# Patient Record
Sex: MALE | Race: WHITE | Employment: FULL TIME | URBAN - NONMETROPOLITAN AREA
[De-identification: names, ages, dates, MRNs, and addresses within clinical notes are randomized per-mention and may not be internally consistent; named-entity substitution may affect disease eponyms.]

---

## 2017-01-25 DIAGNOSIS — E11.65 TYPE 2 DIABETES MELLITUS WITH HYPERGLYCEMIA, WITHOUT LONG-TERM CURRENT USE OF INSULIN (HCC): ICD-10-CM

## 2017-03-04 PROBLEM — R05.9 COUGH: Status: ACTIVE | Noted: 2017-03-04

## 2017-03-08 RX ORDER — SPIRONOLACTONE 25 MG/1
25 TABLET ORAL DAILY
Qty: 90 TABLET | Refills: 3 | Status: SHIPPED | OUTPATIENT
Start: 2017-03-08 | End: 2018-03-02 | Stop reason: SDUPTHER

## 2017-03-08 RX ORDER — VALSARTAN 160 MG/1
160 TABLET ORAL DAILY
Qty: 90 TABLET | Refills: 3 | Status: SHIPPED | OUTPATIENT
Start: 2017-03-08 | End: 2017-12-21

## 2017-03-13 RX ORDER — CARVEDILOL 25 MG/1
TABLET ORAL
Qty: 60 TABLET | Refills: 2 | Status: ON HOLD | OUTPATIENT
Start: 2017-03-13 | End: 2017-03-16 | Stop reason: HOSPADM

## 2017-03-15 PROBLEM — R63.5 WEIGHT GAIN: Status: ACTIVE | Noted: 2017-03-15

## 2017-03-15 PROBLEM — I50.23 ACUTE ON CHRONIC SYSTOLIC (CONGESTIVE) HEART FAILURE (HCC): Status: ACTIVE | Noted: 2017-03-15

## 2017-04-03 RX ORDER — FUROSEMIDE 20 MG/1
20 TABLET ORAL DAILY
Qty: 30 TABLET | Refills: 3 | Status: SHIPPED | OUTPATIENT
Start: 2017-04-03 | End: 2017-11-03 | Stop reason: SDUPTHER

## 2017-04-03 RX ORDER — FUROSEMIDE 40 MG/1
40 TABLET ORAL DAILY
Qty: 30 TABLET | Refills: 3 | Status: SHIPPED | OUTPATIENT
Start: 2017-04-03 | End: 2017-09-04 | Stop reason: SDUPTHER

## 2017-04-21 RX ORDER — OMEPRAZOLE 40 MG/1
CAPSULE, DELAYED RELEASE ORAL
Qty: 90 CAPSULE | Refills: 1 | Status: SHIPPED | OUTPATIENT
Start: 2017-04-21 | End: 2017-10-20 | Stop reason: SDUPTHER

## 2017-05-01 ENCOUNTER — PROCEDURE VISIT (OUTPATIENT)
Dept: CARDIOLOGY | Age: 42
End: 2017-05-01

## 2017-05-01 DIAGNOSIS — Z95.810 S/P ICD (INTERNAL CARDIAC DEFIBRILLATOR) PROCEDURE: Primary | ICD-10-CM

## 2017-05-01 PROCEDURE — 93296 REM INTERROG EVL PM/IDS: CPT | Performed by: INTERNAL MEDICINE

## 2017-05-01 PROCEDURE — 93295 DEV INTERROG REMOTE 1/2/MLT: CPT | Performed by: INTERNAL MEDICINE

## 2017-05-03 ENCOUNTER — OFFICE VISIT (OUTPATIENT)
Dept: ENDOCRINOLOGY | Age: 42
End: 2017-05-03

## 2017-05-03 VITALS
DIASTOLIC BLOOD PRESSURE: 77 MMHG | WEIGHT: 315 LBS | SYSTOLIC BLOOD PRESSURE: 139 MMHG | BODY MASS INDEX: 40.43 KG/M2 | HEIGHT: 74 IN | HEART RATE: 105 BPM | RESPIRATION RATE: 22 BRPM

## 2017-05-03 DIAGNOSIS — Z79.4 TYPE 2 DIABETES MELLITUS WITH COMPLICATION, WITH LONG-TERM CURRENT USE OF INSULIN (HCC): Primary | ICD-10-CM

## 2017-05-03 DIAGNOSIS — E11.8 TYPE 2 DIABETES MELLITUS WITH COMPLICATION, WITH LONG-TERM CURRENT USE OF INSULIN (HCC): Primary | ICD-10-CM

## 2017-05-03 DIAGNOSIS — E78.2 MIXED HYPERLIPIDEMIA: ICD-10-CM

## 2017-05-03 LAB — GLUCOSE BLD-MCNC: 212 MG/DL

## 2017-05-03 PROCEDURE — 99214 OFFICE O/P EST MOD 30 MIN: CPT | Performed by: CLINICAL NURSE SPECIALIST

## 2017-05-03 PROCEDURE — 82962 GLUCOSE BLOOD TEST: CPT | Performed by: CLINICAL NURSE SPECIALIST

## 2017-05-03 RX ORDER — ATORVASTATIN CALCIUM 10 MG/1
10 TABLET, FILM COATED ORAL DAILY
Qty: 30 TABLET | Refills: 3 | Status: SHIPPED | OUTPATIENT
Start: 2017-05-03 | End: 2019-04-18

## 2017-05-03 ASSESSMENT — ENCOUNTER SYMPTOMS
VOICE CHANGE: 0
DIARRHEA: 0
CHEST TIGHTNESS: 0
WHEEZING: 0
EYE REDNESS: 0
SHORTNESS OF BREATH: 1
CONSTIPATION: 0
NAUSEA: 0
ABDOMINAL PAIN: 0
COUGH: 0

## 2017-05-11 ENCOUNTER — INITIAL CONSULT (OUTPATIENT)
Dept: PULMONOLOGY | Age: 42
End: 2017-05-11

## 2017-05-11 VITALS
DIASTOLIC BLOOD PRESSURE: 82 MMHG | HEART RATE: 105 BPM | OXYGEN SATURATION: 92 % | BODY MASS INDEX: 40.43 KG/M2 | HEIGHT: 74 IN | WEIGHT: 315 LBS | RESPIRATION RATE: 20 BRPM | SYSTOLIC BLOOD PRESSURE: 122 MMHG

## 2017-05-11 DIAGNOSIS — R06.83 SNORING: ICD-10-CM

## 2017-05-11 DIAGNOSIS — R68.2 DRY MOUTH: ICD-10-CM

## 2017-05-11 DIAGNOSIS — R94.30 CARDIAC LV EJECTION FRACTION 30-35%: ICD-10-CM

## 2017-05-11 DIAGNOSIS — R06.81 WITNESSED APNEIC SPELLS: ICD-10-CM

## 2017-05-11 DIAGNOSIS — I50.23 ACUTE ON CHRONIC SYSTOLIC CONGESTIVE HEART FAILURE (HCC): Primary | ICD-10-CM

## 2017-05-11 PROCEDURE — 99214 OFFICE O/P EST MOD 30 MIN: CPT | Performed by: INTERNAL MEDICINE

## 2017-05-11 RX ORDER — NEBULIZER ACCESSORIES
EACH MISCELLANEOUS
Qty: 1 EACH | Refills: 0 | Status: SHIPPED | OUTPATIENT
Start: 2017-05-11

## 2017-05-17 PROBLEM — I50.22 CHRONIC SYSTOLIC CONGESTIVE HEART FAILURE (HCC): Status: ACTIVE | Noted: 2017-03-15

## 2017-05-17 RX ORDER — FUROSEMIDE 40 MG/1
40 TABLET ORAL 2 TIMES DAILY
Qty: 60 TABLET | Refills: 5 | Status: SHIPPED | OUTPATIENT
Start: 2017-05-17 | End: 2017-06-15

## 2017-06-15 ENCOUNTER — OFFICE VISIT (OUTPATIENT)
Dept: CARDIOLOGY | Age: 42
End: 2017-06-15

## 2017-06-15 VITALS
HEIGHT: 74 IN | WEIGHT: 315 LBS | BODY MASS INDEX: 40.43 KG/M2 | DIASTOLIC BLOOD PRESSURE: 84 MMHG | HEART RATE: 98 BPM | SYSTOLIC BLOOD PRESSURE: 152 MMHG

## 2017-06-15 DIAGNOSIS — I50.22 CHRONIC SYSTOLIC CONGESTIVE HEART FAILURE (HCC): ICD-10-CM

## 2017-06-15 DIAGNOSIS — Z95.810 S/P ICD (INTERNAL CARDIAC DEFIBRILLATOR) PROCEDURE: ICD-10-CM

## 2017-06-15 DIAGNOSIS — I42.9 CARDIOMYOPATHY (HCC): Primary | ICD-10-CM

## 2017-06-15 DIAGNOSIS — I10 ESSENTIAL HYPERTENSION: ICD-10-CM

## 2017-06-15 DIAGNOSIS — Z98.890 S/P CARDIAC CATH: ICD-10-CM

## 2017-06-15 PROCEDURE — 99214 OFFICE O/P EST MOD 30 MIN: CPT | Performed by: INTERNAL MEDICINE

## 2017-06-15 RX ORDER — CARVEDILOL 25 MG/1
25 TABLET ORAL 2 TIMES DAILY WITH MEALS
Qty: 60 TABLET | Refills: 5
Start: 2017-06-15 | End: 2017-09-05 | Stop reason: SDUPTHER

## 2017-06-17 DIAGNOSIS — G47.33 OSA (OBSTRUCTIVE SLEEP APNEA): Primary | ICD-10-CM

## 2017-09-05 RX ORDER — FUROSEMIDE 40 MG/1
TABLET ORAL
Qty: 30 TABLET | Refills: 3 | Status: SHIPPED | OUTPATIENT
Start: 2017-09-05 | End: 2020-08-03 | Stop reason: SDUPTHER

## 2017-09-05 RX ORDER — CARVEDILOL 25 MG/1
25 TABLET ORAL 2 TIMES DAILY WITH MEALS
Qty: 60 TABLET | Refills: 11 | Status: SHIPPED | OUTPATIENT
Start: 2017-09-05 | End: 2018-09-23 | Stop reason: SDUPTHER

## 2017-10-11 ENCOUNTER — TELEPHONE (OUTPATIENT)
Dept: ENDOCRINOLOGY | Age: 42
End: 2017-10-11

## 2017-10-16 ENCOUNTER — OFFICE VISIT (OUTPATIENT)
Dept: PULMONOLOGY | Age: 42
End: 2017-10-16
Payer: COMMERCIAL

## 2017-10-16 VITALS
HEIGHT: 75 IN | OXYGEN SATURATION: 94 % | DIASTOLIC BLOOD PRESSURE: 78 MMHG | RESPIRATION RATE: 22 BRPM | SYSTOLIC BLOOD PRESSURE: 124 MMHG | BODY MASS INDEX: 39.17 KG/M2 | HEART RATE: 110 BPM | WEIGHT: 315 LBS

## 2017-10-16 DIAGNOSIS — Z99.89 OSA ON CPAP: Primary | ICD-10-CM

## 2017-10-16 DIAGNOSIS — G47.33 OSA ON CPAP: Primary | ICD-10-CM

## 2017-10-16 DIAGNOSIS — E66.01 MORBID OBESITY WITH BMI OF 50.0-59.9, ADULT (HCC): ICD-10-CM

## 2017-10-16 DIAGNOSIS — I42.0 DILATED CARDIOMYOPATHY (HCC): ICD-10-CM

## 2017-10-16 PROBLEM — R68.2 DRY MOUTH: Status: RESOLVED | Noted: 2017-05-11 | Resolved: 2017-10-16

## 2017-10-16 PROBLEM — R05.9 COUGH: Status: RESOLVED | Noted: 2017-03-04 | Resolved: 2017-10-16

## 2017-10-16 PROBLEM — R06.83 SNORING: Status: RESOLVED | Noted: 2017-05-11 | Resolved: 2017-10-16

## 2017-10-16 PROBLEM — R06.81 WITNESSED APNEIC SPELLS: Status: RESOLVED | Noted: 2017-05-11 | Resolved: 2017-10-16

## 2017-10-16 PROCEDURE — 99213 OFFICE O/P EST LOW 20 MIN: CPT | Performed by: PHYSICIAN ASSISTANT

## 2017-10-16 RX ORDER — FLUTICASONE PROPIONATE 50 MCG
2 SPRAY, SUSPENSION (ML) NASAL DAILY
COMMUNITY
End: 2020-02-24 | Stop reason: SDUPTHER

## 2017-10-16 RX ORDER — LORATADINE 10 MG/1
10 TABLET ORAL DAILY
COMMUNITY

## 2017-10-16 NOTE — PROGRESS NOTES
[unfilled]      Visit Date: [de-identified]  [unfilled] is a @AGE@ @SEX@ who presents today for:  [unfilled]     Subjective:   @HPI@    @PMH@  [unfilled]  [unfilled]   [unfilled]   [unfilled]  [unfilled]    ROS:   @ROS@    [unfilled]  @[facility]  All other systems reviewed and are negative    Test Results:   Lung Nodule Screening      [] Qualifies    [] Does not qualify   [] Declined    [] Completed    ***   LABS - none ***     Objective:   @VS@ at rest on Room Air  @LASTWT(3)@  Neck Circumference - *** in; Mallampati Score - ***    [unfilled]    Assessment:   [unfilled]       Plan:      ***      Electronically signed by [unfilled] on @TD@ at @NOW@              The truth is, there is nothing much we can do to make @HIS@ breathng better unless [unfilled] stops smoking.  All care efforts should be directed toward smoking cessation

## 2017-10-16 NOTE — PROGRESS NOTES
[place] [facility]      Alisson Rush                                         [id number]  10/16/2017   Chief Complaint   Patient presents with    Sleep Apnea     MIKE on CPAP - 5 mth f/u with D/L. Pt of Dr. Nick Lane    PAP Download:   Original or initial  AHI: 121.7     Date of initial study: 6/9/17    [x] Compliant  100%   []  Noncompliant 0%     PAP Type CPAP   Level  15.0 cmH2O   Avg Hrs/Day 7:23  AHI: 0.0   Recorded compliance dates , 9/12/17 to 10/15/17  Machine/Mfg: 3B Interface: Nasal    Provider:  [][facility]  [][facility]  [x][facility]  [][facility]         [][facility] []Other:   Neck Size: 21  Mallampati 2  ESS:  4    Here is a scan of the most recent download:        Presentation:   Kevin Gonzales presents for sleep medicine follow up for obstructive sleep apnea  Since the last visit, Kevin Gonzales is doing reasonably well with their sleep machine. Other comments: He is doing well with PAP. Feels much better    Equipment issues: The pressure is  acceptable, the mask is acceptable and he  is  using the humidity. Progress History:   Since last visit any new medical issues? No  New ER or hospitlal visits? No  Any new or changes in medicines? No  Any new sleep medicines? No    Sleep issues:  Do you feel better? Yes  More rested? Yes   Better concentration? yes      Past Medical History:   Diagnosis Date    [facility]est[facility] [facility]     Diabetes mellitus ([facility] [address]     S/P ICD (internal cardiac defibrillator) procedure: 1/15/2015: Medtronic Single Chamber 1/15/2015    1/15/2015: Medtronic Single Chamber.  Dr. Cynthia Riddle Sleep apnea 06/09/2017    Dr.Rovner Kendell Duron lifevest applied 8/22/14 8/29/2014    returned prior to ICD insertion 1/15       Past Surgical History:   Procedure Laterality Date    CARDIAC CATHETERIZATION  9/2014    [address]  2014    EYE SURGERY      Lasix        Social History   Substance Use Topics    Smoking status: Never person, place and time, well-developed and well-nourished, in no acute distress  Nose: Nares normal. Septum midline. Mucosa normal. No drainage or sinus tenderness. Oropharynx:  negative  Lungs: clear to auscultation bilaterally  Heart: regular rate and rhythm, S1, S2 normal, no murmur, click, rub or gallop  Extremities: extremities normal, atraumatic, no cyanosis or edema  Neurologic: Mental status: Alert, oriented, thought content appropriate      ASSESSMENT/DIAGNOSIS    1. MIKE on CPAP     2. Dilated cardiomyopathy ([address]     3. Morbid obesity with BMI of 50.0-59.9, adult [facility])              Plan   Do you need any equipment today? No    - He  was advised to continue current positive airway pressure therapy with above described pressure. - He  advised to keep good compliance with current recommended pressure to get optimal results and clinical improvement  - Recommend 7-9 hours of sleep with PAP  - He was advised to call [employer] regarding supplies if needed. - He call my office for earlier appointment if needed for worsening of sleep symptoms.   - He was instructed on weight loss  - Cristiana Schwartz was educated about my impression and plan. Patient verbalizes understanding.   We will see Leelee Oneil back in: 6 months with download    Nick Mercer PA-C, MPAS  10/16/2017

## 2017-10-18 ENCOUNTER — TELEPHONE (OUTPATIENT)
Dept: CARDIOLOGY CLINIC | Age: 42
End: 2017-10-18

## 2017-10-20 RX ORDER — OMEPRAZOLE 40 MG/1
CAPSULE, DELAYED RELEASE ORAL
Qty: 90 CAPSULE | Refills: 0 | Status: SHIPPED | OUTPATIENT
Start: 2017-10-20 | End: 2018-02-05 | Stop reason: SDUPTHER

## 2017-11-06 RX ORDER — FUROSEMIDE 20 MG/1
20 TABLET ORAL DAILY
Qty: 30 TABLET | Refills: 3 | Status: SHIPPED | OUTPATIENT
Start: 2017-11-06 | End: 2018-01-02 | Stop reason: SDUPTHER

## 2017-11-12 ENCOUNTER — HOSPITAL ENCOUNTER (EMERGENCY)
Age: 42
Discharge: HOME | End: 2017-11-12
Payer: COMMERCIAL

## 2017-11-12 VITALS
HEART RATE: 108 BPM | BODY MASS INDEX: 39.17 KG/M2 | OXYGEN SATURATION: 94 % | SYSTOLIC BLOOD PRESSURE: 127 MMHG | WEIGHT: 315 LBS | HEIGHT: 75 IN | RESPIRATION RATE: 20 BRPM | DIASTOLIC BLOOD PRESSURE: 77 MMHG | TEMPERATURE: 97.8 F

## 2017-11-12 DIAGNOSIS — J20.9 ACUTE BRONCHITIS, UNSPECIFIED ORGANISM: Primary | ICD-10-CM

## 2017-11-12 PROCEDURE — 99214 OFFICE O/P EST MOD 30 MIN: CPT | Performed by: NURSE PRACTITIONER

## 2017-11-12 PROCEDURE — 99212 OFFICE O/P EST SF 10 MIN: CPT

## 2017-11-12 RX ORDER — BENZONATATE 200 MG/1
200 CAPSULE ORAL 3 TIMES DAILY PRN
Qty: 21 CAPSULE | Refills: 0 | Status: SHIPPED | OUTPATIENT
Start: 2017-11-12 | End: 2017-11-19

## 2017-11-12 RX ORDER — PREDNISONE 20 MG/1
20 TABLET ORAL 2 TIMES DAILY
Qty: 10 TABLET | Refills: 0 | Status: SHIPPED | OUTPATIENT
Start: 2017-11-12 | End: 2017-11-17

## 2017-11-12 RX ORDER — AZITHROMYCIN 250 MG/1
TABLET, FILM COATED ORAL
Qty: 6 TABLET | Refills: 0 | Status: SHIPPED | OUTPATIENT
Start: 2017-11-12 | End: 2019-04-18

## 2017-11-12 RX ORDER — CEFDINIR 300 MG/1
300 CAPSULE ORAL 2 TIMES DAILY
Qty: 14 CAPSULE | Refills: 0 | Status: SHIPPED | OUTPATIENT
Start: 2017-11-12 | End: 2017-11-19

## 2017-11-12 ASSESSMENT — PAIN DESCRIPTION - DESCRIPTORS: DESCRIPTORS: BURNING;ITCHING

## 2017-11-12 ASSESSMENT — PAIN DESCRIPTION - PAIN TYPE: TYPE: ACUTE PAIN

## 2017-11-12 ASSESSMENT — PAIN SCALES - GENERAL: PAINLEVEL_OUTOF10: 6

## 2017-11-12 ASSESSMENT — PAIN DESCRIPTION - LOCATION: LOCATION: THROAT

## 2017-11-12 ASSESSMENT — PAIN DESCRIPTION - PROGRESSION: CLINICAL_PROGRESSION: GRADUALLY WORSENING

## 2017-11-12 ASSESSMENT — PAIN DESCRIPTION - ONSET: ONSET: GRADUAL

## 2017-11-12 NOTE — ED NOTES
Pt discharged. Discharge assessments completed. No changes. All discharge education and information given. Pt instructed to go to ED for any shortness of breath, chest pain or abd pain. Verbalized understanding. Left stable.      Angelica Leblanc LPN  11/67/98 [id number]

## 2017-11-15 ASSESSMENT — ENCOUNTER SYMPTOMS
SORE THROAT: 1
ABDOMINAL PAIN: 0
NAUSEA: 0
SINUS PRESSURE: 0
CHEST TIGHTNESS: 0
VOMITING: 0
SINUS PAIN: 0
COUGH: 1
DIARRHEA: 0
FACIAL SWELLING: 0
WHEEZING: 0
STRIDOR: 0
SINUS CONGESTION: 1
RHINORRHEA: 1
SHORTNESS OF BREATH: 0
TROUBLE SWALLOWING: 0
VOICE CHANGE: 1

## 2017-11-15 NOTE — ED PROVIDER NOTES
[id number]  Urgent Care Encounter      CHIEF COMPLAINT       Chief Complaint   Patient presents with    Sinusitis    Pharyngitis       Nurses Notes reviewed and I agree except as noted in the HPI. HISTORY OF PRESENT ILLNESS   Luis Solo is a 43 y.o. male who presents: The history is provided by the patient. Pharyngitis   Location:  Generalized  Quality:  Burning  Severity:  Moderate  Onset quality:  Sudden  Duration:  4 days  Timing:  Constant  Progression:  Unchanged  Chronicity:  New  Relieved by:  Nothing  Worsened by:  Swallowing  Associated symptoms: cough, postnasal drip, rhinorrhea, sinus congestion and voice change    Associated symptoms: no abdominal pain, no adenopathy, no chest pain, no chills, no drooling, no ear discharge, no ear pain, no fever, no headaches, no night sweats, no plugged ear sensation, no rash, no shortness of breath, no stridor and no trouble swallowing    Cough:     Cough characteristics:  Non-productive and productive    Sputum characteristics:  Clear    Severity:  Mild    Onset quality:  Gradual    Duration:  4 days    Timing:  Intermittent    Progression:  Unchanged    Chronicity:  New  Rhinorrhea:     Quality:  Clear    Severity:  Mild    Duration:  4 days    Timing:  Intermittent  Risk factors: no exposure to strep, no exposure to mono and no sick contacts        REVIEW OF SYSTEMS     Review of Systems   Constitutional: Negative for activity change, appetite change, chills, diaphoresis, fatigue, fever and night sweats. HENT: Positive for congestion, postnasal drip, rhinorrhea, sore throat and voice change. Negative for drooling, ear discharge, ear pain, facial swelling, sinus pain, sinus pressure, sneezing and trouble swallowing. Respiratory: Positive for cough. Negative for chest tightness, shortness of breath, wheezing and stridor. Cardiovascular: Negative for chest pain, palpitations and leg swelling.    Gastrointestinal: Negative for desensitization and/or provide mask of patient's choice. acetaminophen (TYLENOL) 325 MG tablet Take 650 mg by mouth every 8 hours as needed for PainHistorical Med      Dextromethorphan-Guaifenesin (MUCINEX DM)  MG TB12 Take 1 tablet by mouth 2 times daily as needed (cough/congestion)Historical Med      spironolactone (ALDACTONE) 25 MG tablet Take 1 tablet by mouth daily, Disp-90 tablet, R-3Normal      valsartan (DIOVAN) 160 MG tablet Take 1 tablet by mouth daily, Disp-90 tablet, R-3Normal      Blood Glucose Monitoring Suppl (ACCU-CHEK CLIFFORD) MAUREEN Disp-1 Device, R-0, NormalUse to test blood sugar 4 times a day DX;E11.65      insulin glargine (LANTUS SOLOSTAR) 100 UNIT/ML injection pen Inject 25 Units into the skin nightly, Disp-3 Pen, R-5Normal      insulin aspart (NOVOLOG FLEXPEN) 100 UNIT/ML injection pen Inject 4 units @ breakfast, 4 units @lunch and 4 units @ supper plus sliding scale as follows:   no insulin, 151-200 2 units, 201-250 4 units, 251-300 6 units, 301-350 8 units, 351-400 10 units, Above 400 12 units and call MD, Disp-5 Pen, R-5      Blood Glucose Monitoring Suppl (TRUE METRIX METER) W/DEVICE KIT 1 strip by Does not apply route 4 times daily (before meals and nightly), Disp-1 kit, R-0      Lancets MISC Disp-400 each, R-1, NormalFreestyle: Test BS 4 times a day      Insulin Pen Needle (MEIJER PEN NEEDLES) 31G X 8 MM MISC Disp-150 each, R-5, Djuoyu1g/day      aspirin EC 81 MG EC tablet Take 1 tablet by mouth daily. , Disp-30 tablet, R-3      atorvastatin (LIPITOR) 10 MG tablet Take 1 tablet by mouth daily, Disp-30 tablet, R-3Print      albuterol-ipratropium (COMBIVENT)  MCG/ACT inhaler Inhale 2 puffs into the lungs 2 times daily as needed for WheezingHistorical Med      nitroGLYCERIN (NITROSTAT) 0.4 MG SL tablet Place 1 tablet under the tongue every 5 minutes as needed for Chest pain up to max of 3 total doses.  If no relief after 1 dose, call 911., Disp-25 tablet, R-1Normal !! - Potential duplicate medications found. Please discuss with provider. ALLERGIES     Patient is is allergic to levaquin [levofloxacin in d5w] and statins. FAMILY HISTORY     Patient's family history includes Heart Disease in his maternal grandfather, maternal grandmother, maternal uncle, and paternal uncle. SOCIAL HISTORY     Patient  reports that he has never smoked. He has never used smokeless tobacco. He reports that he drinks about 10.8 oz of alcohol per week . He reports that he does not use drugs. PHYSICAL EXAM     ED TRIAGE VITALS  BP: 127/77, Temp: 97.8 °F (36.6 °C), Pulse: 108, Resp: 20, SpO2: 94 %  Physical Exam   Constitutional: He is oriented to person, place, and time. He appears well-developed and well-nourished. Non-toxic appearance. He does not have a sickly appearance. He does not appear ill. No distress. HENT:   Head: Normocephalic and atraumatic. Head is without right periorbital erythema and without left periorbital erythema. Right Ear: Hearing, tympanic membrane, external ear and ear canal normal.   Left Ear: Hearing, tympanic membrane, external ear and ear canal normal.   Nose: Nose normal. Right sinus exhibits no maxillary sinus tenderness and no frontal sinus tenderness. Left sinus exhibits no maxillary sinus tenderness and no frontal sinus tenderness. Mouth/Throat: Uvula is midline, oropharynx is clear and moist and mucous membranes are normal. No trismus in the jaw. No uvula swelling. No oropharyngeal exudate, posterior oropharyngeal edema or posterior oropharyngeal erythema. Neck: Trachea normal, normal range of motion and phonation normal. Neck supple. Cardiovascular: Regular rhythm, S1 normal, S2 normal and normal heart sounds. Tachycardia present. No murmur heard. Pulmonary/Chest: Effort normal and breath sounds normal. No accessory muscle usage or stridor. No respiratory distress. He has no decreased breath sounds. He has no wheezes.  He has no rhonchi. He has no rales. He exhibits no tenderness. Lymphadenopathy:        Head (right side): No submental, no submandibular, no tonsillar, no preauricular and no posterior auricular adenopathy present. Head (left side): No submental, no submandibular, no tonsillar, no preauricular and no posterior auricular adenopathy present. He has no cervical adenopathy. Neurological: He is alert and oriented to person, place, and time. Skin: Skin is warm, dry and intact. No rash noted. He is not diaphoretic. No cyanosis. No pallor. Psychiatric: He has a normal mood and affect. Nursing note and vitals reviewed. DIAGNOSTIC RESULTS   Labs:No results found for this visit on 11/12/17. IMAGING:    URGENT CARE COURSE:     Vitals:    11/12/17 1151   BP: 127/77   Pulse: 108   Resp: 20   Temp: 97.8 °F (36.6 °C)   TempSrc: Temporal   SpO2: 94%   Weight: (!) 400 lb (181.4 kg)   Height: 6' 3\" (1.905 m)       Medications - No data to display  PROCEDURES:  None  FINAL IMPRESSION      1. Acute bronchitis, unspecified organism        DISPOSITION/PLAN   DISPOSITION Decision to Discharge   History of CHF, Diabetes, sleep apnea, obesity. Patient denies any weight gain, no extremity edema or tightness. Pulse ox 94 % today looking at past visits patient runs at 94% at past visits. No respiratory distress during encounter. Afebrile, nontoxic in appearance. Presents with sore throat, burning sensation. Sinus congestion. Onset 4 days. Allergic to Levaquin  Start on Omnicef and Kaleigh Fluke + due to co morbidities  Continue Flonase for congestion, rhinorrhea, avoid over counter decongestants  Prednisone as prescribed  Tessalon Perles as prescribed for cough as needed  Push fluids  Avoid caffeine, chocolate  Warm Salt water rinses as needed   For any edema to extremities, 2lb weight gain in 24 hours 5lbs in 1 week  Worsening cough, chest pain, Shortness of breath go to ER.     Claritin daily  On Mucinex DM per

## 2017-11-28 ENCOUNTER — TELEPHONE (OUTPATIENT)
Dept: CARDIOLOGY CLINIC | Age: 42
End: 2017-11-28

## 2017-11-28 NOTE — TELEPHONE ENCOUNTER
Spoke with patient on 11.27.2017 and 11.28.17- we did not receive the carelink download on 10.4.17, 11.8. 17. Patient states that he sent the download, however we did not receive it. I explained that if the transmission goes thru successfully,  The bullseye should light up.

## 2017-11-29 ENCOUNTER — PROCEDURE VISIT (OUTPATIENT)
Dept: CARDIOLOGY CLINIC | Age: 42
End: 2017-11-29

## 2017-11-29 DIAGNOSIS — Z95.810 S/P ICD (INTERNAL CARDIAC DEFIBRILLATOR) PROCEDURE: Primary | ICD-10-CM

## 2017-12-14 DIAGNOSIS — E11.65 TYPE 2 DIABETES MELLITUS WITH HYPERGLYCEMIA, WITHOUT LONG-TERM CURRENT USE OF INSULIN (HCC): ICD-10-CM

## 2017-12-14 RX ORDER — INSULIN GLARGINE 100 [IU]/ML
20 INJECTION, SOLUTION SUBCUTANEOUS NIGHTLY
Qty: 15 PEN | Refills: 3 | Status: SHIPPED | OUTPATIENT
Start: 2017-12-14 | End: 2018-01-06 | Stop reason: SDUPTHER

## 2017-12-17 LAB
ANION GAP SERPL CALCULATED.3IONS-SCNC: 15 MMOL/L
BUN BLDV-MCNC: 11 MG/DL (ref 10–20)
CALCIUM SERPL-MCNC: 8.9 MG/DL (ref 8.7–10.8)
CHLORIDE BLD-SCNC: 100 MMOL/L (ref 95–111)
CO2: 27 MMOL/L (ref 21–32)
CREAT SERPL-MCNC: 0.7 MG/DL (ref 0.5–1.3)
EGFR AFRICAN AMERICAN: 150
EGFR IF NONAFRICAN AMERICAN: 124
GLUCOSE: 258 MG/DL (ref 70–100)
MAGNESIUM: 2 MG/DL (ref 1.7–2.4)
POTASSIUM SERPL-SCNC: 4.3 MMOL/L (ref 3.5–5.4)
SODIUM BLD-SCNC: 138 MMOL/L (ref 134–147)

## 2017-12-21 ENCOUNTER — OFFICE VISIT (OUTPATIENT)
Dept: CARDIOLOGY CLINIC | Age: 42
End: 2017-12-21
Payer: COMMERCIAL

## 2017-12-21 VITALS
SYSTOLIC BLOOD PRESSURE: 137 MMHG | HEIGHT: 75 IN | HEART RATE: 104 BPM | BODY MASS INDEX: 39.17 KG/M2 | WEIGHT: 315 LBS | DIASTOLIC BLOOD PRESSURE: 85 MMHG

## 2017-12-21 DIAGNOSIS — Z98.890 S/P CARDIAC CATH: ICD-10-CM

## 2017-12-21 DIAGNOSIS — I42.0 DILATED CARDIOMYOPATHY (HCC): ICD-10-CM

## 2017-12-21 DIAGNOSIS — E66.01 MORBID OBESITY WITH BMI OF 50.0-59.9, ADULT (HCC): ICD-10-CM

## 2017-12-21 DIAGNOSIS — Z99.89 OSA ON CPAP: ICD-10-CM

## 2017-12-21 DIAGNOSIS — I10 ESSENTIAL HYPERTENSION: ICD-10-CM

## 2017-12-21 DIAGNOSIS — R60.0 BILATERAL LEG EDEMA: ICD-10-CM

## 2017-12-21 DIAGNOSIS — G47.33 OSA ON CPAP: ICD-10-CM

## 2017-12-21 DIAGNOSIS — I50.22 CHRONIC SYSTOLIC CONGESTIVE HEART FAILURE (HCC): Primary | ICD-10-CM

## 2017-12-21 PROCEDURE — 99214 OFFICE O/P EST MOD 30 MIN: CPT | Performed by: INTERNAL MEDICINE

## 2017-12-21 NOTE — PROGRESS NOTES
Chief Complaint   Patient presents with    6 Month Follow-Up     cardiomyopathy   Seen and evaluated in the hospital for acute systolic CHF new and dexed to have low EF new  diuresed  And discharged. And under F/U    Has been admitted to hospital twice for CHF  03/2017 and treated and d/betsy by hospitalist and cardiology was not consulted  Reviewed the two admission notes    Pt here for a 6 mo fu  Feel good     No leg edema    MIKE on CPAP in June 2017 and feel better and has more energy    Denies chest pain, sob, dizziness, edema, and palpitations    He had ICD implantation on 1-15-15. Taking lasix 20 mg daily and aldactone 25 mg daily. Doing good    Currently   NO CP  NO SOB  NO palpitation  NO Dizziness  NO N/V/D  Doing well    Patient Seen, Chart, Consults notes, Labs, Radiology studies reviewed.       Patient Active Problem List   Diagnosis    CHF (congestive heart failure) (HCC) systolic chronic     Bronchitis, acute    Cardiomyopathy (HCC)-Nonischemic dilated low EF 25%, newly Dxed 08/2014- med RX- cath  mild CAD- repeat echo Nov 24, 2014- EF 25 to 30%- NEED the ICD    S/P cardiac cath- Angiographically patent coronaries-EF 20, EDP 28 mmhg- med rx    1/15/2015: Medtronic Single Chamber  [place]    Morbid obesity with BMI of 50.0-59.9, adult ([address]    HTN (hypertension)    Bilateral leg edema- +1 B/L-stable    Cough due to ACE inhibitor    Hyponatremia    Hyperglycemia    Acute on chronic congestive heart failure (HCC)    Weight gain, claimed 18lbs in 10 days since discharge    Chronic systolic congestive heart failure (HCC)    MIKE on CPAP       Past Surgical History:   Procedure Laterality Date    [facility]    CARDIAC DEFIBRILLATOR PLACEMENT  2014    CARDIAC DEFIBRILLATOR PLACEMENT      CARDIAC DEFIBRILLATOR PLACEMENT      EYE SURGERY      Lasix        Allergies   Allergen Reactions    Levaquin [Levofloxacin In D5w] Itching    Statins Other (See Comments) Muscle aches and cramps        Family History   Problem Relation Age of Onset    Heart Disease Maternal Uncle     Heart Disease Maternal Grandfather     Heart Disease Paternal Uncle     Heart Disease Maternal Grandmother         Social History     Social History    Marital status:      Spouse name: N/A    Number of children: N/A    Years of education: N/A     Occupational History     [employer]     Social History Main Topics    Smoking status: Never Smoker    Smokeless tobacco: Never Used    Alcohol use 10.8 oz/week     18 Cans of beer per week      Comment: Socially    Drug use: No    Sexual activity: Not on file     Other Topics Concern    Not on file     Social History Narrative    No narrative on file       Current Outpatient Prescriptions   Medication Sig Dispense Refill    sacubitril-valsartan (ENTRESTO) 24-26 MG per tablet Take 1 tablet by mouth 2 times daily 60 tablet 5    LANTUS SOLOSTAR 100 UNIT/ML injection pen INJECT 20 UNITS INTO THE SKIN NIGHTLY 15 pen 3    insulin aspart (NOVOLOG FLEXPEN) 100 UNIT/ML injection pen Inject 4 Units into the skin 3 times daily (before meals) Plus sliding scale. Patient uses a max of 48 units per day. 15 pen 0    UNABLE TO FIND calotren weight loss supplement      azithromycin (ZITHROMAX Z-LORIE) 250 MG tablet 2 tablets day 1 then1 tablet days 2 - 5. 6 tablet 0    furosemide (LASIX) 20 MG tablet TAKE 1 TABLET BY MOUTH DAILY 30 tablet 3    omeprazole (PRILOSEC) 40 MG delayed release capsule TAKE 1 CAPSULE BY MOUTH DAILY. 90 capsule 0    glucose blood VI test strips (ACCU-CHEK CLIFFORD PLUS) strip Use to test blood sugar 4 times a day.  Diagnosis: E11.8 150 strip 5    loratadine (CLARITIN) 10 MG tablet Take 10 mg by mouth daily      fluticasone (FLONASE) 50 MCG/ACT nasal spray 2 sprays by Nasal route daily      furosemide (LASIX) 40 MG tablet TAKE 1 TABLET BY MOUTH DAILY 30 tablet 3    carvedilol (COREG) 25 MG tablet Take 1 tablet by 137/85, pulse 104, height 6' 3\" (1.905 m), weight (!) 407 lb (184.6 kg). Physical Examination:    General appearance - alert, well appearing, and in no distress  Mental status - alert, oriented to person, place, and time  Neck - supple, no significant adenopathy, no JVD, or carotid bruits  Chest - clear to auscultation, no wheezes, rales or rhonchi, symmetric air entry  Heart - normal rate, regular rhythm, normal S1, S2, no murmurs, rubs, clicks or gallops  Abdomen - soft, nontender, nondistended, no masses or organomegaly  Neurological - alert, oriented, normal speech, no focal findings or movement disorder noted  Musculoskeletal - no joint tenderness, deformity or swelling  Extremities - peripheral pulses normal, no pedal edema, no clubbing or cyanosis  Skin - normal coloration and turgor, no rashes, no suspicious skin lesions noted    Lab  No results for input(s): CKTOTAL, CKMB, CKMBINDEX, TROPONINI in the last 72 hours.   CBC:   Lab Results   Component Value Date    WBC 10.7 03/15/2017    RBC 4.66 03/15/2017    HGB 12.4 03/15/2017    HCT 38.9 03/15/2017    MCV 83.5 03/15/2017    MCH 26.7 03/15/2017    MCHC 32.0 03/15/2017    RDW 15.7 03/15/2017     03/15/2017    MPV 7.8 03/15/2017     BMP:    Lab Results   Component Value Date     12/16/2017    K 4.3 12/16/2017     12/16/2017    CO2 27 12/16/2017    BUN 11 12/16/2017    LABALBU 3.9 04/30/2017    CREATININE 0.7 12/16/2017    CALCIUM 8.9 12/16/2017    LABGLOM >90 04/30/2017    GLUCOSE 258 12/16/2017     Hepatic Function Panel:    Lab Results   Component Value Date    ALKPHOS 92 04/30/2017     Magnesium:    Lab Results   Component Value Date    MG 2.0 12/16/2017     Warfarin PT/INR:    No components found for: PTPATWAR,  PTINRWAR  HgBA1c:    Lab Results   Component Value Date    LABA1C 11.1 04/30/2017     FLP:    Lab Results   Component Value Date    TRIG 145 04/30/2017    HDL 34 04/30/2017    LDLCALC 140 04/30/2017     TSH:    Lab Results reviewed and it look good       RTC in 6 months      Mallorie Maxwell [facility]

## 2017-12-29 ENCOUNTER — TELEPHONE (OUTPATIENT)
Dept: ENDOCRINOLOGY | Age: 42
End: 2017-12-29

## 2017-12-29 NOTE — TELEPHONE ENCOUNTER
Spoke with bertha evans and she said his bs have been elevated, she didn't not have concrete numbers for me. I told her to call back with bs readings for a week so that his insulin can be properly adjusted.

## 2018-01-02 RX ORDER — FUROSEMIDE 20 MG/1
20 TABLET ORAL DAILY
Qty: 90 TABLET | Refills: 2 | Status: SHIPPED | OUTPATIENT
Start: 2018-01-02 | End: 2018-08-07 | Stop reason: SDUPTHER

## 2018-01-04 ENCOUNTER — TELEPHONE (OUTPATIENT)
Dept: ENDOCRINOLOGY | Age: 43
End: 2018-01-04

## 2018-01-06 DIAGNOSIS — E11.65 TYPE 2 DIABETES MELLITUS WITH HYPERGLYCEMIA, WITHOUT LONG-TERM CURRENT USE OF INSULIN (HCC): ICD-10-CM

## 2018-01-07 NOTE — TELEPHONE ENCOUNTER
Take 24 units of lantus in the evening    Take meal time insulin as follows:  Breakfast: 6 uniys of humalog  Lunch:       6 units of humalog  Dinner:      6 units of humalog    Plus sliding scale humalog as follows:       Glucose: Dose:               No Insulin  151-200           1 Units  201-250 2 Units  251-300           3 units  301-350           4 units  351-400           5 units  Above 400       6 units and call MD

## 2018-01-08 NOTE — TELEPHONE ENCOUNTER
Mayito Diaz of Dr. Lopez Diss recommendations to take 34 units of Lantus in the evening and to send blood sugar readings in one week. Josue Bro stated understanding.

## 2018-01-08 NOTE — TELEPHONE ENCOUNTER
Informed Sara Arias of Dr. Onofre Bias recommendation of Humalog 6/6/6 plus sliding scale. I made sure she wrote down the sliding scale and had her repeat it back to me. However, Sara Arias stated that she made an error when reporting how much Lantus Aleda Chamber is taking in the evening. Currently, Aleda Chamber is taking 30 Units of Lantus in the evening. Stay at the recommended 24 units nightly, or 30, or something different? Please advise.

## 2018-01-09 ENCOUNTER — TELEPHONE (OUTPATIENT)
Dept: ENDOCRINOLOGY | Age: 43
End: 2018-01-09

## 2018-01-09 DIAGNOSIS — E11.65 TYPE 2 DIABETES MELLITUS WITH HYPERGLYCEMIA, WITHOUT LONG-TERM CURRENT USE OF INSULIN (HCC): ICD-10-CM

## 2018-01-16 DIAGNOSIS — Z79.4 TYPE 2 DIABETES MELLITUS WITH HYPERGLYCEMIA, WITH LONG-TERM CURRENT USE OF INSULIN (HCC): Primary | ICD-10-CM

## 2018-01-16 DIAGNOSIS — E11.65 TYPE 2 DIABETES MELLITUS WITH HYPERGLYCEMIA, WITH LONG-TERM CURRENT USE OF INSULIN (HCC): Primary | ICD-10-CM

## 2018-01-16 RX ORDER — IBUPROFEN 200 MG
1 TABLET ORAL 3 TIMES DAILY
Qty: 100 EACH | Refills: 3 | Status: SHIPPED | OUTPATIENT
Start: 2018-01-16 | End: 2020-04-03

## 2018-01-16 NOTE — TELEPHONE ENCOUNTER
Alfredo Matos called and said they still are unable to afford the Humalog/Novolog. The insurance company said they prefer Novolog but it's still $500+. I asked Alfredo Matos if Roxana Trinidad had ever tried the vials, she said no but that they would be willing to use vials if possible. Would vials of Humalog (or Novolog) be ok? We also have some samples of Novolog pens available. Eboni Stack currently ok with affording the Lantus. Let me know, thanks.

## 2018-01-16 NOTE — TELEPHONE ENCOUNTER
Prescription for Novolog vial and syringes pending. Pt takes 6/6/6 units daily plus sliding scale. Called to let Yadira Balderrama know samples of Novolog pens is Ok. Yadria Balderrama stated understanding.     Sliding scale per Alele:         Glucose:          Dose:               No Insulin  151-200           1 Units  201-250           2 Units  251-300           3 units  301-350           4 units  351-400           5 units  Above 400       6 units and call MD

## 2018-02-05 RX ORDER — OMEPRAZOLE 40 MG/1
CAPSULE, DELAYED RELEASE ORAL
Qty: 90 CAPSULE | Refills: 1 | Status: SHIPPED | OUTPATIENT
Start: 2018-02-05 | End: 2020-03-16 | Stop reason: SDUPTHER

## 2018-02-08 ENCOUNTER — NURSE ONLY (OUTPATIENT)
Dept: CARDIOLOGY CLINIC | Age: 43
End: 2018-02-08
Payer: COMMERCIAL

## 2018-02-08 ENCOUNTER — TELEPHONE (OUTPATIENT)
Dept: CARDIOLOGY CLINIC | Age: 43
End: 2018-02-08

## 2018-02-08 DIAGNOSIS — Z95.810 S/P ICD (INTERNAL CARDIAC DEFIBRILLATOR) PROCEDURE: Primary | ICD-10-CM

## 2018-02-08 PROCEDURE — 93282 PRGRMG EVAL IMPLANTABLE DFB: CPT | Performed by: INTERNAL MEDICINE

## 2018-02-08 NOTE — PROGRESS NOTES
DR CRAWFORD PT  MEDTRONIC SINGLE ICD  BATTERY 9.2 YRS REMAINING  VVI 40  V PACED <0.1%  RV WAVES 5.0  RV IMPEDENCE 399  RV 52  SVC 62  PT INFORMS THAT HE DOES HAVE SOME EDEMA TO HIS LOWER LEGS AND FEET AT TIMES .  OPTIVOL IS NOW BACK TO NORMAL   DENIES ANY INCREASED SOB OR EDEMA AT PRESENT

## 2018-02-13 ENCOUNTER — TELEPHONE (OUTPATIENT)
Dept: ENDOCRINOLOGY | Age: 43
End: 2018-02-13

## 2018-02-13 NOTE — TELEPHONE ENCOUNTER
Patient contacted our office requesting samples of humalog and Lantus. We currently don't have any humalog. [phone] Irena Manuel for samples of lantus?

## 2018-03-02 RX ORDER — VALSARTAN 160 MG/1
160 TABLET ORAL DAILY
Qty: 90 TABLET | Refills: 3 | OUTPATIENT
Start: 2018-03-02

## 2018-03-02 RX ORDER — SPIRONOLACTONE 25 MG/1
25 TABLET ORAL DAILY
Qty: 90 TABLET | Refills: 1 | Status: SHIPPED | OUTPATIENT
Start: 2018-03-02 | End: 2018-09-29 | Stop reason: SDUPTHER

## 2018-03-08 ENCOUNTER — TELEPHONE (OUTPATIENT)
Dept: CARDIOLOGY CLINIC | Age: 43
End: 2018-03-08

## 2018-03-15 ENCOUNTER — TELEPHONE (OUTPATIENT)
Dept: CARDIOLOGY CLINIC | Age: 43
End: 2018-03-15

## 2018-03-16 RX ORDER — VALSARTAN 80 MG/1
80 TABLET ORAL DAILY
COMMUNITY
End: 2019-04-18

## 2018-03-16 NOTE — TELEPHONE ENCOUNTER
Spoke with li, he will go on the valsartan (diovan) he has some at home so he will call when he needs a new script.

## 2018-05-18 ENCOUNTER — TELEPHONE (OUTPATIENT)
Dept: CARDIOLOGY CLINIC | Age: 43
End: 2018-05-18

## 2018-05-29 ENCOUNTER — PROCEDURE VISIT (OUTPATIENT)
Dept: CARDIOLOGY CLINIC | Age: 43
End: 2018-05-29
Payer: COMMERCIAL

## 2018-05-29 DIAGNOSIS — Z95.810 S/P ICD (INTERNAL CARDIAC DEFIBRILLATOR) PROCEDURE: Primary | ICD-10-CM

## 2018-05-29 PROCEDURE — 93295 DEV INTERROG REMOTE 1/2/MLT: CPT | Performed by: INTERNAL MEDICINE

## 2018-05-29 PROCEDURE — 93296 REM INTERROG EVL PM/IDS: CPT | Performed by: INTERNAL MEDICINE

## 2018-06-28 ENCOUNTER — OFFICE VISIT (OUTPATIENT)
Dept: CARDIOLOGY CLINIC | Age: 43
End: 2018-06-28
Payer: COMMERCIAL

## 2018-06-28 VITALS
DIASTOLIC BLOOD PRESSURE: 88 MMHG | SYSTOLIC BLOOD PRESSURE: 130 MMHG | HEIGHT: 75 IN | WEIGHT: 315 LBS | HEART RATE: 98 BPM | BODY MASS INDEX: 39.17 KG/M2

## 2018-06-28 DIAGNOSIS — Z98.890 S/P CARDIAC CATH: ICD-10-CM

## 2018-06-28 DIAGNOSIS — I10 ESSENTIAL HYPERTENSION: ICD-10-CM

## 2018-06-28 DIAGNOSIS — I50.22 CHRONIC SYSTOLIC CONGESTIVE HEART FAILURE (HCC): Primary | ICD-10-CM

## 2018-06-28 DIAGNOSIS — Z99.89 OSA ON CPAP: ICD-10-CM

## 2018-06-28 DIAGNOSIS — E66.01 MORBID OBESITY WITH BMI OF 50.0-59.9, ADULT (HCC): ICD-10-CM

## 2018-06-28 DIAGNOSIS — I42.0 DILATED CARDIOMYOPATHY (HCC): ICD-10-CM

## 2018-06-28 DIAGNOSIS — Z95.810 S/P ICD (INTERNAL CARDIAC DEFIBRILLATOR) PROCEDURE: ICD-10-CM

## 2018-06-28 DIAGNOSIS — G47.33 OSA ON CPAP: ICD-10-CM

## 2018-06-28 PROCEDURE — 99214 OFFICE O/P EST MOD 30 MIN: CPT | Performed by: INTERNAL MEDICINE

## 2018-06-28 PROCEDURE — 93000 ELECTROCARDIOGRAM COMPLETE: CPT | Performed by: INTERNAL MEDICINE

## 2018-06-28 RX ORDER — GLIMEPIRIDE 1 MG/1
2 TABLET ORAL 2 TIMES DAILY
COMMUNITY
End: 2020-03-25 | Stop reason: SDUPTHER

## 2018-07-17 NOTE — TELEPHONE ENCOUNTER
Mariposa Morataya called in and stated valsartan has been recalled due to it causing cancer. Patient gets cough with ace inhibitors. Advice? ?

## 2018-07-19 RX ORDER — IRBESARTAN 75 MG/1
75 TABLET ORAL NIGHTLY
COMMUNITY
End: 2018-07-19 | Stop reason: SDUPTHER

## 2018-07-20 RX ORDER — IRBESARTAN 75 MG/1
75 TABLET ORAL NIGHTLY
Qty: 30 TABLET | Refills: 3 | Status: SHIPPED | OUTPATIENT
Start: 2018-07-20 | End: 2018-11-29 | Stop reason: SDUPTHER

## 2018-08-07 RX ORDER — FUROSEMIDE 20 MG/1
20 TABLET ORAL DAILY
Qty: 90 TABLET | Refills: 1 | Status: SHIPPED | OUTPATIENT
Start: 2018-08-07 | End: 2018-11-26 | Stop reason: SDUPTHER

## 2018-08-17 ENCOUNTER — TELEPHONE (OUTPATIENT)
Dept: CARDIOLOGY CLINIC | Age: 43
End: 2018-08-17

## 2018-08-20 ENCOUNTER — PROCEDURE VISIT (OUTPATIENT)
Dept: CARDIOLOGY CLINIC | Age: 43
End: 2018-08-20

## 2018-08-20 DIAGNOSIS — Z95.810 S/P ICD (INTERNAL CARDIAC DEFIBRILLATOR) PROCEDURE: Primary | ICD-10-CM

## 2018-08-20 NOTE — PROGRESS NOTES
NC  Medtronic single lead ICD  Battery 8.5 years  V paced <0.1%  r wave 5.1mV  r wave 399 ohms  Shock 51ohms  svc 60 ohms

## 2018-09-24 RX ORDER — CARVEDILOL 25 MG/1
25 TABLET ORAL 2 TIMES DAILY WITH MEALS
Qty: 60 TABLET | Refills: 5 | Status: SHIPPED | OUTPATIENT
Start: 2018-09-24 | End: 2019-03-21 | Stop reason: SDUPTHER

## 2018-10-01 RX ORDER — SPIRONOLACTONE 25 MG/1
25 TABLET ORAL DAILY
Qty: 90 TABLET | Refills: 0 | Status: SHIPPED | OUTPATIENT
Start: 2018-10-01 | End: 2018-12-23 | Stop reason: SDUPTHER

## 2018-11-26 RX ORDER — FUROSEMIDE 20 MG/1
20 TABLET ORAL DAILY
Qty: 90 TABLET | Refills: 0 | Status: SHIPPED | OUTPATIENT
Start: 2018-11-26 | End: 2019-09-10 | Stop reason: SDUPTHER

## 2018-11-30 RX ORDER — IRBESARTAN 75 MG/1
TABLET ORAL
Qty: 30 TABLET | Refills: 0 | Status: SHIPPED | OUTPATIENT
Start: 2018-11-30 | End: 2020-05-14

## 2018-12-05 ENCOUNTER — TELEPHONE (OUTPATIENT)
Dept: CARDIOLOGY CLINIC | Age: 43
End: 2018-12-05

## 2018-12-17 ENCOUNTER — PROCEDURE VISIT (OUTPATIENT)
Dept: CARDIOLOGY CLINIC | Age: 43
End: 2018-12-17
Payer: COMMERCIAL

## 2018-12-17 DIAGNOSIS — Z95.810 S/P ICD (INTERNAL CARDIAC DEFIBRILLATOR) PROCEDURE: Primary | ICD-10-CM

## 2018-12-17 PROCEDURE — 93295 DEV INTERROG REMOTE 1/2/MLT: CPT | Performed by: INTERNAL MEDICINE

## 2018-12-17 PROCEDURE — 93296 REM INTERROG EVL PM/IDS: CPT | Performed by: INTERNAL MEDICINE

## 2018-12-24 RX ORDER — SPIRONOLACTONE 25 MG/1
TABLET ORAL
Qty: 90 TABLET | Refills: 0 | Status: SHIPPED | OUTPATIENT
Start: 2018-12-24 | End: 2019-04-15 | Stop reason: SDUPTHER

## 2019-02-27 ENCOUNTER — TELEPHONE (OUTPATIENT)
Dept: CARDIOLOGY CLINIC | Age: 44
End: 2019-02-27

## 2019-03-21 RX ORDER — CARVEDILOL 25 MG/1
25 TABLET ORAL 2 TIMES DAILY WITH MEALS
Qty: 60 TABLET | Refills: 0 | Status: SHIPPED | OUTPATIENT
Start: 2019-03-21 | End: 2019-03-27 | Stop reason: SDUPTHER

## 2019-03-27 RX ORDER — CARVEDILOL 25 MG/1
25 TABLET ORAL 2 TIMES DAILY WITH MEALS
Qty: 180 TABLET | Refills: 0 | Status: SHIPPED | OUTPATIENT
Start: 2019-03-27 | End: 2019-07-15 | Stop reason: SDUPTHER

## 2019-04-15 RX ORDER — SPIRONOLACTONE 25 MG/1
TABLET ORAL
Qty: 90 TABLET | Refills: 0 | Status: SHIPPED | OUTPATIENT
Start: 2019-04-15 | End: 2019-08-05 | Stop reason: SDUPTHER

## 2019-04-18 ENCOUNTER — OFFICE VISIT (OUTPATIENT)
Dept: CARDIOLOGY CLINIC | Age: 44
End: 2019-04-18
Payer: COMMERCIAL

## 2019-04-18 VITALS
SYSTOLIC BLOOD PRESSURE: 148 MMHG | BODY MASS INDEX: 39.17 KG/M2 | HEIGHT: 75 IN | HEART RATE: 112 BPM | DIASTOLIC BLOOD PRESSURE: 93 MMHG | WEIGHT: 315 LBS

## 2019-04-18 DIAGNOSIS — Z99.89 OSA ON CPAP: ICD-10-CM

## 2019-04-18 DIAGNOSIS — R60.0 BILATERAL LEG EDEMA: ICD-10-CM

## 2019-04-18 DIAGNOSIS — I50.42 CHRONIC COMBINED SYSTOLIC AND DIASTOLIC CONGESTIVE HEART FAILURE (HCC): Primary | ICD-10-CM

## 2019-04-18 DIAGNOSIS — G47.33 OSA ON CPAP: ICD-10-CM

## 2019-04-18 DIAGNOSIS — Z98.890 S/P CARDIAC CATH: ICD-10-CM

## 2019-04-18 DIAGNOSIS — I10 ESSENTIAL HYPERTENSION: ICD-10-CM

## 2019-04-18 DIAGNOSIS — Z95.810 S/P ICD (INTERNAL CARDIAC DEFIBRILLATOR) PROCEDURE: ICD-10-CM

## 2019-04-18 DIAGNOSIS — E66.01 MORBID OBESITY WITH BMI OF 50.0-59.9, ADULT (HCC): ICD-10-CM

## 2019-04-18 DIAGNOSIS — I42.0 DILATED CARDIOMYOPATHY (HCC): ICD-10-CM

## 2019-04-18 PROCEDURE — 99214 OFFICE O/P EST MOD 30 MIN: CPT | Performed by: INTERNAL MEDICINE

## 2019-04-18 NOTE — PROGRESS NOTES
Chief Complaint   Patient presents with    Check-Up    Congestive Heart Failure   origin Seen and evaluated in the hospital for acute systolic CHF new and dexed to have low EF new  diuresed  And discharged. And under F/U    Has been admitted to hospital twice for CHF  03/2017 and treated and d/betsy by hospitalist and cardiology was not consulted  Reviewed the two admission notes        Pt here for 10 mo check up     Sob on exertion chronic    No leg edema    MIKE on CPAP in June 2017 and feel better and has more energy    Denies chest pain, dizziness, edema, and palpitations    He had ICD implantation on 1-15-15. Taking lasix 20 mg daily and aldactone 25 mg daily. Doing good    Currently   NO CP  NO SOB  NO palpitation  NO Dizziness  NO N/V/D  Doing well    Patient Seen, Chart, Consults notes, Labs, Radiology studies reviewed.       Patient Active Problem List   Diagnosis    CHF (congestive heart failure) (HCC) systolic chronic     Bronchitis, acute    Cardiomyopathy (HCC)-Nonischemic dilated low EF 25%, newly Dxed 08/2014- med RX- cath  mild CAD- repeat echo Nov 24, 2014- EF 25 to 30%- NEED the ICD    S/P cardiac cath- Angiographically patent coronaries-EF 20, EDP 28 mmhg- med rx    1/15/2015: Medtronic Single Chamber  [place]    Morbid obesity with BMI of 50.0-59.9, adult ([address]    HTN (hypertension)    Bilateral leg edema- +1 B/L-stable    Cough due to ACE inhibitor    Hyponatremia    Hyperglycemia    Weight gain, claimed 18lbs in 10 days since discharge    Chronic systolic congestive heart failure (HCC)    MIKE on CPAP       Past Surgical History:   Procedure Laterality Date    [facility]    CARDIAC DEFIBRILLATOR PLACEMENT  2014    CARDIAC DEFIBRILLATOR PLACEMENT      CARDIAC DEFIBRILLATOR PLACEMENT      EYE SURGERY      Lasix        Allergies   Allergen Reactions    Levaquin [Levofloxacin In D5w] Itching    Statins Other (See Comments)     Muscle aches and cramps        Family History   Problem Relation Age of Onset    Heart Disease Maternal Uncle     Heart Disease Maternal Grandfather     Heart Disease Paternal Uncle     Heart Disease Maternal Grandmother         Social History     Socioeconomic History    Marital status:      Spouse name: Not on file    Number of children: Not on file    Years of education: Not on file    Highest education level: Not on file   Occupational History     Employer: [employer]   Social Needs    Financial resource strain: Not on file    Food insecurity:     Worry: Not on file     Inability: Not on file    Transportation needs:     Medical: Not on file     Non-medical: Not on file   Tobacco Use    Smoking status: Never Smoker    Smokeless tobacco: Never Used   Substance and Sexual Activity    Alcohol use:  Yes     Alcohol/week: 10.8 oz     Types: 18 Cans of beer per week     Comment: Socially    Drug use: No    Sexual activity: Not on file   Lifestyle    Physical activity:     Days per week: Not on file     Minutes per session: Not on file    Stress: Not on file   Relationships    Social connections:     Talks on phone: Not on file     Gets together: Not on file     Attends Sikhism service: Not on file     Active member of club or organization: Not on file     Attends meetings of clubs or organizations: Not on file     Relationship status: Not on file    Intimate partner violence:     Fear of current or ex partner: Not on file     Emotionally abused: Not on file     Physically abused: Not on file     Forced sexual activity: Not on file   Other Topics Concern    Not on file   Social History Narrative    Not on file       Current Outpatient Medications   Medication Sig Dispense Refill    spironolactone (ALDACTONE) 25 MG tablet TAKE 1 TABLET BY MOUTH EVERY DAY 90 tablet 0    carvedilol (COREG) 25 MG tablet Take 1 tablet by mouth 2 times daily (with meals) 180 tablet 0    irbesartan (AVAPRO) 75 MG tablet TAKE 1 TABLET BY MOUTH EVERY DAY AT NIGHT 30 tablet 0    furosemide (LASIX) 20 MG tablet Take 1 tablet by mouth daily 90 tablet 0    metFORMIN (GLUCOPHAGE) 500 MG tablet Take 500 mg by mouth 2 times daily (with meals)      glimepiride (AMARYL) 1 MG tablet Take 2 mg by mouth 2 times daily       omeprazole (PRILOSEC) 40 MG delayed release capsule TAKE 1 CAPSULE BY MOUTH DAILY. 90 capsule 1    INSULIN SYRINGE .5CC/29G ([employer] INSULIN SYRINGE .5CC/29G) 29G X 1/2\" 0.5 ML MISC 1 each by Does not apply route 3 times daily 100 each 3    insulin lispro (HUMALOG KWIKPEN) 100 UNIT/ML pen Inject 6 Units into the skin 3 times daily (before meals) Plus scale 5 pen 3    insulin glargine (LANTUS SOLOSTAR) 100 UNIT/ML injection pen Inject 24 Units into the skin nightly 15 pen 3    UNABLE TO FIND calotren weight loss supplement      glucose blood VI test strips (ACCU-CHEK CLIFFORD PLUS) strip Use to test blood sugar 4 times a day. Diagnosis: E11.8 150 strip 5    loratadine (CLARITIN) 10 MG tablet Take 10 mg by mouth daily      fluticasone (FLONASE) 50 MCG/ACT nasal spray 2 sprays by Nasal route daily      furosemide (LASIX) 40 MG tablet TAKE 1 TABLET BY MOUTH DAILY 30 tablet 3    Respiratory Therapy Supplies (ADULT MASK) MISC Please do mask desensitization and/or provide mask of patient's choice. 1 each 0    acetaminophen (TYLENOL) 325 MG tablet Take 650 mg by mouth every 8 hours as needed for Pain      Dextromethorphan-Guaifenesin (MUCINEX DM)  MG TB12 Take 1 tablet by mouth 2 times daily as needed (cough/congestion)      nitroGLYCERIN (NITROSTAT) 0.4 MG SL tablet Place 1 tablet under the tongue every 5 minutes as needed for Chest pain up to max of 3 total doses.  If no relief after 1 dose, call 911. 25 tablet 1    Blood Glucose Monitoring Suppl (ACCU-CHEK CLIFFORD) MAUREEN Use to test blood sugar 4 times a day DX;E11.65 1 Device 0    Blood Glucose Monitoring Suppl (TRUE METRIX METER) W/DEVICE KIT 1 strip by Does not apply route 4 times daily (before meals and nightly) 1 kit 0    Lancets MISC Freestyle: Test BS 4 times a day 400 each 1    Insulin Pen Needle (MEIJER PEN NEEDLES) 31G X 8 MM MISC 5x/day 150 each 5    aspirin EC 81 MG EC tablet Take 1 tablet by mouth daily. 30 tablet 3     No current facility-administered medications for this visit. Review of Systems -     General ROS: negative  Psychological ROS: negative  Hematological and Lymphatic ROS: No history of blood clots or bleeding disorder. Respiratory ROS: no cough, shortness of breath, or wheezing  Cardiovascular ROS: no chest pain or dyspnea on exertion  Gastrointestinal ROS: negative  Genito-Urinary ROS: no dysuria, trouble voiding, or hematuria  Musculoskeletal ROS: negative  Neurological ROS: no TIA or stroke symptoms  Dermatological ROS: negative      Blood pressure (!) 149/88, pulse 112, height 6' 3\" (1.905 m), weight (!) 412 lb (186.9 kg). Physical Examination:    General appearance - alert, well appearing, and in no distress  Mental status - alert, oriented to person, place, and time  Neck - supple, no significant adenopathy, no JVD, or carotid bruits  Chest - clear to auscultation, no wheezes, rales or rhonchi, symmetric air entry  Heart - normal rate, regular rhythm, normal S1, S2, no murmurs, rubs, clicks or gallops  Abdomen - soft, nontender, nondistended, no masses or organomegaly  Neurological - alert, oriented, normal speech, no focal findings or movement disorder noted  Musculoskeletal - no joint tenderness, deformity or swelling  Extremities - peripheral pulses normal, no pedal edema, no clubbing or cyanosis  Skin - normal coloration and turgor, no rashes, no suspicious skin lesions noted    Lab  No results for input(s): CKTOTAL, CKMB, CKMBINDEX, TROPONINI in the last 72 hours.   CBC:   Lab Results   Component Value Date    WBC 10.7 03/15/2017    RBC 4.66 03/15/2017    HGB 12.4 03/15/2017    HCT 38.9 03/15/2017    MCV 83.5 Medtronic Single [employer]  Basic Metabolic Panel    Magnesium    Basic Metabolic Panel    Magnesium   8. Bilateral leg edema- +1 B/L-stable  Basic Metabolic Panel    Magnesium    Basic Metabolic Panel    Magnesium       Plan   The  current meds and lab reviewed    MIKE  s/p CPAP    Continue the current treatment and with constant vigilance to changes in symptoms and also any potential side effects. Return for care or seek medical attention immediately if symptoms got worse and/or develop new symptoms. Obesity advised to lose WT  Nimo Amaya saw dietician with that regards    ICD interrogation look good  Dec 2019  Function normal    Cardiomyopathy: improving, no CHF symptoms, no change in clinical condition. Will need periodic echocardiograms depending on symptoms. On life vest for low EF  Cont   lisinopril to 20 mg   Cont. coreg to 25 po bid  Repeat echo  EF  Remain low    Hypertension, on medical treatment. Seems to be under good control. Patient is compliant with medical treatment. Cont  lisinopril to 20  And coreg      Congestive heart failure: no evidence of fluid overload today, no recent hospitalization for CHF  Cont   Lasix  40 mg at am 20  Pm qd  and aldactone 25 qd  Need BMP and mg today and prior to next visit    Wt gain > 5 lb may use added lasix  Pat did not get lab yet done  Advised to ge labs done asap  Stop  Diovan 80  Start avapro 75 mg     INS does not cover entresto 1 tab po bid    Cont F/U with CHF clinic    Need Lab prior to next visit needed    Recent lab reviewed and d.w the pat    Repeat echo  EF 25 to 30  % no much improvement - had ICD    ICD interrogation reviewed and NSVT noted    D/w the pat the plan of care    Discussed use, benefit, and side effects of prescribed medications. All patient questions answered. Pt voiced understanding. Instructed to continue current medications, diet and exercise. Continue risk factor modification and medical management.  Patient agreed with treatment plan. Follow up as directed.     Advised about need of lab done twice a yr BMP and MG         RTC in 6 months      Abdi [facility]

## 2019-05-22 ENCOUNTER — TELEPHONE (OUTPATIENT)
Dept: CARDIOLOGY CLINIC | Age: 44
End: 2019-05-22

## 2019-05-22 NOTE — LETTER
.. [address] [place] [address]  Phone: [phone]  Fax: [phone]      July 18, 2019    [address] [place] [address] Road [facility]      Dear Kimmy Wallace: Lord Brandon Taylor Final Notice       Our records indicate you are overdue to download your pacemaker/ICD/loop recorder from home or overdue for your yearly in office interrogation. It essential to have your device interrogated to stay in good standing with our office. The office has scheduled an in-office interrogation for you on ________________________________. If you choose not to keep this appointment, our office will no longer be responsible for the follow up of your pacemaker/ICD/loop recorder. If this appointment time is inconvenient for you, you may reschedule by calling [phone]. THE RESCHEDULED APPOINTMENT MUST BE WITHIN 2 WEEKS OF THE ASSIGNED SCHEDULED APPOINTMENT. If you chose to have your device followed at a new clinic, I have enclosed a release of information form. Please return the form to our clinic and we will be happy to forward your records. If you have any questions regarding the contents of this letter, you may reach us at [phone] during normal business hours. Please feel free to leave us a message.       [employer]

## 2019-05-22 NOTE — LETTER
[facility] [address] [place] [address] [address]  [place] [address].[facility] [address]  Phone: [phone]  Fax: [phone]      June 18, 2019    [address] [address]      Dear Kulwant Lanes:    2nd notice     . Robe Wheeler Our office did not receive your Carelink transmission which was scheduled for 5/20/19. Your last device check was 12/17/2018. Please check the connections of your carelink monitor and send a manual download ASAP. If the monitor appears to be connected and did not send, unplug it for three seconds, plug it back in and try to resend. If you need help sending a download, please call [employer] at [phone]. Our office is not responsible for missed transmissions. Thank You!   [facility] Pacemaker/ICD clinic

## 2019-07-15 RX ORDER — CARVEDILOL 25 MG/1
TABLET ORAL
Qty: 180 TABLET | Refills: 1 | Status: SHIPPED | OUTPATIENT
Start: 2019-07-15 | End: 2020-03-16

## 2019-07-19 ENCOUNTER — PROCEDURE VISIT (OUTPATIENT)
Dept: CARDIOLOGY CLINIC | Age: 44
End: 2019-07-19
Payer: COMMERCIAL

## 2019-07-19 DIAGNOSIS — Z95.810 S/P ICD (INTERNAL CARDIAC DEFIBRILLATOR) PROCEDURE: Primary | ICD-10-CM

## 2019-07-19 PROCEDURE — 93296 REM INTERROG EVL PM/IDS: CPT | Performed by: INTERNAL MEDICINE

## 2019-07-19 PROCEDURE — 93295 DEV INTERROG REMOTE 1/2/MLT: CPT | Performed by: INTERNAL MEDICINE

## 2019-08-06 RX ORDER — SPIRONOLACTONE 25 MG/1
TABLET ORAL
Qty: 90 TABLET | Refills: 0 | Status: SHIPPED | OUTPATIENT
Start: 2019-08-06 | End: 2019-11-02 | Stop reason: SDUPTHER

## 2019-08-14 ENCOUNTER — APPOINTMENT (OUTPATIENT)
Dept: CT IMAGING | Age: 44
End: 2019-08-14
Payer: COMMERCIAL

## 2019-08-14 ENCOUNTER — HOSPITAL ENCOUNTER (EMERGENCY)
Age: 44
Discharge: HOME | End: 2019-08-14
Payer: COMMERCIAL

## 2019-08-14 ENCOUNTER — APPOINTMENT (OUTPATIENT)
Dept: ULTRASOUND IMAGING | Age: 44
End: 2019-08-14
Payer: COMMERCIAL

## 2019-08-14 VITALS
RESPIRATION RATE: 23 BRPM | OXYGEN SATURATION: 93 % | WEIGHT: 315 LBS | BODY MASS INDEX: 39.17 KG/M2 | TEMPERATURE: 98.3 F | HEIGHT: 75 IN | SYSTOLIC BLOOD PRESSURE: 144 MMHG | DIASTOLIC BLOOD PRESSURE: 73 MMHG | HEART RATE: 96 BPM

## 2019-08-14 DIAGNOSIS — K40.90 RIGHT INGUINAL HERNIA: ICD-10-CM

## 2019-08-14 DIAGNOSIS — N45.1 EPIDIDYMITIS, LEFT: Primary | ICD-10-CM

## 2019-08-14 LAB
ALBUMIN SERPL-MCNC: 4.1 G/DL (ref 3.5–5.1)
ALP BLD-CCNC: 88 U/L (ref 38–126)
ALT SERPL-CCNC: 33 U/L (ref 11–66)
ANION GAP SERPL CALCULATED.3IONS-SCNC: 12 MEQ/L (ref 8–16)
AST SERPL-CCNC: 37 U/L (ref 5–40)
BASOPHILS # BLD: 0.4 %
BASOPHILS ABSOLUTE: 0 THOU/MM3 (ref 0–0.1)
BILIRUB SERPL-MCNC: 0.5 MG/DL (ref 0.3–1.2)
BILIRUBIN URINE: NEGATIVE
BLOOD, URINE: NEGATIVE
BUN BLDV-MCNC: 8 MG/DL (ref 7–22)
C-REACTIVE PROTEIN: 4.36 MG/DL (ref 0–1)
CALCIUM SERPL-MCNC: 8.9 MG/DL (ref 8.5–10.5)
CHARACTER, URINE: CLEAR
CHLORIDE BLD-SCNC: 101 MEQ/L (ref 98–111)
CO2: 26 MEQ/L (ref 23–33)
COLOR: YELLOW
CREAT SERPL-MCNC: 0.8 MG/DL (ref 0.4–1.2)
EKG ATRIAL RATE: 101 BPM
EKG P AXIS: 49 DEGREES
EKG P-R INTERVAL: 176 MS
EKG Q-T INTERVAL: 374 MS
EKG QRS DURATION: 98 MS
EKG QTC CALCULATION (BAZETT): 484 MS
EKG R AXIS: 40 DEGREES
EKG T AXIS: 52 DEGREES
EKG VENTRICULAR RATE: 101 BPM
EOSINOPHIL # BLD: 2.8 %
EOSINOPHILS ABSOLUTE: 0.2 THOU/MM3 (ref 0–0.4)
ERYTHROCYTE [DISTWIDTH] IN BLOOD BY AUTOMATED COUNT: 15.6 % (ref 11.5–14.5)
ERYTHROCYTE [DISTWIDTH] IN BLOOD BY AUTOMATED COUNT: 52 FL (ref 35–45)
GFR SERPL CREATININE-BSD FRML MDRD: > 90 ML/MIN/1.73M2
GLUCOSE BLD-MCNC: 191 MG/DL (ref 70–108)
GLUCOSE URINE: NEGATIVE MG/DL
HCT VFR BLD CALC: 41.1 % (ref 42–52)
HEMOGLOBIN: 12.8 GM/DL (ref 14–18)
IMMATURE GRANS (ABS): 0.08 THOU/MM3 (ref 0–0.07)
IMMATURE GRANULOCYTES: 1 %
KETONES, URINE: NEGATIVE
LEUKOCYTE ESTERASE, URINE: NEGATIVE
LYMPHOCYTES # BLD: 23.7 %
LYMPHOCYTES ABSOLUTE: 1.9 THOU/MM3 (ref 1–4.8)
MCH RBC QN AUTO: 28.7 PG (ref 26–33)
MCHC RBC AUTO-ENTMCNC: 31.1 GM/DL (ref 32.2–35.5)
MCV RBC AUTO: 92.2 FL (ref 80–94)
MONOCYTES # BLD: 5.8 %
MONOCYTES ABSOLUTE: 0.5 THOU/MM3 (ref 0.4–1.3)
NITRITE, URINE: NEGATIVE
NUCLEATED RED BLOOD CELLS: 0 /100 WBC
OSMOLALITY CALCULATION: 281 MOSMOL/KG (ref 275–300)
PH UA: 6 (ref 5–9)
PLATELET # BLD: 168 THOU/MM3 (ref 130–400)
PMV BLD AUTO: 9.9 FL (ref 9.4–12.4)
POTASSIUM SERPL-SCNC: 3.6 MEQ/L (ref 3.5–5.2)
PROTEIN UA: NEGATIVE
RBC # BLD: 4.46 MILL/MM3 (ref 4.7–6.1)
SEG NEUTROPHILS: 66.3 %
SEGMENTED NEUTROPHILS ABSOLUTE COUNT: 5.2 THOU/MM3 (ref 1.8–7.7)
SODIUM BLD-SCNC: 139 MEQ/L (ref 135–145)
SPECIFIC GRAVITY, URINE: > 1.03 (ref 1–1.03)
TOTAL PROTEIN: 7 G/DL (ref 6.1–8)
UROBILINOGEN, URINE: 0.2 EU/DL (ref 0–1)
WBC # BLD: 7.9 THOU/MM3 (ref 4.8–10.8)

## 2019-08-14 PROCEDURE — 74177 CT ABD & PELVIS W/CONTRAST: CPT

## 2019-08-14 PROCEDURE — 93005 ELECTROCARDIOGRAM TRACING: CPT | Performed by: STUDENT IN AN ORGANIZED HEALTH CARE EDUCATION/TRAINING PROGRAM

## 2019-08-14 PROCEDURE — 80053 COMPREHEN METABOLIC PANEL: CPT

## 2019-08-14 PROCEDURE — 36415 COLL VENOUS BLD VENIPUNCTURE: CPT

## 2019-08-14 PROCEDURE — 99284 EMERGENCY DEPT VISIT MOD MDM: CPT

## 2019-08-14 PROCEDURE — 96374 THER/PROPH/DIAG INJ IV PUSH: CPT

## 2019-08-14 PROCEDURE — 76870 US EXAM SCROTUM: CPT

## 2019-08-14 PROCEDURE — 6360000004 HC RX CONTRAST MEDICATION: Performed by: STUDENT IN AN ORGANIZED HEALTH CARE EDUCATION/TRAINING PROGRAM

## 2019-08-14 PROCEDURE — 81003 URINALYSIS AUTO W/O SCOPE: CPT

## 2019-08-14 PROCEDURE — 86140 C-REACTIVE PROTEIN: CPT

## 2019-08-14 PROCEDURE — 6360000002 HC RX W HCPCS: Performed by: STUDENT IN AN ORGANIZED HEALTH CARE EDUCATION/TRAINING PROGRAM

## 2019-08-14 PROCEDURE — 93010 ELECTROCARDIOGRAM REPORT: CPT | Performed by: INTERNAL MEDICINE

## 2019-08-14 PROCEDURE — 85025 COMPLETE CBC W/AUTO DIFF WBC: CPT

## 2019-08-14 RX ORDER — TRAMADOL HYDROCHLORIDE 50 MG/1
50 TABLET ORAL EVERY 6 HOURS PRN
Qty: 12 TABLET | Refills: 0 | Status: SHIPPED | OUTPATIENT
Start: 2019-08-14 | End: 2019-08-17

## 2019-08-14 RX ORDER — MORPHINE SULFATE 2 MG/ML
2 INJECTION, SOLUTION INTRAMUSCULAR; INTRAVENOUS ONCE
Status: COMPLETED | OUTPATIENT
Start: 2019-08-14 | End: 2019-08-14

## 2019-08-14 RX ORDER — SULFAMETHOXAZOLE AND TRIMETHOPRIM 800; 160 MG/1; MG/1
1 TABLET ORAL 2 TIMES DAILY
Qty: 14 TABLET | Refills: 0 | Status: SHIPPED | OUTPATIENT
Start: 2019-08-14 | End: 2019-08-21

## 2019-08-14 RX ADMIN — IOPAMIDOL 80 ML: 755 INJECTION, SOLUTION INTRAVENOUS at 14:27

## 2019-08-14 RX ADMIN — MORPHINE SULFATE 2 MG: 2 INJECTION, SOLUTION INTRAMUSCULAR; INTRAVENOUS at 13:37

## 2019-08-14 ASSESSMENT — PAIN DESCRIPTION - PAIN TYPE
TYPE: ACUTE PAIN
TYPE: ACUTE PAIN

## 2019-08-14 ASSESSMENT — PAIN DESCRIPTION - LOCATION
LOCATION: ABDOMEN;SCROTUM;GROIN
LOCATION: ABDOMEN

## 2019-08-14 ASSESSMENT — ENCOUNTER SYMPTOMS
EYE REDNESS: 0
ABDOMINAL DISTENTION: 0
RHINORRHEA: 0
ABDOMINAL PAIN: 1
SORE THROAT: 0
WHEEZING: 0
EYE DISCHARGE: 0
NAUSEA: 1
VOMITING: 1
SHORTNESS OF BREATH: 1
COUGH: 0
BACK PAIN: 0
DIARRHEA: 0

## 2019-08-14 ASSESSMENT — PAIN DESCRIPTION - DESCRIPTORS
DESCRIPTORS: TIGHTNESS;STABBING
DESCRIPTORS: CRAMPING

## 2019-08-14 ASSESSMENT — PAIN DESCRIPTION - FREQUENCY: FREQUENCY: CONTINUOUS

## 2019-08-14 ASSESSMENT — PAIN DESCRIPTION - ORIENTATION
ORIENTATION: LOWER
ORIENTATION: LOWER;LEFT;RIGHT

## 2019-08-14 ASSESSMENT — PAIN DESCRIPTION - ONSET: ONSET: ON-GOING

## 2019-08-14 ASSESSMENT — PAIN DESCRIPTION - PROGRESSION: CLINICAL_PROGRESSION: GRADUALLY WORSENING

## 2019-08-14 ASSESSMENT — PAIN SCALES - GENERAL
PAINLEVEL_OUTOF10: 6
PAINLEVEL_OUTOF10: 8

## 2019-08-14 NOTE — ED NOTES
Pt resting on cot with wife at bedside. Pt denies any needs at this time. Respirations regular, no distress noted. Call light within reach, will monitor.       Vivi Sinha RN  08/14/19 [id number]

## 2019-08-14 NOTE — ED TRIAGE NOTES
Pt presents to ED with c/o lower abdominal/groin pain that started abvout 3 days ago and states he has on going testicular pain for the last month, pt states the testicular pain moves sides. Pt states he has been straining to have bowel movements. Pt states he also has a hx of CHF and states he feels short of breath at times but believes this is due to the pain \"taking his breath away. \" Pt states he has had chills and a low grade fever of 99 degrees orally. Pt rates pain 8/10 at this time. Pt states he takes tylenol and gets relief and also states certain positions also relieve the pain. Pt denies any chest pain. Respirations are unlabored but slightly tachypnic. Sandralee Favre PAC at bedside for assessment. Call light within reach. EKG completed.

## 2019-08-14 NOTE — DISCHARGE INSTR - COC
No Isolation            Nurse Assessment:  Last Vital Signs: BP (!) 144/73   Pulse 96   Temp 98.3 °F (36.8 °C) (Oral)   Resp 23   Ht 6' 3\" (1.905 m)   Wt (!) 412 lb (186.9 kg)   SpO2 93%   BMI 51.50 kg/m²     Last documented pain score (0-10 scale): Pain Level: 6  Last Weight:   Wt Readings from Last 1 Encounters:   19 (!) 412 lb (186.9 kg)     Mental Status:  {IP PT MENTAL STATUS:}    IV Access:  { TARSHA IV ACCESS:658367128}    Nursing Mobility/ADLs:  Walking   {[facility] DME WGOR:157961178}  Transfer  {[facility] DME RBYJ:024429486}  Bathing  {[facility] DME BMGM:099922647}  Dressing  {[facility] DME HUDR:234866659}  Toileting  {[facility] DME KOA}  Feeding  {[facility] DME SZRO:871093667}  Med Admin  {[facility] DME CJIW:916491391}  Med Delivery   {[facility] MED Delivery:187418822}    Wound Care Documentation and Therapy:        Elimination:  Continence:   · Bowel: {YES / LP:08108}  · Bladder: {YES / PQ:16825}  Urinary Catheter: {Urinary Catheter:664649801}   Colostomy/Ileostomy/Ileal Conduit: {YES / OJ:55710}       Date of Last BM: ***  No intake or output data in the 24 hours ending 19 191  No intake/output data recorded.     Safety Concerns:     [employer] Safety Concerns:712758142}    Impairments/Disabilities:      [employer] Impairments/Disabilities:117190633}    Nutrition Therapy:  Current Nutrition Therapy:   [employer] Diet List:401427631}    Routes of Feeding: {[facility] DME Other Feedings:154763664}  Liquids: {Slp liquid thickness:35093}  Daily Fluid Restriction: {CHP DME Yes amt example:359191275}  Last Modified Barium Swallow with Video (Video Swallowing Test): {Done Not Done GXVE:256200093}    Treatments at the Time of Hospital Discharge:   Respiratory Treatments: ***  Oxygen Therapy:  {Therapy; copd oxygen:16420}  Ventilator:    { CC Vent USMB:511030284}    Rehab Therapies: {THERAPEUTIC INTERVENTION:6332760264}  Weight Bearing Status/Restrictions: [address] Weight Bearin}  Other Medical Equipment (for information

## 2019-08-14 NOTE — ED PROVIDER NOTES
Conjunctivae and EOM are normal.   Neck: Normal range of motion. Neck supple. No JVD present. Cardiovascular: Normal rate, regular rhythm, normal heart sounds, intact distal pulses and normal pulses. Exam reveals no gallop and no friction rub. No murmur heard. Pulmonary/Chest: Effort normal and breath sounds normal. No respiratory distress. He has no decreased breath sounds. He has no wheezes. He has no rhonchi. He has no rales. Abdominal: Soft. Bowel sounds are normal. He exhibits no distension. There is tenderness in the right lower quadrant. There is tenderness at McBurney's point. There is no rebound, no guarding, no CVA tenderness and negative Magallon's sign. Hernia confirmed negative in the right inguinal area and confirmed negative in the left inguinal area. Genitourinary: Cremasteric reflex is present. Right testis shows swelling and tenderness. Right testis shows no mass. Right testis is descended. Cremasteric reflex is not absent on the right side. Left testis shows swelling and tenderness. Left testis shows no mass. Left testis is descended. Cremasteric reflex is not absent on the left side. Genitourinary Comments: Patient has erythema and vascularization to the bilateral testicles. There is no peritoneal tenderness. Musculoskeletal: Normal range of motion. He exhibits no edema. Lymphadenopathy: No inguinal adenopathy noted on the right or left side. Neurological: He is alert and oriented to person, place, and time. He exhibits normal muscle tone. Coordination normal.   Skin: Skin is warm and dry. No rash noted. He is not diaphoretic. Nursing note and vitals reviewed.     DIFFERENTIAL DIAGNOSIS:   Rule out appendicitis, constipation, epididymitis, testicular cancer, hernia    DIAGNOSTIC RESULTS     EKG: All EKG's are interpreted by the Emergency Department Physician who either signs or Co-signs this chart in the absence of a cardiologist.       EKG 12 Lead (Preliminary result) finding. No bladder wall thickening. Small fat-containing right inguinal hernia. Scattered stool throughout the colon without wall thickening or obstruction. Normal appendix. No acute osseous findings. Normal caliber abdominal aorta. Mild umbilical infiltration which is nonspecific.                      US SCROTUM AND TESTICLES (Final result)   Result time 08/14/19 15:09:49   Final result by Julio Cesar Anthony MD (08/14/19 15:09:49)                Impression:     IMPRESSION:     1. Left epididymis appears to be malrotated. Mild increased vascularity of the epididymal tail. Inguinal canal vessels are diminutive in size with increased vascularity which may be developmental, related to epididymitis of the tail or possibly varicocele. 2. No sonographic evidence for testicular torsion. **This report has been created using voice recognition software. It may contain minor errors which are inherent in voice recognition technology. **    Final report electronically signed by Dr. Treva Moritz on 8/14/2019 3:09 PM            Narrative:    PROCEDURE: US SCROTUM AND TESTICLES    CLINICAL INFORMATION: bilateral scrotal pain and swelling, 2 months coming and going, increased vascularity on exam .    COMPARISON: No prior study. TECHNIQUE: Grayscale and color Doppler sonographic imaging of the scrotum was performed in the longitudinal and transverse planes. FINDINGS:     Right Testicle- 4.5 x 3.6 x 2.7 cm homogeneous echogenicity. No discrete mass. Arterial and venous spectral Doppler flow is documented. No torsion. Right Epididymis- 0.7 x 1.4 x 0.3 cm  Left Testicle - 4.8 x 3.6 x 3.2 cm   Left Epididymis - 1.8 x 1.2 x 0.9 cm left epididymis appears to be malrotated. Inguinal canal vessels are diminutive in size with increased vascularity which may be developmental, related to epididymitis of the tail or possibly varicocele.                   LABS:   Labs Reviewed   CBC WITH AUTO DIFFERENTIAL - Abnormal; Notable for actions.     Rony Singh PA-C 8/14/19 4:13 PM    [address] [facility]  08/14/19 3482

## 2019-08-23 ENCOUNTER — OFFICE VISIT (OUTPATIENT)
Dept: UROLOGY | Age: 44
End: 2019-08-23
Payer: COMMERCIAL

## 2019-08-23 VITALS
DIASTOLIC BLOOD PRESSURE: 88 MMHG | SYSTOLIC BLOOD PRESSURE: 138 MMHG | WEIGHT: 315 LBS | HEIGHT: 75 IN | BODY MASS INDEX: 39.17 KG/M2

## 2019-08-23 DIAGNOSIS — N52.9 ERECTILE DYSFUNCTION, UNSPECIFIED ERECTILE DYSFUNCTION TYPE: ICD-10-CM

## 2019-08-23 DIAGNOSIS — N45.1 EPIDIDYMITIS: Primary | ICD-10-CM

## 2019-08-23 LAB
BILIRUBIN URINE: NEGATIVE
BLOOD URINE, POC: NORMAL
CHARACTER, URINE: CLEAR
COLOR, URINE: YELLOW
GLUCOSE URINE: NEGATIVE MG/DL
KETONES, URINE: NEGATIVE
LEUKOCYTE CLUMPS, URINE: NEGATIVE
NITRITE, URINE: NEGATIVE
PH, URINE: 5.5 (ref 5–9)
PROTEIN, URINE: NEGATIVE MG/DL
SPECIFIC GRAVITY, URINE: 1.01 (ref 1–1.03)
UROBILINOGEN, URINE: 0.2 EU/DL (ref 0–1)

## 2019-08-23 PROCEDURE — 99204 OFFICE O/P NEW MOD 45 MIN: CPT | Performed by: NURSE PRACTITIONER

## 2019-08-23 RX ORDER — SILDENAFIL CITRATE 20 MG/1
20-100 TABLET ORAL PRN
Qty: 30 TABLET | Refills: 5 | Status: SHIPPED | OUTPATIENT
Start: 2019-08-23 | End: 2021-08-16

## 2019-09-11 RX ORDER — FUROSEMIDE 20 MG/1
TABLET ORAL
Qty: 90 TABLET | Refills: 3 | Status: SHIPPED | OUTPATIENT
Start: 2019-09-11 | End: 2020-03-16 | Stop reason: SDUPTHER

## 2019-10-16 ENCOUNTER — TELEPHONE (OUTPATIENT)
Dept: CARDIOLOGY CLINIC | Age: 44
End: 2019-10-16

## 2019-10-16 DIAGNOSIS — E66.01 MORBID OBESITY WITH BMI OF 50.0-59.9, ADULT (HCC): ICD-10-CM

## 2019-10-16 DIAGNOSIS — I10 ESSENTIAL HYPERTENSION: Primary | ICD-10-CM

## 2019-10-16 DIAGNOSIS — I50.42 CHRONIC COMBINED SYSTOLIC AND DIASTOLIC CONGESTIVE HEART FAILURE (HCC): ICD-10-CM

## 2019-10-16 DIAGNOSIS — I42.0 DILATED CARDIOMYOPATHY (HCC): ICD-10-CM

## 2019-11-03 RX ORDER — SPIRONOLACTONE 25 MG/1
TABLET ORAL
Qty: 90 TABLET | Refills: 0 | Status: SHIPPED | OUTPATIENT
Start: 2019-11-03 | End: 2020-02-24 | Stop reason: SDUPTHER

## 2019-11-11 ENCOUNTER — HOSPITAL ENCOUNTER (OUTPATIENT)
Age: 44
Discharge: HOME | End: 2019-11-11
Payer: COMMERCIAL

## 2019-11-11 DIAGNOSIS — I50.42 CHRONIC COMBINED SYSTOLIC AND DIASTOLIC CONGESTIVE HEART FAILURE (HCC): ICD-10-CM

## 2019-11-11 DIAGNOSIS — R73.9 HYPERGLYCEMIA: ICD-10-CM

## 2019-11-11 LAB
ANION GAP SERPL CALCULATED.3IONS-SCNC: 13 MEQ/L (ref 8–16)
AVERAGE GLUCOSE: 198 MG/DL (ref 70–126)
BUN BLDV-MCNC: 9 MG/DL (ref 7–22)
CALCIUM SERPL-MCNC: 9.2 MG/DL (ref 8.5–10.5)
CHLORIDE BLD-SCNC: 98 MEQ/L (ref 98–111)
CO2: 25 MEQ/L (ref 23–33)
CREAT SERPL-MCNC: 0.8 MG/DL (ref 0.4–1.2)
GFR SERPL CREATININE-BSD FRML MDRD: > 90 ML/MIN/1.73M2
GLUCOSE BLD-MCNC: 256 MG/DL (ref 70–108)
HBA1C MFR BLD: 8.6 % (ref 4.4–6.4)
MAGNESIUM: 1.9 MG/DL (ref 1.6–2.4)
POTASSIUM SERPL-SCNC: 4 MEQ/L (ref 3.5–5.2)
SODIUM BLD-SCNC: 136 MEQ/L (ref 135–145)

## 2019-11-11 PROCEDURE — 83735 ASSAY OF MAGNESIUM: CPT

## 2019-11-11 PROCEDURE — 80048 BASIC METABOLIC PNL TOTAL CA: CPT

## 2019-11-11 PROCEDURE — 36415 COLL VENOUS BLD VENIPUNCTURE: CPT

## 2019-11-11 PROCEDURE — 83036 HEMOGLOBIN GLYCOSYLATED A1C: CPT

## 2019-11-15 ENCOUNTER — NURSE ONLY (OUTPATIENT)
Dept: CARDIOLOGY CLINIC | Age: 44
End: 2019-11-15
Payer: COMMERCIAL

## 2019-11-15 ENCOUNTER — OFFICE VISIT (OUTPATIENT)
Dept: CARDIOLOGY CLINIC | Age: 44
End: 2019-11-15
Payer: COMMERCIAL

## 2019-11-15 VITALS
SYSTOLIC BLOOD PRESSURE: 157 MMHG | DIASTOLIC BLOOD PRESSURE: 97 MMHG | HEART RATE: 97 BPM | HEIGHT: 75 IN | BODY MASS INDEX: 39.17 KG/M2 | WEIGHT: 315 LBS

## 2019-11-15 DIAGNOSIS — Z98.890 S/P CARDIAC CATH: ICD-10-CM

## 2019-11-15 DIAGNOSIS — I42.0 DILATED CARDIOMYOPATHY (HCC): ICD-10-CM

## 2019-11-15 DIAGNOSIS — Z95.810 S/P ICD (INTERNAL CARDIAC DEFIBRILLATOR) PROCEDURE: Primary | ICD-10-CM

## 2019-11-15 DIAGNOSIS — E66.01 MORBID OBESITY WITH BMI OF 50.0-59.9, ADULT (HCC): ICD-10-CM

## 2019-11-15 DIAGNOSIS — I50.22 CHRONIC SYSTOLIC CONGESTIVE HEART FAILURE (HCC): Primary | ICD-10-CM

## 2019-11-15 DIAGNOSIS — I10 ESSENTIAL HYPERTENSION: ICD-10-CM

## 2019-11-15 DIAGNOSIS — Z95.810 S/P ICD (INTERNAL CARDIAC DEFIBRILLATOR) PROCEDURE: ICD-10-CM

## 2019-11-15 PROCEDURE — 99214 OFFICE O/P EST MOD 30 MIN: CPT | Performed by: INTERNAL MEDICINE

## 2019-11-15 PROCEDURE — 93282 PRGRMG EVAL IMPLANTABLE DFB: CPT | Performed by: INTERNAL MEDICINE

## 2020-01-28 ENCOUNTER — TELEPHONE (OUTPATIENT)
Dept: CARDIOLOGY CLINIC | Age: 45
End: 2020-01-28

## 2020-02-21 ENCOUNTER — TELEPHONE (OUTPATIENT)
Dept: CARDIOLOGY CLINIC | Age: 45
End: 2020-02-21

## 2020-02-21 NOTE — LETTER
.. [address] [place] [address]  Phone: [phone]  Fax: [phone]        February 21, 2020    [address] [place] [address] Road [facility]      Dear Светлана Gooden: We have been unable to contact you by phone. Alicia Maravilla .Our office did not receive your Carelink transmission which was scheduled for December 26 and January 30. Our website indicates your monitor is disconnected. Please check the connections and send a manual download ASAP. If the monitor appears to be connected and did not send, unplug it for three seconds, plug it back in and try to resend. If you need help sending a download, please call [employer] at [phone]. Our office is not responsible for missed transmissions. Please call our office at [phone] if you have questions for the pacemaker/ICD clinic     Thank You for your assistance!     [facility] Pacemaker/ICD clinic

## 2020-02-24 ENCOUNTER — TELEPHONE (OUTPATIENT)
Dept: FAMILY MEDICINE CLINIC | Age: 45
End: 2020-02-24

## 2020-02-24 ENCOUNTER — OFFICE VISIT (OUTPATIENT)
Dept: FAMILY MEDICINE CLINIC | Age: 45
End: 2020-02-24
Payer: COMMERCIAL

## 2020-02-24 VITALS
TEMPERATURE: 98.5 F | HEIGHT: 75 IN | BODY MASS INDEX: 39.17 KG/M2 | SYSTOLIC BLOOD PRESSURE: 156 MMHG | WEIGHT: 315 LBS | DIASTOLIC BLOOD PRESSURE: 74 MMHG | RESPIRATION RATE: 18 BRPM | HEART RATE: 98 BPM

## 2020-02-24 PROCEDURE — 1036F TOBACCO NON-USER: CPT | Performed by: FAMILY MEDICINE

## 2020-02-24 PROCEDURE — G8427 DOCREV CUR MEDS BY ELIG CLIN: HCPCS | Performed by: FAMILY MEDICINE

## 2020-02-24 PROCEDURE — G8484 FLU IMMUNIZE NO ADMIN: HCPCS | Performed by: FAMILY MEDICINE

## 2020-02-24 PROCEDURE — 2022F DILAT RTA XM EVC RTNOPTHY: CPT | Performed by: FAMILY MEDICINE

## 2020-02-24 PROCEDURE — 99204 OFFICE O/P NEW MOD 45 MIN: CPT | Performed by: FAMILY MEDICINE

## 2020-02-24 PROCEDURE — G8417 CALC BMI ABV UP PARAM F/U: HCPCS | Performed by: FAMILY MEDICINE

## 2020-02-24 PROCEDURE — 3046F HEMOGLOBIN A1C LEVEL >9.0%: CPT | Performed by: FAMILY MEDICINE

## 2020-02-24 RX ORDER — METFORMIN HYDROCHLORIDE 500 MG/1
1000 TABLET, EXTENDED RELEASE ORAL 2 TIMES DAILY
Qty: 180 TABLET | Refills: 3
Start: 2020-02-24 | End: 2020-05-26 | Stop reason: SDUPTHER

## 2020-02-24 RX ORDER — FLUTICASONE PROPIONATE 50 MCG
2 SPRAY, SUSPENSION (ML) NASAL DAILY
Qty: 1 BOTTLE | Refills: 2 | Status: SHIPPED | OUTPATIENT
Start: 2020-02-24

## 2020-02-24 RX ORDER — GUAIFENESIN AND DEXTROMETHORPHAN HYDROBROMIDE 600; 30 MG/1; MG/1
1 TABLET, EXTENDED RELEASE ORAL 2 TIMES DAILY PRN
Qty: 28 TABLET | Refills: 0 | Status: SHIPPED | OUTPATIENT
Start: 2020-02-24 | End: 2021-12-22

## 2020-02-24 RX ORDER — SPIRONOLACTONE 25 MG/1
TABLET ORAL
Qty: 90 TABLET | Refills: 0 | Status: SHIPPED | OUTPATIENT
Start: 2020-02-24 | End: 2020-03-16

## 2020-02-24 SDOH — ECONOMIC STABILITY: TRANSPORTATION INSECURITY
IN THE PAST 12 MONTHS, HAS THE LACK OF TRANSPORTATION KEPT YOU FROM MEDICAL APPOINTMENTS OR FROM GETTING MEDICATIONS?: NO

## 2020-02-24 SDOH — ECONOMIC STABILITY: FOOD INSECURITY: WITHIN THE PAST 12 MONTHS, THE FOOD YOU BOUGHT JUST DIDN'T LAST AND YOU DIDN'T HAVE MONEY TO GET MORE.: NEVER TRUE

## 2020-02-24 SDOH — ECONOMIC STABILITY: INCOME INSECURITY: HOW HARD IS IT FOR YOU TO PAY FOR THE VERY BASICS LIKE FOOD, HOUSING, MEDICAL CARE, AND HEATING?: NOT HARD AT ALL

## 2020-02-24 SDOH — ECONOMIC STABILITY: TRANSPORTATION INSECURITY
IN THE PAST 12 MONTHS, HAS LACK OF TRANSPORTATION KEPT YOU FROM MEETINGS, WORK, OR FROM GETTING THINGS NEEDED FOR DAILY LIVING?: NO

## 2020-02-24 SDOH — ECONOMIC STABILITY: FOOD INSECURITY: WITHIN THE PAST 12 MONTHS, YOU WORRIED THAT YOUR FOOD WOULD RUN OUT BEFORE YOU GOT MONEY TO BUY MORE.: NEVER TRUE

## 2020-02-24 ASSESSMENT — ENCOUNTER SYMPTOMS
ABDOMINAL PAIN: 0
SINUS PRESSURE: 0
COUGH: 0
SHORTNESS OF BREATH: 0
ABDOMINAL DISTENTION: 0
EYE PAIN: 0
CONSTIPATION: 0
DIARRHEA: 0
RHINORRHEA: 0
NAUSEA: 0
SORE THROAT: 0

## 2020-02-24 ASSESSMENT — PATIENT HEALTH QUESTIONNAIRE - PHQ9
SUM OF ALL RESPONSES TO PHQ9 QUESTIONS 1 & 2: 0
2. FEELING DOWN, DEPRESSED OR HOPELESS: 0
1. LITTLE INTEREST OR PLEASURE IN DOING THINGS: 0
SUM OF ALL RESPONSES TO PHQ QUESTIONS 1-9: 0
SUM OF ALL RESPONSES TO PHQ QUESTIONS 1-9: 0

## 2020-02-24 NOTE — TELEPHONE ENCOUNTER
Pt is scheduled for 2/24/2020 at 2:30 with Dr. Laureano Mackenzie. Left a reminder message on mobile number informing him of the appt. If unable to keep appt he needs to call and cancel.

## 2020-02-25 NOTE — PROGRESS NOTES
[address] [facility] 100 [facility] [address]  Dept: [phone]  Dept Fax: [phone]  Loc: [phone]  PROGRESS NOTE      VisitDate: 2/24/2020    Enrrique Ratliff is a 40 y.o. male who presents today for:     Chief Complaint   Patient presents with    New Patient     get established, wife works for [employer], needed [address] provider-wife sees TM    Diabetes     metformin making him gain weight, interested in maybe trying trajenta    Arthritis     Mom and grandfather have hx of rheumatoid arthritis, c/o back pain         Subjective:  HPI  New patient presents to establish relationship in for follow-up of his chronic conditions. History of type 2 diabetes historically poorly controlled diagnosed about 4 years ago medication list reviewed. No recent labs. We discussed his diet and he does not follow any specific diet but just tries to eyeball his food. We discussed his exercise which is pretty much nonexistent confounded by the fact that he has a history of chronic combined systolic and diastolic heart failure. He is following with cardiology his multiple cardiac meds he is on an arm spironolactone and beta-blocker. He reports most recent EF was in the mid 25s. .  Patient also reports family history of rheumatoid arthritis complains of pain stiffness in his elbows and knees stiffness last more than an hour the morning also has some pain and stiffness in his back and neck no medications other than Tylenol    Review of Systems   Constitutional: Negative for appetite change and fever. HENT: Negative for congestion, ear pain, postnasal drip, rhinorrhea, sinus pressure and sore throat. Eyes: Negative for pain and visual disturbance. Respiratory: Negative for cough and shortness of breath. Cardiovascular: Negative for chest pain. Gastrointestinal: Negative for abdominal distention, abdominal pain, constipation, diarrhea and nausea. carvedilol (COREG) 25 MG tablet TAKE 1 TABLET BY MOUTH TWICE A DAY WITH MEALS 180 tablet 1    irbesartan (AVAPRO) 75 MG tablet TAKE 1 TABLET BY MOUTH EVERY DAY AT NIGHT 30 tablet 0    glimepiride (AMARYL) 1 MG tablet Take 2 mg by mouth 2 times daily       omeprazole (PRILOSEC) 40 MG delayed release capsule TAKE 1 CAPSULE BY MOUTH DAILY. 90 capsule 1    INSULIN SYRINGE .5CC/29G ([employer] INSULIN SYRINGE .5CC/29G) 29G X 1/2\" 0.5 ML MISC 1 each by Does not apply route 3 times daily 100 each 3    insulin lispro (HUMALOG KWIKPEN) 100 UNIT/ML pen Inject 6 Units into the skin 3 times daily (before meals) Plus scale 5 pen 3    insulin glargine (LANTUS SOLOSTAR) 100 UNIT/ML injection pen Inject 24 Units into the skin nightly 15 pen 3    glucose blood VI test strips (ACCU-CHEK CLIFFORD PLUS) strip Use to test blood sugar 4 times a day. Diagnosis: E11.8 150 strip 5    loratadine (CLARITIN) 10 MG tablet Take 10 mg by mouth daily      furosemide (LASIX) 40 MG tablet TAKE 1 TABLET BY MOUTH DAILY 30 tablet 3    Respiratory Therapy Supplies (ADULT MASK) MISC Please do mask desensitization and/or provide mask of patient's choice. 1 each 0    acetaminophen (TYLENOL) 325 MG tablet Take 650 mg by mouth every 8 hours as needed for Pain      nitroGLYCERIN (NITROSTAT) 0.4 MG SL tablet Place 1 tablet under the tongue every 5 minutes as needed for Chest pain up to max of 3 total doses. If no relief after 1 dose, call 911. 25 tablet 1    Blood Glucose Monitoring Suppl (ACCU-CHEK CLIFFORD) MAUREEN Use to test blood sugar 4 times a day DX;E11.65 1 Device 0    Blood Glucose Monitoring Suppl (TRUE METRIX METER) W/DEVICE KIT 1 strip by Does not apply route 4 times daily (before meals and nightly) 1 kit 0    Lancets MISC Freestyle: Test BS 4 times a day 400 each 1    Insulin Pen Needle (MEIJER PEN NEEDLES) 31G X 8 MM MISC 5x/day 150 each 5    aspirin EC 81 MG EC tablet Take 1 tablet by mouth daily.  30 tablet 3     No current and symmetric. BP (!) 156/74 (Site: Right Upper Arm)   Pulse 98   Temp 98.5 °F (36.9 °C) (Oral)   Resp 18   Ht 6' 3\" (1.905 m)   Wt (!) 408 lb 12.8 oz (185.4 kg)   BMI 51.10 kg/m²       Impression/Plan:  1. Type 2 diabetes mellitus with hyperglycemia, with long-term current use of insulin ([address]    2. Polyarthralgia    3.  Chronic combined systolic and diastolic congestive heart failure (HCC)      Requested Prescriptions     Signed Prescriptions Disp Refills    spironolactone (ALDACTONE) 25 MG tablet 90 tablet 0     Sig: TAKE 1 TABLET BY MOUTH EVERY DAY    fluticasone (FLONASE) 50 MCG/ACT nasal spray 1 Bottle 2     Si sprays by Nasal route daily    Dextromethorphan-guaiFENesin (MUCINEX DM)  MG TB12 28 tablet 0     Sig: Take 1 tablet by mouth 2 times daily as needed (cough/congestion)    metFORMIN (GLUCOPHAGE-XR) 500 MG extended release tablet 180 tablet 3     Sig: Take 2 tablets by mouth 2 times daily     Orders Placed This Encounter   Procedures    Lipid Panel     Standing Status:   Future     Standing Expiration Date:   2021     Order Specific Question:   Is Patient Fasting?/# of Hours     Answer:   yes/12    Hemoglobin A1C     Standing Status:   Future     Standing Expiration Date:   2021    Comprehensive Metabolic Panel     Standing Status:   Future     Standing Expiration Date:   2021    Microalbumin / Creatinine Urine Ratio     Standing Status:   Future     Standing Expiration Date:   2021    CBC Auto Differential     Standing Status:   Future     Standing Expiration Date:   2021    Rheumatoid Factor     Standing Status:   Future     Standing Expiration Date:   2021    TOÑO Screen with Reflex     Standing Status:   Future     Standing Expiration Date:   2021    Sedimentation Rate     Standing Status:   Future     Standing Expiration Date:   2021     45 minutes face-to-face time 50 that time spent on counseling regarding diabetic diet carb

## 2020-03-02 ENCOUNTER — CLINICAL DOCUMENTATION (OUTPATIENT)
Dept: PHARMACY | Facility: CLINIC | Age: 45
End: 2020-03-02

## 2020-03-02 NOTE — LETTER
Kinza Brandon   [address] [facility]           03/02/20     Dear Rene Perez,    Thanks so much for taking the first step towards better health. This letter is to inform you that we have received your enrollment form for the [address] [place], [place] [address] Diabetes Management Program but you are missing the following requirements or documentation:    Lipid panel drawn yearly   It appears that your Provider ordered the above test on 2/24/20. To qualify for this program the above requirements must be met by 3/15/20. Results or visits obtained outside of [facility] will need to be provided by fax or email to the numbers listed below before the deadline in order to qualify for the program.    If requirements are not met by the date listed above you will be disqualified from the program and the credit valued at $600 towards your diabetic medications and supplies will be revoked. You will be able to reapply the following calendar year. [employer] Team    [phone] Option #7    Email: [email or web address]    Fax Number: [phone]

## 2020-03-02 NOTE — LETTER
Michelle Jenkinsom   [address] [facility]  [address] [address]           03/30/20     Dear Sylvester Crockett,    [address]! You have successfully enrolled in the Be Well With Diabetes program for 2020. What you receive  Beginning April 1, you will begin receiving up to $600 in waived copays for specific medications and pharmacy-related supplies purchased through our home delivery pharmacy, [facility]. A list of eligible medications and pharmacy supplies can be found at [employer] under Be Well With Diabetes. In addition, youll receive advice and help from our pharmacists, associate care management team and diabetes educators. (And, if you also participate in the Be Well program, you can earn points and Lifestyle Management or Health Management program credit, if applicable.)        What you need to do  To maintain your benefit this year and remain eligible next year, complete the following requirements:          Remember, program requirements must be completed by deadlines shown. If not, your benefit may be terminated, and you will not be eligible to participate again until the following year. To keep you on track, well review your [employer] account and send reminders for action. (If you dont have a [address], submit documentation to [email or web address] or by fax to [phone].)    Congratulations and thank you for taking steps to improve your health and to Be Well With Diabetes. [address] [facility]  Phone: [phone], option 7  Email: [email or web address]  Fax: [phone]

## 2020-03-14 ENCOUNTER — HOSPITAL ENCOUNTER (OUTPATIENT)
Age: 45
Discharge: HOME | End: 2020-03-14
Payer: COMMERCIAL

## 2020-03-14 LAB
ALBUMIN SERPL-MCNC: 3.6 G/DL (ref 3.5–5.1)
ALP BLD-CCNC: 112 U/L (ref 38–126)
ALT SERPL-CCNC: 54 U/L (ref 11–66)
ANION GAP SERPL CALCULATED.3IONS-SCNC: 14 MEQ/L (ref 8–16)
AST SERPL-CCNC: 63 U/L (ref 5–40)
AVERAGE GLUCOSE: 225 MG/DL (ref 70–126)
BASOPHILS # BLD: 0.4 %
BASOPHILS ABSOLUTE: 0 THOU/MM3 (ref 0–0.1)
BILIRUB SERPL-MCNC: 0.5 MG/DL (ref 0.3–1.2)
BUN BLDV-MCNC: 8 MG/DL (ref 7–22)
CALCIUM SERPL-MCNC: 8.9 MG/DL (ref 8.5–10.5)
CHLORIDE BLD-SCNC: 100 MEQ/L (ref 98–111)
CHOLESTEROL, TOTAL: 157 MG/DL (ref 100–199)
CO2: 22 MEQ/L (ref 23–33)
CREAT SERPL-MCNC: 0.5 MG/DL (ref 0.4–1.2)
CREATININE, URINE: 278.3 MG/DL
EOSINOPHIL # BLD: 3.6 %
EOSINOPHILS ABSOLUTE: 0.3 THOU/MM3 (ref 0–0.4)
ERYTHROCYTE [DISTWIDTH] IN BLOOD BY AUTOMATED COUNT: 14.6 % (ref 11.5–14.5)
ERYTHROCYTE [DISTWIDTH] IN BLOOD BY AUTOMATED COUNT: 49.7 FL (ref 35–45)
GFR SERPL CREATININE-BSD FRML MDRD: > 90 ML/MIN/1.73M2
GLUCOSE BLD-MCNC: 217 MG/DL (ref 70–108)
HBA1C MFR BLD: 9.5 % (ref 4.4–6.4)
HCT VFR BLD CALC: 43.9 % (ref 42–52)
HDLC SERPL-MCNC: 35 MG/DL
HEMOGLOBIN: 13.8 GM/DL (ref 14–18)
IMMATURE GRANS (ABS): 0.05 THOU/MM3 (ref 0–0.07)
IMMATURE GRANULOCYTES: 0.6 %
LDL CHOLESTEROL CALCULATED: 92 MG/DL
LYMPHOCYTES # BLD: 25.3 %
LYMPHOCYTES ABSOLUTE: 1.9 THOU/MM3 (ref 1–4.8)
MCH RBC QN AUTO: 28.8 PG (ref 26–33)
MCHC RBC AUTO-ENTMCNC: 31.4 GM/DL (ref 32.2–35.5)
MCV RBC AUTO: 91.6 FL (ref 80–94)
MICROALBUMIN UR-MCNC: 8.63 MG/DL
MICROALBUMIN/CREAT UR-RTO: 31 MG/G (ref 0–30)
MONOCYTES # BLD: 6.4 %
MONOCYTES ABSOLUTE: 0.5 THOU/MM3 (ref 0.4–1.3)
NUCLEATED RED BLOOD CELLS: 0 /100 WBC
PLATELET # BLD: 183 THOU/MM3 (ref 130–400)
PMV BLD AUTO: 10.1 FL (ref 9.4–12.4)
POTASSIUM SERPL-SCNC: 4.4 MEQ/L (ref 3.5–5.2)
RBC # BLD: 4.79 MILL/MM3 (ref 4.7–6.1)
RHEUMATOID FACTOR: < 10 IU/ML (ref 0–13)
SEDIMENTATION RATE, ERYTHROCYTE: 6 MM/HR (ref 0–10)
SEG NEUTROPHILS: 63.7 %
SEGMENTED NEUTROPHILS ABSOLUTE COUNT: 4.9 THOU/MM3 (ref 1.8–7.7)
SODIUM BLD-SCNC: 136 MEQ/L (ref 135–145)
TOTAL PROTEIN: 7 G/DL (ref 6.1–8)
TRIGL SERPL-MCNC: 151 MG/DL (ref 0–199)
WBC # BLD: 7.7 THOU/MM3 (ref 4.8–10.8)

## 2020-03-14 PROCEDURE — 80061 LIPID PANEL: CPT

## 2020-03-14 PROCEDURE — 82043 UR ALBUMIN QUANTITATIVE: CPT

## 2020-03-14 PROCEDURE — 83036 HEMOGLOBIN GLYCOSYLATED A1C: CPT

## 2020-03-14 PROCEDURE — 85651 RBC SED RATE NONAUTOMATED: CPT

## 2020-03-14 PROCEDURE — 36415 COLL VENOUS BLD VENIPUNCTURE: CPT

## 2020-03-14 PROCEDURE — 86430 RHEUMATOID FACTOR TEST QUAL: CPT

## 2020-03-14 PROCEDURE — 80053 COMPREHEN METABOLIC PANEL: CPT

## 2020-03-14 PROCEDURE — 86038 ANTINUCLEAR ANTIBODIES: CPT

## 2020-03-14 PROCEDURE — 85025 COMPLETE CBC W/AUTO DIFF WBC: CPT

## 2020-03-16 RX ORDER — CARVEDILOL 25 MG/1
TABLET ORAL
Qty: 180 TABLET | Refills: 1 | Status: ON HOLD | OUTPATIENT
Start: 2020-03-16 | End: 2020-11-14 | Stop reason: SDUPTHER

## 2020-03-16 RX ORDER — SPIRONOLACTONE 25 MG/1
TABLET ORAL
Qty: 90 TABLET | Refills: 0 | Status: SHIPPED | OUTPATIENT
Start: 2020-03-16 | End: 2020-05-26

## 2020-03-16 RX ORDER — OMEPRAZOLE 40 MG/1
40 CAPSULE, DELAYED RELEASE ORAL DAILY
Qty: 90 CAPSULE | Refills: 1 | Status: SHIPPED | OUTPATIENT
Start: 2020-03-16 | End: 2022-08-29 | Stop reason: SDUPTHER

## 2020-03-16 RX ORDER — CARVEDILOL 25 MG/1
25 TABLET ORAL 2 TIMES DAILY WITH MEALS
Qty: 180 TABLET | Refills: 1 | Status: SHIPPED
Start: 2020-03-16 | End: 2020-04-03 | Stop reason: SDUPTHER

## 2020-03-16 RX ORDER — FUROSEMIDE 20 MG/1
TABLET ORAL
Qty: 90 TABLET | Refills: 1 | Status: SHIPPED | OUTPATIENT
Start: 2020-03-16 | End: 2020-08-03 | Stop reason: SDUPTHER

## 2020-03-16 NOTE — TELEPHONE ENCOUNTER
Last visit- 2/24/2020  Next visit- Visit date not found    Requested Prescriptions     Pending Prescriptions Disp Refills    spironolactone (ALDACTONE) 25 MG tablet [Pharmacy Med Name: SPIRONOLACTONE 25MG TABS] 90 tablet 0     Sig: TAKE 1 TABLET BY MOUTH ONCE DAILY.

## 2020-03-18 ENCOUNTER — TELEPHONE (OUTPATIENT)
Dept: FAMILY MEDICINE CLINIC | Age: 45
End: 2020-03-18

## 2020-03-18 LAB — ANA SCREEN: NORMAL

## 2020-03-18 NOTE — TELEPHONE ENCOUNTER
----- Message from Kindra Guzmán MD sent at 3/18/2020 11:43 AM EDT -----  Glucose high, sugars uncontrolled. Increase lantus to 30 units, humalog to 8 units.   Cmp, hga1c in 3 mon

## 2020-03-18 NOTE — TELEPHONE ENCOUNTER
Called and LM advising patient on result note. Advised if any questions to contact office. Orders placed.

## 2020-03-25 RX ORDER — GLIMEPIRIDE 1 MG/1
2 TABLET ORAL 2 TIMES DAILY
Qty: 180 TABLET | Refills: 3 | Status: SHIPPED | OUTPATIENT
Start: 2020-03-25 | End: 2020-04-04

## 2020-03-25 NOTE — TELEPHONE ENCOUNTER
Willian Silva called requesting a refill on the following medications:  Requested Prescriptions     Pending Prescriptions Disp Refills    glimepiride (AMARYL) 1 MG tablet       Sig: Take 2 tablets by mouth 2 times daily     Pharmacy verified:  .pv    [address] [facility]    Date of last visit: 2/24/20  Date of next visit (if applicable): Visit date not found

## 2020-03-30 NOTE — PROGRESS NOTES
Lipid Panel completed on 3/14/20. Pharmacy Pop Care Documentation:   Patient has completed all the requirements and therefore will be enrolled in the DM Program on 4/01/20. Letter mailed to patient.     Page Garner, [employer] [facility]   Department, toll free: [phone], option 7

## 2020-04-01 ENCOUNTER — ENROLLMENT (OUTPATIENT)
Dept: PHARMACY | Facility: CLINIC | Age: 45
End: 2020-04-01

## 2020-04-01 ENCOUNTER — TELEPHONE (OUTPATIENT)
Dept: PHARMACY | Facility: CLINIC | Age: 45
End: 2020-04-01

## 2020-04-01 ENCOUNTER — CLINICAL DOCUMENTATION (OUTPATIENT)
Dept: PHARMACY | Facility: CLINIC | Age: 45
End: 2020-04-01

## 2020-04-01 NOTE — PROGRESS NOTES
Pharmacy Pop Care Documentation:      The application for Matias Conklin for enrollment into the diabetes management program has been reviewed and accepted on 4/1/20.     Teresa Sis

## 2020-04-03 ENCOUNTER — SCHEDULED TELEPHONE ENCOUNTER (OUTPATIENT)
Dept: PHARMACY | Facility: CLINIC | Age: 45
End: 2020-04-03

## 2020-04-03 ENCOUNTER — TELEPHONE (OUTPATIENT)
Dept: FAMILY MEDICINE CLINIC | Age: 45
End: 2020-04-03

## 2020-04-03 NOTE — TELEPHONE ENCOUNTER
Dr. Amarilis Rivas MD,    Your patient is currently enrolled in the [address] [place], [place] [address] Employee Diabetes Program. After your patient's recent visit with a [facility] Clinical Pharmacist, the below were identified as opportunities to assist with their diabetes management (if patient is not eligible for below recommendations, please reply with reason/contraindication):   · Patient is currently taking Humalog and glimepiride. Both medications work in a similar manner. Would the patient benefit from discontinuing glimepiride to decrease risk of hypoglycemia? · According to patient's most recent A1c, diabetes is not controlled. Would the patient benefit from starting Ozempic to help control diabetes and promote weight loss? Patient is agreeable to starting GLP1. For your convenience, I have pended a prescription to be sent to Home Delivery pharmacy if you agree. · Due to the patient having diabetes, it is recommended for the patient to be on a statin. Patient is agreeable to try another statin besides atorvastatin such as rosuvastatin. For your convenience, I have pended a prescription to be sent to Home Delivery pharmacy if you agree. · Patient would like a meter that is covered through insurance and DM program. For your convenience, I have pended a prescription to be sent to Home Delivery pharmacy if you agree. · Through the DM program, patient can get aspirin for a $0 copay when filled through the Home Delivery Pharmacy. For your convenience, I have pended a prescription to be sent to Home Delivery pharmacy if you agree. · Patient is low on pen needles for insulins and needs a refill. For your convenience, I have pended a prescription to be sent to Home Delivery pharmacy if you agree. · Due to diagnosis of diabetes, patient is eligible for [id number] vaccination  See pharmacist note dated 4/3/2020 for complete details.      To remain active in the program, the patient is to meet the requirements and

## 2020-04-03 NOTE — TELEPHONE ENCOUNTER
[facility] Employee Diabetes Program  =================================================================  Reese Berger is a 40 y.o. male enrolled in the [facility] Employee Diabetes Program. Patient provided [place] with verbal consent to remain in the program for this year. Medications:  Current Outpatient Medications   Medication Sig Dispense Refill    glimepiride (AMARYL) 1 MG tablet Take 2 tablets by mouth 2 times daily 180 tablet 3    carvedilol (COREG) 25 MG tablet TAKE 1 TABLET BY MOUTH TWICE A DAY WITH MEALS 180 tablet 1    spironolactone (ALDACTONE) 25 MG tablet TAKE 1 TABLET BY MOUTH ONCE DAILY.  90 tablet 0    omeprazole (PRILOSEC) 40 MG delayed release capsule Take 1 capsule by mouth daily 90 capsule 1    furosemide (LASIX) 20 MG tablet TAKE 1 TABLET BY MOUTH EVERY DAY 90 tablet 1    fluticasone (FLONASE) 50 MCG/ACT nasal spray 2 sprays by Nasal route daily 1 Bottle 2    Dextromethorphan-guaiFENesin (MUCINEX DM)  MG TB12 Take 1 tablet by mouth 2 times daily as needed (cough/congestion) 28 tablet 0    metFORMIN (GLUCOPHAGE-XR) 500 MG extended release tablet Take 2 tablets by mouth 2 times daily 180 tablet 3    sildenafil (REVATIO) 20 MG tablet Take 1-5 tablets by mouth as needed (ED) 30 tablet 5    irbesartan (AVAPRO) 75 MG tablet TAKE 1 TABLET BY MOUTH EVERY DAY AT NIGHT 30 tablet 0    insulin lispro (HUMALOG KWIKPEN) 100 UNIT/ML pen Inject 6 Units into the skin 3 times daily (before meals) Plus scale (Patient taking differently: Inject 6 Units into the skin 3 times daily (before meals) Plus scale 2 for every 50 above 150) 5 pen 3    insulin glargine (LANTUS SOLOSTAR) 100 UNIT/ML injection pen Inject 24 Units into the skin nightly (Patient taking differently: Inject 30 Units into the skin nightly ) 15 pen 3    loratadine (CLARITIN) 10 MG tablet Take 10 mg by mouth daily      furosemide (LASIX) 40 MG tablet TAKE 1 TABLET BY MOUTH DAILY 30 tablet 3    acetaminophen (TYLENOL) 325 MG tablet Take 650 mg by mouth every 8 hours as needed for Pain      nitroGLYCERIN (NITROSTAT) 0.4 MG SL tablet Place 1 tablet under the tongue every 5 minutes as needed for Chest pain up to max of 3 total doses. If no relief after 1 dose, call 911. 25 tablet 1    aspirin EC 81 MG EC tablet Take 1 tablet by mouth daily. 30 tablet 3    Respiratory Therapy Supplies (ADULT MASK) MISC Please do mask desensitization and/or provide mask of patient's choice. 1 each 0    Insulin Pen Needle (MEIJER PEN NEEDLES) 31G X 8 MM MISC 5x/day 150 each 5     No current facility-administered medications for this visit. Current Pharmacy: [facility]  Current testing supplies/frequency: Accu Check interested [employer] meter  Pen needles/syringes: Generic    Allergies:   Allergies   Allergen Reactions    Levaquin [Levofloxacin In D5w] Itching    Statins Other (See Comments)     Muscle aches and cramps      Vitals/Labs:  BP Readings from Last 3 Encounters:   02/24/20 (!) 156/74   11/15/19 (!) 157/97   08/23/19 138/88     Lab Results   Component Value Date    LABMICR 8.63 03/14/2020     Lab Results   Component Value Date    LABA1C 9.5 (H) 03/14/2020    LABA1C 8.6 (H) 11/11/2019    LABA1C 11.1 (H) 04/30/2017     Lab Results   Component Value Date    CHOL 157 03/14/2020    TRIG 151 03/14/2020    HDL 35 03/14/2020    LDLCALC 92 03/14/2020     ALT   Date Value Ref Range Status   03/14/2020 54 11 - 66 U/L Final     Comment:     Performed at [facility], [address]     AST   Date Value Ref Range Status   03/14/2020 63 (H) 5 - 40 U/L Final     The 10-year ASCVD risk score (Milad Raines., et al., 2013) is: 5.9%    Values used to calculate the score:      Age: 40 years      Sex: Male      Is Non- : No      Diabetic: Yes      Tobacco smoker: No      Systolic Blood Pressure: 004 mmHg      Is BP treated: Yes      HDL Cholesterol: 35 mg/dL      Total 2 DM under poor control as evidenced by A1c 9.2%. - Current symptoms/problems include none  - Home blood sugar records:  fasting range: 190-200, postprandial range: 180-220, patient tests 2 time(s) per day  - Any episodes of hypoglycemia? no  - Known diabetic complications: none  - Eye exam current (within one year): yes  - Foot exam current (within one year): yes  - Daily Aspirin? Yes  - Medication compliance: compliant most of the time  - Diet compliance: compliant most of the time working on improving   - Current exercise: walks 1 time(s) per week  - What might prevent you from meeting your goal?: none       Other Considerations:  - Blood Pressure Goal: BP less than 140/90 mmHg due to history of DM: Is not at blood pressure goal but is on irbesartan and carvedilol therapy. .   - Lipids: Patient is not currently prescribed moderate-intensity statin therapy. - Smoking status: never smoked    PLAN:  - Consideration(s) for provider:   · Start Crestor  · Discontinue glimepiride and start Crestor  · Start Prodigy meter and supplies  · Prescription for ASA  · Refill needles  - DM program gaps identified:   · Initial requirements: Requirements met   · Ongoing requirements: OV with provider for DM (2nd), ACC/diabetes educator visit (if A1c over 8%), A1c (2nd), Pneumococcal vaccination: [id number], Influenza vaccination for [id number] and Medication adherence over 70%   - Education to patient: Discussed general issues about diabetes pathophysiology and management. Addressed ADA diet. Suggested low cholesterol diet. Encouraged aerobic exercise. Discussed foot care.   Reminded to get yearly retinal exam.   - Follow up: PCP for identified gaps or as scheduled below  - Upcoming appointments:   Future Appointments   Date Time Provider Link Liriano   7/15/2020 11:45 AM MD DANILO [facility] - [facility]   12/4/2020 11:00 AM SCHEDULE, ALFONSO PACER NURSE [facility] - [address], PharmD, [place]

## 2020-04-04 RX ORDER — ASPIRIN 81 MG/1
81 TABLET ORAL DAILY
Qty: 90 TABLET | Refills: 1 | Status: SHIPPED | OUTPATIENT
Start: 2020-04-04 | End: 2020-10-16

## 2020-04-04 RX ORDER — BLOOD-GLUCOSE CONTROL, LOW
EACH MISCELLANEOUS
Qty: 400 EACH | Refills: 3 | Status: SHIPPED | OUTPATIENT
Start: 2020-04-04 | End: 2022-02-14

## 2020-04-04 RX ORDER — BLOOD-GLUCOSE METER
EACH MISCELLANEOUS
Qty: 1 DEVICE | Refills: 0 | Status: SHIPPED | OUTPATIENT
Start: 2020-04-04

## 2020-04-04 RX ORDER — SEMAGLUTIDE 1.34 MG/ML
INJECTION, SOLUTION SUBCUTANEOUS
Qty: 3 ML | Refills: 0 | Status: SHIPPED | OUTPATIENT
Start: 2020-04-04 | End: 2020-04-06 | Stop reason: SDUPTHER

## 2020-04-04 RX ORDER — ROSUVASTATIN CALCIUM 10 MG/1
10 TABLET, COATED ORAL DAILY
Qty: 90 TABLET | Refills: 1 | Status: SHIPPED | OUTPATIENT
Start: 2020-04-04 | End: 2020-11-16

## 2020-04-04 RX ORDER — PEN NEEDLE, DIABETIC 31 GX5/16"
NEEDLE, DISPOSABLE MISCELLANEOUS
Qty: 400 EACH | Refills: 3 | Status: SHIPPED | OUTPATIENT
Start: 2020-04-04

## 2020-04-04 RX ORDER — BLOOD SUGAR DIAGNOSTIC
STRIP MISCELLANEOUS
Qty: 400 EACH | Refills: 3 | Status: SHIPPED | OUTPATIENT
Start: 2020-04-04 | End: 2020-06-15 | Stop reason: SDUPTHER

## 2020-04-04 NOTE — TELEPHONE ENCOUNTER
[id number] Irena Manuel for new rx from DM consult. Please contact pt and have him stop amaryl when he starts ozempic.

## 2020-04-06 RX ORDER — SEMAGLUTIDE 1.34 MG/ML
INJECTION, SOLUTION SUBCUTANEOUS
Qty: 3 ML | Refills: 3 | Status: SHIPPED | OUTPATIENT
Start: 2020-04-06 | End: 2020-06-15 | Stop reason: SDUPTHER

## 2020-04-06 NOTE — TELEPHONE ENCOUNTER
Thank you for your quick response.     Claudean Hind, PharmD, [address] CLINICAL PHARMACY  Phone: [phone], or toll free [phone], option 7

## 2020-04-27 ENCOUNTER — PROCEDURE VISIT (OUTPATIENT)
Dept: CARDIOLOGY CLINIC | Age: 45
End: 2020-04-27
Payer: COMMERCIAL

## 2020-04-27 PROCEDURE — 93295 DEV INTERROG REMOTE 1/2/MLT: CPT | Performed by: INTERNAL MEDICINE

## 2020-04-27 PROCEDURE — 93296 REM INTERROG EVL PM/IDS: CPT | Performed by: INTERNAL MEDICINE

## 2020-04-27 NOTE — PROGRESS NOTES
Medtronic single icd  Battery 6.8yrs  V paced <0.1%  r wave 3.6mV  Ventricle threshold 0.875 V @ 0.40ms  Ventricle impedance 361 ohms   Shock 44 ohms  svc 56 ohms  Many VT-NS lasting 1 second, longest is 3 seconds but not shown

## 2020-05-14 RX ORDER — IRBESARTAN 75 MG/1
TABLET ORAL
Qty: 30 TABLET | Refills: 5 | Status: SHIPPED | OUTPATIENT
Start: 2020-05-14 | End: 2020-11-17 | Stop reason: SDUPTHER

## 2020-05-19 ENCOUNTER — TELEPHONE (OUTPATIENT)
Dept: PHARMACY | Facility: CLINIC | Age: 45
End: 2020-05-19

## 2020-05-26 RX ORDER — SPIRONOLACTONE 25 MG/1
TABLET ORAL
Qty: 90 TABLET | Refills: 0 | Status: SHIPPED | OUTPATIENT
Start: 2020-05-26 | End: 2020-11-17 | Stop reason: SDUPTHER

## 2020-05-26 RX ORDER — METFORMIN HYDROCHLORIDE 500 MG/1
1000 TABLET, EXTENDED RELEASE ORAL 2 TIMES DAILY
Qty: 180 TABLET | Refills: 3 | Status: SHIPPED
Start: 2020-05-26 | End: 2020-05-26 | Stop reason: CLARIF

## 2020-05-26 RX ORDER — METFORMIN HYDROCHLORIDE 500 MG/1
1000 TABLET, EXTENDED RELEASE ORAL 2 TIMES DAILY
Qty: 360 TABLET | Refills: 3 | Status: SHIPPED | OUTPATIENT
Start: 2020-05-26 | End: 2021-06-07 | Stop reason: SDUPTHER

## 2020-05-26 NOTE — TELEPHONE ENCOUNTER
[facility] is asking about the directions. Does the quantity need to be 360 for a 90 day supply? Please advise.

## 2020-06-15 RX ORDER — SEMAGLUTIDE 1.34 MG/ML
INJECTION, SOLUTION SUBCUTANEOUS
Qty: 3 ML | Refills: 2 | Status: SHIPPED | OUTPATIENT
Start: 2020-06-15 | End: 2020-08-31 | Stop reason: SDUPTHER

## 2020-06-15 RX ORDER — BLOOD SUGAR DIAGNOSTIC
STRIP MISCELLANEOUS
Qty: 400 EACH | Refills: 3 | Status: SHIPPED | OUTPATIENT
Start: 2020-06-15 | End: 2022-02-14

## 2020-07-25 ENCOUNTER — NURSE ONLY (OUTPATIENT)
Dept: LAB | Age: 45
End: 2020-07-25

## 2020-07-25 LAB
ALBUMIN SERPL-MCNC: 4 G/DL (ref 3.5–5.1)
ALP BLD-CCNC: 94 U/L (ref 38–126)
ALT SERPL-CCNC: 14 U/L (ref 11–66)
ANION GAP SERPL CALCULATED.3IONS-SCNC: 7 MEQ/L (ref 8–16)
AST SERPL-CCNC: 20 U/L (ref 5–40)
AVERAGE GLUCOSE: 129 MG/DL (ref 70–126)
BILIRUB SERPL-MCNC: 0.6 MG/DL (ref 0.3–1.2)
BUN BLDV-MCNC: 8 MG/DL (ref 7–22)
CALCIUM SERPL-MCNC: 9 MG/DL (ref 8.5–10.5)
CHLORIDE BLD-SCNC: 100 MEQ/L (ref 98–111)
CO2: 32 MEQ/L (ref 23–33)
CREAT SERPL-MCNC: 0.7 MG/DL (ref 0.4–1.2)
GFR SERPL CREATININE-BSD FRML MDRD: > 90 ML/MIN/1.73M2
GLUCOSE BLD-MCNC: 152 MG/DL (ref 70–108)
HBA1C MFR BLD: 6.3 % (ref 4.4–6.4)
POTASSIUM SERPL-SCNC: 4.3 MEQ/L (ref 3.5–5.2)
SODIUM BLD-SCNC: 139 MEQ/L (ref 135–145)
TOTAL PROTEIN: 6.7 G/DL (ref 6.1–8)

## 2020-08-03 ENCOUNTER — OFFICE VISIT (OUTPATIENT)
Dept: CARDIOLOGY CLINIC | Age: 45
End: 2020-08-03
Payer: COMMERCIAL

## 2020-08-03 VITALS
HEART RATE: 100 BPM | WEIGHT: 315 LBS | BODY MASS INDEX: 39.17 KG/M2 | HEIGHT: 75 IN | DIASTOLIC BLOOD PRESSURE: 90 MMHG | SYSTOLIC BLOOD PRESSURE: 131 MMHG

## 2020-08-03 PROCEDURE — G8427 DOCREV CUR MEDS BY ELIG CLIN: HCPCS | Performed by: INTERNAL MEDICINE

## 2020-08-03 PROCEDURE — G8417 CALC BMI ABV UP PARAM F/U: HCPCS | Performed by: INTERNAL MEDICINE

## 2020-08-03 PROCEDURE — 99214 OFFICE O/P EST MOD 30 MIN: CPT | Performed by: INTERNAL MEDICINE

## 2020-08-03 PROCEDURE — 93000 ELECTROCARDIOGRAM COMPLETE: CPT | Performed by: INTERNAL MEDICINE

## 2020-08-03 PROCEDURE — 1036F TOBACCO NON-USER: CPT | Performed by: INTERNAL MEDICINE

## 2020-08-03 RX ORDER — FUROSEMIDE 20 MG/1
TABLET ORAL
Qty: 90 TABLET | Refills: 2 | Status: SHIPPED | OUTPATIENT
Start: 2020-08-03 | End: 2020-11-17 | Stop reason: SDUPTHER

## 2020-08-03 RX ORDER — CARVEDILOL 3.12 MG/1
3.12 TABLET ORAL 2 TIMES DAILY WITH MEALS
Qty: 180 TABLET | Refills: 3 | Status: SHIPPED | OUTPATIENT
Start: 2020-08-03 | End: 2020-08-06 | Stop reason: SDUPTHER

## 2020-08-03 RX ORDER — FUROSEMIDE 40 MG/1
TABLET ORAL
Qty: 90 TABLET | Refills: 2 | Status: SHIPPED | OUTPATIENT
Start: 2020-08-03 | End: 2020-11-17 | Stop reason: SDUPTHER

## 2020-08-03 NOTE — PROGRESS NOTES
[employer] Other (See Comments)     Muscle aches and cramps        Family History   Problem Relation Age of Onset    Heart Disease Maternal Uncle     Heart Disease Maternal Grandfather     Heart Disease Paternal Uncle     Heart Disease Maternal Grandmother         Social History     Socioeconomic History    Marital status:      Spouse name: Not on file    Number of children: Not on file    Years of education: Not on file    Highest education level: Not on file   Occupational History     Employer: [employer]   Social Needs    Financial resource strain: Not hard at all    Food insecurity     Worry: Never true     Inability: Never true    Transportation needs     Medical: No     Non-medical: No   Tobacco Use    Smoking status: Never Smoker    Smokeless tobacco: Never Used   Substance and Sexual Activity    Alcohol use:  Yes     Alcohol/week: 18.0 standard drinks     Types: 18 Cans of beer per week     Comment: Socially    Drug use: No    Sexual activity: Yes     Partners: Female   Lifestyle    Physical activity     Days per week: Not on file     Minutes per session: Not on file    Stress: Not on file   Relationships    Social connections     Talks on phone: Not on file     Gets together: Not on file     Attends [employer] service: Not on file     Active member of club or organization: Not on file     Attends meetings of clubs or organizations: Not on file     Relationship status: Not on file    Intimate partner violence     Fear of current or ex partner: Not on file     Emotionally abused: Not on file     Physically abused: Not on file     Forced sexual activity: Not on file   Other Topics Concern    Not on file   Social History Narrative    Not on file       Current Outpatient Medications   Medication Sig Dispense Refill    furosemide (LASIX) 20 MG tablet TAKE 1 TABLET BY MOUTH EVERY DAY in the PM 90 tablet 2    furosemide (LASIX) 40 MG tablet TAKE 1 TABLET BY MOUTH DAILY in AM 90 tablet 2    carvedilol (COREG) 3.125 MG tablet Take 1 tablet by mouth 2 times daily (with meals) 180 tablet 3    blood glucose test strips (PRODIGY NO CODING BLOOD GLUC) strip Use to check blood sugar 4 times daily 400 each 3    Semaglutide,0.25 or 0.5MG/DOS, (OZEMPIC, 0.25 OR 0.5 MG/DOSE,) 2 MG/1.5ML SOPN Inject 0.25 mg subcutaneously once weekly for weeks 1-4, then increase to 0.5 mg subcutaneously once weekly 3 mL 2    spironolactone (ALDACTONE) 25 MG tablet TAKE 1 TABLET BY MOUTH ONE TIME A DAY 90 tablet 0    metFORMIN (GLUCOPHAGE-XR) 500 MG extended release tablet Take 2 tablets by mouth 2 times daily 360 tablet 3    irbesartan (AVAPRO) 75 MG tablet TAKE ONE TABLET BY MOUTH EVERY EVENING 30 tablet 5    rosuvastatin (CRESTOR) 10 MG tablet Take 1 tablet by mouth daily 90 tablet 1    Blood Glucose Monitoring Suppl (PRODIGY AUTOCODE BLOOD GLUCOSE) MAUREEN Use to check blood sugar 4 times daily 1 Device 0    Prodigy Lancets 28G MISC Use to check blood sugar 4 times daily 400 each 3    aspirin EC 81 MG EC tablet Take 1 tablet by mouth daily 90 tablet 1    Insulin Pen Needle (PEN NEEDLES 31GX5/16\") 31G X 8 MM MISC Use to inject insulins and Ozempic 400 each 3    carvedilol (COREG) 25 MG tablet TAKE 1 TABLET BY MOUTH TWICE A DAY WITH MEALS 180 tablet 1    omeprazole (PRILOSEC) 40 MG delayed release capsule Take 1 capsule by mouth daily 90 capsule 1    fluticasone (FLONASE) 50 MCG/ACT nasal spray 2 sprays by Nasal route daily 1 Bottle 2    Dextromethorphan-guaiFENesin (MUCINEX DM)  MG TB12 Take 1 tablet by mouth 2 times daily as needed (cough/congestion) 28 tablet 0    sildenafil (REVATIO) 20 MG tablet Take 1-5 tablets by mouth as needed (ED) 30 tablet 5    insulin lispro (HUMALOG KWIKPEN) 100 UNIT/ML pen Inject 6 Units into the skin 3 times daily (before meals) Plus scale (Patient taking differently: Inject 6 Units into the skin 3 times daily (before meals) Plus scale 2 for every 50 above 150) 5 pen S/P cardiac cath- Angiographically patent coronaries-EF 20, EDP 28 mmhg- med rx  Magnesium    Basic Metabolic Panel   6. Bilateral leg edema- +1 B/L-stable  Magnesium    Basic Metabolic Panel   7. 4/91/3767: Medtronic Single Chamber  [place]  Magnesium    Basic Metabolic Panel       Plan   The   current meds and lab reviewed    MIKE  s/p CPAP    Continue the current treatment and with constant vigilance to changes in symptoms and also any potential side effects. Return for care or seek medical attention immediately if symptoms got worse and/or develop new symptoms. Obesity advised to lose WT  Scooby Avery saw dietician with that regards    ICD interrogation look good  Nov 2019  Function normal    Cardiomyopathy: improving, no CHF symptoms, no change in clinical condition. Will need periodic echocardiograms depending on symptoms. Had ICD  Cont. coreg to 25 po bid  Incease the coreg by 3.125 po bid for sinus tachycardia  Repeat echo  EF  Remain low    Sinus tachy 100  Home   Increase coreg by 3.125 po bid      Hypertension, on medical treatment. Seems to be under good control. Patient is compliant with medical treatment. Cont  coreg    Congestive heart failure: no evidence of fluid overload today, no recent hospitalization for CHF  Cont   Lasix  40 mg at am 20  Pm qd  and aldactone 25 qd  Need BMP and mg today and prior to next visit    Wt gain > 5 lb may use added lasix  Pat did not get lab yet done  Advised to ge labs done asap  Off  Diovan 80  Cont  avapro 75 mg     INS does not cover entresto 1 tab po bid    Cont F/U with CHF clinic    Need Lab prior to next visit needed    Recent lab reviewed and d.w the pat    Repeat echo  EF 25 to 30  % no much improvement - had ICD    ICD interrogation 04/2020 reviewed and NSVT noted    D/w the pat the plan of care    Discussed use, benefit, and side effects of prescribed medications. All patient questions answered. Pt voiced understanding.  Instructed to continue current medications, diet and exercise. Continue risk factor modification and medical management. Patient agreed with treatment plan. Follow up as directed.     Advised about need of lab done twice a yr BMP and MG    Stable and cont the current RX         RTC in 6 months      Richard [facility]

## 2020-08-04 ENCOUNTER — CARE COORDINATION (OUTPATIENT)
Dept: OTHER | Facility: CLINIC | Age: 45
End: 2020-08-04

## 2020-08-04 NOTE — CARE COORDINATION
Bakari Hernández MD [address] [facility] [facility]

## 2020-08-06 ENCOUNTER — CARE COORDINATION (OUTPATIENT)
Dept: OTHER | Facility: CLINIC | Age: 45
End: 2020-08-06

## 2020-08-06 RX ORDER — CARVEDILOL 3.12 MG/1
3.12 TABLET ORAL 2 TIMES DAILY WITH MEALS
Qty: 180 TABLET | Refills: 3 | Status: SHIPPED | OUTPATIENT
Start: 2020-08-06 | End: 2020-11-17 | Stop reason: SDUPTHER

## 2020-08-06 NOTE — CARE COORDINATION
Ambulatory Care Coordination Note  2020  CM Risk Score: 6  Charlson 10 Year Mortality Risk Score: 10%     ACC: Tobias Lan, RN    Summary Note:  [address] ([facility]) contacted the Pt's George Ibanez, by telephone to discuss [facility] program, assess needs, and complete enrollment if appropriate. Verified name and  with Conrado Kothari as identifiers. Provided introduction to self, and explanation of the ACM role. CHF-  Pt's SO, Conrado Kothari states that pt has been improving in the past few weeks. He did have his Coreg increased to 3.125. He has not been performing daily weights but is aware of Red flag symptoms to report. DM II- Pt was started on Ozempic back in  and his BG has improved. His A1c droppped from 9 to 6 since starting it. He has had DM education in the past and generally understand DM diet. She is appreciative of the support and education that can be provided through [facility]. Education Provided:  Importance and benefits of: Self monitoring compliance, Symptom management, Medication compliance, Diet compliance and Routine follow up appointment compliance    Plan:  - Continue Telephonic follow up to discuss: Medication adherence, Reinforce disease specific self-management education: Daily weights, SBGM, Symptom management and Provider appointment adherence    Pt verbalized understanding and is agreeable to follow up contact. Ambulatory Care Coordination Assessment    Care Coordination Protocol  Program Enrollment:  Rising Risk  Referral from Primary Care Provider:  No  Week 1 - Initial Assessment     Do you have all of your prescriptions and are they filled?:  Yes  Barriers to medication adherence:  None  Are you able to afford your medications?:  Yes  How often do you have trouble taking your medications the way you have been told to take them?:  I always take them as prescribed.      Do you have Home O2 Therapy?:  No         Current Housing:  Private Residence MEALS 3/16/20   Carlos Manuel Singh PA-C   omeprazole (PRILOSEC) 40 MG delayed release capsule Take 1 capsule by mouth daily 3/16/20   Marla Gonzales MD   fluticasone [facility]) 50 MCG/ACT nasal spray 2 sprays by Nasal route daily 2/24/20   Radha Camargo MD   Dextromethorphan-guaiFENesin [facility] DM)  MG TB12 Take 1 tablet by mouth 2 times daily as needed (cough/congestion) 2/24/20   Radha Camargo MD   sildenafil (REVATIO) 20 MG tablet Take 1-5 tablets by mouth as needed (ED) 8/23/19   JACOB Guardado - CNP   insulin lispro (HUMALOG KWIKPEN) 100 UNIT/ML pen Inject 6 Units into the skin 3 times daily (before meals) Plus scale  Patient taking differently: Inject 6 Units into the skin 3 times daily (before meals) Plus scale 2 for every 50 above 150 1/9/18   Liliana Rice MD   insulin glargine (LANTUS SOLOSTAR) 100 UNIT/ML injection pen Inject 24 Units into the skin nightly  Patient taking differently: Inject 30 Units into the skin nightly  1/6/18   Liliana Rice MD   loratadine (CLARITIN) 10 MG tablet Take 10 mg by mouth daily    Historical Provider, MD   Respiratory Therapy Supplies (ADULT MASK) MISC Please do mask desensitization and/or provide mask of patient's choice. 5/11/17   Parth Garibay MD   acetaminophen (TYLENOL) 325 MG tablet Take 650 mg by mouth every 8 hours as needed for Pain    Historical Provider, MD   nitroGLYCERIN (NITROSTAT) 0.4 MG SL tablet Place 1 tablet under the tongue every 5 minutes as needed for Chest pain up to max of 3 total doses.  If no relief after 1 dose, call 911. 3/16/17   Moe Gray MD       Future Appointments   Date Time Provider Link [place]   8/17/2020  3:20 PM MD DANILO EchevarriaW [facility] [address] [place] [address]   12/4/2020 11:00 AM [facility]ED[facility] DANILO PACER [address]   4/12/2021  1:30 PM Marla Gonzales MD [address] [facility] - [address] [place] [facility]

## 2020-08-06 NOTE — TELEPHONE ENCOUNTER
Prescription Question     From   Angi Santos  To   [facility] Clinical Support Pool  Sent   8/6/2020 11:25 AM    I phoned on 08/05/2020 and requested that my new script for carvedilol 3.125mg [facility] @ [phone]. Our local pharmacies are stating that they can not fill as we recently filled the 25mg script. Please call my wife Lonnie Mcgregor at [phone] with questions or concerns.    Thank you

## 2020-08-17 ENCOUNTER — OFFICE VISIT (OUTPATIENT)
Dept: FAMILY MEDICINE CLINIC | Age: 45
End: 2020-08-17
Payer: COMMERCIAL

## 2020-08-17 VITALS
BODY MASS INDEX: 50.37 KG/M2 | WEIGHT: 315 LBS | RESPIRATION RATE: 18 BRPM | SYSTOLIC BLOOD PRESSURE: 138 MMHG | TEMPERATURE: 98.4 F | DIASTOLIC BLOOD PRESSURE: 88 MMHG | HEART RATE: 101 BPM | OXYGEN SATURATION: 95 %

## 2020-08-17 PROCEDURE — 90715 TDAP VACCINE 7 YRS/> IM: CPT | Performed by: FAMILY MEDICINE

## 2020-08-17 PROCEDURE — 99396 PREV VISIT EST AGE 40-64: CPT | Performed by: FAMILY MEDICINE

## 2020-08-17 PROCEDURE — 90471 IMMUNIZATION ADMIN: CPT | Performed by: FAMILY MEDICINE

## 2020-08-17 PROCEDURE — 90472 IMMUNIZATION ADMIN EACH ADD: CPT | Performed by: FAMILY MEDICINE

## 2020-08-17 PROCEDURE — 90732 PPSV23 VACC 2 YRS+ SUBQ/IM: CPT | Performed by: FAMILY MEDICINE

## 2020-08-17 RX ORDER — GLIMEPIRIDE 1 MG/1
TABLET ORAL
COMMUNITY
Start: 2020-07-15 | End: 2020-08-17

## 2020-08-17 ASSESSMENT — ENCOUNTER SYMPTOMS
NAUSEA: 0
COUGH: 0
VOMITING: 0
SHORTNESS OF BREATH: 0
SORE THROAT: 0
CONSTIPATION: 0
RHINORRHEA: 0
BACK PAIN: 0
WHEEZING: 0
ABDOMINAL PAIN: 0
DIARRHEA: 0

## 2020-08-17 NOTE — PATIENT INSTRUCTIONS
advice. The National Vaccine Injury Compensation Program  The National Vaccine Injury Compensation Program (VICP) is a federal program that was created to compensate people who may have been injured by certain vaccines. Visit the VICP website at www.hrsa.gov/vaccinecompensation or call 9-621.647.8566 to learn about the program and about filing a claim. There is a time limit to file a claim for compensation. How can I learn more? · Ask your health care provider. · Call your local or state health department. · Contact the Centers for Disease Control and Prevention (CDC):  ? Call 9-334.383.4363 (1-800-CDC-INFO) or  ? Visit CDC's website at www.cdc.gov/vaccines  Vaccine Information Statement (Interim)  Tdap (Tetanus, Diphtheria, Pertussis) Vaccine  04/01/2020  [address]ina Savage Primer [id number]-83  Department of Health and Human Services  Centers for Disease Control and Prevention  Many Vaccine Information Statements are available in I[place]ian and other languages. See www.immunize.org/vis. Muchas hojas de información sobre vacunas están disponibles en español y en otros idiomas. Visite www.immunize.org/vis. Care instructions adapted under license by [facility] ([facility]). If you have questions about a medical condition or this instruction, always ask your healthcare professional. [address] any warranty or liability for your use of this information. Patient Education        pneumococcal polysaccharides vaccine (PPSV), 23-valent  Pronunciation:  HARI Warren 23-GABY merlos  Brand:  Pneumovax 23  What is the most important information I should know about this vaccine? PPSV should be given at least 2 weeks before the start of any treatment that can weaken your immune system. PPSV is also given at least 2 weeks before you undergo a splenectomy (surgical removal of the spleen). The timing of this vaccination is very important for it to be effective. Follow your doctor's instructions.   You can still receive a vaccine if you have a cold or fever. In the case of a more severe illness with a fever or any type of infection, wait until you get better before receiving this vaccine. You should not receive a booster vaccine if you had a life-threatening allergic reaction after the first shot. Keep track of any and all side effects you have after receiving this vaccine. If you ever need to receive a booster dose, you will need to tell your doctor if the previous shot caused any side effects. Becoming infected with pneumococcal disease (such as pneumonia or meningitis) is much more dangerous to your health than receiving this vaccine. However, like any medicine, this vaccine can cause side effects but the risk of serious side effects is extremely low. What is pneumococcal polysaccharides vaccine (PPSV)? Pneumococcal disease is a serious infection caused by a bacteria. Pneumococcal bacteria can infect the sinuses and inner ear. It can also infect the lungs, blood, and brain and these conditions can be fatal.  Pneumococcal polysaccharides vaccine (PPSV) is used to prevent infection caused by pneumococcal bacteria. PPSV contains 23 of the most common types of pneumococcal bacteria. PPSV works by exposing you to a small dose of the bacteria or a protein from the bacteria, which causes your body to develop immunity to the disease. PPSV will not treat an active infection that has already developed in the body. PPSV is for use only in adults and children who are at least 3years old. For children younger than 3years old, another vaccine called Prevnar (pneumococcal conjugate vaccine [PCV] 7-valent) is used, usually given between the ages of 2 months and 15 months. Like any vaccine, PPSV may not provide protection from disease in every person. What should I discuss with my healthcare provider before receiving this vaccine?   You should not receive this vaccine if you have ever had a life-threatening allergic reaction to any pneumococcal polysaccharides vaccine. Before receiving this vaccine, tell your doctor if you are allergic to any drugs, or if you have a bleeding or blood clotting disorder such as hemophilia, or easy bruising. The timing and number of PPSV doses you receive will depend on whether you have any of these other conditions:  · cancer, leukemia, lymphoma, or multiple myeloma;  · HIV or AIDS;  · sickle cell disease;  · a kidney condition called nephrotic syndrome;  · a history of organ or bone marrow transplant;  · if you are receiving chemotherapy;  · if you have been using steroid medication for a long period of time;  · if you are scheduled to have your spleen removed (splenectomy); or  · if you have received a pneumococcal vaccine within the past 3 to 5 years. You can still receive a vaccine if you have a cold or fever. In the case of a more severe illness with a fever or any type of infection, wait until you get better before receiving this vaccine. Vaccines may be harmful to an unborn baby and generally should not be given to a pregnant woman. However, not vaccinating the mother could be more harmful to the baby if the mother becomes infected with a disease that this vaccine could prevent. Your doctor will decide whether you should receive this vaccine, especially if you have a high risk of infection with pneumococcal disease. It is not known whether PPSV passes into breast milk or if it could harm a nursing baby. Do not use this medication without telling your doctor if you are breast-feeding a baby. How is this vaccine given? PPSV is given as an injection (shot) under the skin or into a muscle of your arm or thigh. You will receive this injection in a doctor's office or other clinic setting. PPSV is usually given as a routine vaccination in adults who are 72 years and older.   PPSV may also be given to people between the ages 3and 59years old who have:  · heart disease, lung disease, or diabetes;  · a cerebrospinal fluid leak, or a cochlear implant (an electronic hearing device);  · alcoholism or liver disease (including cirrhosis);  · sickle cell disease or a disorder of the spleen;  · a weak immune system caused by HIV, AIDS, cancer, kidney failure, organ transplantation, or a damaged spleen; or  · a weak immune system caused by taking steroids or receiving chemotherapy or radiation treatment. PPSV may also be given to people between the ages 23and 59years old who smoke or have asthma. PPSV should be given at least 2 weeks before the start of any treatment that can weaken your immune system. PPSV is also given at least 2 weeks before you undergo a splenectomy (surgical removal of the spleen). The timing of this vaccination is very important for it to be effective. Follow your doctor's instructions. Your doctor may recommend treating fever and pain with an aspirin-free pain reliever such as acetaminophen (Tylenol) or ibuprofen (Motrin, Advil, and others) when the shot is given and for the next 24 hours. Follow the label directions or your doctor's instructions about how much of this medicine to take. If your doctor has prescribed an antibiotic (such as penicillin) to help prevent infection with pneumococcal bacteria, do not stop using the antibiotic after you receive the PPSV. Take the antibiotic for the entire length of time prescribed by your doctor. Most people receive only one PPSV shot during their lifetime. However, people in certain age groups or with certain disease conditions that put them at risk of infection may need to receive more than one vaccine. Before receiving this vaccine, tell your doctor if you have received a pneumococcal vaccine within the past 3 to 5 years. What happens if I miss a dose? Since PPSV is usually given only one time, you will most likely not be on a dosing schedule.  If you are receiving a repeat PPSV shot, be sure to tell your doctor if it has been injected into;  · mild skin rash; or  · mild soreness, warmth, redness, swelling, or a hard lump where the shot was given. This is not a complete list of side effects and others may occur. Call your doctor for medical advice about side effects. You may report vaccine side effects to the [place] [address] and [facility] at [phone]. What other drugs will affect pneumococcal polysaccharides vaccine (PPSV)? Before receiving this vaccine, tell the doctor about all other vaccines you have recently received. Also tell the doctor if you have recently received drugs or treatments that can weaken the immune system, including:  · an oral, nasal, inhaled, or injectable steroid medicine;  · medications to treat psoriasis, rheumatoid arthritis, or other autoimmune disorders, such as azathioprine (Imuran), etanercept (Enbrel), leflunomide ([address]), and others; or  · medicines to treat or prevent organ transplant rejection, such as basiliximab (Simulect), cyclosporine (Sandimmune, Neoral, Gengraf), muromonab-CD3 (Orthoclone), mycophenolate mofetil (CellCept), sirolimus (Rapamune), or tacrolimus (Prograf). If you are using any of these medications, you may not be able to receive the vaccine, or may need to wait until the other treatments are finished. This list is not complete and other drugs may interact with PPSV. Tell your doctor about all medications you use. This includes prescription, over-the-counter, vitamin, and herbal products. Do not start a new medication without telling your doctor. Where can I get more information? Your doctor or pharmacist may have additional information about pneumococcal polysaccharides vaccine. You may also find additional information from your local health department or the Centers for Disease Control and Prevention.   Remember, keep this and all other medicines out of the reach of children, never share your medicines with others, and use this medication only for the indication prescribed. Every effort has been made to ensure that the information provided by [address] is accurate, up-to-date, and complete, but no guarantee is made to that effect. Drug information contained herein may be time sensitive. [facility] information has been compiled for use by healthcare practitioners and consumers in the [place] and therefore [facility] does not warrant that uses outside of the [place] are appropriate, unless specifically indicated otherwise. [facility]'s drug information does not endorse drugs, diagnose patients or recommend therapy. [facility]'s drug information is an informational resource designed to assist licensed healthcare practitioners in caring for their patients and/or to serve consumers viewing this service as a supplement to, and not a substitute for, the expertise, skill, knowledge and judgment of healthcare practitioners. The absence of a warning for a given drug or drug combination in no way should be construed to indicate that the drug or drug combination is safe, effective or appropriate for any given patient. [facility] does not assume any responsibility for any aspect of healthcare administered with the aid of information [facility] provides. The information contained herein is not intended to cover all possible uses, directions, precautions, warnings, drug interactions, allergic reactions, or adverse effects. If you have questions about the drugs you are taking, check with your doctor, nurse or pharmacist.  Copyright 4941-8649 [address] [place]. Version: 5.02. Revision date: 10/17/2012. Care instructions adapted under license by [facility] ([facility]). If you have questions about a medical condition or this instruction, always ask your healthcare professional. [place] [address] any warranty or liability for your use of this information.

## 2020-08-18 NOTE — PROGRESS NOTES
Lasix     VASECTOMY  2004     Family History   Problem Relation Age of Onset    Heart Disease Maternal Uncle     Heart Disease Maternal Grandfather     Heart Disease Paternal Uncle     Heart Disease Maternal Grandmother      Social History     Tobacco Use    Smoking status: Never Smoker    Smokeless tobacco: Never Used   Substance Use Topics    Alcohol use:  Yes     Alcohol/week: 18.0 standard drinks     Types: 18 Cans of beer per week     Comment: Socially      Current Outpatient Medications   Medication Sig Dispense Refill    carvedilol (COREG) 3.125 MG tablet Take 1 tablet by mouth 2 times daily (with meals) 180 tablet 3    furosemide (LASIX) 20 MG tablet TAKE 1 TABLET BY MOUTH EVERY DAY in the PM 90 tablet 2    furosemide (LASIX) 40 MG tablet TAKE 1 TABLET BY MOUTH DAILY in AM 90 tablet 2    blood glucose test strips (PRODIGY NO CODING BLOOD GLUC) strip Use to check blood sugar 4 times daily 400 each 3    Semaglutide,0.25 or 0.5MG/DOS, (OZEMPIC, 0.25 OR 0.5 MG/DOSE,) 2 MG/1.5ML SOPN Inject 0.25 mg subcutaneously once weekly for weeks 1-4, then increase to 0.5 mg subcutaneously once weekly 3 mL 2    spironolactone (ALDACTONE) 25 MG tablet TAKE 1 TABLET BY MOUTH ONE TIME A DAY 90 tablet 0    metFORMIN (GLUCOPHAGE-XR) 500 MG extended release tablet Take 2 tablets by mouth 2 times daily 360 tablet 3    irbesartan (AVAPRO) 75 MG tablet TAKE ONE TABLET BY MOUTH EVERY EVENING 30 tablet 5    rosuvastatin (CRESTOR) 10 MG tablet Take 1 tablet by mouth daily 90 tablet 1    Blood Glucose Monitoring Suppl (PRODIGY AUTOCODE BLOOD GLUCOSE) MAUREEN Use to check blood sugar 4 times daily 1 Device 0    Prodigy Lancets 28G MISC Use to check blood sugar 4 times daily 400 each 3    aspirin EC 81 MG EC tablet Take 1 tablet by mouth daily 90 tablet 1    Insulin Pen Needle (PEN NEEDLES 31GX5/16\") 31G X 8 MM MISC Use to inject insulins and Ozempic 400 each 3    carvedilol (COREG) 25 MG tablet TAKE 1 TABLET BY MOUTH TWICE A DAY WITH MEALS 180 tablet 1    omeprazole (PRILOSEC) 40 MG delayed release capsule Take 1 capsule by mouth daily 90 capsule 1    fluticasone (FLONASE) 50 MCG/ACT nasal spray 2 sprays by Nasal route daily 1 Bottle 2    Dextromethorphan-guaiFENesin (MUCINEX DM)  MG TB12 Take 1 tablet by mouth 2 times daily as needed (cough/congestion) 28 tablet 0    sildenafil (REVATIO) 20 MG tablet Take 1-5 tablets by mouth as needed (ED) 30 tablet 5    insulin lispro (HUMALOG KWIKPEN) 100 UNIT/ML pen Inject 6 Units into the skin 3 times daily (before meals) Plus scale (Patient taking differently: Inject 6 Units into the skin 3 times daily (before meals) Plus scale 2 for every 50 above 150) 5 pen 3    insulin glargine (LANTUS SOLOSTAR) 100 UNIT/ML injection pen Inject 24 Units into the skin nightly (Patient taking differently: Inject 30 Units into the skin nightly ) 15 pen 3    loratadine (CLARITIN) 10 MG tablet Take 10 mg by mouth daily      Respiratory Therapy Supplies (ADULT MASK) MISC Please do mask desensitization and/or provide mask of patient's choice. 1 each 0    acetaminophen (TYLENOL) 325 MG tablet Take 650 mg by mouth every 8 hours as needed for Pain      nitroGLYCERIN (NITROSTAT) 0.4 MG SL tablet Place 1 tablet under the tongue every 5 minutes as needed for Chest pain up to max of 3 total doses. If no relief after 1 dose, call 911. 25 tablet 1     No current facility-administered medications for this visit.       Allergies   Allergen Reactions    Levaquin [Levofloxacin In D5w] Itching    Statins Other (See Comments)     Muscle aches and cramps     Health Maintenance   Topic Date Due    HIV screen  07/09/1990    Hepatitis B vaccine (1 of 3 - Risk 3-dose series) 07/09/1994    Flu vaccine (1) 09/01/2020    Lipid screen  03/14/2021    A1C test (Diabetic or Prediabetic)  07/25/2021    Potassium monitoring  07/25/2021    Creatinine monitoring  07/25/2021    DTaP/Tdap/Td vaccine (2 - Td) 08/17/2030    Pneumococcal 0-64 years Vaccine  Completed    Hepatitis A vaccine  Aged Out    Hib vaccine  Aged Out    Meningococcal (ACWY) vaccine  Aged Out         Objective:     Physical Exam  Vitals signs reviewed. Constitutional:       General: He is not in acute distress. HENT:      Mouth/Throat:      Pharynx: No oropharyngeal exudate. Eyes:      Conjunctiva/sclera: Conjunctivae normal.   Neck:      Thyroid: No thyromegaly. Comments: No carotid bruits  Cardiovascular:      Rate and Rhythm: Normal rate and regular rhythm. Heart sounds: Normal heart sounds. No murmur. Pulmonary:      Breath sounds: Normal breath sounds. No wheezing or rales. Abdominal:      General: Bowel sounds are normal. There is no distension. Palpations: Abdomen is soft. There is no mass. Tenderness: There is no abdominal tenderness. There is no guarding or rebound. Musculoskeletal: Normal range of motion. General: No tenderness. Lymphadenopathy:      Cervical: No cervical adenopathy. Skin:     General: Skin is dry. Findings: No rash. Neurological:      Mental Status: He is alert and oriented to person, place, and time. Cranial Nerves: No cranial nerve deficit. Deep Tendon Reflexes: Reflexes are normal and symmetric. /88   Pulse 101   Temp 98.4 °F (36.9 °C) (Temporal)   Resp 18   Wt (!) 403 lb (182.8 kg)   SpO2 95%   BMI 50.37 kg/m²       Impression/Plan:  1. Type 2 diabetes mellitus with hyperglycemia, with long-term current use of insulin ([facility] [address]    2.  Chronic combined systolic and diastolic congestive heart failure (HCC)      Requested Prescriptions      No prescriptions requested or ordered in this encounter     Orders Placed This Encounter   Procedures    Pneumococcal polysaccharide vaccine 23-valent greater than or equal to 3yo subcutaneous/IM    Tdap (age 6y and older) IM (Boostrix)    Comprehensive Metabolic Panel     Standing Status:   Future Standing Expiration Date:   8/17/2021    Hemoglobin A1C     Standing Status:   Future     Standing Expiration Date:   8/17/2021    Lipid Panel     Standing Status:   Future     Standing Expiration Date:   8/17/2021     Order Specific Question:   Is Patient Fasting?/# of Hours     Answer:   yes/12    Microalbumin / Creatinine Urine Ratio     Standing Status:   Future     Standing Expiration Date:   8/17/2021   Stop glimepiride he may titrate up on the Ozempic again if it becomes intolerable due to nausea he can decrease back down  Seen today for wellness visit. Discussed the importance of a healthy life style. Balanced diet, nutrition, physical activity,and injury prevention. Also discussed the importance of up to date immunizations and annual screenings. Patient giveneducational materials - see patient instructions. Discussed use, benefit, and side effects of prescribed medications. All patient questions answered. Pt voiced understanding. Reviewed health maintenance. Patient agreedwith treatment plan. Follow up as directed. **This report has been created using voice recognition software. It may contain minor errorswhich are inherent in voice recognition technology. **       Electronically signed by Lisa Narayanan MD on 8/17/2020 at 9:10 PM

## 2020-08-19 ENCOUNTER — TELEPHONE (OUTPATIENT)
Dept: CARDIOLOGY CLINIC | Age: 45
End: 2020-08-19

## 2020-08-26 ENCOUNTER — CARE COORDINATION (OUTPATIENT)
Dept: OTHER | Facility: CLINIC | Age: 45
End: 2020-08-26

## 2020-08-26 NOTE — CARE COORDINATION
Ambulatory Care Coordination Note  2020  CM Risk Score: 6  Charlson 10 Year Mortality Risk Score: 10%     ACC: Jasson Medel RN    Summary Note:  [facility] contacted the patient and spouse, Swathi Watson by telephone to follow up on progress, discuss new issues or concerns, and reinforce/ provide pt education. Verified name and  with patient as identifiers. DM II-  Pt states he is doing much better now. His BG are staying below 150 most of the time. He did have some nausea with the Ozempic, so the dose was lowered and he is triturating back up. He feels he is much better at following the DM diet. He is able to describe DM diet guidelines as he completed DM ed in the past.  Advised t to revisit the information often  To help make the diet change feel less overwhelming. Pt and wife verbalized understanding and agreeable. Discussed Dm foot exam care gap and pt states that PCP performed it in the office when he was there last for a physical.     CHF- Pt denies any symptoms. He  Does not weigh daily. He is needing to get a scale to use at home. He is not able to use the scale he had before. Reinforced Red Flag symptoms to report and reviewed CHF Zone sheet. Education Provided on importance and benefits of:  Self monitoring compliance: SBGM and daily weights  Red Flag symptoms to report  Symptom management  Medication compliance  Diet compliance: DM II and Cardiac  Provider follow up appointment compliance    Plan:  - Continue Telephonic follow up to discuss:  Reinforce/ review DM II and CHF Zone sheet education  Medication compliance  Diet compliance: Diabetic and Cardiac/ low sodium  Goal Progress  Patient verbalized understanding and is agreeable to follow up contact.           Care Coordination Interventions    Program Enrollment:  Rising Risk  Referral from Primary Care Provider:  No  Suggested Interventions and Community Resources  Diabetes Education:  Declined (Comment: 8/26/20: Pt has completed in the past)  Pharmacist:  Completed (Comment: 8/26/20: Pt enrolled in DM care management)  Registered Dietician:  Declined (Comment: 8/26/20: Pt has completed in the past with DM ed)  Zone Management Tools:  Completed (Comment: DM II, CHF)           Prior to Admission medications    Medication Sig Start Date End Date Taking?  Authorizing Provider   carvedilol (COREG) 3.125 MG tablet Take 1 tablet by mouth 2 times daily (with meals) 8/6/20   Cinda Coffman PA-C   furosemide (LASIX) 20 MG tablet TAKE 1 TABLET BY MOUTH EVERY DAY in the PM 8/3/20   Nita Gutiérrez MD   furosemide (LASIX) 40 MG tablet TAKE 1 TABLET BY MOUTH DAILY in AM 8/3/20   Nita Gutiérrez MD   blood glucose test strips (PRODIGY NO CODING BLOOD GLUC) strip Use to check blood sugar 4 times daily 6/15/20   Liz Chacko MD   Semaglutide,0.25 or 0.5MG/DOS, (OZEMPIC, 0.25 OR 0.5 MG/DOSE,) 2 MG/1.5ML SOPN Inject 0.25 mg subcutaneously once weekly for weeks 1-4, then increase to 0.5 mg subcutaneously once weekly 6/15/20   Liz Chacko MD   spironolactone (ALDACTONE) 25 MG tablet TAKE 1 TABLET BY MOUTH ONE TIME A DAY 5/26/20   Liz Chacko MD   metFORMIN (GLUCOPHAGE-XR) 500 MG extended release tablet Take 2 tablets by mouth 2 times daily 5/26/20   Liz Chacko MD   irbesartan (AVAPRO) 75 MG tablet TAKE ONE TABLET BY MOUTH EVERY EVENING 5/14/20   Liz Chacko MD   rosuvastatin (CRESTOR) 10 MG tablet Take 1 tablet by mouth daily 4/4/20   Liz Chacko MD   Blood Glucose Monitoring Suppl (PRODIGY AUTOCODE BLOOD GLUCOSE) MAUREEN Use to check blood sugar 4 times daily 4/4/20   MD Sanjiv Enriquez Lancets 28G MISC Use to check blood sugar 4 times daily 4/4/20   Liz Chacko MD   aspirin EC 81 MG EC tablet Take 1 tablet by mouth daily 4/4/20   Liz Chacko MD   Insulin Pen Needle (PEN NEEDLES 31GX5/16\") 31G X 8 MM MISC Use to inject insulins and Ozempic 4/4/20   Liz Chacko MD   carvedilol (COREG) 25 MG tablet TAKE 1 TABLET BY MOUTH TWICE A DAY WITH MEALS 3/16/20   Amanda Connors PA-C   omeprazole (PRILOSEC) 40 MG delayed release capsule Take 1 capsule by mouth daily 3/16/20   Mandy Aponte MD   fluticasone [facility]) 50 MCG/ACT nasal spray 2 sprays by Nasal route daily 2/24/20   Donya King MD   Dextromethorphan-guaiFENesin [facility] DM)  MG TB12 Take 1 tablet by mouth 2 times daily as needed (cough/congestion) 2/24/20   Donya King MD   sildenafil (REVATIO) 20 MG tablet Take 1-5 tablets by mouth as needed (ED) 8/23/19   JACOB Beaulieu - CNP   insulin lispro (HUMALOG KWIKPEN) 100 UNIT/ML pen Inject 6 Units into the skin 3 times daily (before meals) Plus scale  Patient taking differently: Inject 6 Units into the skin 3 times daily (before meals) Plus scale 2 for every 50 above 150 1/9/18   Hugh Serrano MD   insulin glargine (LANTUS SOLOSTAR) 100 UNIT/ML injection pen Inject 24 Units into the skin nightly  Patient taking differently: Inject 30 Units into the skin nightly  1/6/18   Hugh Serrano MD   loratadine (CLARITIN) 10 MG tablet Take 10 mg by mouth daily    Historical Provider, MD   Respiratory Therapy Supplies (ADULT MASK) MISC Please do mask desensitization and/or provide mask of patient's choice. 5/11/17   Prakash Benavides MD   acetaminophen (TYLENOL) 325 MG tablet Take 650 mg by mouth every 8 hours as needed for Pain    Historical Provider, MD   nitroGLYCERIN (NITROSTAT) 0.4 MG SL tablet Place 1 tablet under the tongue every 5 minutes as needed for Chest pain up to max of 3 total doses.  If no relief after 1 dose, call 911. 3/16/17   Veda Wiley MD       Future Appointments   Date Time Provider Link Liriano   12/4/2020 11:00 AM SCHEDULE, SRPS PACER NURSE DANILO PACER [facility] [address] [facility]   2/18/2021  3:30 PM MD DANILO Dwyer PALMER [facility] [address] [facility]   4/12/2021  1:30 PM Mandy Aponte MD [address] [facility] - [address] [facility]

## 2020-08-29 ENCOUNTER — PATIENT MESSAGE (OUTPATIENT)
Dept: FAMILY MEDICINE CLINIC | Age: 45
End: 2020-08-29

## 2020-08-31 ENCOUNTER — PATIENT MESSAGE (OUTPATIENT)
Dept: FAMILY MEDICINE CLINIC | Age: 45
End: 2020-08-31

## 2020-08-31 RX ORDER — INSULIN LISPRO 100 [IU]/ML
6 INJECTION, SOLUTION INTRAVENOUS; SUBCUTANEOUS
Qty: 5 PEN | Refills: 3 | Status: SHIPPED
Start: 2020-08-31 | End: 2020-10-16 | Stop reason: DRUGHIGH

## 2020-08-31 RX ORDER — SEMAGLUTIDE 1.34 MG/ML
INJECTION, SOLUTION SUBCUTANEOUS
Qty: 3 ML | Refills: 3 | Status: SHIPPED | OUTPATIENT
Start: 2020-08-31 | End: 2021-01-07

## 2020-08-31 NOTE — TELEPHONE ENCOUNTER
From: Tamra Moore  To:  Chicho Clarke MD  Sent: 8/31/2020 1:29 PM EDT  Subject: Prescription Question    I also need a refill [facility]empic [facility]

## 2020-09-09 ENCOUNTER — CARE COORDINATION (OUTPATIENT)
Dept: OTHER | Facility: CLINIC | Age: 45
End: 2020-09-09

## 2020-09-09 NOTE — CARE COORDINATION
Ambulatory Care Coordination Note  2020  CM Risk Score: 6  Charlson 10 Year Mortality Risk Score: 10%     ACC: Amie Allen RN    Summary Note:     [facility]) contacted the patient by telephone to follow up on progress, discuss new issues or concerns, and reinforce/ provide pt education. Verified name and  with patient as identifiers. DM II-  Pt states his BG has been in goal range. He is able to describe his DM medication regimen and SSI use. He is able to describe BG and A1c goal ranges, S/S of hypo/hyperglycemia and how to treat. PT denies any hypoglycemic symptoms since last contact. He feels he is doing much better with his DM diet and his wife has been supportive. He is still feeling motivated to keep making improvements. He is able to describe general DM diet guidelines. He declines any referral to DM ed or RD. CHF-  Pt is not currently doing daily weights. He is monitoring for symptoms. He I sable to describe S/S to report. Reinforced CHF zone sheet. Pt verbalized understanding    Education Provided on importance and benefits of:  Self monitoring compliance: SBGM and Daily Weights  Red Flag symptoms to report  Diabetes and CHF Zone Sheet   Medication compliance  Diet compliance: Diabetic and Cardiac  Utilization of appropriate level of care    Plan:  - Continue Telephonic follow up to discuss:  Reinforce/ review DM and CHF Zone sheet education  Medication compliance  Utilization of appropriate level of care  Goal Progress  Patient verbalized understanding and is agreeable to follow up contact.            Care Coordination Interventions    Program Enrollment:  Rising Risk  Referral from Primary Care Provider:  No  Suggested Interventions and Community Resources  Diabetes Education:  Declined (Comment: 20: Pt has completed in the past)  Pharmacist:  Completed (Comment: 20: Pt enrolled in DM care management)  Registered Dietician:  Declined (Comment: 8/26/20: Pt has completed in the past with DM ed)  Specialty Services Referral:  Completed (Comment: 9/9/20: Established with Cardiology)  Zone Management Tools:  Completed (Comment: DM II, CHF)         Goals Addressed                 This Visit's Progress     Conditions and Symptoms   On track     I will keep my appointment or reschedule if I have to cancel. I will notify my provider of any barriers to my plan of care. I will follow my Zone Management tool to seek urgent or emergent care. I will notify my provider of any symptoms that indicate a worsening of my condition. Barriers: lack of motivation, overwhelmed by complexity of regimen, stress, time constraints, and medication side effects  Plan for overcoming my barriers: Will work with ACM, family, and providers  Confidence: 9/10  Anticipated Goal Completion Date: 9/30/20         Self Monitoring   On track     Self-Monitored Blood Glucose - I will check my blood sugar Fasting blood sugar and blood sugars ac  I will notify my provider of any trends of increasing or decreasing blood sugars over a 1 month period. Daily Weights - I will notify my provider of any increase in weight by 3 or more pounds in 2 days OR 5 or more pounds in a week. Barriers: lack of motivation and overwhelmed by complexity of regimen  Plan for overcoming my barriers: Will work with ACM and family  Confidence: 8/10  Anticipated Goal Completion Date: 9/30/20              Prior to Admission medications    Medication Sig Start Date End Date Taking?  Authorizing Provider   insulin lispro, 1 Unit Dial, (HUMALOG KWIKPEN) 100 UNIT/ML SOPN Inject 6 Units into the skin 3 times daily (before meals) 8/31/20   Christina Eldridge MD   Semaglutide,0.25 or 0.5MG/DOS, (OZEMPIC, 0.25 OR 0.5 MG/DOSE,) 2 MG/1.5ML SOPN Inject 0.25 mg subcutaneously once weekly for weeks 1-4, then increase to 0.5 mg subcutaneously once weekly 8/31/20   Christina Eldridge MD   carvedilol (COREG) 3.125 MG tablet Take 1 tablet by mouth 2 times daily (with meals) 8/6/20   [facility] CLINTON Costa   furosemide (LASIX) 20 MG tablet TAKE 1 TABLET BY MOUTH EVERY DAY in the PM 8/3/20   Bakari Hernández MD   furosemide (LASIX) 40 MG tablet TAKE 1 TABLET BY MOUTH DAILY in AM 8/3/20   Bakari Hernández MD   blood glucose test strips (PRODIGY NO CODING BLOOD GLUC) strip Use to check blood sugar 4 times daily 6/15/20   Florence Cerna MD   spironolactone (ALDACTONE) 25 MG tablet TAKE 1 TABLET BY MOUTH ONE TIME A DAY 5/26/20   Florence Cerna MD   metFORMIN (GLUCOPHAGE-XR) 500 MG extended release tablet Take 2 tablets by mouth 2 times daily 5/26/20   Florence Cerna MD   irbesartan (AVAPRO) 75 MG tablet TAKE ONE TABLET BY MOUTH EVERY EVENING 5/14/20   Florence Cerna MD   rosuvastatin (CRESTOR) 10 MG tablet Take 1 tablet by mouth daily 4/4/20   Florence Cerna MD   Blood Glucose Monitoring Suppl (PRODIGY AUTOCODE BLOOD GLUCOSE) MAUREEN Use to check blood sugar 4 times daily 4/4/20   MD Sanjiv Mitchell Lancets 28G MISC Use to check blood sugar 4 times daily 4/4/20   Florence Cerna MD   aspirin EC 81 MG EC tablet Take 1 tablet by mouth daily 4/4/20   Florence Cerna MD   Insulin Pen Needle (PEN NEEDLES 31GX5/16\") 31G X 8 MM MISC Use to inject insulins and Ozempic 4/4/20   Florence Cerna MD   carvedilol (COREG) 25 MG tablet TAKE 1 TABLET BY MOUTH TWICE A DAY WITH MEALS 3/16/20   [facility] CL[place] Costa   omeprazole (PRILOSEC) 40 MG delayed release capsule Take 1 capsule by mouth daily 3/16/20   Bakari Hernández MD   fluticasone [facility] [place]) 50 MCG/ACT nasal spray 2 sprays by Nasal route daily 2/24/20   Florence Cerna MD   Dextromethorphan-guaiFENesin [facility] DM)  MG TB12 Take 1 tablet by mouth 2 times daily as needed (cough/congestion) 2/24/20   Florence Cerna MD   sildenafil (REVATIO) 20 MG tablet Take 1-5 tablets by mouth as needed (ED) 8/23/19   JACOB Mclean - CNP   insulin lispro (HUMALOG KWIKPEN) 100 UNIT/ML pen Inject 6 Units into the skin 3 times daily (before meals) Plus scale  Patient taking differently: Inject 6 Units into the skin 3 times daily (before meals) Plus scale 2 for every 50 above 150 1/9/18   Wesley Mcgregor MD   insulin glargine (LANTUS SOLOSTAR) 100 UNIT/ML injection pen Inject 24 Units into the skin nightly  Patient taking differently: Inject 30 Units into the skin nightly  1/6/18   Wesley Mcgregor MD   loratadine (CLARITIN) 10 MG tablet Take 10 mg by mouth daily    Historical Provider, MD   Respiratory Therapy Supplies (ADULT MASK) MISC Please do mask desensitization and/or provide mask of patient's choice. 5/11/17   Aishwarya Turner MD   acetaminophen (TYLENOL) 325 MG tablet Take 650 mg by mouth every 8 hours as needed for Pain    Historical Provider, MD   nitroGLYCERIN (NITROSTAT) 0.4 MG SL tablet Place 1 tablet under the tongue every 5 minutes as needed for Chest pain up to max of 3 total doses. If no relief after 1 dose, call 911. 3/16/17   Dipak Gregg MD       Future Appointments   Date Time Provider Link Liriano   12/4/2020 11:00 AM SCHEDULE, SRPS PACER NURSE [facility] PACER [facility] [email or web address]   2/18/2021  3:30 PM Armandina Scheuermann, MD [facility] [place]   4/12/2021  1:30 PM Tam Carl MD SR[facility]X [facility] [email or web address]     ,   Diabetes Assessment    Medic Alert ID:  No  Meal Planning:  Avoidance of concentrated sweets, Plate Method   How often do you test your blood sugar?:  Meals   Do you have barriers with adherence to non-pharmacologic self-management interventions?  (Nutrition/Exercise/Self-Monitoring):  No   Have you ever had to go to the ED for symptoms of low blood sugar?:  No       Do you have hyperglycemia symptoms?:  No   Do you have hypoglycemia symptoms?:  No   Last Blood Sugar Value:  128   Blood Sugar Monitoring Regimen:  Before Meals, At Bedtime   Blood Sugar Trends:  Steady Decrease       and   Congestive Heart Failure Assessment    Are you currently restricting fluids?:  No Restriction  Do you understand a low sodium diet?:  Yes  Do you understand how to read food labels?:  Yes  How many restaurant meals do you eat per week?:  3-4  Do you salt your food before tasting it?:  No     No patient-reported symptoms      Symptoms:   None:  Yes      Symptom course:  stable  Weight trend:  stable (Comment: Per pt)

## 2020-09-10 ENCOUNTER — TELEPHONE (OUTPATIENT)
Dept: CARDIOLOGY CLINIC | Age: 45
End: 2020-09-10

## 2020-09-10 NOTE — LETTER
.. [address] [place], [place] [address]  Phone: [phone]  Fax: [phone]        September 10, 2020    [address] [place] [address] [facility]      Dear Martina Fields: Grace Clay We have been unable to contact you by phone. Our office did not receive your Carelink transmission which was scheduled for August 18, 2020. Our website indicates your monitor is disconnected. Please check the connections and send a manual download ASAP. If the monitor appears to be connected and did not send, unplug it for three seconds, plug it back in and try to resend. If you need help sending a download, please call [employer] at [phone]. Our office is not responsible for missed transmissions. Please call our office at [phone] if you have questions for the pacemaker/ICD clinic     Thank You for your assistance!     [facility] Pacemaker/ICD clinic

## 2020-09-14 ENCOUNTER — PROCEDURE VISIT (OUTPATIENT)
Dept: CARDIOLOGY CLINIC | Age: 45
End: 2020-09-14
Payer: COMMERCIAL

## 2020-09-14 PROCEDURE — 93295 DEV INTERROG REMOTE 1/2/MLT: CPT | Performed by: INTERNAL MEDICINE

## 2020-09-14 PROCEDURE — 93296 REM INTERROG EVL PM/IDS: CPT | Performed by: INTERNAL MEDICINE

## 2020-09-14 NOTE — PROGRESS NOTES
[employer] single icd  Battery 6.3Yrs  V paced <0.1%  r wave 1.9mV  Ventricle threshold 0.75V @ 0.40ms  Ventricle impedance 361 ohms   Shock 46 ohms   svc 53 ohms   Episodes of NSVT  longest 5 seconds 18 beats, rate 222bpm

## 2020-09-22 ENCOUNTER — CARE COORDINATION (OUTPATIENT)
Dept: OTHER | Facility: CLINIC | Age: 45
End: 2020-09-22

## 2020-09-22 NOTE — CARE COORDINATION
Ambulatory Care Coordination Note  2020  CM Risk Score: 6  Charlson 10 Year Mortality Risk Score: 10%     ACC: Zaheer Islas RN    Summary Note:  [facility]) contacted the patient by telephone to follow up on progress, discuss new issues or concerns, and reinforce/ provide pt education. Verified name and  with patient as identifiers. DM II-  Pt states he is tolerating the Ozempic much better now. He is not having the nausea he was having before an has increased his dose without a problem. He states he is trying hard at following his DM diet and his wife is being very supportive. He states he has not had a BG over 180 since last contact. CHF- Pt has not been performing daily weights and CHF zone sheet and importance and benefits of self monitoring discussed. Pt verbalized understanding and will see if he is able to get a new scale to use at home. He is able to describe Red Flag symptoms to report. Education Provided on importance and benefits of:  Self monitoring compliance: SBGM and Daily weights  Red Flag symptoms to report  Diabetes and CHF Zone Sheet   Medication compliance  Diet compliance: Diabetic and Cardiac    Plan:  - Continue Telephonic follow up to discuss:  Self monitoring compliance: Daily weights  Diet compliance: Diabetic and Cardiac  Goal Progress  Patient verbalized understanding and is agreeable to follow up contact.             Care Coordination Interventions    Program Enrollment:  Rising Risk  Referral from Primary Care Provider:  No  Suggested Interventions and Community Resources  Diabetes Education:  Declined (Comment: 20: Pt has completed in the past)  Pharmacist:  Completed (Comment: 20: Pt enrolled in DM care management)  Registered Dietician:  Declined (Comment: 20: Pt has completed in the past with DM ed)  Specialty Services Referral:  Completed (Comment: 20: Established with Cardiology)  Zone Management Tools:  Completed (Comment: DM II, CHF)         Goals Addressed                 This Visit's Progress     Conditions and Symptoms   On track     I will keep my appointment or reschedule if I have to cancel. I will notify my provider of any barriers to my plan of care. I will follow my Zone Management tool to seek urgent or emergent care. I will notify my provider of any symptoms that indicate a worsening of my condition. Barriers: lack of motivation, overwhelmed by complexity of regimen, stress, time constraints, and medication side effects  Plan for overcoming my barriers: Will work with ACM, family, and providers  Confidence: 9/10  Anticipated Goal Completion Date: 9/30/20         Self Monitoring   On track     Self-Monitored Blood Glucose - I will check my blood sugar Fasting blood sugar and blood sugars ac  I will notify my provider of any trends of increasing or decreasing blood sugars over a 1 month period. Daily Weights - I will notify my provider of any increase in weight by 3 or more pounds in 2 days OR 5 or more pounds in a week. Barriers: lack of motivation and overwhelmed by complexity of regimen  Plan for overcoming my barriers: Will work with ACM and family  Confidence: 8/10  Anticipated Goal Completion Date: 9/30/20              Prior to Admission medications    Medication Sig Start Date End Date Taking?  Authorizing Provider   insulin lispro, 1 Unit Dial, (HUMALOG KWIKPEN) 100 UNIT/ML SOPN Inject 6 Units into the skin 3 times daily (before meals) 8/31/20   Armandina Scheuermann, MD   Semaglutide,0.25 or 0.5MG/DOS, (OZEMPIC, 0.25 OR 0.5 MG/DOSE,) 2 MG/1.5ML SOPN Inject 0.25 mg subcutaneously once weekly for weeks 1-4, then increase to 0.5 mg subcutaneously once weekly 8/31/20   Armandina Scheuermann, MD   carvedilol (COREG) 3.125 MG tablet Take 1 tablet by mouth 2 times daily (with meals) 8/6/20   Nemo Roca PA-C   furosemide (LASIX) 20 MG tablet TAKE 1 TABLET BY MOUTH EVERY DAY in the PM 8/3/20   Jasvir Melara above 150 1/9/18   Francy Dunlap MD   insulin glargine (LANTUS SOLOSTAR) 100 UNIT/ML injection pen Inject 24 Units into the skin nightly  Patient taking differently: Inject 30 Units into the skin nightly  1/6/18   Francy Dunlap MD   loratadine (CLARITIN) 10 MG tablet Take 10 mg by mouth daily    Historical Provider, MD   Respiratory Therapy Supplies (ADULT MASK) MISC Please do mask desensitization and/or provide mask of patient's choice. 5/11/17   Ana Maria Carrasquillo MD   acetaminophen (TYLENOL) 325 MG tablet Take 650 mg by mouth every 8 hours as needed for Pain    Historical Provider, MD   nitroGLYCERIN (NITROSTAT) 0.4 MG SL tablet Place 1 tablet under the tongue every 5 minutes as needed for Chest pain up to max of 3 total doses.  If no relief after 1 dose, call 911. 3/16/17   Vincent Burkitt, MD       Future Appointments   Date Time Provider Link Liriano   12/4/2020 11:00 AM SCHEDULE, SRPS PACER NURSE SRPX PACER [facility] [place]   2/18/2021  3:30 PM Neeru Christianson MD SRPX [facility] [place]   4/12/2021  1:30 PM Natalie Miller MD [address] [facility] [place]

## 2020-10-08 ENCOUNTER — CARE COORDINATION (OUTPATIENT)
Dept: OTHER | Facility: CLINIC | Age: 45
End: 2020-10-08

## 2020-10-08 NOTE — CARE COORDINATION
Ambulatory Care Coordination Note  10/8/2020  CM Risk Score: 6  Charlson 10 Year Mortality Risk Score: 10%     ACC: Gloria Weston RN    Summary Note: ACM contacted patient by telephone to follow up on progress, reinforce previous education/ provide patient education, and discuss any new issues or concerns. HIPAA compliant message left requesting a return phone call at patients convenience to discuss. Will continue to follow. Care Coordination Interventions    Program Enrollment:  Rising Risk  Referral from Primary Care Provider:  No  Suggested Interventions and Community Resources  Diabetes Education:  Declined (Comment: 8/26/20: Pt has completed in the past)  Pharmacist:  Completed (Comment: 8/26/20: Pt enrolled in DM care management)  Registered Dietician:  Baljinder Holman (Comment: 8/26/20: Pt has completed in the past with DM ed)  Specialty Services Referral:  Completed (Comment: 9/9/20: Established with Cardiology)  Zone Management Tools:  Completed (Comment: DM II, CHF)           Prior to Admission medications    Medication Sig Start Date End Date Taking?  Authorizing Provider   insulin lispro, 1 Unit Dial, (HUMALOG KWIKPEN) 100 UNIT/ML SOPN Inject 6 Units into the skin 3 times daily (before meals) 8/31/20   Kenji Carr MD   Semaglutide,0.25 or 0.5MG/DOS, (OZEMPIC, 0.25 OR 0.5 MG/DOSE,) 2 MG/1.5ML SOPN Inject 0.25 mg subcutaneously once weekly for weeks 1-4, then increase to 0.5 mg subcutaneously once weekly 8/31/20   Kenji Carr MD   carvedilol (COREG) 3.125 MG tablet Take 1 tablet by mouth 2 times daily (with meals) 8/6/20   Anna Kimball PA-C   furosemide (LASIX) 20 MG tablet TAKE 1 TABLET BY MOUTH EVERY DAY in the PM 8/3/20   Sky Mcintyre MD   furosemide (LASIX) 40 MG tablet TAKE 1 TABLET BY MOUTH DAILY in AM 8/3/20   Sky Mcintyre MD   blood glucose test strips (PRODIGY NO CODING BLOOD GLUC) strip Use to check blood sugar 4 times daily 6/15/20   Kenji Carr MD   spironolactone (ALDACTONE) 25 MG tablet TAKE 1 TABLET BY MOUTH ONE TIME A DAY 5/26/20   Kimberly Linton MD   metFORMIN (GLUCOPHAGE-XR) 500 MG extended release tablet Take 2 tablets by mouth 2 times daily 5/26/20   Kimberly Linton MD   irbesartan (AVAPRO) 75 MG tablet TAKE ONE TABLET BY MOUTH EVERY EVENING 5/14/20   Kimberly Linton MD   rosuvastatin (CRESTOR) 10 MG tablet Take 1 tablet by mouth daily 4/4/20   Kimberly Linton MD   Blood Glucose Monitoring Suppl (PRODIGY AUTOCODE BLOOD GLUCOSE) MAUREEN Use to check blood sugar 4 times daily 4/4/20   Kimberly Linton MD   Prodigy Lancets 28G MISC Use to check blood sugar 4 times daily 4/4/20   Kimberly Linton MD   aspirin EC 81 MG EC tablet Take 1 tablet by mouth daily 4/4/20   Kimberly Linton MD   Insulin Pen Needle (PEN NEEDLES 31GX5/16\") 31G X 8 MM MISC Use to inject insulins and Ozempic 4/4/20   Kimberly Linton MD   carvedilol (COREG) 25 MG tablet TAKE 1 TABLET BY MOUTH TWICE A DAY WITH MEALS 3/16/20   Zane Wylie PA-C   omeprazole (PRILOSEC) 40 MG delayed release capsule Take 1 capsule by mouth daily 3/16/20   Luz Parson MD   fluticasone [facility]) 50 MCG/ACT nasal spray 2 sprays by Nasal route daily 2/24/20   Kimberly Linton MD   Dextromethorphan-guaiFENesin [facility] DM)  MG TB12 Take 1 tablet by mouth 2 times daily as needed (cough/congestion) 2/24/20   Kimberly Linton MD   sildenafil (REVATIO) 20 MG tablet Take 1-5 tablets by mouth as needed (ED) 8/23/19   JACOB Cummings - CNP   insulin lispro (HUMALOG KWIKPEN) 100 UNIT/ML pen Inject 6 Units into the skin 3 times daily (before meals) Plus scale  Patient taking differently: Inject 6 Units into the skin 3 times daily (before meals) Plus scale 2 for every 50 above 150 1/9/18   Domenic Herrera MD   insulin glargine (LANTUS SOLOSTAR) 100 UNIT/ML injection pen Inject 24 Units into the skin nightly  Patient taking differently: Inject 30 Units into the skin nightly  1/6/18   Domenic Herrera MD   loratadine (Derian Frazier) 10 MG tablet Take 10 mg by mouth daily    Historical Provider, MD   Respiratory Therapy Supplies (ADULT MASK) MISC Please do mask desensitization and/or provide mask of patient's choice. 5/11/17   Valeria Ruano MD   acetaminophen (TYLENOL) 325 MG tablet Take 650 mg by mouth every 8 hours as needed for Pain    Historical Provider, MD   nitroGLYCERIN (NITROSTAT) 0.4 MG SL tablet Place 1 tablet under the tongue every 5 minutes as needed for Chest pain up to max of 3 total doses.  If no relief after 1 dose, call 911. 3/16/17   Bucky Gauthier MD       Future Appointments   Date Time Provider Link Liriano   12/4/2020 11:00 AM SCHEDULE, SRPS PACER NURSE SRPX [facility] [place]   2/18/2021  3:30 PM [facility] [place]   4/12/2021  1:30 PM Amanda Shaikh MD [address] [facility] [place]

## 2020-10-14 ENCOUNTER — CARE COORDINATION (OUTPATIENT)
Dept: OTHER | Facility: CLINIC | Age: 45
End: 2020-10-14

## 2020-10-14 NOTE — CARE COORDINATION
6/15/20   Mo Sutton MD   spironolactone (ALDACTONE) 25 MG tablet TAKE 1 TABLET BY MOUTH ONE TIME A DAY 5/26/20   Mo Sutton MD   metFORMIN (GLUCOPHAGE-XR) 500 MG extended release tablet Take 2 tablets by mouth 2 times daily 5/26/20   Mo Sutton MD   irbesartan (AVAPRO) 75 MG tablet TAKE ONE TABLET BY MOUTH EVERY EVENING 5/14/20   Mo Sutton MD   rosuvastatin (CRESTOR) 10 MG tablet Take 1 tablet by mouth daily 4/4/20   Mo Sutton MD   Blood Glucose Monitoring Suppl (PRODIGY AUTOCODE BLOOD GLUCOSE) MAUREEN Use to check blood sugar 4 times daily 4/4/20   Mo Sutton MD   Prodigy Lancets 28G MISC Use to check blood sugar 4 times daily 4/4/20   Mo Sutton MD   aspirin EC 81 MG EC tablet Take 1 tablet by mouth daily 4/4/20   Mo Sutton MD   Insulin Pen Needle (PEN NEEDLES 31GX5/16\") 31G X 8 MM MISC Use to inject insulins and Ozempic 4/4/20   Mo Sutton MD   carvedilol (COREG) 25 MG tablet TAKE 1 TABLET BY MOUTH TWICE A DAY WITH MEALS 3/16/20   Esperanza Booker PA-C   omeprazole (PRILOSEC) 40 MG delayed release capsule Take 1 capsule by mouth daily 3/16/20   Phan Campbell MD   fluticasone [facility]) 50 MCG/ACT nasal spray 2 sprays by Nasal route daily 2/24/20   Mo Sutton MD   Dextromethorphan-guaiFENesin [facility] DM)  MG TB12 Take 1 tablet by mouth 2 times daily as needed (cough/congestion) 2/24/20   Mo Sutton MD   sildenafil (REVATIO) 20 MG tablet Take 1-5 tablets by mouth as needed (ED) 8/23/19   Sheryle Lamp, APRN - CNP   insulin lispro (HUMALOG KWIKPEN) 100 UNIT/ML pen Inject 6 Units into the skin 3 times daily (before meals) Plus scale  Patient taking differently: Inject 6 Units into the skin 3 times daily (before meals) Plus scale 2 for every 50 above 150 1/9/18   Janes Dodson MD   insulin glargine (LANTUS SOLOSTAR) 100 UNIT/ML injection pen Inject 24 Units into the skin nightly  Patient taking differently: Inject 30 Units into the skin nightly  1/6/18 Ramiro Goodpasture, MD   loratadine (CLARITIN) 10 MG tablet Take 10 mg by mouth daily    Historical Provider, MD   Respiratory Therapy Supplies (ADULT MASK) MISC Please do mask desensitization and/or provide mask of patient's choice. 5/11/17   Ely Woodruff MD   acetaminophen (TYLENOL) 325 MG tablet Take 650 mg by mouth every 8 hours as needed for Pain    Historical Provider, MD   nitroGLYCERIN (NITROSTAT) 0.4 MG SL tablet Place 1 tablet under the tongue every 5 minutes as needed for Chest pain up to max of 3 total doses.  If no relief after 1 dose, call 911. 3/16/17   Griselda Ko MD       Future Appointments   Date Time Provider Link Deandra   12/4/2020 11:00 AM SCHEDULE, SRPS PACER NURSE SRPX PACER [facility] [place]   2/18/2021  3:30 PM [facility] [place]   4/12/2021  1:30 PM Bon Luong MD [address] [facility]

## 2020-10-16 RX ORDER — INSULIN LISPRO 100 [IU]/ML
INJECTION, SOLUTION INTRAVENOUS; SUBCUTANEOUS
Qty: 45 PEN | Refills: 3 | Status: SHIPPED | OUTPATIENT
Start: 2020-10-16 | End: 2021-12-27

## 2020-10-16 RX ORDER — ASPIRIN 81 MG/1
TABLET ORAL
Qty: 90 TABLET | Refills: 3 | Status: SHIPPED | OUTPATIENT
Start: 2020-10-16 | End: 2021-10-21

## 2020-10-26 ENCOUNTER — CARE COORDINATION (OUTPATIENT)
Dept: OTHER | Facility: CLINIC | Age: 45
End: 2020-10-26

## 2020-10-26 NOTE — CARE COORDINATION
Ambulatory Care Coordination Note  10/26/2020  CM Risk Score: 6  Charlson 10 Year Mortality Risk Score: 10%     ACC: Juanjose Felipe RN    Summary Note: ACM contacted patient by telephone to follow up on progress, reinforce previous education/ provide patient education, and discuss any new issues or concerns. HIPAA compliant message left requesting a return phone call at patients convenience to discuss. Will continue to follow. Care Coordination Interventions    Program Enrollment:  Rising Risk  Referral from Primary Care Provider:  No  Suggested Interventions and Community Resources  Diabetes Education:  Declined (Comment: 8/26/20: Pt has completed in the past)  Pharmacist:  Completed (Comment: 8/26/20: Pt enrolled in DM care management)  Registered Dietician:  Rigde Carmona (Comment: 8/26/20: Pt has completed in the past with DM ed)  Specialty Services Referral:  Completed (Comment: 9/9/20: Established with Cardiology)  Zone Management Tools:  Completed (Comment: DM II, CHF)           Prior to Admission medications    Medication Sig Start Date End Date Taking?  Authorizing Provider   ASPIRIN ADULT LOW STRENGTH 81 MG EC tablet TAKE 1 TABLET BY MOUTH ONE TIME A DAY 10/16/20   JACOB Duenas - CNP   insulin lispro, 1 Unit Dial, (HUMALOG KWIKPEN) 100 UNIT/ML SOPN 14 U with meals plus sliding scale--151-200 3u,  201-250  5u,  251-300  8u, 301-350  10u, 351-400 12u, over 400 15 u and contact physician 10/16/20   JACOB Carmen CNP   Semaglutide,0.25 or 0.5MG/DOS, (OZEMPIC, 0.25 OR 0.5 MG/DOSE,) 2 MG/1.5ML SOPN Inject 0.25 mg subcutaneously once weekly for weeks 1-4, then increase to 0.5 mg subcutaneously once weekly 8/31/20   Shawna Saavedra MD   carvedilol (COREG) 3.125 MG tablet Take 1 tablet by mouth 2 times daily (with meals) 8/6/20   Unknown CLINTON Cooper   furosemide (LASIX) 20 MG tablet TAKE 1 TABLET BY MOUTH EVERY DAY in the PM 8/3/20   Florinda Gaines MD   furosemide (LASIX) 40 MG tablet TAKE 1 TABLET BY MOUTH DAILY in AM 8/3/20   Kishan Jung MD   blood glucose test strips (PRODIGY NO CODING BLOOD GLUC) strip Use to check blood sugar 4 times daily 6/15/20   Meka Harrell MD   spironolactone (ALDACTONE) 25 MG tablet TAKE 1 TABLET BY MOUTH ONE TIME A DAY 5/26/20   Meka Harrell MD   metFORMIN (GLUCOPHAGE-XR) 500 MG extended release tablet Take 2 tablets by mouth 2 times daily 5/26/20   Meka Harrell MD   irbesartan (AVAPRO) 75 MG tablet TAKE ONE TABLET BY MOUTH EVERY EVENING 5/14/20   Meka Harrell MD   rosuvastatin (CRESTOR) 10 MG tablet Take 1 tablet by mouth daily 4/4/20   Meka Harrell MD   Blood Glucose Monitoring Suppl (PRODIGY AUTOCODE BLOOD GLUCOSE) MAUREEN Use to check blood sugar 4 times daily 4/4/20   MD Sanjiv Thakur Lancets 28G MISC Use to check blood sugar 4 times daily 4/4/20   Meka Harrell MD   Insulin Pen Needle (PEN NEEDLES 31GX5/16\") 31G X 8 MM MISC Use to inject insulins and Ozempic 4/4/20   Meka Harrell MD   carvedilol (COREG) 25 MG tablet TAKE 1 TABLET BY MOUTH TWICE A DAY WITH MEALS 3/16/20   Stoney Babinski, PA-C   omeprazole (PRILOSEC) 40 MG delayed release capsule Take 1 capsule by mouth daily 3/16/20   Kishan Jung MD   fluticasone [facility]) 50 MCG/ACT nasal spray 2 sprays by Nasal route daily 2/24/20   Meka Harrell MD   Dextromethorphan-guaiFENesin [facility] DM)  MG TB12 Take 1 tablet by mouth 2 times daily as needed (cough/congestion) 2/24/20   Meka Harrell MD   sildenafil (REVATIO) 20 MG tablet Take 1-5 tablets by mouth as needed (ED) 8/23/19   JACOB Lucio - CNP   insulin glargine (LANTUS SOLOSTAR) 100 UNIT/ML injection pen Inject 24 Units into the skin nightly  Patient taking differently: Inject 30 Units into the skin nightly  1/6/18   Ramila Renner MD   loratadine (CLARITIN) 10 MG tablet Take 10 mg by mouth daily    Historical Provider, MD   Respiratory Therapy Supplies (ADULT MASK) MISC Please do mask desensitization and/or provide mask of patient's choice. 5/11/17   Valeria Ruano MD   acetaminophen (TYLENOL) 325 MG tablet Take 650 mg by mouth every 8 hours as needed for Pain    Historical Provider, MD   nitroGLYCERIN (NITROSTAT) 0.4 MG SL tablet Place 1 tablet under the tongue every 5 minutes as needed for Chest pain up to max of 3 total doses.  If no relief after 1 dose, call 911. 3/16/17   Bucky Gauthier MD       Future Appointments   Date Time Provider Link Deandra   12/4/2020 11:00 AM SCHEDULE, SRPS PACER [facility] [place]   2/18/2021  3:30 PM Alejandro Dexter MD [facility] [place]   4/12/2021  1:30 PM Amanda Shaikh MD [address] [facility] [place]

## 2020-11-13 ENCOUNTER — HOSPITAL ENCOUNTER (INPATIENT)
Age: 45
LOS: 1 days | Discharge: HOME | DRG: 308 | End: 2020-11-14
Attending: FAMILY MEDICINE | Admitting: FAMILY MEDICINE
Payer: COMMERCIAL

## 2020-11-13 ENCOUNTER — APPOINTMENT (OUTPATIENT)
Dept: GENERAL RADIOLOGY | Age: 45
DRG: 308 | End: 2020-11-13
Payer: COMMERCIAL

## 2020-11-13 PROBLEM — Z45.02 AICD DISCHARGE: Status: ACTIVE | Noted: 2020-11-13

## 2020-11-13 LAB
ALBUMIN SERPL-MCNC: 4.2 G/DL (ref 3.5–5.1)
ALP BLD-CCNC: 115 U/L (ref 38–126)
ALT SERPL-CCNC: 27 U/L (ref 11–66)
ANION GAP SERPL CALCULATED.3IONS-SCNC: 12 MEQ/L (ref 8–16)
APTT: 32.7 SECONDS (ref 22–38)
AST SERPL-CCNC: 23 U/L (ref 5–40)
BASOPHILS # BLD: 0.4 %
BASOPHILS ABSOLUTE: 0 THOU/MM3 (ref 0–0.1)
BILIRUB SERPL-MCNC: 0.3 MG/DL (ref 0.3–1.2)
BILIRUBIN DIRECT: < 0.2 MG/DL (ref 0–0.3)
BUN BLDV-MCNC: 6 MG/DL (ref 7–22)
CALCIUM IONIZED: 1.14 MMOL/L (ref 1.12–1.32)
CALCIUM SERPL-MCNC: 9.5 MG/DL (ref 8.5–10.5)
CHLORIDE BLD-SCNC: 99 MEQ/L (ref 98–111)
CO2: 25 MEQ/L (ref 23–33)
CREAT SERPL-MCNC: 0.6 MG/DL (ref 0.4–1.2)
EOSINOPHIL # BLD: 3.1 %
EOSINOPHILS ABSOLUTE: 0.3 THOU/MM3 (ref 0–0.4)
ERYTHROCYTE [DISTWIDTH] IN BLOOD BY AUTOMATED COUNT: 16.4 % (ref 11.5–14.5)
ERYTHROCYTE [DISTWIDTH] IN BLOOD BY AUTOMATED COUNT: 50.8 FL (ref 35–45)
GFR SERPL CREATININE-BSD FRML MDRD: > 90 ML/MIN/1.73M2
GLUCOSE BLD-MCNC: 161 MG/DL (ref 70–108)
HCT VFR BLD CALC: 42.7 % (ref 42–52)
HEMOGLOBIN: 13.3 GM/DL (ref 14–18)
IMMATURE GRANS (ABS): 0.05 THOU/MM3 (ref 0–0.07)
IMMATURE GRANULOCYTES: 0.6 %
INR BLD: 1.08 (ref 0.85–1.13)
LYMPHOCYTES # BLD: 35.5 %
LYMPHOCYTES ABSOLUTE: 2.9 THOU/MM3 (ref 1–4.8)
MAGNESIUM: 1.8 MG/DL (ref 1.6–2.4)
MCH RBC QN AUTO: 26.9 PG (ref 26–33)
MCHC RBC AUTO-ENTMCNC: 31.1 GM/DL (ref 32.2–35.5)
MCV RBC AUTO: 86.4 FL (ref 80–94)
MONOCYTES # BLD: 6.7 %
MONOCYTES ABSOLUTE: 0.5 THOU/MM3 (ref 0.4–1.3)
NUCLEATED RED BLOOD CELLS: 0 /100 WBC
OSMOLALITY CALCULATION: 273 MOSMOL/KG (ref 275–300)
PLATELET # BLD: 221 THOU/MM3 (ref 130–400)
PMV BLD AUTO: 9.6 FL (ref 9.4–12.4)
POTASSIUM SERPL-SCNC: 4.1 MEQ/L (ref 3.5–5.2)
RBC # BLD: 4.94 MILL/MM3 (ref 4.7–6.1)
SEG NEUTROPHILS: 53.7 %
SEGMENTED NEUTROPHILS ABSOLUTE COUNT: 4.4 THOU/MM3 (ref 1.8–7.7)
SODIUM BLD-SCNC: 136 MEQ/L (ref 135–145)
TOTAL PROTEIN: 6.9 G/DL (ref 6.1–8)
TROPONIN T: < 0.01 NG/ML
WBC # BLD: 8.2 THOU/MM3 (ref 4.8–10.8)

## 2020-11-13 PROCEDURE — 6360000002 HC RX W HCPCS: Performed by: FAMILY MEDICINE

## 2020-11-13 PROCEDURE — 82330 ASSAY OF CALCIUM: CPT

## 2020-11-13 PROCEDURE — 83735 ASSAY OF MAGNESIUM: CPT

## 2020-11-13 PROCEDURE — 93005 ELECTROCARDIOGRAM TRACING: CPT | Performed by: FAMILY MEDICINE

## 2020-11-13 PROCEDURE — 99285 EMERGENCY DEPT VISIT HI MDM: CPT

## 2020-11-13 PROCEDURE — 2140000000 HC CCU INTERMEDIATE R&B

## 2020-11-13 PROCEDURE — 99222 1ST HOSP IP/OBS MODERATE 55: CPT | Performed by: PHYSICIAN ASSISTANT

## 2020-11-13 PROCEDURE — 80053 COMPREHEN METABOLIC PANEL: CPT

## 2020-11-13 PROCEDURE — 82248 BILIRUBIN DIRECT: CPT

## 2020-11-13 PROCEDURE — 36415 COLL VENOUS BLD VENIPUNCTURE: CPT

## 2020-11-13 PROCEDURE — 71045 X-RAY EXAM CHEST 1 VIEW: CPT

## 2020-11-13 PROCEDURE — 84484 ASSAY OF TROPONIN QUANT: CPT

## 2020-11-13 PROCEDURE — 85730 THROMBOPLASTIN TIME PARTIAL: CPT

## 2020-11-13 PROCEDURE — 85610 PROTHROMBIN TIME: CPT

## 2020-11-13 PROCEDURE — 96374 THER/PROPH/DIAG INJ IV PUSH: CPT

## 2020-11-13 PROCEDURE — 85025 COMPLETE CBC W/AUTO DIFF WBC: CPT

## 2020-11-13 RX ORDER — SODIUM CHLORIDE 0.9 % (FLUSH) 0.9 %
10 SYRINGE (ML) INJECTION PRN
Status: DISCONTINUED | OUTPATIENT
Start: 2020-11-13 | End: 2020-11-14 | Stop reason: HOSPADM

## 2020-11-13 RX ORDER — ROSUVASTATIN CALCIUM 10 MG/1
10 TABLET, COATED ORAL DAILY
Status: DISCONTINUED | OUTPATIENT
Start: 2020-11-14 | End: 2020-11-14 | Stop reason: HOSPADM

## 2020-11-13 RX ORDER — PANTOPRAZOLE SODIUM 40 MG/1
40 TABLET, DELAYED RELEASE ORAL
Status: DISCONTINUED | OUTPATIENT
Start: 2020-11-14 | End: 2020-11-14 | Stop reason: HOSPADM

## 2020-11-13 RX ORDER — ACETAMINOPHEN 325 MG/1
650 TABLET ORAL EVERY 6 HOURS PRN
Status: DISCONTINUED | OUTPATIENT
Start: 2020-11-13 | End: 2020-11-14 | Stop reason: HOSPADM

## 2020-11-13 RX ORDER — METFORMIN HYDROCHLORIDE 500 MG/1
1000 TABLET, EXTENDED RELEASE ORAL 2 TIMES DAILY
Status: DISCONTINUED | OUTPATIENT
Start: 2020-11-14 | End: 2020-11-14 | Stop reason: HOSPADM

## 2020-11-13 RX ORDER — ONDANSETRON 2 MG/ML
4 INJECTION INTRAMUSCULAR; INTRAVENOUS EVERY 6 HOURS PRN
Status: DISCONTINUED | OUTPATIENT
Start: 2020-11-13 | End: 2020-11-14 | Stop reason: HOSPADM

## 2020-11-13 RX ORDER — HEPARIN SODIUM 5000 [USP'U]/ML
5000 INJECTION, SOLUTION INTRAVENOUS; SUBCUTANEOUS EVERY 8 HOURS SCHEDULED
Status: DISCONTINUED | OUTPATIENT
Start: 2020-11-14 | End: 2020-11-14 | Stop reason: HOSPADM

## 2020-11-13 RX ORDER — CETIRIZINE HYDROCHLORIDE 10 MG/1
10 TABLET ORAL DAILY
Status: DISCONTINUED | OUTPATIENT
Start: 2020-11-14 | End: 2020-11-14 | Stop reason: HOSPADM

## 2020-11-13 RX ORDER — PROMETHAZINE HYDROCHLORIDE 25 MG/1
12.5 TABLET ORAL EVERY 6 HOURS PRN
Status: DISCONTINUED | OUTPATIENT
Start: 2020-11-13 | End: 2020-11-14 | Stop reason: HOSPADM

## 2020-11-13 RX ORDER — INSULIN GLARGINE 100 [IU]/ML
30 INJECTION, SOLUTION SUBCUTANEOUS NIGHTLY
Status: DISCONTINUED | OUTPATIENT
Start: 2020-11-14 | End: 2020-11-14 | Stop reason: HOSPADM

## 2020-11-13 RX ORDER — SODIUM CHLORIDE 0.9 % (FLUSH) 0.9 %
10 SYRINGE (ML) INJECTION EVERY 12 HOURS SCHEDULED
Status: DISCONTINUED | OUTPATIENT
Start: 2020-11-14 | End: 2020-11-14 | Stop reason: HOSPADM

## 2020-11-13 RX ORDER — ONDANSETRON 2 MG/ML
4 INJECTION INTRAMUSCULAR; INTRAVENOUS ONCE
Status: COMPLETED | OUTPATIENT
Start: 2020-11-13 | End: 2020-11-13

## 2020-11-13 RX ORDER — CARVEDILOL 25 MG/1
25 TABLET ORAL 2 TIMES DAILY
Status: DISCONTINUED | OUTPATIENT
Start: 2020-11-14 | End: 2020-11-14 | Stop reason: HOSPADM

## 2020-11-13 RX ORDER — LOSARTAN POTASSIUM 25 MG/1
25 TABLET ORAL DAILY
Status: DISCONTINUED | OUTPATIENT
Start: 2020-11-14 | End: 2020-11-14 | Stop reason: HOSPADM

## 2020-11-13 RX ORDER — FUROSEMIDE 40 MG/1
60 TABLET ORAL DAILY
Status: DISCONTINUED | OUTPATIENT
Start: 2020-11-14 | End: 2020-11-14 | Stop reason: HOSPADM

## 2020-11-13 RX ORDER — FLUTICASONE PROPIONATE 50 MCG
2 SPRAY, SUSPENSION (ML) NASAL DAILY
Status: DISCONTINUED | OUTPATIENT
Start: 2020-11-14 | End: 2020-11-14 | Stop reason: HOSPADM

## 2020-11-13 RX ORDER — SPIRONOLACTONE 25 MG/1
25 TABLET ORAL DAILY
Status: DISCONTINUED | OUTPATIENT
Start: 2020-11-14 | End: 2020-11-14 | Stop reason: HOSPADM

## 2020-11-13 RX ORDER — ASPIRIN 81 MG/1
81 TABLET ORAL DAILY
Status: DISCONTINUED | OUTPATIENT
Start: 2020-11-14 | End: 2020-11-14 | Stop reason: HOSPADM

## 2020-11-13 RX ORDER — ACETAMINOPHEN 650 MG/1
650 SUPPOSITORY RECTAL EVERY 6 HOURS PRN
Status: DISCONTINUED | OUTPATIENT
Start: 2020-11-13 | End: 2020-11-14 | Stop reason: HOSPADM

## 2020-11-13 RX ADMIN — ONDANSETRON 4 MG: 2 INJECTION INTRAMUSCULAR; INTRAVENOUS at 23:00

## 2020-11-13 ASSESSMENT — ENCOUNTER SYMPTOMS
VOMITING: 0
CHEST TIGHTNESS: 0
ABDOMINAL PAIN: 0
BACK PAIN: 0
WHEEZING: 0
SORE THROAT: 0
NAUSEA: 0
CONSTIPATION: 0
SHORTNESS OF BREATH: 0
SINUS PRESSURE: 0
SINUS PAIN: 0
COUGH: 0
DIARRHEA: 0
RHINORRHEA: 0

## 2020-11-14 VITALS
HEIGHT: 75 IN | BODY MASS INDEX: 39.17 KG/M2 | DIASTOLIC BLOOD PRESSURE: 65 MMHG | WEIGHT: 315 LBS | TEMPERATURE: 98.5 F | SYSTOLIC BLOOD PRESSURE: 118 MMHG | OXYGEN SATURATION: 93 % | RESPIRATION RATE: 18 BRPM | HEART RATE: 97 BPM

## 2020-11-14 LAB
ALBUMIN SERPL-MCNC: 3.8 G/DL (ref 3.5–5.1)
ANION GAP SERPL CALCULATED.3IONS-SCNC: 10 MEQ/L (ref 8–16)
BUN BLDV-MCNC: 6 MG/DL (ref 7–22)
CALCIUM SERPL-MCNC: 9.3 MG/DL (ref 8.5–10.5)
CHLORIDE BLD-SCNC: 103 MEQ/L (ref 98–111)
CO2: 25 MEQ/L (ref 23–33)
CREAT SERPL-MCNC: 0.6 MG/DL (ref 0.4–1.2)
EKG ATRIAL RATE: 107 BPM
EKG ATRIAL RATE: 115 BPM
EKG P AXIS: 49 DEGREES
EKG P-R INTERVAL: 120 MS
EKG P-R INTERVAL: 188 MS
EKG Q-T INTERVAL: 330 MS
EKG Q-T INTERVAL: 352 MS
EKG QRS DURATION: 106 MS
EKG QRS DURATION: 114 MS
EKG QTC CALCULATION (BAZETT): 456 MS
EKG QTC CALCULATION (BAZETT): 469 MS
EKG R AXIS: -36 DEGREES
EKG R AXIS: 63 DEGREES
EKG T AXIS: 56 DEGREES
EKG T AXIS: 58 DEGREES
EKG VENTRICULAR RATE: 107 BPM
EKG VENTRICULAR RATE: 115 BPM
GFR SERPL CREATININE-BSD FRML MDRD: > 90 ML/MIN/1.73M2
GLUCOSE BLD-MCNC: 117 MG/DL (ref 70–108)
GLUCOSE BLD-MCNC: 126 MG/DL (ref 70–108)
GLUCOSE BLD-MCNC: 174 MG/DL (ref 70–108)
GLUCOSE BLD-MCNC: 197 MG/DL (ref 70–108)
LV EF: 33 %
LVEF MODALITY: NORMAL
MAGNESIUM: 2 MG/DL (ref 1.6–2.4)
PHOSPHORUS: 4.1 MG/DL (ref 2.4–4.7)
POTASSIUM SERPL-SCNC: 4.8 MEQ/L (ref 3.5–5.2)
REASON FOR REJECTION: NORMAL
REJECTED TEST: NORMAL
SODIUM BLD-SCNC: 138 MEQ/L (ref 135–145)

## 2020-11-14 PROCEDURE — 6370000000 HC RX 637 (ALT 250 FOR IP): Performed by: INTERNAL MEDICINE

## 2020-11-14 PROCEDURE — 6360000002 HC RX W HCPCS: Performed by: PHYSICIAN ASSISTANT

## 2020-11-14 PROCEDURE — 82948 REAGENT STRIP/BLOOD GLUCOSE: CPT

## 2020-11-14 PROCEDURE — 2580000003 HC RX 258: Performed by: PHYSICIAN ASSISTANT

## 2020-11-14 PROCEDURE — 2500000003 HC RX 250 WO HCPCS: Performed by: PHYSICIAN ASSISTANT

## 2020-11-14 PROCEDURE — 93005 ELECTROCARDIOGRAM TRACING: CPT | Performed by: PHYSICIAN ASSISTANT

## 2020-11-14 PROCEDURE — 93010 ELECTROCARDIOGRAM REPORT: CPT | Performed by: NUCLEAR MEDICINE

## 2020-11-14 PROCEDURE — 99254 IP/OBS CNSLTJ NEW/EST MOD 60: CPT | Performed by: INTERNAL MEDICINE

## 2020-11-14 PROCEDURE — 93306 TTE W/DOPPLER COMPLETE: CPT

## 2020-11-14 PROCEDURE — 80069 RENAL FUNCTION PANEL: CPT

## 2020-11-14 PROCEDURE — 94760 N-INVAS EAR/PLS OXIMETRY 1: CPT

## 2020-11-14 PROCEDURE — 99239 HOSP IP/OBS DSCHRG MGMT >30: CPT | Performed by: FAMILY MEDICINE

## 2020-11-14 PROCEDURE — 83735 ASSAY OF MAGNESIUM: CPT

## 2020-11-14 PROCEDURE — 36415 COLL VENOUS BLD VENIPUNCTURE: CPT

## 2020-11-14 PROCEDURE — 6370000000 HC RX 637 (ALT 250 FOR IP): Performed by: PHYSICIAN ASSISTANT

## 2020-11-14 RX ORDER — CARVEDILOL 25 MG/1
TABLET ORAL
Qty: 60 TABLET | Refills: 0 | Status: SHIPPED | OUTPATIENT
Start: 2020-11-14 | End: 2020-11-17 | Stop reason: SDUPTHER

## 2020-11-14 RX ORDER — DILTIAZEM HYDROCHLORIDE 120 MG/1
120 CAPSULE, COATED, EXTENDED RELEASE ORAL DAILY
Status: DISCONTINUED | OUTPATIENT
Start: 2020-11-14 | End: 2020-11-14 | Stop reason: HOSPADM

## 2020-11-14 RX ORDER — METOPROLOL TARTRATE 5 MG/5ML
5 INJECTION INTRAVENOUS EVERY 6 HOURS PRN
Status: DISCONTINUED | OUTPATIENT
Start: 2020-11-14 | End: 2020-11-14 | Stop reason: HOSPADM

## 2020-11-14 RX ORDER — DEXTROSE MONOHYDRATE 50 MG/ML
100 INJECTION, SOLUTION INTRAVENOUS PRN
Status: DISCONTINUED | OUTPATIENT
Start: 2020-11-14 | End: 2020-11-14 | Stop reason: HOSPADM

## 2020-11-14 RX ORDER — DILTIAZEM HYDROCHLORIDE 120 MG/1
120 CAPSULE, COATED, EXTENDED RELEASE ORAL DAILY
Qty: 30 CAPSULE | Refills: 0 | Status: SHIPPED | OUTPATIENT
Start: 2020-11-15 | End: 2020-11-17 | Stop reason: SDUPTHER

## 2020-11-14 RX ORDER — DEXTROSE MONOHYDRATE 25 G/50ML
12.5 INJECTION, SOLUTION INTRAVENOUS PRN
Status: DISCONTINUED | OUTPATIENT
Start: 2020-11-14 | End: 2020-11-14 | Stop reason: HOSPADM

## 2020-11-14 RX ORDER — NICOTINE POLACRILEX 4 MG
15 LOZENGE BUCCAL PRN
Status: DISCONTINUED | OUTPATIENT
Start: 2020-11-14 | End: 2020-11-14 | Stop reason: HOSPADM

## 2020-11-14 RX ADMIN — FUROSEMIDE 60 MG: 40 TABLET ORAL at 10:00

## 2020-11-14 RX ADMIN — Medication 10 ML: at 10:02

## 2020-11-14 RX ADMIN — FLUTICASONE PROPIONATE 2 SPRAY: 50 SPRAY, METERED NASAL at 10:01

## 2020-11-14 RX ADMIN — HEPARIN SODIUM 5000 UNITS: 5000 INJECTION INTRAVENOUS; SUBCUTANEOUS at 00:54

## 2020-11-14 RX ADMIN — METOPROLOL TARTRATE 5 MG: 5 INJECTION INTRAVENOUS at 02:41

## 2020-11-14 RX ADMIN — ASPIRIN 81 MG: 81 TABLET, COATED ORAL at 10:01

## 2020-11-14 RX ADMIN — MAGNESIUM SULFATE HEPTAHYDRATE 1 G: 500 INJECTION, SOLUTION INTRAMUSCULAR; INTRAVENOUS at 00:54

## 2020-11-14 RX ADMIN — CARVEDILOL 25 MG: 25 TABLET, FILM COATED ORAL at 10:01

## 2020-11-14 RX ADMIN — CETIRIZINE HYDROCHLORIDE 10 MG: 10 TABLET, FILM COATED ORAL at 10:01

## 2020-11-14 RX ADMIN — LOSARTAN POTASSIUM 25 MG: 25 TABLET, FILM COATED ORAL at 10:00

## 2020-11-14 RX ADMIN — Medication 10 ML: at 00:55

## 2020-11-14 RX ADMIN — DILTIAZEM HYDROCHLORIDE 120 MG: 120 CAPSULE, EXTENDED RELEASE ORAL at 10:01

## 2020-11-14 RX ADMIN — METFORMIN HYDROCHLORIDE 1000 MG: 500 TABLET, EXTENDED RELEASE ORAL at 00:54

## 2020-11-14 RX ADMIN — SPIRONOLACTONE 25 MG: 25 TABLET ORAL at 10:00

## 2020-11-14 RX ADMIN — CARVEDILOL 25 MG: 25 TABLET, FILM COATED ORAL at 00:54

## 2020-11-14 RX ADMIN — PANTOPRAZOLE SODIUM 40 MG: 40 TABLET, DELAYED RELEASE ORAL at 05:43

## 2020-11-14 RX ADMIN — ROSUVASTATIN CALCIUM 10 MG: 10 TABLET, FILM COATED ORAL at 10:00

## 2020-11-14 NOTE — CONSULTS
[facility] [address] [place] [address]  Cardiology Consult        Patient:  Jennyfer Herrera  YOB: 1975  MRN: [id number]     Acct: [de-identified]    PCP: Meka Harrell MD    Date of Admission: 11/13/2020      Reason for Consultation:  ICD shock      History Of Present Illness:    57-year-old man with history of nonischemic cardiomyopathy with ejection fraction of 30% status post ICD implantation, diabetes, sleep apnea on CPAP presents to the hospital for ICD discharge. As per patient he has been busy moving his house for the last week. Yesterday he was in his usual state of health. He went to sleep the usual amount of time denies any chest pain shortness of breath. He knows noticed shock which he initially thought was secondary from his CPAP machine. Subsequently he was shocked 3 more times. He has never had a shock from his ICD before. He has a single-chamber Medtronic ICD. Interrogation in the ED showed it was an appropriate shock for VT. Patient denies any fevers chills cough, medication changes, diarrhea. Patient follows up with Dr. Love Blanc for cardiology care. Cardiology consult was obtained for further evaluation. EKGs shows sinus tachycardia at 107 bpm with poor R wave progression. Echo from 2017 shows ejection fraction of 30%, mild left atrial dilation. Cath in September 2014 showed no coronary disease. Patient does report that he has persistent sinus tachycardia at baseline and his average heart rate varies from 100-120s. Past Medical History:          Diagnosis Date    CHF (congestive heart failure) ([address]     Diabetes mellitus ([address]     S/P ICD (internal cardiac defibrillator) procedure: 1/15/2015: Medtronic Single Chamber 1/15/2015    1/15/2015: Medtronic Single Chamber.  Dr. Umesh Kim Sleep apnea 06/09/2017    Dr.Rovner    Zoll lifevesivette applied 8/22/14 8/29/2014    returned prior to ICD insertion 1/15       Past Surgical History:          Procedure Laterality Date    CARD[facility]AC [facility]HETERIZATION  9/2014    [facility]    CARDIAC DEFIBRILLATOR PLACEMENT  2014    CARDIAC DEFIBRILLATOR PLACEMENT      CARDIAC DEFIBRILLATOR PLACEMENT      EYE SURGERY      Lasix     VASECTOMY  2004       Medications Prior to Admission:      Prior to Admission medications    Medication Sig Start Date End Date Taking?  Authorizing Provider   ASPIRIN ADULT LOW STRENGTH 81 MG EC tablet TAKE 1 TABLET BY MOUTH ONE TIME A DAY 10/16/20  Yes JACOB Milligan CNP   insulin lispro, 1 Unit Dial, (HUMALOG KWIKPEN) 100 UNIT/ML SOPN 14 U with meals plus sliding scale--151-200 3u,  201-250  5u,  251-300  8u, 301-350  10u, 351-400 12u, over 400 15 u and contact physician 10/16/20  Yes JACOB Milligan CNP   Semaglutide,0.25 or 0.5MG/DOS, (OZEMPIC, 0.25 OR 0.5 MG/DOSE,) 2 MG/1.5ML SOPN Inject 0.25 mg subcutaneously once weekly for weeks 1-4, then increase to 0.5 mg subcutaneously once weekly 8/31/20  Yes Kimberly Linton MD   carvedilol (COREG) 3.125 MG tablet Take 1 tablet by mouth 2 times daily (with meals) 8/6/20  Yes Zane Wylie PA-C   furosemide (LASIX) 20 MG tablet TAKE 1 TABLET BY MOUTH EVERY DAY in the PM 8/3/20  Yes Luz Parson MD   furosemide (LASIX) 40 MG tablet TAKE 1 TABLET BY MOUTH DAILY in AM 8/3/20  Yes Luz Parson MD   blood glucose test strips (PRODIGY NO CODING BLOOD GLUC) strip Use to check blood sugar 4 times daily 6/15/20  Yes Kimberly Linton MD   spironolactone (ALDACTONE) 25 MG tablet TAKE 1 TABLET BY MOUTH ONE TIME A DAY 5/26/20  Yes Kimberly Linton MD   metFORMIN (GLUCOPHAGE-XR) 500 MG extended release tablet Take 2 tablets by mouth 2 times daily 5/26/20  Yes Kimberly Linton MD   irbesartan (AVAPRO) 75 MG tablet TAKE ONE TABLET BY MOUTH EVERY EVENING 5/14/20  Yes Kimberly Linton MD   rosuvastatin (CRESTOR) 10 MG tablet Take 1 tablet by mouth daily 4/4/20  Yes Kimberly Linton MD   Prodigy Lancets 28G MISC Use to check blood sugar 4 times daily 4/4/20  Yes Kimberly Linton MD Insulin Pen Needle (PEN NEEDLES 31GX5/16\") 31G X 8 MM MISC Use to inject insulins and Ozempic 4/4/20  Yes Nicole Akbar MD   carvedilol (COREG) 25 MG tablet TAKE 1 TABLET BY MOUTH TWICE A DAY WITH MEALS 3/16/20  Yes Ann Chau PA-C   omeprazole (PRILOSEC) 40 MG delayed release capsule Take 1 capsule by mouth daily 3/16/20  Yes Glenda Ya MD   fluticasone [facility]) 50 MCG/ACT nasal spray 2 sprays by Nasal route daily 2/24/20  Yes Nicole Akbar MD   Dextromethorphan-guaiFENesin [facility] DM)  MG TB12 Take 1 tablet by mouth 2 times daily as needed (cough/congestion) 2/24/20  Yes Nicole Akbar MD   insulin glargine (LANTUS SOLOSTAR) 100 UNIT/ML injection pen Inject 24 Units into the skin nightly  Patient taking differently: Inject 30 Units into the skin nightly  1/6/18  Yes Kaleigh Cormier MD   loratadine (CLARITIN) 10 MG tablet Take 10 mg by mouth daily   Yes Historical Provider, MD   Respiratory Therapy Supplies (ADULT MASK) MISC Please do mask desensitization and/or provide mask of patient's choice. 5/11/17  Yes Rica Wilks MD   acetaminophen (TYLENOL) 325 MG tablet Take 650 mg by mouth every 8 hours as needed for Pain   Yes Historical Provider, MD   Blood Glucose Monitoring Suppl (PRODIGY AUTOCODE BLOOD GLUCOSE) MAUREEN Use to check blood sugar 4 times daily 4/4/20   Nicole Akbar MD   sildenafil (REVATIO) 20 MG tablet Take 1-5 tablets by mouth as needed (ED) 8/23/19   JACOB Gonzalez - CNP   nitroGLYCERIN (NITROSTAT) 0.4 MG SL tablet Place 1 tablet under the tongue every 5 minutes as needed for Chest pain up to max of 3 total doses.  If no relief after 1 dose, call 911. 3/16/17   Viktor Pineda MD       Current Facility-Administered Medications   Medication Dose Route Frequency Provider Last Rate Last Dose    metoprolol (LOPRESSOR) injection 5 mg  5 mg Intravenous Q6H PRN [facility] PA   5 mg at 11/14/20 0241    glucose (GLUTOSE) 40 % oral gel 15 g  15 g Oral PRN [facility], [place]        dextrose 50 % IV solution  12.5 g Intravenous PRN BART Gaona        glucagon (rDNA) injection 1 mg  1 mg Intramuscular PRN BART Gaona        dextrose 5 % solution  100 mL/hr Intravenous PRN [facility], [address]        dilTIAZem (CARDIZEM CD) extended release capsule 120 mg  120 mg Oral Daily Migue Mota MD   120 mg at 11/14/20 1001    sodium chloride flush 0.9 % injection 10 mL  10 mL Intravenous 2 times per day [facility] PA   10 mL at 11/14/20 1002    sodium chloride flush 0.9 % injection 10 mL  10 mL Intravenous PRN Jimenez PA   10 mL at 11/14/20 0055    acetaminophen (TYLENOL) tablet 650 mg  650 mg Oral Q6H PRN BART Gaona        Or    acetaminophen (TYLENOL) suppository 650 mg  650 mg Rectal Q6H PRN BART Gaona        promethazine (PHENERGAN) tablet 12.5 mg  12.5 mg Oral Q6H PRN BART Gaona        Or    ondansetron (ZOFRAN) injection 4 mg  4 mg Intravenous Q6H PRN BART Pereira        [Held by provider] heparin (porcine) injection 5,000 Units  5,000 Units Subcutaneous 3 times per day [facility], [address]   5,000 Units at 11/14/20 0054    aspirin EC tablet 81 mg  81 mg Oral Daily [employer], [place]   81 mg at 11/14/20 1001    carvedilol (COREG) tablet 25 mg  25 mg Oral BID [employer], [place]   25 mg at 11/14/20 1001    fluticasone (FLONASE) 50 MCG/ACT nasal spray 2 spray  2 spray Nasal Daily [employer], [address]   2 spray at 11/14/20 1001    furosemide (LASIX) tablet 60 mg  60 mg Oral Daily [employer], [place]   60 mg at 11/14/20 1000    insulin glargine (LANTUS) injection vial 30 Units  30 Units Subcutaneous Nightly [facility], [address]        losartan (COZAAR) tablet 25 mg  25 mg Oral Daily [employer], [address]   25 mg at 11/14/20 1000    cetirizine (ZYRTEC) tablet 10 mg  10 mg Oral Daily [employer], [address]   10 mg at 11/14/20 1001    [Held by provider] metFORMIN (GLUCOPHAGE-XR) extended release tablet 1,000 mg  1,000 mg Oral BID [place], [place]   1,000 mg at 11/14/20 0054    pantoprazole (PROTONIX) tablet 40 mg  40 mg Oral QAM AC [facility], [place]   40 mg at 11/14/20 0543    rosuvastatin (CRESTOR) tablet 10 mg  10 mg Oral Daily [place], [place]   10 mg at 11/14/20 1000    spironolactone (ALDACTONE) tablet 25 mg  25 mg Oral Daily [place], [place]   25 mg at 11/14/20 1000       Allergies:  Levaquin [levofloxacin in d5w]    Social History:    TOBACCO:   reports that he has never smoked. He has never used smokeless tobacco.  ETOH:   reports current alcohol use of about 18.0 standard drinks of alcohol per week. Family History:        Problem Relation Age of Onset    Heart Disease Maternal Uncle     Heart Disease Maternal Grandfather     Heart Disease Paternal Uncle     Heart Disease Maternal Grandmother          Review of Systems -   General ROS: negative  Psychological ROS: negative  Hematological and Lymphatic ROS: No history of blood clots or bleeding disorder. Respiratory ROS: no cough, shortness of breath, or wheezing  Cardiovascular ROS: As per HPI  Gastrointestinal ROS: negative  Genito-Urinary ROS: no dysuria, trouble voiding, or hematuria  Musculoskeletal ROS: negative  Neurological ROS: no TIA or stroke symptoms  Dermatological ROS: negative    All others reviewed and are negative.        /84   Pulse 101   Temp 98 °F (36.7 °C) (Oral)   Resp 20   Ht 6' 3\" (1.905 m)   Wt (!) 399 lb 14.4 oz (181.4 kg)   SpO2 93%   BMI 49.98 kg/m²       Physical Examination:   General appearance - alert, in no distress  Mental status - alert, oriented to person, place, and time  Neck - supple, no significant adenopathy, no JVD, or carotid bruits  Chest - clear to auscultation, no wheezes, rales or rhonchi, symmetric air entry  Heart -tacky, regular rhythm, normal S1, S2, no murmurs, rubs, clicks or gallops  Abdomen - soft, nontender, nondistended, no masses or organomegaly  Neurological - alert, oriented, normal speech, no focal findings or movement disorder noted  Musculoskeletal - no joint tenderness, deformity or swelling  Extremities - peripheral pulses normal, no pedal edema, no clubbing or cyanosis  Skin - normal coloration and turgor, no rashes, no suspicious skin lesions noted      LABS:    Recent Labs     11/13/20  2208   TROPONINT < 0.010     CBC:   Lab Results   Component Value Date    WBC 8.2 11/13/2020    RBC 4.94 11/13/2020    HGB 13.3 11/13/2020    HCT 42.7 11/13/2020    MCV 86.4 11/13/2020    MCH 26.9 11/13/2020    MCHC 31.1 11/13/2020    RDW 15.7 03/15/2017     11/13/2020    MPV 9.6 11/13/2020     BMP:    Lab Results   Component Value Date     11/14/2020    K 4.8 11/14/2020     11/14/2020    CO2 25 11/14/2020    BUN 6 11/14/2020    LABALBU 3.8 11/14/2020    CREATININE 0.6 11/14/2020    CALCIUM 9.3 11/14/2020    LABGLOM >90 11/14/2020    GLUCOSE 174 11/14/2020    GLUCOSE 258 12/16/2017     Hepatic Function Panel:    Lab Results   Component Value Date    ALKPHOS 115 11/13/2020    ALT 27 11/13/2020    AST 23 11/13/2020    PROT 6.9 11/13/2020    BILITOT 0.3 11/13/2020    BILIDIR <0.2 11/13/2020    LABALBU 3.8 11/14/2020     Magnesium:    Lab Results   Component Value Date    MG 2.0 11/14/2020     Warfarin PT/INR:  No components found for: PTPATWAR, PTINRWAR  HgBA1c:    Lab Results   Component Value Date    LABA1C 6.3 07/25/2020     FLP:    Lab Results   Component Value Date    TRIG 151 03/14/2020    HDL 35 03/14/2020    LDLCALC 92 03/14/2020     TSH:    Lab Results   Component Value Date    TSH 3.000 04/30/2017     BNP: No components found for: PRO-BNP      Assessment/Plan:    Patient Active Problem List   Diagnosis    CHF (congestive heart failure) (HCC) systolic chronic     Bronchitis, acute    Cardiomyopathy (HCC)-Nonischemic dilated low EF 25%, newly Dxed 08/2014- med RX- cath  mild CAD- repeat echo Nov 24, 2014- EF 25 to 30%- NEED the ICD    S/P cardiac cath- Angiographically patent coronaries-EF 20, EDP 28 mmhg- med rx    1/15/2015: Medtronic Single Chamber  [place]    Morbid obesity with BMI of 50.0-59.9, adult ([address]    HTN (hypertension)    Bilateral leg edema- +1 B/L-stable    Cough due to ACE inhibitor    Hyponatremia    Hyperglycemia    Weight gain, claimed 18lbs in 10 days since discharge    Chronic systolic congestive heart failure (HCC)    MIKE on CPAP    AICD discharge     1 ICD discharge for ventricular tachycardia  2 nonischemic cardiomyopathy with ejection fraction of 30%  3 diabetes  4 morbid obesity  5 hypertension  6 MIKE on CPAP    The device interrogation showed an appropriate shock for ventricular tachycardia. Although it is difficult to say that it was VT with 571% certainty due to patient having single-chamber ICD lead,  it could be SVT also  Patient did not have any cardiac symptoms prior to this event nor were there any medication or other changes. We will try to control his baseline tachycardia. Continue current dose of Coreg and add 120 mg of Cardizem CD. Monitor heart failure symptoms and blood pressure  Patient was supplemented with IV magnesium for mild hypomagnesemia  Continue rest of the medications  Advised patient to lose weight  Monitor for atrial fibrillation  Patient needs to follow-up with Dr. Kike Ashton within 1 week to evaluate symptoms further    Please do note hesitate to contact me for any further questions. Thank you for the opportunity to be involved in this patient's care.     Code Status: Full Code    Electronically signed by Shashank Brower MD on 11/14/2020 at 10:33 AM

## 2020-11-14 NOTE — PROGRESS NOTES
Nutrition Education    Provided patient with Heart Healthy Carbohydrate Controlled diet education. Reviewed patient's HgA1C. Discussed importance of consuming three carbohydrate controlled meals daily and how many carbohydrates to consume with each meal. Educated patient on how to count carbohydrates and provided examples of snacks and meals. Answered all questions and concerns at this time. · Verbally reviewed information with Patient & Spouse  · Educated on [address]  · Written educational materials provided. · Contact name and number provided. · Refer to Patient Education activity for more details.     Electronically signed by Leelee Joya RD, LD on 11/14/20 at 12:43 PM EST    Contact: [phone]

## 2020-11-14 NOTE — ED PROVIDER NOTES
[address] COMPLAINT  Chief Complaint   Patient presents with    Irregular Heart Beat       Nurses Notes reviewed and I agree except as noted in the HPI. HPI     Snow Geiger is a 39 y.o. male who presents for evaluation of defibrillator shock. He has a past medical history of MIKE with CPAP use at night and CHF with reduced ejection fraction. He states that he was putting on his CPAP machine at night while sitting on the edge of his bed he felt a jolt of electricity trouble or his chest and had believed that first of the CPAP machine shocked him. He charlee the mask across the bed, and was recovering when he was shocked again. He was shocked a total of 4 times, and on the last time he was lying down in bed trying to recover from what happened. He reports that he was having no chest pain or palpitations prior to the event. He reports that he was fully awake and not unconscious at the time. His wife who witnessed him getting ready for bed thought he was just messing around at first.  However after a few shocks she noted that he turned very pale. She corroborates his story of not feeling ill prior to this event, or exhibiting any signs or symptoms that something might have been wrong with his heart at the time. They both report that throughout this entire event he had never lost consciousness or had a fall. He denies fevers, chills, chest pains, shortness of breath, nausea, vomiting, abdominal pain, or increased swelling in his lower limbs. He does state there is swelling in his lower limbs however this always occurs when he wears a certain pair of shoes. He reports is the first time he has ever been shocked by his ICD before. The ICD was placed around 5 to 6 years ago due to low ejection heart failure. He reports that he has been taking his medication as directed. He follows with Dr. Kike Ashton.   He states that he is currently on an 8-month follow-up schedule and is doing well with his heart failure regimen. He currently states that he does not have any chest pain. EF: 30% in 2017, global hypokinesis  Valves: No valvular disease  Arteries: No stents, no history of CAD  Rhythm: No history of Afib or VF  Peripheral: No history of PVD or PAD    REVIEW OF SYSTEMS  Review of Systems   Constitutional: Negative for activity change, chills, diaphoresis, fatigue and fever. HENT: Negative for congestion, rhinorrhea, sinus pressure, sinus pain, sneezing and sore throat. Respiratory: Negative for cough, chest tightness, shortness of breath and wheezing. Cardiovascular: Positive for leg swelling (Minimal). Negative for chest pain and palpitations. Gastrointestinal: Negative for abdominal pain, constipation, diarrhea, nausea and vomiting. Genitourinary: Negative for dysuria, frequency, hematuria and urgency. Musculoskeletal: Negative for arthralgias, back pain, joint swelling, myalgias, neck pain and neck stiffness. Skin: Negative for pallor, rash and wound. Neurological: Negative for dizziness, tremors, syncope, weakness, light-headedness and headaches. PAST MEDICAL HISTORY   has a past medical history of CHF (congestive heart failure) ([facility] [address] Diabetes mellitus ([facility] [address] S/P ICD (internal cardiac defibrillator) procedure: 1/15/2015: Medtronic Single Chamber, Sleep apnea, and Zoll lifevest applied 8/22/14. SURGICAL HISTORY   has a past surgical history that includes eye surgery; Cardiac catheterization (9/2014); Cardiac defibrillator placement (2014); Cardiac defibrillator placement; Cardiac defibrillator placement; and Vasectomy (2004).     CURRENT MEDICATIONS  Previous Medications    ACETAMINOPHEN (TYLENOL) 325 MG TABLET    Take 650 mg by mouth every 8 hours as needed for Pain    ASPIRIN ADULT LOW STRENGTH 81 MG EC TABLET    TAKE 1 TABLET BY MOUTH ONE TIME A DAY    BLOOD GLUCOSE MONITORING SUPPL ([employer] Challenger (OZEMPIC, 0.25 OR 0.5 MG/DOSE,) 2 MG/1.5ML SOPN    Inject 0.25 mg subcutaneously once weekly for weeks 1-4, then increase to 0.5 mg subcutaneously once weekly    SILDENAFIL (REVATIO) 20 MG TABLET    Take 1-5 tablets by mouth as needed (ED)    SPIRONOLACTONE (ALDACTONE) 25 MG TABLET    TAKE 1 TABLET BY MOUTH ONE TIME A DAY       ALLERGIES  is allergic to levaquin [levofloxacin in d5w] and statins. FAMILY HISTORY  He indicated that his mother is alive. He indicated that his father is alive. He indicated that his brother is alive. He indicated that the status of his maternal grandmother is unknown. He indicated that the status of his maternal grandfather is unknown. He indicated that the status of his maternal uncle is unknown. He indicated that the status of his paternal uncle is unknown.   family history includes Heart Disease in his maternal grandfather, maternal grandmother, maternal uncle, and paternal uncle. SOCIAL HISTORY   reports that he has never smoked. He has never used smokeless tobacco. He reports current alcohol use of about 18.0 standard drinks of alcohol per week. He reports that he does not use drugs. PHYSICAL EXAM     INITIAL VITALS: BP (!) 149/94   Pulse 113   Temp 98.5 °F (36.9 °C) (Oral)   Resp 16   Ht 6' 3\" (1.905 m)   Wt (!) 400 lb (181.4 kg)   SpO2 97%   BMI 50.00 kg/m² Estimated body mass index is 50 kg/m² as calculated from the following:    Height as of this encounter: 6' 3\" (1.905 m). Weight as of this encounter: 400 lb (181.4 kg). Physical Exam  Vitals signs and nursing note reviewed. Constitutional:       General: He is not in acute distress. Appearance: Normal appearance. He is obese. He is not ill-appearing, toxic-appearing or diaphoretic. HENT:      Head: Normocephalic and atraumatic. Right Ear: External ear normal.      Left Ear: External ear normal.      Nose: Nose normal. No congestion or rhinorrhea.       Mouth/Throat:      Mouth: Mucous membranes are moist.      Pharynx: Oropharynx is clear. No oropharyngeal exudate or posterior oropharyngeal erythema. Eyes:      General: No scleral icterus. Extraocular Movements: Extraocular movements intact. Conjunctiva/sclera: Conjunctivae normal.      Pupils: Pupils are equal, round, and reactive to light. Neck:      Musculoskeletal: Normal range of motion and neck supple. No muscular tenderness. Cardiovascular:      Rate and Rhythm: Regular rhythm. Tachycardia present. Pulses: Normal pulses. Heart sounds: Normal heart sounds. No murmur. No friction rub. No gallop. Pulmonary:      Effort: Pulmonary effort is normal.      Breath sounds: Normal breath sounds. No wheezing, rhonchi or rales. Abdominal:      General: Bowel sounds are normal.      Palpations: Abdomen is soft. There is no mass. Tenderness: There is no abdominal tenderness. Musculoskeletal:         General: No swelling or tenderness. Right lower leg: Edema (Mild) present. Left lower leg: Edema (Mild) present. Lymphadenopathy:      Cervical: No cervical adenopathy. Skin:     General: Skin is warm and dry. Capillary Refill: Capillary refill takes less than 2 seconds. Coloration: Skin is not jaundiced or pale. Findings: No lesion or rash. Neurological:      General: No focal deficit present. Mental Status: He is alert and oriented to person, place, and time. Mental status is at baseline. Cranial Nerves: No cranial nerve deficit. Motor: No weakness. Psychiatric:         Mood and Affect: Mood normal.         Behavior: Behavior normal.         MEDICAL DECISION MAKING  Review of past medical records show his last visit with Dr. Bridget Delgado was in August of this year. As per Dr. Ana Dover reports, his ICD interrogations have been normal in the past, and he has been tolerating his CHF medications. Initial assessment: Stable but shaken adult male.   He states no chest pain and there is no ANION GAP   GLOMERULAR FILTRATION RATE, ESTIMATED     All other unresulted laboratory test above are normal:    Vitals:    Vitals:    11/13/20 2155 11/13/20 2300   BP: (!) 150/89 (!) 149/94   Pulse: 115 113   Resp: 16 16   Temp: 98.5 °F (36.9 °C)    TempSrc: Oral    SpO2: 98% 97%   Weight: (!) 400 lb (181.4 kg)    Height: 6' 3\" (1.905 m)        EMERGENCY DEPARTMENT COURSE:    Medications   ondansetron (ZOFRAN) injection 4 mg (4 mg Intravenous Given 11/13/20 2300)            The patient was seen and evaluated by me. Within the department, I observed the patient's vital signs to be within acceptable range. He was mildly Hypertensive on admission, however recheck revealed a normotensive BP. He remained is Sinus Tachycardia for the duration of the stay. Laboratory and radiological studies were performed, results were reviewed with the patient. Within the department, the patient was treated with Zofran for Nausea. I observed the patient's condition to remain stable during the duration of their stay. I explained my proposed course of treatment to the patient, and they were amenable to my decision. Discussed case to Cardiologist on call and they recommended admitting the Patient for adjustment of medications and to see Cardiology. Laboratory results are within normal limits, and there are no electrolyte abnormalities to explain VF at this time. Discussed case with the admitting hospitalist as well. Controlled Substances Monitoring:       CRITICAL CARE:  None. CONSULTS:  None. PROCEDURES:  None. FINAL IMPRESSION:  1. Defibrillator discharge    2. Ventricular fibrillation seen on cardiac monitor [facility])        DISPOSITION/PLAN:  PATIENT REFERRED TO:  No follow-up provider specified. DISCHARGE MEDICATIONS:  New Prescriptions    No medications on file         (Please note that portions of this note were completed with a voice recognition program and electronically transcribed.  Efforts were made to edit the dictations but occasionally words are mis-transcribed. This transcription may contain errors not detected in proofreading.  This transcription was electronically signed.)    Electronically signed by Sterling Gamino DO on 11/13/2020 at 11:13 PM     Sterling Gamino DO  Resident  11/13/20 1530 [address] Rita Mcallister DO  Resident  11/13/20 [id number]

## 2020-11-14 NOTE — ED NOTES
ED to inpatient nurses report    Chief Complaint   Patient presents with    Irregular Heart Beat      Present to ED from home  LOC: alert and orientated to name, place, date  Vital signs   Vitals:    11/13/20 2155 11/13/20 2300   BP: (!) 150/89 (!) 149/94   Pulse: 115 113   Resp: 16 16   Temp: 98.5 °F (36.9 °C)    TempSrc: Oral    SpO2: 98% 97%   Weight: (!) 400 lb (181.4 kg)    Height: 6' 3\" (1.905 m)       Oxygen Baseline room air    Current needs required none   LDAs:   Peripheral IV 11/13/20 Right Antecubital (Active)   Site Assessment Clean;Dry; Intact 11/13/20 2204   Line Status Blood return noted;Normal saline locked; Flushed 11/13/20 2204   Dressing Status Clean;Dry; Intact 11/13/20 2204   Dressing Intervention New 11/13/20 2204     Mobility: Independent  Pending ED orders: none  Present condition: stable  Person of contractwife, phone number in chart  Our promise was given to family    Electronically signed by Jazlyn Quiroz RN on 11/13/2020 at 11:33 PM     Jazlyn Quiroz, [facility]  11/13/20 [id number]

## 2020-11-14 NOTE — PROGRESS NOTES
Patient and wife given discharge instructions via teach back method. Patient and wife verbalize understanding of family doctor and heart doctor calling them with appointments in one week. Patient and wife verbalize understanding of new medications and side effects. Patient taken to the discharge lobby in stable condition with belongings.

## 2020-11-14 NOTE — ED NOTES
Bed: 003A  Expected date:   Expected time:   Means of arrival: [facility]  Comments:     Nicole Morelos RN  11/13/20 [id number]

## 2020-11-14 NOTE — ED NOTES
Pacemaker confirmed that it is a medtronic.  Interrogation performed at this time     Dominik Mcgregor RN  11/13/20 [id number]

## 2020-11-14 NOTE — ED NOTES
Pt reassessed at this time. Pt medicated for nausea. Pt denies any pain. Vitals remain stable. resp even and unlabored. Call light in reach.  Will continue to monitor     Alicia Lowe RN  11/13/20 6581

## 2020-11-14 NOTE — H&P
[facility] Hospitalist History & Physical   11/13/2020  9:54 PM   Assessment and Plan:        1. AICD discharge / V. Fibrillation: likey 2/2 HFrEF  a. K 4.3, Mg 1.8  b. V.fib and NSVT confirmed with report from [employer], awaiting wave strips to be sent. c.  Consult Cardiology. Pend Echo. Mag replacement for Mg >2 and K >4. Telemetry  2. Nonischemic HFrEF 30% with severe global hypokinesis of LV (per Echo 2017) s/p AICD (2015): without decompensation: 2/2 post-streptococcal CMP   a. Denies LE edema/orthopnea or CP. Dry weight 400-402.   b. Cath (2014) showed nonischemic dilated cardiomyopathy with EF 15-20%  c. Resume ARB, lasix, spironolactone, statin, BB. Daily weights. 3. IDDM type 2, controlled: resume home lantus, sliding scale insulin, hold metformin. Receives semaglutide injections weakly. Goal BG while hosptialized 140-180. Hypoglycemia orders. 4. Sinus Tachycardia: unclear etiology. PRN metoprolol vs titrate home BB. 5. Chronic normocytic anemia, stable: Likely chronic disease. Monitor. Transfuse for hgb <7.   6. GERD: resume PPI   7. MIKE: to bring in home machine if admitted another night. CC:  AICD discharge  HPI: Mary Ann Hodge is a 39year old white male nonsmoker with PMH HFrEF30% s/p ICD (2015), DM, MIKE who presented to [facility] ED on 11/13/20 c/o AICD discharge that occurred while the patient was lying down trying to sleep with his CPAP on in bed. He was shocked 3 times in quick succession, then stood to find his wife and was shocked two additional times. His wife suggested he lie down on the floor and she called EMS. The patient denies any prior discharges. He follows with Dr. Nic Padilla. He denies LOC, palpitations, light-headedness, CP, SOB, LE edema, recent exacerbations of CHF, fever, chills. In the ED, vitals , /89, RR 16, 98% on RA and afebrile. Labs showed: K 4.1, Mag 1.8, ICa 1.14, troponin neg, unremarkable LFTs, hgb, 13.3, PTT/INR wnl.        ROS: A 14 point ROS was performed and was negative other than the pertinent positives noted in the hpi  PMH:  Per HPI  SHX:  never smoked. He has never used smokeless tobacco. He reports current alcohol use of about 18.0 standard drinks of alcohol per week. He reports that he does not use drugs. FHX: Heart Disease in his maternal grandfather, maternal grandmother, maternal uncle, and paternal uncle. Allergies: levaquin and statins.   Medications:  Current Facility-Administered Medications   Medication Dose Route Frequency Provider Last Rate Last Dose    magnesium sulfate 1 g in dextrose 5 % 100 mL IVPB  1 g Intravenous Once [employer], [place] 50 mL/hr at 11/14/20 0054 1 g at 11/14/20 0054    sodium chloride flush 0.9 % injection 10 mL  10 mL Intravenous 2 times per day [employer], [address]        sodium chloride flush 0.9 % injection 10 mL  10 mL Intravenous PRN [employer], [place]   10 mL at 11/14/20 0055    acetaminophen (TYLENOL) tablet 650 mg  650 mg Oral Q6H PRN [employer], [place]        Or    acetaminophen (TYLENOL) suppository 650 mg  650 mg Rectal Q6H PRN [employer], [place]        promethazine (PHENERGAN) tablet 12.5 mg  12.5 mg Oral Q6H PRN [employer], [place]        Or    ondansetron (ZOFRAN) injection 4 mg  4 mg Intravenous Q6H PRN [employer] [place]        heparin (porcine) injection 5,000 Units  5,000 Units Subcutaneous 3 times per day [employer], [place]   5,000 Units at 11/14/20 0054    aspirin EC tablet 81 mg  81 mg Oral Daily [employer], [address]        carvedilol (COREG) tablet 25 mg  25 mg Oral BID [employer] [place]   25 mg at 11/14/20 0054    fluticasone (FLONASE) 50 MCG/ACT nasal spray 2 spray  2 spray Nasal Daily [employer], [address]        furosemide (LASIX) tablet 60 mg  60 mg Oral Daily [employer], [address]        insulin glargine (LANTUS) injection vial 30 Units  30 Units Subcutaneous Nightly [employer], [address]        losartan (COZAAR) tablet 25 mg  25 mg Oral Daily [employer], [address]        cetirizine (ZYRTEC) tablet 10 mg  10 mg Oral Daily [place], [place]        metFORMIN [facility]) extended release tablet 1,000 mg  1,000 mg Oral BID [facility], [place]   1,000 mg at 11/14/20 0054    pantoprazole (PROTONIX) tablet 40 mg  40 mg Oral QAM [facility] [place]        rosuvastatin (CRESTOR) tablet 10 mg  10 mg Oral Daily [place], [place]        spironolactone (ALDACTONE) tablet 25 mg  25 mg Oral Daily [place], [place]             Vital Signs:   BP (!) 149/94   Pulse 113   Temp 98.5 °F (36.9 °C) (Oral)   Resp 16   Ht 6' 3\" (1.905 m)   Wt (!) 400 lb (181.4 kg)   SpO2 97%   BMI 50.00 kg/m²    No intake or output data in the 24 hours ending 11/13/20 8123     General:  White male well groomed, well-nourished, well-developed who appears stated age, in no acute distress lying in bed. Head: Normocephalic and atraumatic. EENT: No exophthalmos noted. No scleral or conjunctiva icterus, injection or pallor noted. Neck: Supple. Trachea midline. No thyromegaly. Thorax/Lungs: Thorax is symmetrical with good expansion. Breath sounds CTA and equal b/l without rales, wheezing, or rhonchi. No retractions or use of abdominal muscles. Cardiac: S1, S2, RRR with noted S3 best heard at 4th intercostal LSB. No JVD  Abdomen: Abdomen soft, nontender to palpation, without guarding or rigidity. Normoactive BS. Peripheral Vasculature: Extremities warm, dry without edema, no varicosities or stasis changes, DP  pulses 2+ b/l. Skin:  Skin warm and dry. No lesions, rash. Psych:  Alert and oriented x3. Affect appropriate  Neurologic: No focal deficits. No Seizures.      Data:   Labs:   Results for orders placed or performed during the hospital encounter of 11/13/20   APTT   Result Value Ref Range    aPTT 32.7 22.0 - 38.0 seconds   Protime-INR   Result Value Ref Range    INR 1.08 0.85 - 1.13   CBC auto differential   Result Value Ref Range    WBC 8.2 4.8 - 10.8 thou/mm3    RBC 4.94 4.70 - 6.10 mill/mm3    Hemoglobin 13.3 (L) 14.0 - 18.0 gm/dl    Hematocrit 42.7 42.0 - 52.0 %    MCV 86.4 80.0 - 94.0 fL    MCH 26.9 26.0 - 33.0 pg    MCHC 31.1 (L) 32.2 - 35.5 gm/dl    RDW-CV 16.4 (H) 11.5 - 14.5 %    RDW-SD 50.8 (H) 35.0 - 45.0 fL    Platelets 124 665 - 780 thou/mm3    MPV 9.6 9.4 - 12.4 fL    Seg Neutrophils 53.7 %    Lymphocytes 35.5 %    Monocytes 6.7 %    Eosinophils 3.1 %    Basophils 0.4 %    Immature Granulocytes 0.6 %    Segs Absolute 4.4 1.8 - 7.7 thou/mm3    Lymphocytes Absolute 2.9 1.0 - 4.8 thou/mm3    Monocytes Absolute 0.5 0.4 - 1.3 thou/mm3    Eosinophils Absolute 0.3 0.0 - 0.4 thou/mm3    Basophils Absolute 0.0 0.0 - 0.1 thou/mm3    Immature Grans (Abs) 0.05 0.00 - 0.07 thou/mm3    nRBC 0 /100 wbc   Basic Metabolic Panel   Result Value Ref Range    Sodium 136 135 - 145 meq/L    Potassium 4.1 3.5 - 5.2 meq/L    Chloride 99 98 - 111 meq/L    CO2 25 23 - 33 meq/L    Glucose 161 (H) 70 - 108 mg/dL    BUN 6 (L) 7 - 22 mg/dL    CREATININE 0.6 0.4 - 1.2 mg/dL    Calcium 9.5 8.5 - 10.5 mg/dL   Hepatic function panel   Result Value Ref Range    Alb 4.2 3.5 - 5.1 g/dL    Total Bilirubin 0.3 0.3 - 1.2 mg/dL    Bilirubin, Direct <0.2 0.0 - 0.3 mg/dL    Alkaline Phosphatase 115 38 - 126 U/L    AST 23 5 - 40 U/L    ALT 27 11 - 66 U/L    Total Protein 6.9 6.1 - 8.0 g/dL   Magnesium   Result Value Ref Range    Magnesium 1.8 1.6 - 2.4 mg/dL   Calcium, Ionized   Result Value Ref Range    Calcium, Ion 1.14 1.12 - 1.32 mmol/L   Troponin   Result Value Ref Range    Troponin T < 0.010 ng/ml   Anion Gap   Result Value Ref Range    Anion Gap 12.0 8.0 - 16.0 meq/L   Glomerular Filtration Rate, Estimated   Result Value Ref Range    Est, Glom Filt Rate >90 ml/min/1.73m2   Osmolality   Result Value Ref Range    Osmolality Calc 273.0 (L) 275.0 - 300.0 mOsmol/kg       EKG / Radiology:     EKG:   Reviewed by me: pend    CXR:   Reviewed by me: shows increased hilar interstitial markings with possible inc interstitial infiltrate in RLL    Xr Chest

## 2020-11-14 NOTE — PROGRESS NOTES
Pt admitted to  3B25 via cart/stretcher. Complaints: AICD fired  IV none infusing into the antecubital right, condition patent and no redness at a rate of 0 mls/ hour with about 0 mls in the bag still. IV site free of s/s of infection or infiltration. Vital signs obtained. Assessment and data collection initiated. Two nurse skin assessment performed by Dacia HOFFMANN and Theo Moeller RN. Oriented to room. Policies and procedures for 3B explained. Dacia HOFFMANN discussed hourly rounding with patient addressing 5 P's. Fall prevention and safety brochure discussed with patient. Bed alarm on. Call light in reach. The best day to schedule a follow up Dr appointment is:  Monday a.m. Explained patients right to have family, representative or physician notified of their admission. Patient has Declined for physician to be notified. Patient has Declined for family/representative to be notified. All questions answered with no further questions at this time.

## 2020-11-14 NOTE — ED TRIAGE NOTES
Pt presents to the ED by EMS with c/o his internal defibrillator going off. Pt states he was just laying down to go to bed when his defibrillator shocked him. This shock was followed by 4 more shocks. Pt states he has never had this happen before. Pt states he was not exerting himself when this happened. Pt had defibrillator placed roughly 6 years ago d/t CHF. Pt cardiologist is Natalya. EKG completed.  Tele applied

## 2020-11-14 NOTE — DISCHARGE SUMMARY
Hospital Medicine Discharge Summary      Patient Identification:   Ella Sirvastava   : 1975  MRN: [id number]   Account: [de-identified]      Patient's PCP: Shawna Saavedra MD    Admit Date: 2020     Discharge Date:   2020    Admitting Physician: Madhu Correia MD     Discharge Physician: Aviva Ash MD     Discharge Diagnoses: Active Hospital Problems    Diagnosis Date Noted    AICD discharge [Z45.02] 2020     1. AICD discharge / V. Fibrillation: likey 2/2 HFrEF  a. K 4.3, Mg 1.8  b. V.fib and NSVT confirmed with report from [facility], awaiting wave strips to be sent. c.  Consult Cardiology. Started on cardizem PO, and cleared for discharge. 2. Nonischemic HFrEF 30% with severe global hypokinesis of LV (per Echo ) s/p AICD (): without decompensation: 2/2 post-streptococcal CMP   a. Denies LE edema/orthopnea or CP. Dry weight 400-402.   b. Cath () showed nonischemic dilated cardiomyopathy with EF 15-20%  c. Resume ARB, lasix, spironolactone, statin, BB. Daily weights. d. Rpt Echo showed Left ventricular size is normal and systolic function is moderate to severely reduced. Ejection fraction was estimated at 30-35%. 3. IDDM type 2, controlled: resume home lantus, sliding scale insulin, hold metformin. Receives semaglutide injections weakly. Goal BG while hosptialized 140-180. Hypoglycemia orders. 4. Chronic normocytic anemia, stable: Likely chronic disease. Monitor. Transfuse for hgb <7.   5. GERD: resume PPI   6. MIKE:       The patient was seen and examined on day of discharge and this discharge summary is in conjunction with any daily progress note from day of discharge. Hospital Course:        Ella Srivastava is a 39year old white male nonsmoker with PMH HFrEF30% s/p ICD (), DM, MIKE who presented to [facility] ED on 20 c/o AICD discharge that occurred while the patient was lying down trying to sleep with his CPAP on in bed.  He was shocked 3 times in quick succession, then stood to find his wife and was shocked two additional times. His wife suggested he lie down on the floor and she called EMS. The patient denies any prior discharges. He follows with Dr. Roland Burkitt denies LOC, palpitations, light-headedness, CP, SOB, LE edema, recent exacerbations of CHF, fever, chills.      In the ED, vitals , /89, RR 16, 98% on RA and afebrile. Labs showed: K 4.1, Mag 1.8, ICa 1.14, troponin neg, unremarkable LFTs, hgb, 13.3, PTT/INR wnl. Cardiology started on cardizem PO daily. Rpt Echo showed Left ventricular size is normal and systolic function is moderate to severely reduced. Ejection fraction was estimated at 30-35%. He was discharged stable and will follow up with PCP and cardiology as out patient. Exam:     Vitals:  Vitals:    11/14/20 0030 11/14/20 0230 11/14/20 0405 11/14/20 0928   BP: (!) 145/87 137/82 131/88 137/84   Pulse: 116 117 105 101   Resp:  22  20   Temp:  98.4 °F (36.9 °C)  98 °F (36.7 °C)   TempSrc:  Oral  Oral   SpO2:  94%  93%   Weight:       Height:         Weight: Weight: (!) 399 lb 14.4 oz (181.4 kg)     24 hour intake/output:    Intake/Output Summary (Last 24 hours) at 11/14/2020 1103  Last data filed at 11/14/2020 0930  Gross per 24 hour   Intake 210 ml   Output 450 ml   Net -240 ml       General appearance:  No apparent distress, appears stated age and cooperative. HEENT:  Normal cephalic, atraumatic without obvious deformity. Pupils equal, round, and reactive to light. Extra ocular muscles intact. Conjunctivae/corneas clear. Neck: Supple, with full range of motion. No jugular venous distention. Trachea midline. Respiratory:  Normal respiratory effort. Clear to auscultation, bilaterally without Rales/Wheezes/Rhonchi. Cardiovascular:  Regular rate and rhythm with normal S1/S2 without murmurs, rubs or gallops. Abdomen: Soft, non-tender, non-distended with normal bowel sounds.   Musculoskeletal:  No clubbing, cyanosis or edema bilaterally. Full range of motion without deformity. Skin: Skin color, texture, turgor normal.  No rashes or lesions. Neurologic:  Neurovascularly intact without any focal sensory/motor deficits. Cranial nerves: II-XII intact, grossly non-focal.  Psychiatric:  Alert and oriented, thought content appropriate, normal insight  Capillary Refill: Brisk,< 3 seconds   Peripheral Pulses: +2 palpable, equal bilaterally     Labs: For convenience and continuity at follow-up the following most recent labs are provided:      CBC:    Lab Results   Component Value Date    WBC 8.2 11/13/2020    HGB 13.3 11/13/2020    HCT 42.7 11/13/2020     11/13/2020       Renal:    Lab Results   Component Value Date     11/14/2020    K 4.8 11/14/2020     11/14/2020    CO2 25 11/14/2020    BUN 6 11/14/2020    CREATININE 0.6 11/14/2020    CALCIUM 9.3 11/14/2020    PHOS 4.1 11/14/2020         Significant Diagnostic Studies    Radiology:   XR CHEST PORTABLE   Final Result   Impression:   Mild cardiomegaly and pulmonary edema compatible of CHF. Secondary    consideration is perihilar/viral pneumonitis. This document has been electronically signed by: Nelia Peña MD on    11/13/2020 11:02 PM                Consults:     STR ED TO IP CONSULT  IP CONSULT TO CARDIOLOGY    Disposition:    [x] Home       [] TCU       [] Rehab       [] Psych       [] SNF       [] [place]       [] Other-    Condition at Discharge: Stable    Code Status:  Full Code     Patient Instructions:    Discharge lab work:    Activity: activity as tolerated  Diet: DIET CARB CONTROL; Low Sodium (2 GM)      Follow-up visits:   Trenton Rhodes MD  [address] [place] [address]  [phone]    Schedule an appointment as soon as possible for a visit in 1 week      Brady More MD  [address] [place] [address]  [phone]    Schedule an appointment as soon as possible for a visit in 2 weeks           Discharge Medications:      Wilmer Huerta   Home Medication Instructions DFN:[id number]    Printed on:11/14/20 0532   Medication Information                      acetaminophen (TYLENOL) 325 MG tablet  Take 650 mg by mouth every 8 hours as needed for Pain             ASPIRIN ADULT LOW STRENGTH 81 MG EC tablet  TAKE 1 TABLET BY MOUTH ONE TIME A DAY             Blood Glucose Monitoring Suppl (PRODIGY AUTOCODE BLOOD GLUCOSE) MAUREEN  Use to check blood sugar 4 times daily             blood glucose test strips (PRODIGY NO CODING BLOOD GLUC) strip  Use to check blood sugar 4 times daily             carvedilol (COREG) 25 MG tablet  TAKE 1 TABLET BY MOUTH TWICE A DAY WITH MEALS             carvedilol (COREG) 3.125 MG tablet  Take 1 tablet by mouth 2 times daily (with meals)             Dextromethorphan-guaiFENesin (MUCINEX DM)  MG TB12  Take 1 tablet by mouth 2 times daily as needed (cough/congestion)             dilTIAZem (CARDIZEM CD) 120 MG extended release capsule  Take 1 capsule by mouth daily             fluticasone (FLONASE) 50 MCG/ACT nasal spray  2 sprays by Nasal route daily             furosemide (LASIX) 20 MG tablet  TAKE 1 TABLET BY MOUTH EVERY DAY in the PM             furosemide (LASIX) 40 MG tablet  TAKE 1 TABLET BY MOUTH DAILY in AM             insulin glargine (LANTUS SOLOSTAR) 100 UNIT/ML injection pen  Inject 24 Units into the skin nightly             insulin lispro, 1 Unit Dial, (HUMALOG KWIKPEN) 100 UNIT/ML SOPN  14 U with meals plus sliding scale--151-200 3u,  201-250  5u,  251-300  8u, 301-350  10u, 351-400 12u, over 400 15 u and contact physician             Insulin Pen Needle (PEN NEEDLES 31GX5/16\") 31G X 8 MM MISC  Use to inject insulins and Ozempic             irbesartan (AVAPRO) 75 MG tablet  TAKE ONE TABLET BY MOUTH EVERY EVENING             loratadine (CLARITIN) 10 MG tablet  Take 10 mg by mouth daily             metFORMIN (GLUCOPHAGE-XR) 500 MG extended release tablet  Take 2 tablets by mouth 2 times daily             nitroGLYCERIN (NITROSTAT) 0.4 MG SL tablet  Place 1 tablet under the tongue every 5 minutes as needed for Chest pain up to max of 3 total doses. If no relief after 1 dose, call 911. omeprazole (PRILOSEC) 40 MG delayed release capsule  Take 1 capsule by mouth daily             Prodigy Lancets 28G MISC  Use to check blood sugar 4 times daily             Respiratory Therapy Supplies (ADULT MASK) MISC  Please do mask desensitization and/or provide mask of patient's choice. rosuvastatin (CRESTOR) 10 MG tablet  Take 1 tablet by mouth daily             Semaglutide,0.25 or 0.5MG/DOS, (OZEMPIC, 0.25 OR 0.5 MG/DOSE,) 2 MG/1.5ML SOPN  Inject 0.25 mg subcutaneously once weekly for weeks 1-4, then increase to 0.5 mg subcutaneously once weekly             sildenafil (REVATIO) 20 MG tablet  Take 1-5 tablets by mouth as needed (ED)             spironolactone (ALDACTONE) 25 MG tablet  TAKE 1 TABLET BY MOUTH ONE TIME A DAY                 Time Spent on discharge is more than 45 minutes in the examination, evaluation, counseling and review of medications and discharge plan. Signed: Thank you Dawn Corral MD for the opportunity to be involved in this patient's care.     Electronically signed by Beryl Piedra MD on 11/14/2020 at 11:03 AM

## 2020-11-16 ENCOUNTER — CARE COORDINATION (OUTPATIENT)
Dept: OTHER | Facility: CLINIC | Age: 45
End: 2020-11-16

## 2020-11-16 ENCOUNTER — CARE COORDINATION (OUTPATIENT)
Dept: CASE MANAGEMENT | Age: 45
End: 2020-11-16

## 2020-11-16 RX ORDER — ROSUVASTATIN CALCIUM 10 MG/1
TABLET, COATED ORAL
Qty: 90 TABLET | Refills: 1 | Status: SHIPPED | OUTPATIENT
Start: 2020-11-16 | End: 2020-11-17 | Stop reason: SDUPTHER

## 2020-11-16 NOTE — CARE COORDINATION
[facility] Transitions Initial Follow Up Call    Call within 2 business days of discharge: Yes    Patient: Gela Li Patient : 1975   MRN: [id number]  Reason for Admission: Defibrillator discharge  Discharge Date: 20 RARS: Readmission Risk Score: 16      Last Discharge [facility]       Complaint Diagnosis Description Type Department Provider    20 Irregular Heart Beat Defibrillator discharge . .. ED to Hosp-Admission (Discharged) (ADMITTED) [facility] 3[facility] Kevin Comer MD; Laurie Callaway. .. Challenges to be reviewed by the provider   Additional needs identified to be addressed with provider No  none    Discussed COVID-19 related testing which was not done at this time. Test results were not done. Method of communication with provider : none    Advance Care Planning:   Does patient have an Advance Directive:  reviewed and current. Was this a readmission? No  Patient stated reason for admission: Defibrillator discharge  Patients top risk factors for readmission: medical condition    Care Transition Nurse (CTN) contacted the patient by telephone to perform post hospital discharge assessment. Verified name and  with patient as identifiers. Provided introduction to self, and explanation of the CTN role. CTN reviewed discharge instructions, medical action plan and red flags with patient who verbalized understanding. Patient given an opportunity to ask questions and does not have any further questions or concerns at this time. Were discharge instructions available to patient? Yes. Reviewed appropriate site of care based on symptoms and resources available to patient including: PCP. The patient agrees to contact the PCP office for questions related to their healthcare. Medication reconciliation was performed with patient, who verbalizes understanding of administration of home medications. Discussed follow-up appointments.   Is follow up appointment scheduled within 7 days of discharge? Yes      Plan for follow-up call in 7-10 days based on severity of symptoms and risk factors. CTN provided contact information for future needs. Patient states he is doing well. His A1C is 6 it was previously 8 before he went to diabetic clinic. BS was 127. Denies chest pain, edema, SOB, and cough. He does take 60 mg of lasix a day. He see cardiologist tomorrow. Patient is very knowledgeable about his medical issues. Educated patient on diuretic medication. Avoid taking you medication at night. Take it at lease six hours before bedtime it you need to take it twice a day. Your kidneys will be making more urine. Watch for water retention which includes swollen ankles and unexplained weight gain over a short period of time. Notify your PCP if you gain 3lbs in a day or 5lb in a week. Manage you salt intake. Follow a fluid restriction if you were given one by your PCP.   Patient denies concerns or issues at this time      Non-face-to-face services provided:  Obtained and reviewed discharge summary and/or continuity of care documents    Care Transitions 24 Hour Call    Do you have any ongoing symptoms?:  No  Do you have a copy of your discharge instructions?:  Yes  Do you have all of your prescriptions and are they filled?:  Yes  Have you been contacted by a [facility] Pharmacist?:  No  Have you scheduled your follow up appointment?:  Yes  Do you have support at home?:  Partner/Spouse/SO  Are you an active caregiver in your home?:  No  Care Transitions Interventions         Follow Up  Future Appointments   Date Time Provider Link Deandra   11/17/2020  1:00 PM Sekou Clark MD [address] [facility] [place]   12/4/2020 11:00 AM SCHEDULE, [facility] PACER NURSE DANILO [facility] [place]   2/18/2021  3:30 PM MD DANILO [facility] [place]   4/12/2021  1:30 PM MD DANILO [facility] - Ericka Solorzano RN

## 2020-11-17 ENCOUNTER — OFFICE VISIT (OUTPATIENT)
Dept: CARDIOLOGY CLINIC | Age: 45
End: 2020-11-17
Payer: COMMERCIAL

## 2020-11-17 VITALS
HEIGHT: 75 IN | HEART RATE: 91 BPM | DIASTOLIC BLOOD PRESSURE: 78 MMHG | SYSTOLIC BLOOD PRESSURE: 125 MMHG | BODY MASS INDEX: 39.17 KG/M2 | WEIGHT: 315 LBS

## 2020-11-17 PROBLEM — I42.8 NONISCHEMIC CARDIOMYOPATHY (HCC): Status: ACTIVE | Noted: 2020-11-17

## 2020-11-17 PROBLEM — R00.0 SINUS TACHYCARDIA: Status: ACTIVE | Noted: 2020-11-17

## 2020-11-17 PROBLEM — I50.42 CHRONIC COMBINED SYSTOLIC AND DIASTOLIC CONGESTIVE HEART FAILURE (HCC): Status: ACTIVE | Noted: 2020-11-17

## 2020-11-17 PROCEDURE — 99215 OFFICE O/P EST HI 40 MIN: CPT | Performed by: INTERNAL MEDICINE

## 2020-11-17 RX ORDER — FUROSEMIDE 20 MG/1
TABLET ORAL
Qty: 90 TABLET | Refills: 3 | Status: SHIPPED | OUTPATIENT
Start: 2020-11-17 | End: 2021-05-17

## 2020-11-17 RX ORDER — IRBESARTAN 75 MG/1
TABLET ORAL
Qty: 90 TABLET | Refills: 3 | Status: SHIPPED | OUTPATIENT
Start: 2020-11-17 | End: 2022-01-20

## 2020-11-17 RX ORDER — ROSUVASTATIN CALCIUM 10 MG/1
TABLET, COATED ORAL
Qty: 90 TABLET | Refills: 3 | Status: SHIPPED | OUTPATIENT
Start: 2020-11-17 | End: 2021-05-17

## 2020-11-17 RX ORDER — DILTIAZEM HYDROCHLORIDE 120 MG/1
120 CAPSULE, COATED, EXTENDED RELEASE ORAL DAILY
Qty: 90 CAPSULE | Refills: 3 | Status: SHIPPED | OUTPATIENT
Start: 2020-11-17 | End: 2021-03-17 | Stop reason: ALTCHOICE

## 2020-11-17 RX ORDER — CARVEDILOL 6.25 MG/1
6.25 TABLET ORAL 2 TIMES DAILY WITH MEALS
Qty: 180 TABLET | Refills: 3 | Status: SHIPPED | OUTPATIENT
Start: 2020-11-17 | End: 2021-12-28

## 2020-11-17 RX ORDER — SPIRONOLACTONE 25 MG/1
TABLET ORAL
Qty: 90 TABLET | Refills: 3 | Status: SHIPPED | OUTPATIENT
Start: 2020-11-17 | End: 2021-10-21

## 2020-11-17 RX ORDER — FUROSEMIDE 40 MG/1
TABLET ORAL
Qty: 90 TABLET | Refills: 3 | Status: SHIPPED | OUTPATIENT
Start: 2020-11-17 | End: 2021-05-17

## 2020-11-17 RX ORDER — CARVEDILOL 25 MG/1
TABLET ORAL
Qty: 180 TABLET | Refills: 3 | Status: SHIPPED | OUTPATIENT
Start: 2020-11-17 | End: 2021-10-21

## 2020-11-17 NOTE — PROGRESS NOTES
Chief Complaint   Patient presents with    Follow-Up from Hospital    Congestive Heart Failure   origin Seen and evaluated in the hospital for acute systolic CHF new and dexed to have low EF new  diuresed  And discharged. And under F/U    Has been admitted to hospital twice for CHF  03/2017 and treated and d/betsy by hospitalist and cardiology was not consulted  Reviewed the two admission notes    Follow-up hospital.   He was in hospital due to ICD firing. 11/13/2020    DOING WELL POST d/C    Denies chest pain, dizziness, edema, and palpitations    Sob on exertion chronic- STABLE    No leg edema    Lisinopril induced cough and resolved ater stopped lisinopril    MIKE on CPAP in June 2017 and feel better and has more energy    He had ICD implantation on 1-15-15. Taking lasix 20 mg daily and aldactone 25 mg daily. Doing good    Patient Seen, Chart, Consults notes, Labs, Radiology studies reviewed.       Patient Active Problem List   Diagnosis    CHF (congestive heart failure) (HCC) systolic chronic     Bronchitis, acute    Cardiomyopathy (HCC)-Nonischemic dilated low EF 25%, newly Dxed 08/2014- med RX- cath  mild CAD- repeat echo Nov 24, 2014- EF 25 to 30%- NEED the ICD    S/P cardiac cath- Angiographically patent coronaries-EF 20, EDP 28 mmhg- med rx    1/15/2015: Medtronic Single Chamber  [place]ie John    Morbid obesity with BMI of 50.0-59.9, adult ([address]    HTN (hypertension)    Bilateral leg edema- +1 B/L- RESOLVED    Cough due to ACE inhibitor    Hyponatremia    Hyperglycemia    Weight gain, claimed 18lbs in 10 days since discharge    Chronic systolic congestive heart failure (HCC)    MIKE on CPAP    Hx of AICD discharge    Sinus tachycardia    Chronic combined systolic and diastolic congestive heart failure ([address]    Nonischemic cardiomyopathy [facility])       Past Surgical History:   Procedure Laterality Date    [facility]    CARDIAC DEFIBRILLATOR PLACEMENT  2014    CARDIAC DEFIBRILLATOR PLACEMENT      CARDIAC DEFIBRILLATOR PLACEMENT      EYE SURGERY      Lasix     VASECTOMY  2004       Allergies   Allergen Reactions    Levaquin [Levofloxacin In D5w] Itching        Family History   Problem Relation Age of Onset    Heart Disease Maternal Uncle     Heart Disease Maternal Grandfather     Heart Disease Paternal Uncle     Heart Disease Maternal Grandmother         Social History     Socioeconomic History    Marital status:      Spouse name: Not on file    Number of children: Not on file    Years of education: Not on file    Highest education level: Not on file   Occupational History     Employer: [employer]   Social Needs    Financial resource strain: Not hard at all    Food insecurity     Worry: Never true     Inability: Never true    Transportation needs     Medical: No     Non-medical: No   Tobacco Use    Smoking status: Never Smoker    Smokeless tobacco: Never Used   Substance and Sexual Activity    Alcohol use:  Yes     Alcohol/week: 18.0 standard drinks     Types: 18 Cans of beer per week     Comment: Socially    Drug use: No    Sexual activity: Yes     Partners: Female   Lifestyle    Physical activity     Days per week: Not on file     Minutes per session: Not on file    Stress: Not on file   Relationships    Social connections     Talks on phone: Not on file     Gets together: Not on file     Attends Hindu service: Not on file     Active member of club or organization: Not on file     Attends meetings of clubs or organizations: Not on file     Relationship status: Not on file    Intimate partner violence     Fear of current or ex partner: Not on file     Emotionally abused: Not on file     Physically abused: Not on file     Forced sexual activity: Not on file   Other Topics Concern    Not on file   Social History Narrative    Not on file       Current Outpatient Medications   Medication Sig Dispense Refill    carvedilol ([address]) daily 1 Bottle 2    Dextromethorphan-guaiFENesin (MUCINEX DM)  MG TB12 Take 1 tablet by mouth 2 times daily as needed (cough/congestion) 28 tablet 0    sildenafil (REVATIO) 20 MG tablet Take 1-5 tablets by mouth as needed (ED) 30 tablet 5    insulin glargine (LANTUS SOLOSTAR) 100 UNIT/ML injection pen Inject 24 Units into the skin nightly (Patient taking differently: Inject 30 Units into the skin nightly ) 15 pen 3    loratadine (CLARITIN) 10 MG tablet Take 10 mg by mouth daily      Respiratory Therapy Supplies (ADULT MASK) MISC Please do mask desensitization and/or provide mask of patient's choice. 1 each 0    acetaminophen (TYLENOL) 325 MG tablet Take 650 mg by mouth every 8 hours as needed for Pain      nitroGLYCERIN (NITROSTAT) 0.4 MG SL tablet Place 1 tablet under the tongue every 5 minutes as needed for Chest pain up to max of 3 total doses. If no relief after 1 dose, call 911. 25 tablet 1     No current facility-administered medications for this visit. Review of Systems -     General ROS: negative  Psychological ROS: negative  Hematological and Lymphatic ROS: No history of blood clots or bleeding disorder. Respiratory ROS: no cough, shortness of breath, or wheezing  Cardiovascular ROS: no chest pain or dyspnea on exertion  Gastrointestinal ROS: negative  Genito-Urinary ROS: no dysuria, trouble voiding, or hematuria  Musculoskeletal ROS: negative  Neurological ROS: no TIA or stroke symptoms  Dermatological ROS: negative      Blood pressure 125/78, pulse 91, height 6' 3\" (1.905 m), weight (!) 397 lb 6.4 oz (180.3 kg).         Physical Examination:    General appearance - alert, well appearing, and in no distress  Mental status - alert, oriented to person, place, and time  Neck - supple, no significant adenopathy, no JVD, or carotid bruits  Chest - clear to auscultation, no wheezes, rales or rhonchi, symmetric air entry  Heart - normal rate, regular rhythm, normal S1, S2, no murmurs, rubs, systolic and diastolic congestive heart failure ([facility]     4. Essential hypertension     5. Sinus tachycardia     6. S/P cardiac cath- Angiographically patent coronaries-EF 20, EDP 28 mmhg- med rx     7. 1/15/2015: Medtronic Single Chamber  [place]     8. Bilateral leg edema- +1 B/L- RESOLVED     9. Morbid obesity with BMI of 50.0-59.9, adult ([facility]         Plan   The  current meds and lab reviewed    MIKE  s/p CPAP    11/13/2020  ICD shock for VT  I reviewwed the INterrogation  5 shocks and 3 failed for VT  Previous interrogation showed  Episodes of NSVT longest 18 beat  The device interrogation showed an appropriate shock for ventricular tachycardia. Although it is difficult to say that it was VT with 295% certainty due to patient having single-chamber ICD lead,  it could be SVT also    Increase to  coreg 31.25 bid FROM 28.125 BID AND CONSIDER TO INCREASE DOWN THE LINE  Cont cardizem CD AND CONSIDER TO DECREASE AND STOP  Consider  To ADD  Amiodarone to prevent ICD shock if any FURTHER recurrence    Continue the current treatment and with constant vigilance to changes in symptoms and also any potential side effects. Return for care or seek medical attention immediately if symptoms got worse and/or develop new symptoms. Obesity advised to lose WT  Roderick Dowling saw dietician with that regards    ICD interrogation look good  Nov 2019  Function normal    Cardiomyopathy: improving, no CHF symptoms, no change in clinical condition. Will need periodic echocardiograms depending on symptoms. Had ICD  Cont. coreg to 25 po bid  Cont  coreg by 3.125 po bid   Repeat echo  EF  Remain low    Sinus tachy 100  Home   INCREASE coreg     Hypertension, on medical treatment. Seems to be under good control. Patient is compliant with medical treatment.    Cont  coreg    Congestive heart failure: no evidence of fluid overload today, no recent hospitalization for CHF  Cont   Lasix  40 mg at am 20  Pm qd  and aldactone 25 qd  Need BMP and mg today and prior to next visit    ANY Wt gain > 5 lb may use added lasix  Off  Diovan 80  Cont  avapro 75 mg     INS does not cover entresto 1 tab po bid    Cont F/U with CHF clinic    Need Lab prior to next visit needed    Recent lab reviewed and d.w the pat    Repeat echo  EF 25 to 30  % no much improvement - had ICD    ICD interrogation 04/2020 reviewed and NSVT noted    D/w the pat the plan of care    Discussed use, benefit, and side effects of prescribed medications. All patient questions answered. Pt voiced understanding. Instructed to continue current medications, diet and exercise. Continue risk factor modification and medical management. Patient agreed with treatment plan. Follow up as directed.     Advised about need of lab done twice a yr BMP and MG    Stable and cont the current RX      hOSPITAL RECORD REVIEWED      D./W THE PAT DETAIL PLAN OF CARE    I spent 40 minutes involved in face-to-face discussion of medical issues, prognosis, record review  and plan with the patient today and more than 50% of the time was spent on counseling and coordination of care        RTC in 2 months  [facility]

## 2020-11-18 ENCOUNTER — TELEPHONE (OUTPATIENT)
Dept: PHARMACY | Facility: CLINIC | Age: 45
End: 2020-11-18

## 2020-11-20 ENCOUNTER — CARE COORDINATION (OUTPATIENT)
Dept: OTHER | Facility: CLINIC | Age: 45
End: 2020-11-20

## 2020-11-20 NOTE — CARE COORDINATION
medication managment, and self monitoring   Education provided: Red Flag symptoms to report, Self monitoring compliance, Diet education/compliance, Medication compliance, Provider follow up appointment compliance, Specialist appointment compliance, Utilization of appropriate level of care: Right Care, Right Place, Right Time and Reinforced Discharge instructions    Discussed follow-up appointments. If no appointment was previously scheduled, appointment scheduling offered: NA I  Non-[facility] follow up appointment(s): None    Plan for follow-up call in 10-14 days based on severity of symptoms and risk factors. Intervention Plan for next call: self management-Assess compliance and ongoing needs Reinforce/ provide Diabetes and CHF Zone sheet education Self monitoring compliance Medication compliance Diet compliance Utilization of appropriate level of care Goal Progress    ACM provided contact information for future needs. Goals      Conditions and Symptoms      I will keep my appointment or reschedule if I have to cancel. I will notify my provider of any barriers to my plan of care. I will follow my Zone Management tool to seek urgent or emergent care. I will notify my provider of any symptoms that indicate a worsening of my condition. Barriers: lack of motivation, overwhelmed by complexity of regimen, stress, time constraints, and medication side effects  Plan for overcoming my barriers: Will work with ACM, family, and providers  Confidence: 9/10  Anticipated Goal Completion Date: 9/30/20         Self Monitoring      Self-Monitored Blood Glucose - I will check my blood sugar Fasting blood sugar and blood sugars ac  I will notify my provider of any trends of increasing or decreasing blood sugars over a 1 month period. Daily Weights - I will notify my provider of any increase in weight by 3 or more pounds in 2 days OR 5 or more pounds in a week.       Barriers: lack of motivation and overwhelmed by complexity of regimen  Plan for overcoming my barriers: Will work with ACM and family  Confidence: 8/10  Anticipated Goal Completion Date: 9/30/20                Care Transitions Subsequent and Final Call    Schedule Follow Up Appointment with PCP:  Completed  Subsequent and Final Calls  Do you have any ongoing symptoms?:  No  Have your medications changed?:  Yes  Patient Reports:  Coreg and Lasix doses adjusted  Do you have any questions related to your medications?:  No  Do you currently have any active services?:  No  Do you have any needs or concerns that I can assist you with?:  No  Identified Barriers:  None  Care Transitions Interventions    Specialty Service Referral:  Completed    Other Interventions:             Follow Up  Future Appointments   Date Time Provider Link Liriano   12/4/2020 11:00 AM SCHEDU[facility] [address]   1/22/2021 11:45 AM Titus Ceron MD [facility] - [address]   2/18/2021  3:30 PM [facility] Shae Plata [facility]

## 2020-12-04 ENCOUNTER — NURSE ONLY (OUTPATIENT)
Dept: CARDIOLOGY CLINIC | Age: 45
End: 2020-12-04
Payer: COMMERCIAL

## 2020-12-04 PROCEDURE — 93282 PRGRMG EVAL IMPLANTABLE DFB: CPT | Performed by: INTERNAL MEDICINE

## 2020-12-04 NOTE — PROGRESS NOTES
[facility]     5.3 years on device   Recent shock, no new events   Rv imped 399  Shock 48  SVC 58  Aware of elevated optivol, denies weight gain, no SOB, no swelling, no cough  0% paced   R waves 2.9  Threshold 0.75 @ 0.4

## 2020-12-08 ENCOUNTER — CARE COORDINATION (OUTPATIENT)
Dept: OTHER | Facility: CLINIC | Age: 45
End: 2020-12-08

## 2020-12-08 NOTE — CARE COORDINATION
[facility] Transitions Follow Up Call    2020    Patient: Ephriam Kayser  Patient : 1975   MRN: [id number]  Reason for Admission: AICD discharge/ V-fib  Discharge Date: 20 RARS: Readmission Risk Score: 16         Associate Care Manager [facility]) contacted the patient and spouse, Indu Zuluaga by telephone to follow up after admission on 20. Verified name and  with  spouse, Indu Zuluaga as identifiers. Addressed changes since last contact: follow up appointment-completed and medication management-no changes cuevas; future changes discusssed  Discharged needs reviewed: none  Follow up appointment completed? Yes    ACM reviewed discharge instructions, medical action plan and red flags with  Indu Zuluaga and discussed any barriers to care and/or understanding of plan of care after discharge. Discussed appropriate site of care based on symptoms and resources available to patient including: PCP, Specialist, Benefits related nurse triage line, Urgent care clinics, When to call 911, [employer] Messaging and Condition related references. The spouse, Indu Zuluaga agrees to contact the PCP office for questions related to their healthcare. Patients top risk factors for readmission: medical condition    Interventions to address risk factors:   Assessment and support for treatment adherence and medication management-Reinforced self monitoring and medication compliance. Pt's wife is able to describe Red Flag symptoms to report. Education provided: Self monitoring compliance  Diet education/compliance  Provider follow up appointment compliance  Utilization of appropriate level of care: Right Care, Right Place, Right Time    Discussed follow-up appointments.  If no appointment was previously scheduled, appointment scheduling offered: No I    Non-[facility] follow up appointment(s): None    ACM will sign off Care transition as pt condition has improved, compliant with medications, self monitoring, and follow up ap appt completed. Pt will continued to be followed through Associate Care Management    ACM provided contact information for future needs. Goals      Conditions and Symptoms      I will keep my appointment or reschedule if I have to cancel. I will notify my provider of any barriers to my plan of care. I will follow my Zone Management tool to seek urgent or emergent care. I will notify my provider of any symptoms that indicate a worsening of my condition. Barriers: lack of motivation, overwhelmed by complexity of regimen, stress, time constraints, and medication side effects  Plan for overcoming my barriers: Will work with ACM, family, and providers  Confidence: 9/10  Anticipated Goal Completion Date: 9/30/20; 12/20/20    Goal completion date revised as interventions still in process         Self Monitoring      Self-Monitored Blood Glucose - I will check my blood sugar Fasting blood sugar and blood sugars ac  I will notify my provider of any trends of increasing or decreasing blood sugars over a 1 month period. Daily Weights - I will notify my provider of any increase in weight by 3 or more pounds in 2 days OR 5 or more pounds in a week. Barriers: lack of motivation and overwhelmed by complexity of regimen  Plan for overcoming my barriers: Will work with ACM and family  Confidence: 8/10  Anticipated Goal Completion Date: 9/30/20; 12/20/20    Goal completion date revised as interventions still in process.                   Care Transitions Subsequent and Final Call    Schedule Follow Up Appointment with PCP:  Completed  Subsequent and Final Calls  Do you have any ongoing symptoms?:  No  Have your medications changed?:  No  Do you have any questions related to your medications?:  No  Do you currently have any active services?:  No  Do you have any needs or concerns that I can assist you with?:  No  Identified Barriers:  None  Care Transitions Interventions    Specialty Service Referral:  Completed Other Interventions:             Follow Up  Future Appointments   Date Time Provider Link Liriano   1/22/2021 11:45 AM [facility] [place] [address]   2/18/2021  3:30 PM MD DANILO Claire [facility] [place]   12/10/2021 11:00 AM SCHEDULE, [facility] PACER NURSE [address] PACER [facility]

## 2021-01-07 DIAGNOSIS — E11.65 TYPE 2 DIABETES MELLITUS WITH HYPERGLYCEMIA, WITHOUT LONG-TERM CURRENT USE OF INSULIN (HCC): ICD-10-CM

## 2021-01-07 RX ORDER — INSULIN GLARGINE 100 [IU]/ML
30 INJECTION, SOLUTION SUBCUTANEOUS NIGHTLY
Qty: 10 PEN | Refills: 3 | Status: SHIPPED | OUTPATIENT
Start: 2021-01-07 | End: 2022-04-07 | Stop reason: SDUPTHER

## 2021-01-07 NOTE — TELEPHONE ENCOUNTER
Rach Gates called requesting a refill on the following medications:  Requested Prescriptions     Pending Prescriptions Disp Refills    insulin glargine (LANTUS SOLOSTAR) 100 UNIT/ML injection pen 15 pen 3     Sig: Inject 24 Units into the skin nightly     Pharmacy verified: [id number] [address]      Date of last visit: 8/17/20  Date of next visit (if applicable): 0/81/4800

## 2021-01-13 ENCOUNTER — TELEPHONE (OUTPATIENT)
Dept: CARDIOLOGY CLINIC | Age: 46
End: 2021-01-13

## 2021-01-14 ENCOUNTER — PROCEDURE VISIT (OUTPATIENT)
Dept: CARDIOLOGY CLINIC | Age: 46
End: 2021-01-14
Payer: COMMERCIAL

## 2021-01-14 DIAGNOSIS — I50.42 CHRONIC COMBINED SYSTOLIC AND DIASTOLIC CONGESTIVE HEART FAILURE (HCC): Primary | ICD-10-CM

## 2021-01-14 PROCEDURE — 93297 REM INTERROG DEV EVAL ICPMS: CPT | Performed by: INTERNAL MEDICINE

## 2021-01-14 PROCEDURE — G2066 INTER DEVC REMOTE 30D: HCPCS | Performed by: INTERNAL MEDICINE

## 2021-01-20 ENCOUNTER — NURSE ONLY (OUTPATIENT)
Dept: LAB | Age: 46
End: 2021-01-20

## 2021-01-20 DIAGNOSIS — Z95.810 S/P ICD (INTERNAL CARDIAC DEFIBRILLATOR) PROCEDURE: ICD-10-CM

## 2021-01-20 DIAGNOSIS — I50.22 CHRONIC SYSTOLIC CONGESTIVE HEART FAILURE (HCC): ICD-10-CM

## 2021-01-20 DIAGNOSIS — R06.02 SOB (SHORTNESS OF BREATH) ON EXERTION: ICD-10-CM

## 2021-01-20 DIAGNOSIS — R60.0 BILATERAL LEG EDEMA: ICD-10-CM

## 2021-01-20 DIAGNOSIS — I42.0 DILATED CARDIOMYOPATHY (HCC): ICD-10-CM

## 2021-01-20 DIAGNOSIS — I10 ESSENTIAL HYPERTENSION: ICD-10-CM

## 2021-01-20 DIAGNOSIS — Z98.890 S/P CARDIAC CATH: ICD-10-CM

## 2021-01-20 LAB
ANION GAP SERPL CALCULATED.3IONS-SCNC: 15 MEQ/L (ref 8–16)
BUN BLDV-MCNC: 14 MG/DL (ref 7–22)
CALCIUM SERPL-MCNC: 10.2 MG/DL (ref 8.5–10.5)
CHLORIDE BLD-SCNC: 100 MEQ/L (ref 98–111)
CO2: 23 MEQ/L (ref 23–33)
CREAT SERPL-MCNC: 0.9 MG/DL (ref 0.4–1.2)
GFR SERPL CREATININE-BSD FRML MDRD: > 90 ML/MIN/1.73M2
GLUCOSE BLD-MCNC: 170 MG/DL (ref 70–108)
MAGNESIUM: 1.9 MG/DL (ref 1.6–2.4)
POTASSIUM SERPL-SCNC: 5.3 MEQ/L (ref 3.5–5.2)
SODIUM BLD-SCNC: 138 MEQ/L (ref 135–145)

## 2021-01-21 ENCOUNTER — TELEPHONE (OUTPATIENT)
Dept: PHARMACY | Age: 46
End: 2021-01-21

## 2021-01-21 NOTE — LETTER
[facility]  [address], [address]   [address] [place] [address]  Niesha Corado [id number]       02/04/21     Dear Margo Capellan,      Congratulations! You have completed the 2020 requirements for the [facility] Be Well With Diabetes Program. You have been automatically re-enrolled into the [facility] Be Well With Diabetes Program for 2021. One of the requirements to participate in the [facility] Be Well With Diabetes Program is to complete a Clinical Pharmacist Telephone appointment yearly. The [place] [address] Team has attempted to contact you to schedule your 2021 Diabetes Management telephone appointment but was unable to reach you. We would like to work with you and your doctor to:  - Review your medications, including over-the-counter and herbal medications  - Answer questions about your medications and how to get the most benefit from them  - Identify potential drug interactions or side effects and help fix them  - Identify preferred medications that are equally effective, but available at a lower cost to you  - Help you reach the necessary requirements to remain enrolled in the Diabetes Management Program offered by [facility]     Please call [phone] and select option #7 to schedule this appointment to take advantage of this service. Telephone appointments are available Monday thru Friday from 7:30 AM till 5:30 PM.     This is a courtesy reminder. If you have this appointment already scheduled for your 2021 enrollment in the program, please disregard this message. If you have not scheduled this appointment yet, please contact us at the above number to schedule.      Sincerely,      [address]  Phone: [phone], option 7

## 2021-01-21 NOTE — TELEPHONE ENCOUNTER
[facility] Employee Diabetes Program - Be Well With Diabetes    Outreaching to patient to set up pharmacist appointment for 2021. Left voice message for return call. Will send MyChart message.     Jonelle Mancilla, PharmD, [place], [address] [place] [address] Pharmacist  O: [phone]  Department, toll free: [phone], option 7

## 2021-02-04 NOTE — TELEPHONE ENCOUNTER
[facility] [place] Employee Diabetes Program - Be Well With Diabetes    Outreaching to patient to set up pharmacist appointment for 2021. Left voice message for return call. [employer]t message not viewed, will send letter.     Jero Whitmore, PharmD, [place], [address]  [facility] Pharmacist  O: [phone]  Department, toll free: [phone], option 7

## 2021-02-08 ENCOUNTER — OFFICE VISIT (OUTPATIENT)
Dept: CARDIOLOGY CLINIC | Age: 46
End: 2021-02-08
Payer: COMMERCIAL

## 2021-02-08 VITALS
BODY MASS INDEX: 39.17 KG/M2 | HEART RATE: 106 BPM | SYSTOLIC BLOOD PRESSURE: 136 MMHG | HEIGHT: 75 IN | DIASTOLIC BLOOD PRESSURE: 62 MMHG | WEIGHT: 315 LBS

## 2021-02-08 DIAGNOSIS — I47.20 VT (VENTRICULAR TACHYCARDIA): ICD-10-CM

## 2021-02-08 DIAGNOSIS — I50.42 CHRONIC COMBINED SYSTOLIC AND DIASTOLIC CONGESTIVE HEART FAILURE (HCC): Primary | ICD-10-CM

## 2021-02-08 PROCEDURE — 99213 OFFICE O/P EST LOW 20 MIN: CPT | Performed by: INTERNAL MEDICINE

## 2021-02-08 NOTE — PROGRESS NOTES
[address] [facility] [place] [address] [place] [address]  Dept: [phone]  Dept Fax: [phone]  Loc: [phone]    Visit Date: 2/8/2021    Mr. Bridget Solano is a 39 y.o. male  who presented for:  Chief Complaint   Patient presents with    Follow-up     former patient of Dr. Earl Garay       HPI:   Mr. Colleen Silva is a 77-year-old male with a past medical history of congestive heart failure, ICD, nonischemic cardiomyopathy, and hypertension. ICD implanted on 1/15/2015. ICD fired on 11/13/2020 for VT with subsequent hospitalization. Device interrogation on 1/14/2021 showed 5.3 battery years remaining, optivol within normal limits. Mr. Bridget Solnao is doing well since November. Denies chest pain, dizziness, lower extremity edema, palpitations. His chronic dyspnea on exertion is unchanged. His lower leg edema is at baseline. Weight stable. Blood pressure at home is well controlled, typically SBP 130s mmHg. Pulse stable since starting diltiazem.       Current Outpatient Medications:     Semaglutide,0.25 or 0.5MG/DOS, (OZEMPIC, 0.25 OR 0.5 MG/DOSE,) 2 MG/1.5ML SOPN, INJECT 0.25MG UNDER THE SKIN ONCE WEEKLY FOR WEEKS 1-4 THEN INCREASE TO 0.5MG UNDER THE SKIN ONCE WEEKLY, Disp: 4 pen, Rfl: 0    insulin glargine (LANTUS SOLOSTAR) 100 UNIT/ML injection pen, Inject 30 Units into the skin nightly, Disp: 10 pen, Rfl: 3    carvedilol (COREG) 25 MG tablet, TAKE 1 TABLET BY MOUTH TWICE A DAY WITH MEALS, Disp: 180 tablet, Rfl: 3    carvedilol (COREG) 6.25 MG tablet, Take 1 tablet by mouth 2 times daily (with meals), Disp: 180 tablet, Rfl: 3    dilTIAZem (CARDIZEM CD) 120 MG extended release capsule, Take 1 capsule by mouth daily, Disp: 90 capsule, Rfl: 3    furosemide (LASIX) 20 MG tablet, TAKE 1 TABLET BY MOUTH EVERY DAY in the PM, Disp: 90 tablet, Rfl: 3   Respiratory Therapy Supplies (ADULT MASK) MISC, Please do mask desensitization and/or provide mask of patient's choice. , Disp: 1 each, Rfl: 0    acetaminophen (TYLENOL) 325 MG tablet, Take 650 mg by mouth every 8 hours as needed for Pain, Disp: , Rfl:     nitroGLYCERIN (NITROSTAT) 0.4 MG SL tablet, Place 1 tablet under the tongue every 5 minutes as needed for Chest pain up to max of 3 total doses. If no relief after 1 dose, call 911., Disp: 25 tablet, Rfl: 1    Past Medical History  Sherri Nolen  has a past medical history of CHF (congestive heart failure) ([facility] [address] Diabetes mellitus ([facility] [address] S/P ICD (internal cardiac defibrillator) procedure: 1/15/2015: Medtronic Single Chamber, Sleep apnea, and Zoll lifevest applied 8/22/14. Social History  Sherri Nolen  reports that he has never smoked. He has never used smokeless tobacco. He reports current alcohol use of about 18.0 standard drinks of alcohol per week. He reports that he does not use drugs. Family History  Sherri Nloen family history includes Heart Disease in his maternal grandfather, maternal grandmother, maternal uncle, and paternal uncle. Past Surgical History   Past Surgical History:   Procedure Laterality Date    CARDIAC CATHETERIZATION  9/2014    [address] [place] [address]    CARDIAC DEFIBRILLATOR PLACEMENT  2014    CARDIAC DEFIBRILLATOR PLACEMENT      CARDIAC DEFIBRILLATOR PLACEMENT      EYE SURGERY      Lasix     VASECTOMY  2004       Subjective:     REVIEW OF SYSTEMS  Constitutional: denies sweats, chills and fever  HENT: denies  congestion, sinus pressure, sneezing and sore throat. Eyes: denies  pain, discharge, redness and itching. Respiratory: denies apnea, cough  Gastrointestinal: denies blood in stool, constipation, diarrhea   Endocrine: denies cold intolerance, heat intolerance, polydipsia. Genitourinary: denies dysuria, enuresis, flank pain and hematuria. Musculoskeletal: denies arthralgias, joint swelling and neck pain. GLUCOSE 170 2021    GLUCOSE 258 2017       Lab Results   Component Value Date    ALKPHOS 115 2020    ALT 27 2020    AST 23 2020    PROT 6.9 2020    BILITOT 0.3 2020    BILIDIR <0.2 2020    LABALBU 3.8 2020       Lab Results   Component Value Date    MG 1.9 2021       Lab Results   Component Value Date    INR 1.08 2020    INR 1.05 2016         Lab Results   Component Value Date    LABA1C 6.3 2020       Lab Results   Component Value Date    TRIG 151 2020    HDL 35 2020    LDLCALC 92 2020       Lab Results   Component Value Date    TSH 3.000 2017         Testing Reviewed:      I have individually reviewed the below cardiac tests    EK2020  Tachycardia, 107 bpm.  Sinus rhythm with poor R wave progression. ECHO:   Results for orders placed during the hospital encounter of 20   ECHO Complete 2D W Doppler W Color    Narrative Transthoracic Echocardiography Report (TTE)     Demographics      Patient Name  [facility]       Gender             Male                 Kayley Hampton      MR #          [id number]      Race                                                  Ethnicity      Account #     [de-identified]      Room Number        0025      Accession     [id number]     Date of Study      2020   Number      Date of Birth 1975     Referring          Blayne Joseph MD                                Physician          Jimenez ARRIAGA      Age           39 year(s)     Sonographer        ARVIND Miguel, RDCS,                                                   RDMS, RVT                                   Interpreting       Mary Ann Tavera MD                                Physician     Procedure    Type of Study      TTE procedure:ECHOCARDIOGRAM COMPLETE 2D W DOPPLER W COLOR.      Procedure Date  Date: 2020 Start: 08:19 AM    Study Location: Bedside Technical Quality: Limited visualization due to body habitus. Indications:AICD Discharge. Additional Medical History:AICD discharge, congestive heart failure,  cardiomyopathy, morbid obesity, hypertenison, sleep apnea, AICD    Patient Status: Routine    Height: 75 inches Weight: 399.01 pounds BSA: 2.94 m^2 BMI: 49.87 kg/m^2    BP: 131/88 mmHg     Conclusions      Summary   Technically difficult study due to poor acoustic windows. Left ventricular size is normal and systolic function is moderate to   severely reduced. Ejection fraction was estimated at 30-35%. LV wall   thickness is within normal limits. The left atrium is Mildly dilated. Mild mitral regurgitation is present. Signature      ----------------------------------------------------------------   Electronically signed by Mary Ann Tavera MD (Interpreting   physician) on 11/14/2020 at 01:24 PM   ----------------------------------------------------------------      Findings      Mitral Valve   The mitral valve was not well visualized . Mild mitral regurgitation is present. Aortic Valve   The aortic valve leaflets were not well visualized. DOPPLER: Transaortic velocity was within the normal range with no evidence   of aortic stenosis. There was no evidence of aortic regurgitation. Tricuspid Valve   Tricuspid valve was not well visualized. Mild tricuspid regurgitation visualized. Pulmonic Valve   The pulmonic valve was not well visualized . Left Atrium   The left atrium is Mildly dilated. Left Ventricle   Left ventricular size is normal and systolic function is moderate to   severely reduced. Ejection fraction was estimated at 30-35%. LV wall   thickness is within normal limits. Right Atrium   Right atrial size was normal.      Right Ventricle   The right ventricular size was normal with normal systolic function and   wall thickness. Pacer Wire visualized in right ventricle.       Pericardial Effusion The pericardium was normal in appearance with no evidence of a pericardial   effusion. Pleural Effusion   No evidence of pleural effusion. Aorta / Great Vessels   -Aortic root dimension within normal limits.   -The Pulmonary artery is within normal limits. -IVC size is within normal limits with normal respiratory phasic changes.      M-Mode/2D Measurements & Calculations      LV Diastolic   LV Systolic Dimension:    AV Cusp Separation: 1.5 cmLA   Dimension: 6.7 5.6 cm                    Dimension: 4.6 cmAO Root   cm             LV Volume Diastolic:      Dimension: 2.7 cmLA Area: 25.9   LV FS:16.4 %   168.1 ml                  cm^2   LV PW          LV Volume Systolic: 828.4   Diastolic: 1.1 ml   cm             LV EDV/LV EDV Index:   Septum         168.1 ml/57 m^2LV ESV/LV  RV Diastolic Dimension: 3.3 cm   Diastolic: 0.7 ESV Index: 187.6 ml/39   cm             m^2                       LA/Aorta: 1.7                  EF Calculated: 32.2 %     Ascending Aorta: 3 cm                                            LA volume/Index: 85.5 ml /29m^2                  LV Length: 9.8 cm   LV Area   Diastolic:   08.7 cm^2   LV Area   Systolic: 13.9   cm^2     Doppler Measurements & Calculations      MV Peak E-Wave: 109 cm/s AV Peak Velocity: 125  LVOT Peak Velocity: 77.1                            cm/s                   cm/s   MV Peak Gradient: 4.75   AV Peak Gradient: 6.25 LVOT Peak Gradient: 2 mmHg   mmHg                     mmHg                                                   TV Peak E-Wave: 82.6 cm/s   MV Deceleration Time:   127 msec                                        TV Peak Gradient: 2.73                                                   mmHg                            IVRT: 63 msec          TR Velocity:266 cm/s                                                   TR Gradient:28.3 mmHg                                                   PV Peak Velocity: 63 cm/s AV DVI (Vmax):0.62     PV Peak Gradient: 1.59   MR Velocity: 352 cm/s                           mmHg     [email or web address] [id number]       STRESS:    CATH:  09/03/2014  CONCLUSION:    1.    Markedly elevated LVEDP of 28 mmHg and markedly dilated left          ventricular size. 2.    Severely reduced left ventricular systolic function, ejection          fraction 15-20% with severe global hypokinesis, no transaortic          pressure gradient. 3.    Angiograph widely patent, coronaries patent left main, left anterior          descending and left circumflex and right coronary artery. There is a          right dominant circulation as there is a mild luminal irregularity          noted in the left anterior descending. 4.    Nonischemic severely dilated left ventricular systolic function and          severe cardiomyopathy. Assessment/Plan       Diagnosis Orders   1. Chronic combined systolic and diastolic congestive heart failure (HCC)       Nonischemic cardiomyopathy with EF 30%  ICD discharge for ventricular tachycardia 11/2020  Diabetes  Morbid obesity  Hypertension  MIKE on CPAP    Will refer to EP for VT episode  Will Get 14 day event monitor for any more episodes and also to be clear on possible Afib  Advised weight loss  Continue Coreg, Cardizem, Lasix, Aldactone. Monitor K with potassium. Continue monitoring heart failure symptoms and blood pressure at home  Continue rest of medications  The patient is asked to make an attempt to improve diet and exercise patterns to aid in medical management of this problem. Advised more plant based nutrition/meditarrean diet   Advised patient to call office or seek immediate medical attention if there is any new onset of  any chest pain, sob, palpitations, lightheadedness, dizziness, orthopnea, PND or pedal edema. All medication side effects were discussed in details. Thank youfor allowing me to participate in the care of this patient. Please do not hesitate to contact me for any further questions. Return in about 6 months (around 8/8/2021).        Electronically signed by Boyd Palacios MD   2/8/2021 at 2:50 PM EST

## 2021-02-15 RX ORDER — GLIMEPIRIDE 1 MG/1
TABLET ORAL
Qty: 180 TABLET | Refills: 3 | OUTPATIENT
Start: 2021-02-15

## 2021-02-19 ENCOUNTER — HOSPITAL ENCOUNTER (OUTPATIENT)
Dept: NON INVASIVE DIAGNOSTICS | Age: 46
Discharge: HOME | End: 2021-02-19
Payer: COMMERCIAL

## 2021-02-19 DIAGNOSIS — I47.20 VT (VENTRICULAR TACHYCARDIA): ICD-10-CM

## 2021-02-19 PROCEDURE — 93246 EXT ECG>7D<15D RECORDING: CPT

## 2021-02-19 PROCEDURE — 93270 REMOTE 30 DAY ECG REV/REPORT: CPT

## 2021-02-23 ENCOUNTER — SCHEDULED TELEPHONE ENCOUNTER (OUTPATIENT)
Dept: PHARMACY | Facility: CLINIC | Age: 46
End: 2021-02-23

## 2021-02-23 NOTE — TELEPHONE ENCOUNTER
(PEN NEEDLES 31GX5/16\") 31G X 8 MM MISC Use to inject insulins and Ozempic     omeprazole (PRILOSEC) 40 MG delayed release capsule Take 1 capsule by mouth daily     fluticasone (FLONASE) 50 MCG/ACT nasal spray 2 sprays by Nasal route daily     Dextromethorphan-guaiFENesin (MUCINEX DM)  MG TB12 Take 1 tablet by mouth 2 times daily as needed (cough/congestion)     sildenafil (REVATIO) 20 MG tablet Take 1-5 tablets by mouth as needed (ED)     loratadine (CLARITIN) 10 MG tablet Take 10 mg by mouth daily     Respiratory Therapy Supplies (ADULT MASK) MISC Please do mask desensitization and/or provide mask of patient's choice.  acetaminophen (TYLENOL) 325 MG tablet Take 650 mg by mouth every 8 hours as needed for Pain     nitroGLYCERIN (NITROSTAT) 0.4 MG SL tablet Place 1 tablet under the tongue every 5 minutes as needed for Chest pain up to max of 3 total doses. If no relief after 1 dose, call 911. Apple cider vinegar 2 gummies daily    Current Pharmacy: [facility]  Current testing supplies/frequency: Prodigy ; TID ; reviewed FreeStyle Surinder to be complete. Informed does not need to change his meter. Pen needles/syringes: 31 G x 8 mm    Allergies:   Allergies   Allergen Reactions    Levaquin [Levofloxacin In D5w] Itching      Vitals/Labs:  BP Readings from Last 3 Encounters:   02/08/21 136/62   11/17/20 125/78   11/14/20 118/65     Lab Results   Component Value Date    LABMICR 8.63 03/14/2020     Lab Results   Component Value Date    LABA1C 6.3 07/25/2020    LABA1C 9.5 (H) 03/14/2020    LABA1C 8.6 (H) 11/11/2019     Lab Results   Component Value Date    CHOL 157 03/14/2020    TRIG 151 03/14/2020    HDL 35 03/14/2020    LDLCALC 92 03/14/2020     ALT   Date Value Ref Range Status   11/13/2020 27 11 - 66 U/L Final     AST   Date Value Ref Range Status   11/13/2020 23 5 - 40 U/L Final     The 10-year ASCVD risk score (Bhumika Massey, et al., 2013) is: 5.2%    Values used to calculate the score:      Age: 39 years      Sex: Male      Is Non- : No      Diabetic: Yes      Tobacco smoker: No      Systolic Blood Pressure: 291 mmHg      Is BP treated: Yes      HDL Cholesterol: 35 mg/dL      Total Cholesterol: 157 mg/dL     Lab Results   Component Value Date    CREATININE 0.9 01/20/2021     CrCl cannot be calculated (Unknown ideal weight.). Lab Results   Component Value Date    LABGLOM >90 01/20/2021    LABGLOM >90 11/14/2020    LABGLOM >90 11/13/2020    LABGLOM >90 07/25/2020       Immunizations:  Immunization History   Administered Date(s) Administered    Influenza Virus Vaccine 11/01/2019    Influenza, Rodriguez Barakat, IM, (6 mo and older Fluzone, Flulaval, Fluarix and 3 yrs and older Afluria) 10/17/2020    Influenza, Quadv, IM, PF (6 mo and older Fluzone, Flulaval, Fluarix, and 3 yrs and older Afluria) 10/28/2016    Pneumococcal Polysaccharide ([id number]) 08/17/2020    Tdap (Boostrix, Adacel) 08/17/2020      Social History:  Social History     Tobacco Use    Smoking status: Never Smoker    Smokeless tobacco: Never Used   Substance Use Topics    Alcohol use: Yes     Alcohol/week: 18.0 standard drinks     Types: 18 Cans of beer per week     Comment: Socially     ASSESSMENT:  Initial Program Requirements (Y indicates has completed for the year, N indicates needs to be completed by 07/01/2021):   No - Provider Visit for DM (1st)  No - A1c (1st)     Ongoing Program Requirements (Y indicates has completed for the year, N indicates needs to be completed by 12/31/2021:  No - Provider Visit for DM (2nd)  n/a - ACC/diabetes educator visit (if A1c over 8%)  No - A1c (2nd)  No - Lipid panel  No - Urine microalbumin  Yes - Pneumococcal vaccination: Up-to-date, not needed again until age 72  No - Influenza vaccination for Fall 2021  n/a - Medication adherence over 70% - on insulin  Yes - On statin or contraindication(s) rosuvastatin  Yes - On ACEi/ARB or contraindication(s) irbesartan     Current medications eligible for copay waiver, up to $600, through [employer]:  - aspirin (with prescription), diltiazem, furosemide, Lantus, Humalog, irbesartan, metformin XR, rosuvastatin, Ozempic  - Prodigy and generic pen needles and syringes     Diabetes Care:   T2DM  - Glycemic Goal: <7.0%. Is at blood glucose goal.   - Home blood sugar records:  Fastings: (which states are usually his highest 134 today)  Before meals usually 80s-90s  - Currently doing the keto diet; started about 10 days ago. Blood sugars are lower. Follows up with Dr. Bernadette Carcamo 3/15. Encouraged to call office before then if starts experiencing hypoglycemia due to new diet. Insulin may need adjusted soon before appointment and it is imperative provider would know about new diet and any lows so insulin can be adjusted. Reviewed dangers of low blood glucose and how to treat. - Any episodes of hypoglycemia? No.  Treatment of hypoglycemia (low blood sugars): if sugar is below 80 mg/dL, treat with 15 grams of simple sugar [rapid acting carb] (3-4 glucose tablets, 4 oz of regular juice, 1/2 can of pop, 5 sugar-containing candies, 1 Tablespoon of honey). RECHECK in 15 minutes. If above 80 mg/dL, have small meal or snack. If not above 80 mg/dL, repeat the 15 grams of simple carbs until above 80 mg/dL. - Eye exam current (within one year): yes  - Foot exam current (within one year): yes  - Current exercise: 1-2 days per week he will walk 20-30 minutes; laps around the office at work as well    Other Considerations:  - Blood Pressure Goal: BP less than 140/90 mmHg due to history of DM: Is at blood pressure goal.   - Lipids: Patient is prescribed moderate-intensity statin therapy. - Smoking status: Never smoked    PLAN:  · [employer] Fourth message with more information about [facility] sent.  Told patient I do not think he needs to change to this, but wanted to review the information for completeness and so he knew different options. - DM program gaps identified:   · Initial requirements: Provider Visit for DM (1st) and A1c (1st)   · Ongoing requirements: Provider visit for DM (2nd), ACC/diabetes educator visit (if A1c over 8%), A1c (2nd), Lipid panel, Urine microalbumin, Influenza vaccination for [id number] and Medication adherence over 70%   - Education to patient: Discussed foot care, Reminded to get yearly retinal exam and Discussed ways to avoid symptomatic hypoglycemia   - Follow up: PCP for identified gaps or as scheduled below  - Upcoming appointments:   Future Appointments   Date Time Provider Link Liriano   3/15/2021  3:30 PM Dejuan Baker MD [facility] [place]   3/17/2021 11:30 AM Aureliano Marcus MD N [facility] [place]   8/16/2021  3:45 PM Sara Schroeder MD N [facility] [place]   12/10/2021 11:00 AM SCHEDULE, [facility] PACER NURSE N [facility] - [place] Parekh, PharmD, [address], [address] [place] [address]  Direct: [phone]  Department, toll free: [phone], option 7    CLINICAL PHARMACY CONSULT: MED RECONCILIATION/REVIEW ADDENDUM    For Pharmacy Admin Tracking Only    PHSO: Yes  Total # of Interventions Recommended: 1  - Updated Order #: 1 Updated Order Reason(s):  Other  Recommended intervention potential cost savings: 1  Total Interventions Accepted: 1  Time Spent (min): 30

## 2021-03-16 NOTE — PROCEDURES
[address] [place] [address]                                 HOLTER MONITOR    PATIENT NAME: Karon Denny                  :        1975  MED REC NO:   [id number]                           ROOM:  ACCOUNT NO:   [de-identified]                           ADMIT DATE: 2021  PROVIDER:     Jaz Flores MD    DATE OF STUDY:  2021    DURATION:  Date of study was from 2021 to 2021. The patient  had the Holter for 14 days. INDICATION FOR STUDY:  Ventricular tachycardia. INTERPRETATION:  Total monitoring period for the patient was 14 days  from 2021 to 2021. The predominant rhythm was sinus rhythm  with episodes of sinus bradycardia and sinus tachycardia. The average  heart rate was 88 beats per minute. The maximum heart rate was 124  beats per minute. There were no significant arrhythmias. There were no  significant pauses or high gradient AV blocks. There were rare isolated  PACs less than 1% of total QRS complexes. Otherwise, no significant  findings on this 14-day monitoring period. SUMMARY:  1. Sinus rhythm with average heart rate of 88 beats per minute. 2.  Rare PACs and PVCs less than 1%. 3.  No significant arrhythmias. 4.  No significant pauses or high grade AV blocks. 5.  Clinical correlation is necessary.         Samy Wilson MD    D: 03/15/2021 12:23:05       T: 03/15/2021 13:11:09     [email or web address]  Job#: [id number]     Doc#: [id number]    CC:

## 2021-03-17 ENCOUNTER — NURSE ONLY (OUTPATIENT)
Dept: CARDIOLOGY CLINIC | Age: 46
End: 2021-03-17
Payer: COMMERCIAL

## 2021-03-17 ENCOUNTER — TELEPHONE (OUTPATIENT)
Dept: CARDIOLOGY CLINIC | Age: 46
End: 2021-03-17

## 2021-03-17 ENCOUNTER — OFFICE VISIT (OUTPATIENT)
Dept: CARDIOLOGY CLINIC | Age: 46
End: 2021-03-17
Payer: COMMERCIAL

## 2021-03-17 VITALS
BODY MASS INDEX: 39.17 KG/M2 | HEART RATE: 93 BPM | SYSTOLIC BLOOD PRESSURE: 127 MMHG | DIASTOLIC BLOOD PRESSURE: 77 MMHG | HEIGHT: 75 IN | WEIGHT: 315 LBS

## 2021-03-17 DIAGNOSIS — Z45.02 AICD DISCHARGE: Primary | ICD-10-CM

## 2021-03-17 DIAGNOSIS — T82.198A INAPPROPRIATE DISCHARGE OF IMPLANTABLE CARDIOVERTER-DEFIBRILLATOR (ICD), INITIAL ENCOUNTER: Primary | ICD-10-CM

## 2021-03-17 PROCEDURE — 93289 INTERROG DEVICE EVAL HEART: CPT | Performed by: INTERNAL MEDICINE

## 2021-03-17 PROCEDURE — 99204 OFFICE O/P NEW MOD 45 MIN: CPT | Performed by: INTERNAL MEDICINE

## 2021-03-17 NOTE — PROGRESS NOTES
[address] [facility]  Dept: [phone]         CARDIAC ELECTROPHYSIOLOGY: CONSULTATION NOTE  PATIENT DEMOGRAPHICS:  Date:   3/17/2021  Patient name:              Gisela Tellez  YOB: 1975  Sex: male   MRN:   [id number]    PRIMARY CARE PHYSICIAN:   Yun Miguel MD    REFERRING PROVIDER:  Roberto Sheikh MD    REASON FOR CONSULTATION:  AICD shock. HISTORY OF PRESENT ILLNESS:  45/M with dilated cardiomyopathy, diagnosed in 2014, most recent ejection fraction 30 to 35%, single-chamber AICD implanted in 2014 for primary prevention of sudden cardiac death, MIKE on CPAP, diabetes mellitus and hypertension who was admitted to [facility] in 11/23/2020 with AICD shock. The patient woke up from sleep on 11/13/2020 with a series of AICD shocks, 5. He was evaluated in the emergency room and device interrogation showed rapid ventricular rates (200 to 240 bpm) unresponsive to 4 shocks and was finally converted to sinus rhythm after fifth shock. He was started on Cardizem. He denies having ventricular arrhythmias or SCD therapies in the past.  He has been complaining of intermittent palpitations for the last several months but would last for few seconds to minutes and spontaneously subside. He denied chest pain, shortness of breath, orthopnea or lower extremity swelling. He has a sedentary lifestyle in general and does not exercise much. REVIEW OF SYSTEMS:    Constitutional: Negative for chills and fever  HENT: Negative for congestion, sinus pressure, sneezing and sore throat. Eyes: Negative for pain, discharge, redness and itching. Respiratory: Negative for apnea, cough  Gastrointestinal: Negative for blood in stool, constipation, diarrhea   Endocrine: Negative for cold intolerance, heat intolerance, polydipsia. Genitourinary: Negative for dysuria, enuresis, flank pain and hematuria.    Musculoskeletal: EVENING 90 tablet 3    rosuvastatin (CRESTOR) 10 MG tablet TAKE 1 TABLET BY MOUTH ONE TIME A DAY 90 tablet 3    ASPIRIN ADULT LOW STRENGTH 81 MG EC tablet TAKE 1 TABLET BY MOUTH ONE TIME A DAY 90 tablet 3    insulin lispro, 1 Unit Dial, (HUMALOG KWIKPEN) 100 UNIT/ML SOPN 14 U with meals plus sliding scale--151-200 3u,  201-250  5u,  251-300  8u, 301-350  10u, 351-400 12u, over 400 15 u and contact physician 45 pen 3    blood glucose test strips (PRODIGY NO CODING BLOOD GLUC) strip Use to check blood sugar 4 times daily 400 each 3    metFORMIN (GLUCOPHAGE-XR) 500 MG extended release tablet Take 2 tablets by mouth 2 times daily 360 tablet 3    Blood Glucose Monitoring Suppl (PRODIGY AUTOCODE BLOOD GLUCOSE) MAUREEN Use to check blood sugar 4 times daily 1 Device 0    Prodigy Lancets 28G MISC Use to check blood sugar 4 times daily 400 each 3    Insulin Pen Needle (PEN NEEDLES 31GX5/16\") 31G X 8 MM MISC Use to inject insulins and Ozempic 400 each 3    omeprazole (PRILOSEC) 40 MG delayed release capsule Take 1 capsule by mouth daily 90 capsule 1    fluticasone (FLONASE) 50 MCG/ACT nasal spray 2 sprays by Nasal route daily 1 Bottle 2    Dextromethorphan-guaiFENesin (MUCINEX DM)  MG TB12 Take 1 tablet by mouth 2 times daily as needed (cough/congestion) 28 tablet 0    sildenafil (REVATIO) 20 MG tablet Take 1-5 tablets by mouth as needed (ED) 30 tablet 5    loratadine (CLARITIN) 10 MG tablet Take 10 mg by mouth daily      Respiratory Therapy Supplies (ADULT MASK) MISC Please do mask desensitization and/or provide mask of patient's choice. 1 each 0    acetaminophen (TYLENOL) 325 MG tablet Take 650 mg by mouth every 8 hours as needed for Pain      nitroGLYCERIN (NITROSTAT) 0.4 MG SL tablet Place 1 tablet under the tongue every 5 minutes as needed for Chest pain up to max of 3 total doses. If no relief after 1 dose, call 911. 25 tablet 1     No current facility-administered medications for this visit. PHYSICAL EXAM:  Vitals:    03/17/21 1126   BP: 127/77   Pulse: 93     Body mass index is 50 kg/m². GENERAL: Alert and oriented. No distress. EYES: No pallor or icterus. ENT: No central cyanosis. VESSELS: No jugular venous distension or carotid bruits. HEART: Normal S1/S2. No murmur, rub or gallop. LUNGS: Clear to auscultation. CHEST WALL: No discharge or erosion at AICD pocket site. ABDOMEN: Soft and non-tender. EXTREMITIES: No lower extremity edema. Feet are warm. NEUROLOGICAL: Grossly intact. LABORATORY DATA AND DIAGNOSTIC DATA:  I have personally reviewed and interpreted the results of the following diagnostic testing    Lab Results   Component Value Date    WBC 8.2 11/13/2020    HGB 13.3 (L) 11/13/2020    HCT 42.7 11/13/2020     11/13/2020    CHOL 157 03/14/2020    TRIG 151 03/14/2020    HDL 35 03/14/2020    ALT 27 11/13/2020    AST 23 11/13/2020     01/20/2021    K 5.3 (H) 01/20/2021     01/20/2021    CREATININE 0.9 01/20/2021    BUN 14 01/20/2021    CO2 23 01/20/2021    TSH 3.000 04/30/2017    INR 1.08 11/13/2020    LABA1C 6.3 07/25/2020    LABMICR 8.63 03/14/2020   Echocardiogram dated 11/13/2020: LVEF 30 to 35%, normal LV wall thickness, mildly dilated left ventricle (LVEDD 6.7 cm), mild mitral regurgitation. ECG dated 11/14/2020: Sinus tachycardia, nonspecific interventricular motion defect. Coronary angiogram dated 9/3/2014: No evidence of epicardial coronary artery disease. Device interrogation single-chamber ICD implanted on 1/27/2015, Medtronic: Longevity 5.3 years. R waves 4.6 mV, threshold 0.875 V at 0.4 ms, impedance 361 ohms, RV impedance 47 and SVC 55 ohms. I reviewed the tracings from 11/13/2021: Rapid ventricular rate 150 bpm with sinus morphology rapidly degenerating into high ventricular rates (220 bpm with wide QRS complex), triggering AICD shocks. IMPRESSION:  1.   AICD shocks, likely related to atrial tachycardia/atrial fibrillation with rapid ventricular rate and aberrancy. 2.  Dilated cardiomyopathy, LVEF 30 to 35%. 3.  Morbid obesity, BMI 50 kg/m². 4.  Hypertension, hyperlipidemia, sleep apnea and type 2 diabetes mellitus. ASSESSMENT AND RECOMMENDATIONS:  Discussed the diagnosis and management of atrial tachyarrhythmias with the patient. He is programmed single zone, appropriately. Discussed antiarrhythmic therapy for decreasing the risk of having recurrent atrial tachyarrhythmias. He is young and has LV dysfunction. We will admit him for dofetilide therapy. He will also be benefited by upgrade to dual-chamber AICD for monitoring of the atrial tachyarrhythmia burden and programming SVT discrimination. Based on his atrial fibrillation burden will consider anticoagulation. The benefits and the side effects of the dofetilide therapy were discussed. Their questions were answered. They verbalized understanding of the discussion. We will discontinue his Cardizem as well. Thank you for allowing me to participate in the care of your patient. Please call me if you have any questions. **This report has been created using voice recognition software. It may contain minor errors which are inherent in voice recognition technology. **     Roxi Garcia MD, MRCP, [facility], [facility] on 3/17/2021 at 12:04 PM

## 2021-03-17 NOTE — PROGRESS NOTES
medtronic single icd in office w/ Jered  Hx shock in Nov     5.2 years on device   RV imped 399  Shock 47  SVC 57  0% paced   optivol WNL  R waves 5.1  Threshold 0.5 @ 0.4    1/19/21   7 beats VT

## 2021-03-17 NOTE — TELEPHONE ENCOUNTER
Spoke with Margarita Wilson in Admitting,   Patient is scheduled for Admission on 3.26.21, 3.27.21, 3.28.21 [facility] admission    Patient will arrive to admitting at 10am   1st floor admitting,

## 2021-03-23 ENCOUNTER — NURSE ONLY (OUTPATIENT)
Dept: LAB | Age: 46
End: 2021-03-23

## 2021-03-23 DIAGNOSIS — E11.65 TYPE 2 DIABETES MELLITUS WITH HYPERGLYCEMIA, WITH LONG-TERM CURRENT USE OF INSULIN (HCC): ICD-10-CM

## 2021-03-23 DIAGNOSIS — Z79.4 TYPE 2 DIABETES MELLITUS WITH HYPERGLYCEMIA, WITH LONG-TERM CURRENT USE OF INSULIN (HCC): ICD-10-CM

## 2021-03-23 LAB
ALBUMIN SERPL-MCNC: 4.2 G/DL (ref 3.5–5.1)
ALP BLD-CCNC: 95 U/L (ref 38–126)
ALT SERPL-CCNC: 17 U/L (ref 11–66)
ANION GAP SERPL CALCULATED.3IONS-SCNC: 11 MEQ/L (ref 8–16)
AST SERPL-CCNC: 22 U/L (ref 5–40)
AVERAGE GLUCOSE: 150 MG/DL (ref 70–126)
BILIRUB SERPL-MCNC: 0.4 MG/DL (ref 0.3–1.2)
BUN BLDV-MCNC: 15 MG/DL (ref 7–22)
CALCIUM SERPL-MCNC: 9.7 MG/DL (ref 8.5–10.5)
CHLORIDE BLD-SCNC: 101 MEQ/L (ref 98–111)
CHOLESTEROL, TOTAL: 107 MG/DL (ref 100–199)
CO2: 27 MEQ/L (ref 23–33)
CREAT SERPL-MCNC: 0.8 MG/DL (ref 0.4–1.2)
CREATININE, URINE: 206.4 MG/DL
GFR SERPL CREATININE-BSD FRML MDRD: > 90 ML/MIN/1.73M2
GLUCOSE BLD-MCNC: 168 MG/DL (ref 70–108)
HBA1C MFR BLD: 7 % (ref 4.4–6.4)
HDLC SERPL-MCNC: 28 MG/DL
LDL CHOLESTEROL CALCULATED: 57 MG/DL
MICROALBUMIN UR-MCNC: < 1.2 MG/DL
MICROALBUMIN/CREAT UR-RTO: 6 MG/G (ref 0–30)
POTASSIUM SERPL-SCNC: 4.8 MEQ/L (ref 3.5–5.2)
SODIUM BLD-SCNC: 139 MEQ/L (ref 135–145)
TOTAL PROTEIN: 8 G/DL (ref 6.1–8)
TRIGL SERPL-MCNC: 108 MG/DL (ref 0–199)

## 2021-03-25 ENCOUNTER — OFFICE VISIT (OUTPATIENT)
Dept: FAMILY MEDICINE CLINIC | Age: 46
End: 2021-03-25
Payer: COMMERCIAL

## 2021-03-25 VITALS
RESPIRATION RATE: 20 BRPM | BODY MASS INDEX: 49.62 KG/M2 | DIASTOLIC BLOOD PRESSURE: 60 MMHG | WEIGHT: 315 LBS | HEART RATE: 93 BPM | SYSTOLIC BLOOD PRESSURE: 130 MMHG | OXYGEN SATURATION: 95 %

## 2021-03-25 DIAGNOSIS — E11.65 TYPE 2 DIABETES MELLITUS WITH HYPERGLYCEMIA, WITHOUT LONG-TERM CURRENT USE OF INSULIN (HCC): Primary | ICD-10-CM

## 2021-03-25 PROCEDURE — 99214 OFFICE O/P EST MOD 30 MIN: CPT | Performed by: FAMILY MEDICINE

## 2021-03-25 PROCEDURE — 3051F HG A1C>EQUAL 7.0%<8.0%: CPT | Performed by: FAMILY MEDICINE

## 2021-03-25 RX ORDER — GABAPENTIN 100 MG/1
100 CAPSULE ORAL 3 TIMES DAILY
Qty: 90 CAPSULE | Refills: 5 | Status: SHIPPED | OUTPATIENT
Start: 2021-03-25 | End: 2021-10-21

## 2021-03-25 RX ORDER — SEMAGLUTIDE 1.34 MG/ML
1 INJECTION, SOLUTION SUBCUTANEOUS WEEKLY
Qty: 7 PEN | Refills: 3 | Status: SHIPPED | OUTPATIENT
Start: 2021-03-25 | End: 2022-01-28 | Stop reason: SDUPTHER

## 2021-03-25 RX ORDER — SEMAGLUTIDE 1.34 MG/ML
INJECTION, SOLUTION SUBCUTANEOUS
Qty: 12 PEN | Refills: 3 | Status: CANCELLED | OUTPATIENT
Start: 2021-03-25

## 2021-03-25 ASSESSMENT — PATIENT HEALTH QUESTIONNAIRE - PHQ9: SUM OF ALL RESPONSES TO PHQ QUESTIONS 1-9: 0

## 2021-03-26 ENCOUNTER — HOSPITAL ENCOUNTER (INPATIENT)
Age: 46
LOS: 1 days | Discharge: HOME | DRG: 309 | End: 2021-03-26
Attending: INTERNAL MEDICINE | Admitting: INTERNAL MEDICINE
Payer: COMMERCIAL

## 2021-03-26 VITALS
RESPIRATION RATE: 16 BRPM | OXYGEN SATURATION: 95 % | DIASTOLIC BLOOD PRESSURE: 73 MMHG | BODY MASS INDEX: 39.17 KG/M2 | WEIGHT: 315 LBS | TEMPERATURE: 97.7 F | HEIGHT: 75 IN | SYSTOLIC BLOOD PRESSURE: 125 MMHG | HEART RATE: 89 BPM

## 2021-03-26 PROBLEM — I48.91 A-FIB (HCC): Status: ACTIVE | Noted: 2021-03-26

## 2021-03-26 LAB
ANION GAP SERPL CALCULATED.3IONS-SCNC: 12 MEQ/L (ref 8–16)
BUN BLDV-MCNC: 11 MG/DL (ref 7–22)
CALCIUM SERPL-MCNC: 9.2 MG/DL (ref 8.5–10.5)
CHLORIDE BLD-SCNC: 98 MEQ/L (ref 98–111)
CO2: 26 MEQ/L (ref 23–33)
CREAT SERPL-MCNC: 0.8 MG/DL (ref 0.4–1.2)
EKG ATRIAL RATE: 87 BPM
EKG P AXIS: 42 DEGREES
EKG P-R INTERVAL: 250 MS
EKG Q-T INTERVAL: 392 MS
EKG QRS DURATION: 100 MS
EKG QTC CALCULATION (BAZETT): 471 MS
EKG R AXIS: -11 DEGREES
EKG T AXIS: 60 DEGREES
EKG VENTRICULAR RATE: 87 BPM
GFR SERPL CREATININE-BSD FRML MDRD: > 90 ML/MIN/1.73M2
GLUCOSE BLD-MCNC: 194 MG/DL (ref 70–108)
MAGNESIUM: 1.9 MG/DL (ref 1.6–2.4)
POTASSIUM SERPL-SCNC: 4.6 MEQ/L (ref 3.5–5.2)
SODIUM BLD-SCNC: 136 MEQ/L (ref 135–145)

## 2021-03-26 PROCEDURE — 6360000002 HC RX W HCPCS: Performed by: PHYSICIAN ASSISTANT

## 2021-03-26 PROCEDURE — G0378 HOSPITAL OBSERVATION PER HR: HCPCS

## 2021-03-26 PROCEDURE — 96366 THER/PROPH/DIAG IV INF ADDON: CPT

## 2021-03-26 PROCEDURE — 80048 BASIC METABOLIC PNL TOTAL CA: CPT

## 2021-03-26 PROCEDURE — G0379 DIRECT REFER HOSPITAL OBSERV: HCPCS

## 2021-03-26 PROCEDURE — 6370000000 HC RX 637 (ALT 250 FOR IP): Performed by: PHYSICIAN ASSISTANT

## 2021-03-26 PROCEDURE — 83735 ASSAY OF MAGNESIUM: CPT

## 2021-03-26 PROCEDURE — 99222 1ST HOSP IP/OBS MODERATE 55: CPT | Performed by: INTERNAL MEDICINE

## 2021-03-26 PROCEDURE — 36415 COLL VENOUS BLD VENIPUNCTURE: CPT

## 2021-03-26 PROCEDURE — 96365 THER/PROPH/DIAG IV INF INIT: CPT

## 2021-03-26 PROCEDURE — 93005 ELECTROCARDIOGRAM TRACING: CPT | Performed by: PHYSICIAN ASSISTANT

## 2021-03-26 PROCEDURE — 2580000003 HC RX 258: Performed by: PHYSICIAN ASSISTANT

## 2021-03-26 PROCEDURE — 1200000003 HC TELEMETRY R&B

## 2021-03-26 RX ORDER — NICOTINE POLACRILEX 4 MG
15 LOZENGE BUCCAL PRN
Status: DISCONTINUED | OUTPATIENT
Start: 2021-03-26 | End: 2021-03-26 | Stop reason: HOSPADM

## 2021-03-26 RX ORDER — DOFETILIDE 0.25 MG/1
250 CAPSULE ORAL EVERY 12 HOURS
Status: DISCONTINUED | OUTPATIENT
Start: 2021-03-26 | End: 2021-03-26 | Stop reason: HOSPADM

## 2021-03-26 RX ORDER — LOSARTAN POTASSIUM 25 MG/1
25 TABLET ORAL EVERY EVENING
Status: DISCONTINUED | OUTPATIENT
Start: 2021-03-26 | End: 2021-03-26 | Stop reason: HOSPADM

## 2021-03-26 RX ORDER — ASPIRIN 81 MG/1
81 TABLET ORAL DAILY
Status: DISCONTINUED | OUTPATIENT
Start: 2021-03-27 | End: 2021-03-26 | Stop reason: HOSPADM

## 2021-03-26 RX ORDER — DEXTROSE MONOHYDRATE 50 MG/ML
100 INJECTION, SOLUTION INTRAVENOUS PRN
Status: DISCONTINUED | OUTPATIENT
Start: 2021-03-26 | End: 2021-03-26 | Stop reason: HOSPADM

## 2021-03-26 RX ORDER — GABAPENTIN 100 MG/1
100 CAPSULE ORAL 3 TIMES DAILY
Status: DISCONTINUED | OUTPATIENT
Start: 2021-03-26 | End: 2021-03-26 | Stop reason: HOSPADM

## 2021-03-26 RX ORDER — CETIRIZINE HYDROCHLORIDE 10 MG/1
10 TABLET ORAL DAILY
Status: DISCONTINUED | OUTPATIENT
Start: 2021-03-27 | End: 2021-03-26 | Stop reason: HOSPADM

## 2021-03-26 RX ORDER — ROSUVASTATIN CALCIUM 10 MG/1
10 TABLET, COATED ORAL NIGHTLY
Status: DISCONTINUED | OUTPATIENT
Start: 2021-03-26 | End: 2021-03-26 | Stop reason: HOSPADM

## 2021-03-26 RX ORDER — FLUTICASONE PROPIONATE 50 MCG
2 SPRAY, SUSPENSION (ML) NASAL DAILY
Status: DISCONTINUED | OUTPATIENT
Start: 2021-03-27 | End: 2021-03-26 | Stop reason: HOSPADM

## 2021-03-26 RX ORDER — ACETAMINOPHEN 325 MG/1
650 TABLET ORAL EVERY 8 HOURS PRN
Status: DISCONTINUED | OUTPATIENT
Start: 2021-03-26 | End: 2021-03-26 | Stop reason: HOSPADM

## 2021-03-26 RX ORDER — FUROSEMIDE 40 MG/1
40 TABLET ORAL DAILY
Status: DISCONTINUED | OUTPATIENT
Start: 2021-03-27 | End: 2021-03-26 | Stop reason: HOSPADM

## 2021-03-26 RX ORDER — IRBESARTAN 75 MG/1
1 TABLET ORAL EVERY EVENING
Status: DISCONTINUED | OUTPATIENT
Start: 2021-03-26 | End: 2021-03-26 | Stop reason: CLARIF

## 2021-03-26 RX ORDER — METFORMIN HYDROCHLORIDE 500 MG/1
1000 TABLET, EXTENDED RELEASE ORAL 2 TIMES DAILY
Status: DISCONTINUED | OUTPATIENT
Start: 2021-03-26 | End: 2021-03-26 | Stop reason: HOSPADM

## 2021-03-26 RX ORDER — CARVEDILOL 6.25 MG/1
6.25 TABLET ORAL 2 TIMES DAILY WITH MEALS
Status: DISCONTINUED | OUTPATIENT
Start: 2021-03-26 | End: 2021-03-26 | Stop reason: HOSPADM

## 2021-03-26 RX ORDER — SPIRONOLACTONE 25 MG/1
25 TABLET ORAL DAILY
Status: DISCONTINUED | OUTPATIENT
Start: 2021-03-27 | End: 2021-03-26 | Stop reason: HOSPADM

## 2021-03-26 RX ORDER — INSULIN GLARGINE 100 [IU]/ML
30 INJECTION, SOLUTION SUBCUTANEOUS NIGHTLY
Status: DISCONTINUED | OUTPATIENT
Start: 2021-03-26 | End: 2021-03-26 | Stop reason: HOSPADM

## 2021-03-26 RX ORDER — PANTOPRAZOLE SODIUM 40 MG/1
40 TABLET, DELAYED RELEASE ORAL DAILY
Status: DISCONTINUED | OUTPATIENT
Start: 2021-03-27 | End: 2021-03-26 | Stop reason: HOSPADM

## 2021-03-26 RX ORDER — FUROSEMIDE 20 MG/1
20 TABLET ORAL DAILY
Status: DISCONTINUED | OUTPATIENT
Start: 2021-03-27 | End: 2021-03-26 | Stop reason: HOSPADM

## 2021-03-26 RX ORDER — CARVEDILOL 25 MG/1
25 TABLET ORAL 2 TIMES DAILY
Status: DISCONTINUED | OUTPATIENT
Start: 2021-03-26 | End: 2021-03-26 | Stop reason: HOSPADM

## 2021-03-26 RX ORDER — DEXTROSE MONOHYDRATE 25 G/50ML
12.5 INJECTION, SOLUTION INTRAVENOUS PRN
Status: DISCONTINUED | OUTPATIENT
Start: 2021-03-26 | End: 2021-03-26 | Stop reason: HOSPADM

## 2021-03-26 RX ADMIN — GABAPENTIN 100 MG: 100 CAPSULE ORAL at 13:39

## 2021-03-26 RX ADMIN — MAGNESIUM SULFATE HEPTAHYDRATE 1000 MG: 500 INJECTION, SOLUTION INTRAMUSCULAR; INTRAVENOUS at 13:39

## 2021-03-26 SDOH — HEALTH STABILITY: MENTAL HEALTH: HOW OFTEN DO YOU HAVE A DRINK CONTAINING ALCOHOL?: NEVER

## 2021-03-26 ASSESSMENT — PAIN SCALES - GENERAL: PAINLEVEL_OUTOF10: 0

## 2021-03-26 NOTE — H&P
results:  Lab Results   Component Value Date    WBC 8.2 11/13/2020    HGB 13.3 (L) 11/13/2020    HCT 42.7 11/13/2020     11/13/2020    CHOL 107 03/23/2021    TRIG 108 03/23/2021    HDL 28 03/23/2021    ALT 17 03/23/2021    AST 22 03/23/2021     03/26/2021    K 4.6 03/26/2021    CL 98 03/26/2021    CREATININE 0.8 03/26/2021    BUN 11 03/26/2021    CO2 26 03/26/2021    TSH 3.000 04/30/2017    INR 1.08 11/13/2020    LABA1C 7.0 (H) 03/23/2021    LABMICR < 1.20 03/23/2021            Medications:   PRN Meds: sodium chloride flush  Home Meds:   No current facility-administered medications on file prior to encounter. Current Outpatient Medications on File Prior to Encounter   Medication Sig Dispense Refill    Semaglutide, 1 MG/DOSE, (OZEMPIC, 1 MG/DOSE,) 2 MG/1.5ML SOPN Inject 1 mg into the skin once a week 7 pen 3    gabapentin (NEURONTIN) 100 MG capsule Take 1 capsule by mouth 3 times daily for 180 days.  Intended supply: 30 days 90 capsule 5    insulin glargine (LANTUS SOLOSTAR) 100 UNIT/ML injection pen Inject 30 Units into the skin nightly 10 pen 3    carvedilol (COREG) 25 MG tablet TAKE 1 TABLET BY MOUTH TWICE A DAY WITH MEALS 180 tablet 3    carvedilol (COREG) 6.25 MG tablet Take 1 tablet by mouth 2 times daily (with meals) 180 tablet 3    furosemide (LASIX) 20 MG tablet TAKE 1 TABLET BY MOUTH EVERY DAY in the PM (Patient taking differently: TAKE 1 TABLET BY MOUTH EVERY DAY in the AM) 90 tablet 3    furosemide (LASIX) 40 MG tablet TAKE 1 TABLET BY MOUTH DAILY in AM 90 tablet 3    spironolactone (ALDACTONE) 25 MG tablet TAKE 1 TABLET BY MOUTH ONE TIME A DAY 90 tablet 3    irbesartan (AVAPRO) 75 MG tablet TAKE ONE TABLET BY MOUTH EVERY EVENING 90 tablet 3    rosuvastatin (CRESTOR) 10 MG tablet TAKE 1 TABLET BY MOUTH ONE TIME A DAY 90 tablet 3    ASPIRIN ADULT LOW STRENGTH 81 MG EC tablet TAKE 1 TABLET BY MOUTH ONE TIME A DAY 90 tablet 3    insulin lispro, 1 Unit Dial, (HUMALOG KWIKPEN) 100 UNIT/ML SOPN 14 U with meals plus sliding scale--151-200 3u,  201-250  5u,  251-300  8u, 301-350  10u, 351-400 12u, over 400 15 u and contact physician 45 pen 3    metFORMIN (GLUCOPHAGE-XR) 500 MG extended release tablet Take 2 tablets by mouth 2 times daily 360 tablet 3    omeprazole (PRILOSEC) 40 MG delayed release capsule Take 1 capsule by mouth daily 90 capsule 1    Dextromethorphan-guaiFENesin (MUCINEX DM)  MG TB12 Take 1 tablet by mouth 2 times daily as needed (cough/congestion) (Patient taking differently: Take 1 tablet by mouth 2 times daily ) 28 tablet 0    loratadine (CLARITIN) 10 MG tablet Take 10 mg by mouth daily      acetaminophen (TYLENOL) 325 MG tablet Take 650 mg by mouth every 8 hours as needed for Pain      nitroGLYCERIN (NITROSTAT) 0.4 MG SL tablet Place 1 tablet under the tongue every 5 minutes as needed for Chest pain up to max of 3 total doses. If no relief after 1 dose, call 911. 25 tablet 1    blood glucose test strips (PRODIGY NO CODING BLOOD GLUC) strip Use to check blood sugar 4 times daily 400 each 3    Blood Glucose Monitoring Suppl (PRODIGY AUTOCODE BLOOD GLUCOSE) MAUREEN Use to check blood sugar 4 times daily 1 Device 0    Prodigy Lancets 28G MISC Use to check blood sugar 4 times daily 400 each 3    Insulin Pen Needle (PEN NEEDLES 31GX5/16\") 31G X 8 MM MISC Use to inject insulins and Ozempic 400 each 3    fluticasone (FLONASE) 50 MCG/ACT nasal spray 2 sprays by Nasal route daily 1 Bottle 2    sildenafil (REVATIO) 20 MG tablet Take 1-5 tablets by mouth as needed (ED) 30 tablet 5    Respiratory Therapy Supplies (ADULT MASK) MISC Please do mask desensitization and/or provide mask of patient's choice. 1 each 0   .  Echocardiogram dated 11/13/2020: LVEF 30 to 35%, normal LV wall thickness, mildly dilated left ventricle (LVEDD 6.7 cm), mild mitral regurgitation. ECG dated 11/14/2020: Sinus tachycardia, nonspecific interventricular motion defect.   Coronary angiogram dated 9/3/2014: No evidence of epicardial coronary artery disease. Device interrogation single-chamber ICD implanted on 1/27/2015, [employer]: Longevity 5.3 years. R waves 4.6 mV, threshold 0.875 V at 0.4 ms, impedance 361 ohms, RV impedance 47 and SVC 55 ohms. I reviewed the tracings from 11/13/2021: Rapid ventricular rate 150 bpm with sinus morphology rapidly degenerating into high ventricular rates (220 bpm with wide QRS complex), triggering AICD shocks.        IMPRESSION:  1. AICD shocks, likely related to atrial tachycardia/atrial fibrillation with rapid ventricular rate and aberrancy (11/2022), No recurrences. Planned to undergo Tikosyn loading. 2.  Dilated cardiomyopathy, LVEF 30 to 35%. 3.  Morbid obesity, BMI 50 kg/m². 4.  Hypertension, hyperlipidemia, sleep apnea and type 2 diabetes mellitus. Plan:  1. KFT. Electrolyte and a baseline ECG. 2. Monitor electrolytes and renal functions daily. 3. Give magnesium 1 gm IV once. 4. Keep K >4 and Mg >4.  5  Start Apixaban 5 mg bid. 6. Start Tikosyn 250 mg bid. 7. ECG 2 hours after each dose of Tikosyn. 8. Call physician before given each dose of Tikosyn. ADDENDUM:  The patient did have recurrent AICD shocks after his device reprogramming. His events monitor showed no atrial fibrillation. AICD evaluation showed normal heart rate trends since 12/2020). After discussing with the patient we elected to continue monitoring him closely for recurrent atrial arrhythmia. We hold hold on to antiarrhythmic therapy and anticoagulation. We will discharge him home with follow up in EP clinic as scheduled.        Electronically signed by Emily Ball MD on 11/20/2020 at 10:04 AM

## 2021-03-26 NOTE — FLOWSHEET NOTE
Advance Directive Consult: Advance Directive education provided and forms were updated. Copies placed in chart and original returned to patient. Physician notified. Copy sent to Medical Records. Discussion including opening conversation about willingness for donation for transplantation and research. 03/26/21 0215   Encounter Summary   Services provided to: Patient;Significant other   Referral/Consult From: Nurse   Support System Spouse;Parent; Family members   Continue Visiting Yes  (3/26)   Complexity of Encounter Moderate   Length of Encounter 45 minutes   Advance Care Planning Yes   Advance Directives (For Healthcare)   Healthcare Directive Yes, patient has an advance directive for healthcare treatment   Type of Healthcare Directive Durable power of  for health care   Copy in Chart Yes, copy in chart   Chart Copy Status : Revisions completed   Date Reviewed and Current: 03/26/21   Advance Directives Healthcare power of attornery completed   [facility] Agent's Name [address] Agent's Phone Number [phone]   If you are unable to speak for yourself, does your Healthcare Agent or Legal Spokesperson know your healthcare wishes?  Yes

## 2021-03-27 ENCOUNTER — CARE COORDINATION (OUTPATIENT)
Dept: OTHER | Facility: CLINIC | Age: 46
End: 2021-03-27

## 2021-03-27 ASSESSMENT — ENCOUNTER SYMPTOMS
ABDOMINAL PAIN: 0
DIARRHEA: 0
SHORTNESS OF BREATH: 0
ABDOMINAL DISTENTION: 0
EYE PAIN: 0
COUGH: 0
RHINORRHEA: 0
SINUS PRESSURE: 0
NAUSEA: 0
SORE THROAT: 0
CONSTIPATION: 0

## 2021-03-27 NOTE — PROGRESS NOTES
[address] [place] [address]  Dept: [phone]  Dept Fax: [phone]  Loc: [phone]  PROGRESS NOTE      VisitDate: 3/25/2021    Lucero Chris is a 39 y.o. male who presents today for:     Chief Complaint   Patient presents with    6 Month Follow-Up     DM. AM BS's have been elevated. Due for foot exam.     Discuss Labs    Medication Refill    Peripheral Neuropathy     shooting pains in feet. Subjective:  HPI  Follow-up diabetes. Blood sugars variable. Labs reviewed with the patient. Discussed continuous glucose monitoring however he has a defibrillator unclear if this would affect the glucose monitor. Been having sharp pains in his feet mostly at night when he stops moving. His defibrillator did fire recently he is scheduled for testing tomorrow. Pt has MIKE, has been using cpap since 2017, with good compliance. Review of Systems   Constitutional: Negative for appetite change and fever. HENT: Negative for congestion, ear pain, postnasal drip, rhinorrhea, sinus pressure and sore throat. Eyes: Negative for pain and visual disturbance. Respiratory: Negative for cough and shortness of breath. Cardiovascular: Negative for chest pain. Gastrointestinal: Negative for abdominal distention, abdominal pain, constipation, diarrhea and nausea. Genitourinary: Negative for dysuria, frequency and urgency. Musculoskeletal: Positive for arthralgias and myalgias. Skin: Negative for rash. Neurological: Positive for numbness. Negative for dizziness. Past Medical History:   Diagnosis Date    [facility]     Diabetes mellitus ([facility]     S/P ICD (internal cardiac defibrillator) procedure: 1/15/2015: Medtronic Single Chamber 1/15/2015    1/15/2015: Medtronic Single Chamber.  Dr. Taylor Moreira Sleep apnea 06/09/2017    Dr.Rovner Kendell Duron lifevest applied 8/22/14 8/29/2014 returned prior to ICD insertion 1/15      Past Surgical History:   Procedure Laterality Date    CARD[facility]AC [facility]TERIZATION  9/2014    [facility]    CARDIAC DEFIBRILLATOR PLACEMENT  2014    CARDIAC DEFIBRILLATOR PLACEMENT      CARDIAC DEFIBRILLATOR PLACEMENT      EYE SURGERY      Lasix     VASECTOMY  2004     Family History   Problem Relation Age of Onset    Heart Disease Maternal Uncle     Heart Disease Maternal Grandfather     Heart Disease Paternal Uncle     Heart Disease Maternal Grandmother      Social History     Tobacco Use    Smoking status: Never Smoker    Smokeless tobacco: Never Used   Substance Use Topics    Alcohol use: Never     Alcohol/week: 18.0 standard drinks     Types: 18 Cans of beer per week     Frequency: Never      Current Outpatient Medications   Medication Sig Dispense Refill    Semaglutide, 1 MG/DOSE, (OZEMPIC, 1 MG/DOSE,) 2 MG/1.5ML SOPN Inject 1 mg into the skin once a week 7 pen 3    gabapentin (NEURONTIN) 100 MG capsule Take 1 capsule by mouth 3 times daily for 180 days.  Intended supply: 30 days 90 capsule 5    insulin glargine (LANTUS SOLOSTAR) 100 UNIT/ML injection pen Inject 30 Units into the skin nightly 10 pen 3    carvedilol (COREG) 25 MG tablet TAKE 1 TABLET BY MOUTH TWICE A DAY WITH MEALS 180 tablet 3    carvedilol (COREG) 6.25 MG tablet Take 1 tablet by mouth 2 times daily (with meals) 180 tablet 3    furosemide (LASIX) 20 MG tablet TAKE 1 TABLET BY MOUTH EVERY DAY in the PM (Patient taking differently: TAKE 1 TABLET BY MOUTH EVERY DAY in the AM) 90 tablet 3    furosemide (LASIX) 40 MG tablet TAKE 1 TABLET BY MOUTH DAILY in AM 90 tablet 3    spironolactone (ALDACTONE) 25 MG tablet TAKE 1 TABLET BY MOUTH ONE TIME A DAY 90 tablet 3    irbesartan (AVAPRO) 75 MG tablet TAKE ONE TABLET BY MOUTH EVERY EVENING 90 tablet 3    rosuvastatin (CRESTOR) 10 MG tablet TAKE 1 TABLET BY MOUTH ONE TIME A DAY 90 tablet 3    ASPIRIN ADULT LOW STRENGTH 81 MG EC tablet TAKE 1 TABLET BY MOUTH ONE TIME A DAY 90 tablet 3    insulin lispro, 1 Unit Dial, (HUMALOG KWIKPEN) 100 UNIT/ML SOPN 14 U with meals plus sliding scale--151-200 3u,  201-250  5u,  251-300  8u, 301-350  10u, 351-400 12u, over 400 15 u and contact physician 45 pen 3    blood glucose test strips (PRODIGY NO CODING BLOOD GLUC) strip Use to check blood sugar 4 times daily 400 each 3    metFORMIN (GLUCOPHAGE-XR) 500 MG extended release tablet Take 2 tablets by mouth 2 times daily 360 tablet 3    Blood Glucose Monitoring Suppl (PRODIGY AUTOCODE BLOOD GLUCOSE) MAUREEN Use to check blood sugar 4 times daily 1 Device 0    Prodigy Lancets 28G MISC Use to check blood sugar 4 times daily 400 each 3    Insulin Pen Needle (PEN NEEDLES 31GX5/16\") 31G X 8 MM MISC Use to inject insulins and Ozempic 400 each 3    omeprazole (PRILOSEC) 40 MG delayed release capsule Take 1 capsule by mouth daily 90 capsule 1    fluticasone (FLONASE) 50 MCG/ACT nasal spray 2 sprays by Nasal route daily 1 Bottle 2    Dextromethorphan-guaiFENesin (MUCINEX DM)  MG TB12 Take 1 tablet by mouth 2 times daily as needed (cough/congestion) (Patient taking differently: Take 1 tablet by mouth 2 times daily ) 28 tablet 0    sildenafil (REVATIO) 20 MG tablet Take 1-5 tablets by mouth as needed (ED) 30 tablet 5    loratadine (CLARITIN) 10 MG tablet Take 10 mg by mouth daily      Respiratory Therapy Supplies (ADULT MASK) MISC Please do mask desensitization and/or provide mask of patient's choice. 1 each 0    acetaminophen (TYLENOL) 325 MG tablet Take 650 mg by mouth every 8 hours as needed for Pain      nitroGLYCERIN (NITROSTAT) 0.4 MG SL tablet Place 1 tablet under the tongue every 5 minutes as needed for Chest pain up to max of 3 total doses. If no relief after 1 dose, call 911. 25 tablet 1     No current facility-administered medications for this visit.       Allergies   Allergen Reactions    Levaquin [Levofloxacin In D5w] Itching     Health Maintenance   Topic Date Due    Hepatitis C screen  Never done    Diabetic foot exam  Never done    HIV screen  Never done    Hepatitis B vaccine (1 of 3 - Risk 3-dose series) Never done    Diabetic retinal exam  02/28/2021    COVID-19 Vaccine (2 - Moderna 2-dose series) 04/03/2021    A1C test (Diabetic or Prediabetic)  03/23/2022    Diabetic microalbuminuria test  03/23/2022    Lipid screen  03/23/2022    Potassium monitoring  03/26/2022    Creatinine monitoring  03/26/2022    DTaP/Tdap/Td vaccine (2 - Td) 08/17/2030    Flu vaccine  Completed    Pneumococcal 0-64 years Vaccine  Completed    Hepatitis A vaccine  Aged Out    Hib vaccine  Aged Out    Meningococcal (ACWY) vaccine  Aged Out         Objective:     Physical Exam  Constitutional:       General: He is not in acute distress. Appearance: He is well-developed. He is not diaphoretic. HENT:      Head: Normocephalic and atraumatic. Right Ear: External ear normal.      Left Ear: External ear normal.   Eyes:      Conjunctiva/sclera: Conjunctivae normal.   Neck:      Vascular: No JVD. Cardiovascular:      Rate and Rhythm: Normal rate and regular rhythm. Heart sounds: Normal heart sounds. Pulmonary:      Effort: Pulmonary effort is normal.      Breath sounds: Normal breath sounds. No wheezing or rales. Musculoskeletal:         General: No tenderness. Skin:     General: Skin is warm and dry. Coloration: Skin is not pale. Neurological:      Mental Status: He is alert and oriented to person, place, and time. Comments: Monofilament testing abnormal with mild decrease sensation in the feet bilaterally       /60   Pulse 93   Resp 20   Wt (!) 397 lb (180.1 kg)   SpO2 95%   BMI 49.62 kg/m²       Impression/Plan:  1.  Type 2 diabetes mellitus with hyperglycemia, without long-term current use of insulin (HCC)      Requested Prescriptions     Signed Prescriptions Disp Refills    Semaglutide, 1 MG/DOSE, (OZEMPIC, 1 MG/DOSE,) 2 MG/1.5ML SOPN 7 pen 3     Sig: Inject 1 mg into the skin once a week    gabapentin (NEURONTIN) 100 MG capsule 90 capsule 5     Sig: Take 1 capsule by mouth 3 times daily for 180 days. Intended supply: 30 days     Orders Placed This Encounter   Procedures    Hemoglobin A1C     Standing Status:   Future     Standing Expiration Date:   3/25/2022    Comprehensive Metabolic Panel     Standing Status:   Future     Standing Expiration Date:   3/25/2022     Meds increased as above reviewed side effects of gabapentin as well as durability and dosing and timing. Patient giveneducational materials - see patient instructions. Discussed use, benefit, and side effects of prescribed medications. All patient questions answered. Pt voiced understanding. Reviewed health maintenance. Patient agreedwith treatment plan. Follow up as directed. **This report has been created using voice recognition software. It may contain minor errorswhich are inherent in voice recognition technology. **       Electronically signed by Gopal Valencia MD on 3/27/2021 at 1:17 PM

## 2021-03-27 NOTE — CARE COORDINATION
G[facility] Transitions Initial Follow Up Call    Call within 2 business days of discharge: Yes    Patient: Yojana John Patient : 1975   MRN: [id number]  Reason for Admission: A fib  Discharge Date: 3/26/21 RARS: Readmission Risk Score: 13      Last Discharge [facility]       Complaint Diagnosis Description Type Department Provider    3/26/21   Admission (Discharged) Kishan Anderson MD           [facility] attempted to reach patient for Care Transitions call. HIPAA compliant message left requesting a return phone call at patients convenience. Will continue to follow.      Care Transitions 24 Hour Call    Do you have all of your prescriptions and are they filled?: Yes  Do you have support at home?: Partner/Spouse/SO  Are you an active caregiver in your home?: No  Care Transitions Interventions    Specialty Service Referral: Completed           Follow Up  Future Appointments   Date Time Provider Link Liriano   2021  3:30 PM MD DANILO Perera [facility] [place]   2021  3:45 PM MD RANDALL Ordonez [facility] [place]   3/14/2022 11:30 AM SCHEDULE, ALFONSO PACER NURSE Mohan PACER P - [address] BRIAN Manning RN

## 2021-03-29 ENCOUNTER — CARE COORDINATION (OUTPATIENT)
Dept: OTHER | Facility: CLINIC | Age: 46
End: 2021-03-29

## 2021-03-29 ENCOUNTER — PROCEDURE VISIT (OUTPATIENT)
Dept: CARDIOLOGY CLINIC | Age: 46
End: 2021-03-29

## 2021-03-29 ENCOUNTER — TELEPHONE (OUTPATIENT)
Dept: CARDIOLOGY CLINIC | Age: 46
End: 2021-03-29

## 2021-03-29 DIAGNOSIS — I48.0 PAROXYSMAL ATRIAL FIBRILLATION (HCC): Primary | ICD-10-CM

## 2021-03-29 DIAGNOSIS — Z95.810 S/P ICD (INTERNAL CARDIAC DEFIBRILLATOR) PROCEDURE: Primary | ICD-10-CM

## 2021-03-29 NOTE — PROGRESS NOTES
[facility] medtronic single icd     [place] was seen as inpt   Cancelled tikosyn dosing, is scheduled for upgrade to dual ICD     5.1 years on device   RV imped 399  Shock 49  SVC 62  No new events    R waves 4.3  Threshold 0.75 @ 0.4  optivol WNl

## 2021-03-29 NOTE — LETTER
Dear Nando Renae:      My name is Hansel Garcia , Associate Care Manager for [facility] and I have been trying to reach you. The Associate Care Management (ACM) program is a free-of-charge confidential service provided to our employees and their family members covered by the [facility]. The program will provide an associate and his/her family with the [employer]'s expertise to assist in navigating the health care delivery system, provider services, and their overall care needsso as to assure and improve health care interactions and enhance the quality of life. This program is designed to provide you with the opportunity to have a [facility] partner with you for the following services:     1) when you come home from the hospital or emergency room   2) when help is needed to manage your disease   3) when you need assistance coordinating services or appointments  4) when you need additional education, resources or assistance reaching your Be Well Health Program goals/requirements such as Be Well With Diabetes      [facility] is dedicated to empowering the good health of its community and improving the quality of care and care experiences for employees and their families. We are committed to safeguarding patient confidentiality and privacy, assuring that every employee has the respect he or she deserves in managing their health. The information shared with your care manager will not be shared with anyone else aside from those you identify as part of your care team, and will only be used to assist you with any identified care needs. Please contact me if you would like this service provided to you. Sincerely,  Hansel Garcia BSN, RN- [facility]  Associate Care Manager  [phone]      If you have any questions or concerns, please don't hesitate to call.

## 2021-03-29 NOTE — TELEPHONE ENCOUNTER
Wife called to office asking about cancelled admission for Tikosyn, asking what would be the next step. Verbally discussed with Dr Wily Matthews, he called to [facility]. Verbal order to upgrade single ICD (MDT) to dual with atrial lead.   Please agree with verbal order    Orders to Rogers Sylvester for scheduling

## 2021-03-30 ENCOUNTER — CARE COORDINATION (OUTPATIENT)
Dept: OTHER | Facility: CLINIC | Age: 46
End: 2021-03-30

## 2021-03-30 ENCOUNTER — TELEPHONE (OUTPATIENT)
Dept: CARDIOLOGY CLINIC | Age: 46
End: 2021-03-30

## 2021-03-30 NOTE — TELEPHONE ENCOUNTER
Procedure: Upgrade to Dual ICD -  Medtronic  Date: 04.09.2021  Arrival Time: 6am  Meds to Hold: Lasix, Aldactone the morning of the procedure   Hold diabetes medications the morning of the procedure    Covid:  testing ordered, to be done: none  1 wk incision/ device check apt at 04.19.21 at  11:30am    Instructions verbalized to the patient.   Instructions emailed to the patient thru [facility]

## 2021-04-08 ENCOUNTER — PREP FOR PROCEDURE (OUTPATIENT)
Dept: CARDIOLOGY | Age: 46
End: 2021-04-08

## 2021-04-08 RX ORDER — SODIUM CHLORIDE 0.9 % (FLUSH) 0.9 %
5-40 SYRINGE (ML) INJECTION EVERY 12 HOURS SCHEDULED
Status: CANCELLED | OUTPATIENT
Start: 2021-04-08

## 2021-04-08 RX ORDER — SODIUM CHLORIDE 0.9 % (FLUSH) 0.9 %
5-40 SYRINGE (ML) INJECTION PRN
Status: CANCELLED | OUTPATIENT
Start: 2021-04-08

## 2021-04-08 RX ORDER — CHLORHEXIDINE GLUCONATE 4 G/100ML
SOLUTION TOPICAL ONCE
Status: CANCELLED | OUTPATIENT
Start: 2021-04-08 | End: 2021-04-08

## 2021-04-08 RX ORDER — SODIUM CHLORIDE 9 MG/ML
INJECTION, SOLUTION INTRAVENOUS CONTINUOUS
Status: CANCELLED | OUTPATIENT
Start: 2021-04-08

## 2021-04-08 RX ORDER — SODIUM CHLORIDE 9 MG/ML
25 INJECTION, SOLUTION INTRAVENOUS PRN
Status: CANCELLED | OUTPATIENT
Start: 2021-04-08

## 2021-04-09 ENCOUNTER — APPOINTMENT (OUTPATIENT)
Dept: GENERAL RADIOLOGY | Age: 46
End: 2021-04-09
Attending: INTERNAL MEDICINE
Payer: COMMERCIAL

## 2021-04-09 PROCEDURE — 71046 X-RAY EXAM CHEST 2 VIEWS: CPT

## 2021-04-12 ENCOUNTER — CARE COORDINATION (OUTPATIENT)
Dept: OTHER | Facility: CLINIC | Age: 46
End: 2021-04-12

## 2021-04-12 NOTE — CARE COORDINATION
Gisella 45 Transitions Initial Follow Up Call    Call within 2 business days of discharge: Yes    Patient: Monica Manuel Patient : 1975   MRN: [id number]  Reason for Admission: Upgrade from Single to [address] ICD  Discharge Date: 21 RARS: Readmission Risk Score: 13    Ambulatory Care Coordination Note  2021  CM Risk Score: 10  Charlson 10 Year Mortality Risk Score: 10%     ACC: Mena Jiménez LPN    Patient's wife, Carlos A Zavaleta, returning ACM call. She states that patient is home resting right now, doing okay post-op duel chamber ICD. She states the pressure dressing remains in-tact, yesterday their dog accidentally bumped area. Denies any drainage or increasing pain. She states she plans to remove the bandage tomorrow, adv to keep steri-strips intact, keep dry, can sponge around site. Discussed s/s infection. Patient is taking Keflex. Discussed all medications with wife, no issues getting medications, no questions regarding medications. Wife states that patient does weight himself daily, discussed CHF zone management. Patient is monitoring his BP and HR frequently throughout the day per wife. ACM discussed the following RED FLAGS and encouraged patient to contact 911 for life threatening emergencies and PCP office  for non life-threatening symptoms. ACM also encouraged outreach to Nurse Access Line and/or ACM for assessment and intervention guidance as needed. Reminded patient of alternatives to ED such as urgent care, walk in clinics and e-visits as available. Follow up appointments are scheduled and well coordinated.     Ambulatory Care Coordination Assessment    Care Coordination Protocol  Program Enrollment: Rising Risk  Referral from Primary Care Provider: No  Week 1 - Initial Assessment     Do you have all of your prescriptions and are they filled?: Yes  Barriers to medication adherence: None  Are you able to afford your medications?: Yes  How often do you have trouble taking your medications the way you have been told to take them?: I always take them as prescribed. Do you have Home O2 Therapy?: No      Ability to seek help/take action for Emergent Urgent situations i.e. fire, crime, inclement weather or health crisis. : Independent  Ability to ambulate to restroom: Independent  Ability handle personal hygeine needs (bathing/dressing/grooming): Independent  Ability to manage Medications: Independent  Ability to prepare Food Preparation: Independent  Ability to maintain home (clean home, laundry): Independent  Ability to drive and/or has transportation: Independent  Ability to do shopping: Independent  Ability to manage finances: Independent  Is patient able to live independently?: Yes     Current Housing: Private Residence                 Thinking about your patient's physical health needs, are there any symptoms or problems (risk indicators) you are unsure about that require further investigation?: No identified areas of uncertainly or problems already being investigated   Are the patients physical health problems impacting on their mental well-being?: No identified areas of concern   Are there any problems with your patients lifestyle behaviors (alcohol, drugs, diet, exercise) that are impacting on physical or mental well-being?: No identified areas of concern   Do you have any other concerns about your patients mental well-being?  How would you rate their severity and impact on the patient?: No identified areas of concern   How would you rate their home environment in terms of safety and stability (including domestic violence, insecure housing, neighbor harassment)?: Consistently safe, supportive, stable, no identified problems   How do daily activities impact on the patient's well-being? (include current or anticipated unemployment, work, caregiving, access to transportation or other): No identified problems or perceived positive benefits   How would you rate their skin nightly 1/7/21  Yes Leila Miguel MD   carvedilol (COREG) 25 MG tablet TAKE 1 TABLET BY MOUTH TWICE A DAY WITH MEALS 11/17/20  Yes Bogdan Thurman MD   carvedilol (COREG) 6.25 MG tablet Take 1 tablet by mouth 2 times daily (with meals) 11/17/20  Yes Bogdan Thurman MD   furosemide (LASIX) 20 MG tablet TAKE 1 TABLET BY MOUTH EVERY DAY in the PM  Patient taking differently: TAKE 1 TABLET BY MOUTH EVERY DAY in the AM 11/17/20  Yes Bogdan Thurman MD   furosemide (LASIX) 40 MG tablet TAKE 1 TABLET BY MOUTH DAILY in AM 11/17/20  Yes Bogdan Thurman MD   spironolactone (ALDACTONE) 25 MG tablet TAKE 1 TABLET BY MOUTH ONE TIME A DAY 11/17/20  Yes Bogdan Thurman MD   irbesartan (AVAPRO) 75 MG tablet TAKE ONE TABLET BY MOUTH EVERY EVENING 11/17/20  Yes Bogdan Thurman MD   rosuvastatin (CRESTOR) 10 MG tablet TAKE 1 TABLET BY MOUTH ONE TIME A DAY 11/17/20  Yes Bogdan Thurman MD   ASPIRIN ADULT LOW STRENGTH 81 MG EC tablet TAKE 1 TABLET BY MOUTH ONE TIME A DAY 10/16/20  Yes JACOB Hernandez CNP   insulin lispro, 1 Unit Dial, (HUMALOG KWIKPEN) 100 UNIT/ML SOPN 14 U with meals plus sliding scale--151-200 3u,  201-250  5u,  251-300  8u, 301-350  10u, 351-400 12u, over 400 15 u and contact physician 10/16/20  Yes JACOB Hernandez CNP   blood glucose test strips (PRODIGY NO CODING BLOOD GLUC) strip Use to check blood sugar 4 times daily 6/15/20  Yes Leila Miguel MD   metFORMIN (GLUCOPHAGE-XR) 500 MG extended release tablet Take 2 tablets by mouth 2 times daily 5/26/20  Yes Leila Miguel MD   Blood Glucose Monitoring Suppl (PRODIGY AUTOCODE BLOOD GLUCOSE) MAUREEN Use to check blood sugar 4 times daily 4/4/20  Yes MD Sanjiv Mg Lancets 28G MISC Use to check blood sugar 4 times daily 4/4/20  Yes Leila Miguel MD   Insulin Pen Needle (PEN NEEDLES 31GX5/16\") 31G X 8 MM MISC Use to inject insulins and Ozempic 4/4/20  Yes Leila Miguel MD   omeprazole (PRILOSEC) 40 MG delayed release capsule Take 1 capsule by mouth daily 3/16/20  Yes Carlene Ewing MD   fluticasone [facility]) 50 MCG/ACT nasal spray 2 sprays by Nasal route daily 2/24/20  Yes Nenita Harry MD   Dextromethorphan-guaiFENesin [facility] DM)  MG TB12 Take 1 tablet by mouth 2 times daily as needed (cough/congestion)  Patient taking differently: Take 1 tablet by mouth 2 times daily  2/24/20  Yes Nenita Harry MD   sildenafil (REVATIO) 20 MG tablet Take 1-5 tablets by mouth as needed (ED) 8/23/19  Yes JACOB Renteria - CNP   loratadine (CLARITIN) 10 MG tablet Take 10 mg by mouth daily   Yes Historical Provider, MD   Respiratory Therapy Supplies (ADULT MASK) MISC Please do mask desensitization and/or provide mask of patient's choice. 5/11/17  Yes Michele Jones MD   acetaminophen (TYLENOL) 325 MG tablet Take 650 mg by mouth every 8 hours as needed for Pain   Yes Historical Provider, MD   nitroGLYCERIN (NITROSTAT) 0.4 MG SL tablet Place 1 tablet under the tongue every 5 minutes as needed for Chest pain up to max of 3 total doses. If no relief after 1 dose, call 911. 3/16/17  Yes Vivek Calhoun MD       Future Appointments   Date Time Provider Link Liriano   4/19/2021 11:30 AM SCHEDULE, SRPS PACER NURSE N SRPX PACER [facility] [place]   4/22/2021  3:00 PM MD DANILO Amador FM [facility] [place]   6/24/2021  3:30 PM MD DANILO Amador FM [facility] [place]   8/16/2021  3:45 PM MD RANDALL Servin Heart [facility] [place]   3/14/2022 11:30 AM SCHEDULE, SRPS PACER NURSE N SRPX PACER [facility] - [place], [address] Coordinator  Associate Care Management  [address] [place] [address], [address]  Phone: [phone]  [email or web address]

## 2021-04-13 ENCOUNTER — TELEPHONE (OUTPATIENT)
Dept: CARDIOLOGY CLINIC | Age: 46
End: 2021-04-13

## 2021-04-13 NOTE — TELEPHONE ENCOUNTER
Wife called and said pt was recently upgraded to a dual icd and said he was taken off of the diltiazem and now he is sob when he ambulates. Caitie Tan he is not retaining any fluid. Says he weighs himself everyday and his weight has not flucuated at all. Has no edema or swelling. Said his heart rate when he was taking the diltiazem was in the 70's and now he is back up to the 90's to lower 100's and feels more sob since he has been off of the diltiazem. He has a f/u with dr Brooklyn Barahona on 08/16/21. Recommendations? ?

## 2021-04-14 NOTE — TELEPHONE ENCOUNTER
FYI,   pt total dose of coreg 31.25mg bid (he takes a 25mg tab and one 6.25 tab bid)  HR is in upper 90's, some fatigue but SOB is better. Asked him to send a carelink download but new device is not compatible with old machine, will send when new one arrives. Wife states he has lost some weight. Has inc check on the 19th wife aware if he needs checked prior because he is not feeling well to give us a call.

## 2021-04-19 ENCOUNTER — NURSE ONLY (OUTPATIENT)
Dept: CARDIOLOGY CLINIC | Age: 46
End: 2021-04-19
Payer: COMMERCIAL

## 2021-04-19 DIAGNOSIS — Z45.02 AICD DISCHARGE: Primary | ICD-10-CM

## 2021-04-19 NOTE — PROGRESS NOTES
DR KEARNS PT   MEDTRONIC DUAL ICD INITIAL CHECK IN OFFICE AFTER UPGRADE TO A DUAL ICD   BATTERY 10.9 YRS REMAINING  PRESENTS IN ASVS  ATRIAL IMPEDENCE 456  RV IMPEDENCE 399  RV 49  SVC 60  P WAVES 3.4  RV WAVES 6  ATRIAL THRESHOLD 1.75 @ 0.4/ WAS 1.6 @ 0.5 AT IMPLANT  ATRIAL AMPLITUDE 3.5 @ 0.4  VENT THRESHOLD 1 @ 0.4  VENT AMPLITUDE ADAPTIVE   A PACED <0.1%  V PACED <0.1%    INCISION LINE IS HEALED.  STERI STRIPS REMOVED AND THERE IS NO DRAINAGE, NO OPEN AREAS AND NO REDNESS OR EDEMA  PT AND WIFE INSTRUCTED TO GO TO ER IF HE DEVELOPS ANY S/S OF INFECTION, REDNESS, DRAINAGE OR WARM TO THE TOUCH OR A TEMP    PT WILL COME BACK IN 3 MTHS FOR CHRONIC VALUES   POST OP INSTRUCTIONS REVIEWED WITH PT AND WIFE

## 2021-04-23 ENCOUNTER — CARE COORDINATION (OUTPATIENT)
Dept: OTHER | Facility: CLINIC | Age: 46
End: 2021-04-23

## 2021-04-23 NOTE — CARE COORDINATION
ACM attempted to reach patient for follow up call regarding CT. HIPAA compliant message left requesting a return phone call at patients convenience. Will continue to follow. Associate Care Manager (ACM) sent [employer] message for Associate Care Management follow up related to CT. See patient message for details and response (as appropriate). Africa Packer, [address] Coordinator  Associate Care Management  [address] [place] [address], [address] [place] [address] Street  Phone: [phone]  [email or web address]

## 2021-04-27 PROCEDURE — 93295 DEV INTERROG REMOTE 1/2/MLT: CPT | Performed by: INTERNAL MEDICINE

## 2021-04-27 PROCEDURE — 93296 REM INTERROG EVL PM/IDS: CPT | Performed by: INTERNAL MEDICINE

## 2021-05-04 ENCOUNTER — CARE COORDINATION (OUTPATIENT)
Dept: OTHER | Facility: CLINIC | Age: 46
End: 2021-05-04

## 2021-05-04 NOTE — CARE COORDINATION
Associate Care Manager (ACM) sent [employer] message for Associate Care Management follow up related to meds/symptom update. See patient message for details and response (as appropriate). Africa Sheikh, [address] Coordinator  Associate Care Management  [address] [place] [address]  Phone: [phone]  [email or web address]

## 2021-05-12 ENCOUNTER — TELEPHONE (OUTPATIENT)
Dept: FAMILY MEDICINE CLINIC | Age: 46
End: 2021-05-12

## 2021-05-12 ENCOUNTER — CARE COORDINATION (OUTPATIENT)
Dept: OTHER | Facility: CLINIC | Age: 46
End: 2021-05-12

## 2021-05-12 DIAGNOSIS — G47.33 OSA ON CPAP: Primary | ICD-10-CM

## 2021-05-12 DIAGNOSIS — Z99.89 OSA ON CPAP: Primary | ICD-10-CM

## 2021-05-12 NOTE — CARE COORDINATION
Ambulatory Care Coordination Note  5/12/2021  CM Risk Score: 10  Charlson 10 Year Mortality Risk Score: 10%     ACC: Margaret Hubbard LPN    Associate Care Manager (ACLIAM) sent RadLogics message for Associate Care Management follow up related to CORBY. See patient message for details and response (as appropriate). Africa Christiansen, [address] Coordinator  Associate Care Management  [address] [place] [address], [address] [place] [address] Street  Phone: [phone]  [email or web address]

## 2021-05-12 NOTE — TELEPHONE ENCOUNTER
----- Message from Unknown Dills sent at 5/12/2021  3:23 PM EDT -----  Subject: Message to Provider    QUESTIONS  Information for Provider? Patient's care coordinator wanting to know if   patient is needing an appt for a C-Pap supply or if it can just be   prescribed without one.  ---------------------------------------------------------------------------  --------------  CALL BACK INFO  What is the best way for the office to contact you? OK to leave message on   voicemail  Preferred Call Back Phone Number? [phone]  ---------------------------------------------------------------------------  --------------  SCRIPT ANSWERS  Relationship to Patient? Third Party  Representative Name?  Africa

## 2021-05-12 NOTE — TELEPHONE ENCOUNTER
Called Africa back and let her know that he was just seen 3-25-21 so he should not need an appt. She will find out what supplies he needs and where he wants to get them and get back with us.

## 2021-05-12 NOTE — TELEPHONE ENCOUNTER
ACC: Marisol De Santiago LPN  CM Risk Score: 10  Charlson 10 Year Mortality Risk Score: 10%         ACM [facility] # for CPAP supplies.     [address] Fax# [phone]     Patient uses a [facility] CPAP machine. He is in need of new tubing, a face mask, and head gear.     He also needs a letter stating his MIKE dx and correlation with his CPAP use. I spoke to patient's wife whom states patient has been using his CPAP since 2017 when he began seeing Dr. Efrem Soto. Since, he has been just following PCP and now cardiology. Uses CPAP QHS and with naps. Patient and wife are proficient on CPAP usage and keeping machine clean.     Patient aware PCP office will fax orders, demographic sheet, and letter to [phone].        Care Coordination Interventions       Program Enrollment: Rising Risk  Referral from Primary Care Provider: No  Suggested Interventions and Community Resources  Cardiac Rehab: Completed (Comment: Dr. Krunal San)  Diabetes Education: In Process (Comment: [facility] DM Education)  Disease Specific Clinic: Completed (Comment: Dr. Krunal San- Cardiologist)  Other Services: In Process (Comment:  [facility]- CPAP Supplies/Equipment)  Zone Management Tools: In Process (Comment: CHF, MIKE, DM)                  Africa Sheikh, [address] [facility] Coordinator  Associate Care Management  [address] [place] [address] Street  Phone: [phone]  [email or web address]                     Unsigned   Documentation       Marisol De Santiago LPN  [facility] 1 hour ago (3:50 PM)      1 [facility]  Marisol De Santiago LPN 2 hours ago (1:02 PM)      Marisol De Santiago LPN  [facility] 2 hours ago (3:05 PM)          Recent Patient Communication     Last Update Reason Specialty     Today -- ---     [facility] Africa Catherine     Today -- ---     [facility] Population Care Africa Catherine     Today -- ---     [facility] Population Care Africa Mathur Today Advice Only Family Medicine     [facility] M[facility]ie Mcburney T Closed     1 week ago -- ---     [facility] Population [facility] Kelley Loredo Wymaria e Open

## 2021-05-12 NOTE — TELEPHONE ENCOUNTER
CPAP supplies pended. Please addend March office visit to reflect his use of the CPAP since 2017 for his MIKE.

## 2021-05-17 RX ORDER — ROSUVASTATIN CALCIUM 10 MG/1
TABLET, COATED ORAL
Qty: 90 TABLET | Refills: 2 | Status: SHIPPED | OUTPATIENT
Start: 2021-05-17 | End: 2022-03-21

## 2021-05-17 RX ORDER — FUROSEMIDE 20 MG/1
TABLET ORAL
Qty: 90 TABLET | Refills: 0 | Status: SHIPPED | OUTPATIENT
Start: 2021-05-17 | End: 2021-08-17

## 2021-05-17 RX ORDER — FUROSEMIDE 40 MG/1
TABLET ORAL
Qty: 90 TABLET | Refills: 0 | Status: SHIPPED | OUTPATIENT
Start: 2021-05-17 | End: 2021-08-17

## 2021-05-17 NOTE — TELEPHONE ENCOUNTER
Wife called today. Patient also needs Rx for new machine, as his is not functioning properly. Rx printed and faxed to [address].

## 2021-05-24 ENCOUNTER — CARE COORDINATION (OUTPATIENT)
Dept: OTHER | Facility: CLINIC | Age: 46
End: 2021-05-24

## 2021-05-24 NOTE — CARE COORDINATION
ACC: Klaus Lew LPN CM Risk Score: 10  Charlson 10 Year Mortality Risk Score: 10%     Care Coordination Interventions    Program Enrollment: Rising Risk  Referral from Primary Care Provider: No  Suggested Interventions and Community Resources  Cardiac Rehab: Completed (Comment: Dr. Tate Clinton)  Diabetes Education: In Process (Comment: ACM DM Education)  Disease Specific Clinic: Completed (Comment: Dr. Tate Clinton- Cardiologist)  Other Services: In Process (Comment: [facility]- CPAP Supplies/Equipment)  Zone Management Tools: In Process (Comment: CHF, MIKE, DM)         Associate Care Manager (MECREDES) sent Applaud message for Associate Care Management follow up related to CPAP supplies. See patient message for details and response (as appropriate). Africa Maier, [address] Coordinator  Associate Care Management  [address] [place] [address]  Phone: [phone]  [email or web address]

## 2021-06-01 ENCOUNTER — CARE COORDINATION (OUTPATIENT)
Dept: OTHER | Facility: CLINIC | Age: 46
End: 2021-06-01

## 2021-06-07 RX ORDER — METFORMIN HYDROCHLORIDE 500 MG/1
1000 TABLET, EXTENDED RELEASE ORAL 2 TIMES DAILY
Qty: 360 TABLET | Refills: 2 | Status: SHIPPED | OUTPATIENT
Start: 2021-06-07 | End: 2022-04-05

## 2021-06-16 LAB
CHOLESTEROL, TOTAL: 99 MG/DL (ref 0–199)
FASTING: YES
GLUCOSE BLD-MCNC: 177 MG/DL (ref 74–109)
HBA1C MFR BLD: 5.3 % (ref 4.4–6.4)
HDLC SERPL-MCNC: 30 MG/DL (ref 40–90)
LDL CHOLESTEROL CALCULATED: 44 MG/DL
TRIGL SERPL-MCNC: 124 MG/DL (ref 0–199)

## 2021-06-21 ENCOUNTER — PROCEDURE VISIT (OUTPATIENT)
Dept: CARDIOLOGY CLINIC | Age: 46
End: 2021-06-21

## 2021-06-21 DIAGNOSIS — I50.42 CHRONIC COMBINED SYSTOLIC AND DIASTOLIC CONGESTIVE HEART FAILURE (HCC): Primary | ICD-10-CM

## 2021-07-15 NOTE — TELEPHONE ENCOUNTER
LOV 2-24-20 veggies. A helping is a piece of fruit or ½ cup of vegetables. · Cut back to 1 can or small cup of soda or juice drink a day. Try water and milk instead. · Cheese, yogurt, milk--have at least 3 cups a day to get the calcium you need. · The decision to have sex is a serious one that only you can make. Not having sex is the best way to prevent HIV, STIs (sexually transmitted infections), and pregnancy. · If you do choose to have sex, condoms and birth control can increase your chances of protection against STIs and pregnancy. · Talk to an adult you feel comfortable with. Confide in this person and ask for his or her advice. This can be a parent, a teacher, a , or someone else you trust.  Healthy ways to deal with stress   · Get 9 to 10 hours of sleep every night. · Eat healthy meals. · Go for a long walk. · Dance. Shoot hoops. Go for a bike ride. Get some exercise. · Talk with someone you trust.  · Laugh, cry, sing, or write in a journal.  When should you call for help? Call 911 anytime you think you may need emergency care. For example, call if:    · You feel life is meaningless or think about killing yourself. Talk to a counselor or doctor if any of the following problems lasts for 2 or more weeks.    · You feel sad a lot or cry all the time.     · You have trouble sleeping or sleep too much.     · You find it hard to concentrate, make decisions, or remember things.     · You change how you normally eat.     · You feel guilty for no reason. Where can you learn more? Go to [email or web address] and sign in to your [employer] account. Enter L348 in the SocialRadar box to learn more about \"Well Care - Tips for Teens: Care Instructions. \"     If you do not have an account, please click on the \"Sign Up Now\" link. Current as of: February 10, 2021               Content Version: 12.9  © 2339-0973 Healthwise, Incorporated.    Care instructions adapted under license by [facility]. If you have questions about a medical condition or this instruction, always ask your healthcare professional. [place] [address] any warranty or liability for your use of this information.

## 2021-07-27 ENCOUNTER — NURSE ONLY (OUTPATIENT)
Dept: CARDIOLOGY CLINIC | Age: 46
End: 2021-07-27
Payer: COMMERCIAL

## 2021-07-27 DIAGNOSIS — Z95.810 ICD (IMPLANTABLE CARDIOVERTER-DEFIBRILLATOR) IN PLACE: Primary | ICD-10-CM

## 2021-07-27 NOTE — PROGRESS NOTES
DR KEARNS PT   MEDTRONIC DUAL ICD 3 MTH CHRONIC VALUES APPT IN OFFICE  BATTERY 10.7 YRS REMAINING    PRESENTS IN ASVS   ATRIAL IMPEDENCE 418  VENT IMPEDENCE 399  RV 51  SVC 61  P WAVES 4  RV 7.3  ATRIAL THRESHOLD 1 @ 0.4  VENT THRESHOLD 1 @ 0.4  ATRIAL AMPLITUDE CHANGED FROM 3.50 TO 2.50 ADAPTIVE   A PACED <0.2%  V PACED <0.1%

## 2021-08-03 PROCEDURE — 93289 INTERROG DEVICE EVAL HEART: CPT | Performed by: INTERNAL MEDICINE

## 2021-08-16 ENCOUNTER — OFFICE VISIT (OUTPATIENT)
Dept: CARDIOLOGY CLINIC | Age: 46
End: 2021-08-16
Payer: COMMERCIAL

## 2021-08-16 VITALS
WEIGHT: 315 LBS | BODY MASS INDEX: 39.17 KG/M2 | DIASTOLIC BLOOD PRESSURE: 87 MMHG | HEIGHT: 75 IN | HEART RATE: 98 BPM | SYSTOLIC BLOOD PRESSURE: 140 MMHG

## 2021-08-16 DIAGNOSIS — I42.8 NONISCHEMIC CARDIOMYOPATHY (HCC): Primary | ICD-10-CM

## 2021-08-16 PROCEDURE — 99213 OFFICE O/P EST LOW 20 MIN: CPT | Performed by: INTERNAL MEDICINE

## 2021-08-16 NOTE — PROGRESS NOTES
[address] [facility] [place] [address] [place] [address]  Dept: [phone]  Dept Fax: [phone]  Loc: [phone]    Visit Date: 8/16/2021    Mr. Ya Alvarenga is a 55 y.o. male  who presented for:  Chief Complaint   Patient presents with    Check-Up    Congestive Heart Failure       HPI:   56 yo M c hx of CHF s/p ICD 1/2015, nonischemic CMP, HTN, is here for a follow up. Overall doing well  Consider weight loss surgery. Denies any chest pain, sob, palpitations, lightheadedness, dizziness, orthopnea, PND or pedal edema. Current Outpatient Medications:     metFORMIN (GLUCOPHAGE-XR) 500 MG extended release tablet, Take 2 tablets by mouth 2 times daily, Disp: 360 tablet, Rfl: 2    CPAP Machine MISC, by Does not apply route Ramp pressure: 4.0 cm H2O Treatment pressure: 15.0 cm H2O, Disp: 1 each, Rfl: 0    furosemide (LASIX) 20 MG tablet, TAKE ONE TABLET BY MOUTH EVERY EVENING, Disp: 90 tablet, Rfl: 0    rosuvastatin (CRESTOR) 10 MG tablet, TAKE 1 TABLET BY MOUTH ONE TIME A DAY, Disp: 90 tablet, Rfl: 2    furosemide (LASIX) 40 MG tablet, TAKE ONE TABLET BY MOUTH EVERY MORNING, Disp: 90 tablet, Rfl: 0    Semaglutide, 1 MG/DOSE, (OZEMPIC, 1 MG/DOSE,) 2 MG/1.5ML SOPN, Inject 1 mg into the skin once a week, Disp: 7 pen, Rfl: 3    gabapentin (NEURONTIN) 100 MG capsule, Take 1 capsule by mouth 3 times daily for 180 days.  Intended supply: 30 days, Disp: 90 capsule, Rfl: 5    insulin glargine (LANTUS SOLOSTAR) 100 UNIT/ML injection pen, Inject 30 Units into the skin nightly, Disp: 10 pen, Rfl: 3    carvedilol (COREG) 25 MG tablet, TAKE 1 TABLET BY MOUTH TWICE A DAY WITH MEALS, Disp: 180 tablet, Rfl: 3    carvedilol (COREG) 6.25 MG tablet, Take 1 tablet by mouth 2 times daily (with meals), Disp: 180 tablet, Rfl: 3    spironolactone (ALDACTONE) 25 MG tablet, TAKE 1 TABLET BY MOUTH ONE TIME A DAY, Disp: 90 tablet, Rfl: 3    irbesartan (AVAPRO) 75 MG tablet, TAKE ONE TABLET BY MOUTH EVERY EVENING, Disp: 90 tablet, Rfl: 3    ASPIRIN ADULT LOW STRENGTH 81 MG EC tablet, TAKE 1 TABLET BY MOUTH ONE TIME A DAY, Disp: 90 tablet, Rfl: 3    insulin lispro, 1 Unit Dial, (HUMALOG KWIKPEN) 100 UNIT/ML SOPN, 14 U with meals plus sliding scale--151-200 3u,  201-250  5u,  251-300  8u, 301-350  10u, 351-400 12u, over 400 15 u and contact physician, Disp: 45 pen, Rfl: 3    blood glucose test strips (PRODIGY NO CODING BLOOD GLUC) strip, Use to check blood sugar 4 times daily, Disp: 400 each, Rfl: 3    Blood Glucose Monitoring Suppl (PRODIGY AUTOCODE BLOOD GLUCOSE) MAUREEN, Use to check blood sugar 4 times daily, Disp: 1 Device, Rfl: 0    Prodigy Lancets 28G MISC, Use to check blood sugar 4 times daily, Disp: 400 each, Rfl: 3    Insulin Pen Needle (PEN NEEDLES 31GX5/16\") 31G X 8 MM MISC, Use to inject insulins and Ozempic, Disp: 400 each, Rfl: 3    omeprazole (PRILOSEC) 40 MG delayed release capsule, Take 1 capsule by mouth daily, Disp: 90 capsule, Rfl: 1    fluticasone (FLONASE) 50 MCG/ACT nasal spray, 2 sprays by Nasal route daily, Disp: 1 Bottle, Rfl: 2    Dextromethorphan-guaiFENesin (MUCINEX DM)  MG TB12, Take 1 tablet by mouth 2 times daily as needed (cough/congestion) (Patient taking differently: Take 1 tablet by mouth 2 times daily ), Disp: 28 tablet, Rfl: 0    loratadine (CLARITIN) 10 MG tablet, Take 10 mg by mouth daily, Disp: , Rfl:     Respiratory Therapy Supplies (ADULT MASK) MISC, Please do mask desensitization and/or provide mask of patient's choice. , Disp: 1 each, Rfl: 0    acetaminophen (TYLENOL) 325 MG tablet, Take 650 mg by mouth every 8 hours as needed for Pain, Disp: , Rfl:     nitroGLYCERIN (NITROSTAT) 0.4 MG SL tablet, Place 1 tablet under the tongue every 5 minutes as needed for Chest pain up to max of 3 total doses.  If no relief after 1 dose, call 911., Disp: 25 tablet, Rfl: 1    Past Medical History  Roberto Luo  has a past medical history of CHF (congestive heart failure) ([facility] [address] Diabetes mellitus ([facility] [address] S/P ICD (internal cardiac defibrillator) procedure: 1/15/2015: Medtronic Single Chamber, Sleep apnea, and Zoll lifevest applied 8/22/14. Social History  Sonya Temple  reports that he has never smoked. He has never used smokeless tobacco. He reports that he does not drink alcohol and does not use drugs. Family History  Sonya Temple family history includes Heart Disease in his maternal grandfather, maternal grandmother, maternal uncle, and paternal uncle. Past Surgical History   Past Surgical History:   Procedure Laterality Date    CARD[facility]    CARDIAC DEFIBRILLATOR PLACEMENT  2014    CARDIAC DEFIBRILLATOR PLACEMENT      CARDIAC DEFIBRILLATOR PLACEMENT      EYE SURGERY      Lasix     VASECTOMY  2004       Subjective:     REVIEW OF SYSTEMS  Constitutional: denies sweats, chills and fever  HENT: denies  congestion, sinus pressure, sneezing and sore throat. Eyes: denies  pain, discharge, redness and itching. Respiratory: denies apnea, cough  Gastrointestinal: denies blood in stool, constipation, diarrhea   Endocrine: denies cold intolerance, heat intolerance, polydipsia. Genitourinary: denies dysuria, enuresis, flank pain and hematuria. Musculoskeletal: denies arthralgias, joint swelling and neck pain. Neurological: denies numbness and headaches. Psychiatric/Behavioral: denies agitation, confusion, decreased concentration and dysphoric mood    All others reviewed and are negative.    Objective:     BP (!) 140/87   Pulse 98   Ht 6' 3\" (1.905 m)   Wt (!) 397 lb (180.1 kg)   BMI 49.62 kg/m²     Wt Readings from Last 3 Encounters:   08/16/21 (!) 397 lb (180.1 kg)   04/09/21 (!) 397 lb (180.1 kg)   03/26/21 (!) 402 lb 9.6 oz (182.6 kg)     BP Readings from Last 3 Encounters:   08/16/21 (!) 140/87   04/09/21 (!) 138/116   03/26/21 125/73       PHYSICAL EXAM  Constitutional: Oriented to person, place, and time. Appears well-developed and well-nourished. HENT:   Head: Normocephalic and atraumatic. Eyes: EOM are normal. Pupils are equal, round, and reactive to light. Neck: Normal range of motion. Neck supple. No JVD present. Cardiovascular: Normal rate , normal heart sounds and intact distal pulses. Pulmonary/Chest: Effort normal and breath sounds normal. No respiratory distress. No wheezes. No rales. Abdominal: Soft. Bowel sounds are normal. No distension. There is no tenderness. Musculoskeletal: Normal range of motion. No edema. Neurological: Alert and oriented to person, place, and time. No cranial nerve deficit. Coordination normal.   Skin: Skin is warm and dry. Psychiatric: Normal mood and affect.        No results found for: CKTOTAL, CKMB, CKMBINDEX    Lab Results   Component Value Date    WBC 11.0 04/09/2021    RBC 4.89 04/09/2021    HGB 12.6 04/09/2021    HCT 42.1 04/09/2021    MCV 86.1 04/09/2021    MCH 25.8 04/09/2021    MCHC 29.9 04/09/2021    RDW 15.7 03/15/2017     04/09/2021    MPV 10.1 04/09/2021       Lab Results   Component Value Date     04/09/2021    K 4.8 04/09/2021     04/09/2021    CO2 24 04/09/2021    BUN 11 04/09/2021    LABALBU 4.2 03/23/2021    CREATININE 0.7 04/09/2021    CALCIUM 9.3 04/09/2021    LABGLOM >90 04/09/2021    GLUCOSE 177 06/16/2021    GLUCOSE 258 12/16/2017       Lab Results   Component Value Date    ALKPHOS 95 03/23/2021    ALT 17 03/23/2021    AST 22 03/23/2021    PROT 8.0 03/23/2021    BILITOT 0.4 03/23/2021    BILIDIR <0.2 11/13/2020    LABALBU 4.2 03/23/2021       Lab Results   Component Value Date    MG 1.9 03/26/2021       Lab Results   Component Value Date    INR 1.09 04/09/2021    INR 1.08 11/13/2020    INR 1.05 12/12/2016         Lab Results   Component Value Date    LABA1C 5.3 06/16/2021       Lab Results   Component Value Date    TRIG 124 06/16/2021    HDL 30 06/16/2021    LDLCALC 44 06/16/2021       Lab Results Component Value Date    TSH 3.000 04/30/2017         Testing Reviewed:      I haveindividually reviewed the below cardiac tests    EKG:    ECHO: Results for orders placed during the hospital encounter of 11/13/20    ECHO Complete 2D W Doppler W Color    Narrative  Transthoracic Echocardiography Report (TTE)    Demographics    Patient Name  [facility]       Gender             Male  Ismael Gregorio    MR #          [id number]      Race                   Ethnicity    Account #     [de-identified]      Room Number        0025    Accession     [id number]     Date of Study      11/14/2020  Number    Date of Birth 1975     Referring          Carmenza Casper MD  Physician          [facility]    Age           39 year(s)     Sonographer        ARVIND Amezcua, RDCS,  RDMS, RVT    Interpreting       Kimberley aCse MD  Physician    Procedure    Type of Study    TTE procedure:ECHOCARDIOGRAM COMPLETE 2D W DOPPLER W COLOR. Procedure Date  Date: 11/14/2020 Start: 08:19 AM    Study Location: Bedside  Technical Quality: Limited visualization due to body habitus. Indications:AICD Discharge. Additional Medical History:AICD discharge, congestive heart failure,  cardiomyopathy, morbid obesity, hypertenison, sleep apnea, AICD    Patient Status: Routine    Height: 75 inches Weight: 399.01 pounds BSA: 2.94 m^2 BMI: 49.87 kg/m^2    BP: 131/88 mmHg    Conclusions    Summary  Technically difficult study due to poor acoustic windows. Left ventricular size is normal and systolic function is moderate to  severely reduced. Ejection fraction was estimated at 30-35%. LV wall  thickness is within normal limits. The left atrium is Mildly dilated. Mild mitral regurgitation is present.     Signature    ----------------------------------------------------------------  Electronically signed by Kimberley Case MD (Interpreting  physician) on 11/14/2020 at 01:24 PM  ----------------------------------------------------------------    Findings    Mitral Valve  The mitral valve was not well visualized . Mild mitral regurgitation is present. Aortic Valve  The aortic valve leaflets were not well visualized. DOPPLER: Transaortic velocity was within the normal range with no evidence  of aortic stenosis. There was no evidence of aortic regurgitation. Tricuspid Valve  Tricuspid valve was not well visualized. Mild tricuspid regurgitation visualized. Pulmonic Valve  The pulmonic valve was not well visualized . Left Atrium  The left atrium is Mildly dilated. Left Ventricle  Left ventricular size is normal and systolic function is moderate to  severely reduced. Ejection fraction was estimated at 30-35%. LV wall  thickness is within normal limits. Right Atrium  Right atrial size was normal.    Right Ventricle  The right ventricular size was normal with normal systolic function and  wall thickness. Pacer Wire visualized in right ventricle. Pericardial Effusion  The pericardium was normal in appearance with no evidence of a pericardial  effusion. Pleural Effusion  No evidence of pleural effusion. Aorta / Great Vessels  -Aortic root dimension within normal limits.  -The Pulmonary artery is within normal limits. -IVC size is within normal limits with normal respiratory phasic changes.     M-Mode/2D Measurements & Calculations    LV Diastolic   LV Systolic Dimension:    AV Cusp Separation: 1.5 cmLA  Dimension: 6.7 5.6 cm                    Dimension: 4.6 cmAO Root  cm             LV Volume Diastolic:      Dimension: 2.7 cmLA Area: 25.9  LV FS:16.4 %   168.1 ml                  cm^2  LV PW          LV Volume Systolic: 109.3  Diastolic: 1.1 ml  cm             LV EDV/LV EDV Index:  Septum         168.1 ml/57 m^2LV ESV/LV  RV Diastolic Dimension: 3.3 cm  Diastolic: 0.7 ESV Index: 506.6 ml/39  cm             m^2                       LA/Aorta: 1.7  EF Calculated: 32.2 %     Ascending Aorta: 3 cm  LA volume/Index: 85.5 ml /29m^2  LV Length: 9.8 cm  LV Area  Diastolic:  89.5 cm^2  LV Area  Systolic: 14.2  cm^2    Doppler Measurements & Calculations    MV Peak E-Wave: 109 cm/s AV Peak Velocity: 125  LVOT Peak Velocity: 77.1  cm/s                   cm/s  MV Peak Gradient: 4.75   AV Peak Gradient: 6.25 LVOT Peak Gradient: 2 mmHg  mmHg                     mmHg  TV Peak E-Wave: 82.6 cm/s  MV Deceleration Time:  127 msec                                        TV Peak Gradient: 2.73  mmHg  IVRT: 63 msec          TR Velocity:266 cm/s  TR Gradient:28.3 mmHg  PV Peak Velocity: 63 cm/s  AV DVI (Vmax):0.62     PV Peak Gradient: 1.59  MR Velocity: 352 cm/s                           mmHg    [id number]      STRESS:    CATH:    Assessment/Plan       Diagnosis Orders   1. Nonischemic cardiomyopathy (HCC)         Nonischemic cardiomyopathy with EF 30%  ICD discharge for ventricular tachycardia 11/2020  Diabetes  Morbid obesity  Hypertension  MIKE on CPAP     Consider weight loss surgery  Doing well from cardiac standpoint  BMI 50  Reviewed 14 day event monitor, normal findings. Advised weight loss  Continue Coreg, Cardizem, Lasix, Aldactone. Monitor K with potassium. Continue monitoring heart failure symptoms and blood pressure at home  Continue rest of medications  The patient is asked to make an attempt to improve diet and exercise patterns to aid in medical management of this problem. Advised more plant based nutrition/meditarrean diet   Advised patient to call office or seek immediate medical attention if there is any new onset of  any chest pain, sob, palpitations, lightheadedness, dizziness, orthopnea, PND or pedal edema. All medication side effects were discussed in details.     Thank youfor allowing me to participate in the care of this patient.    Please do not hesitate to contact me for any further questions. Return in about 1 year (around 8/16/2022), or if symptoms worsen or fail to improve, for Regular follow up, Review testing.        Electronically signed by Brady More MD [facility]  8/16/2021 at 3:46 PM EDT

## 2021-08-17 RX ORDER — FUROSEMIDE 40 MG/1
TABLET ORAL
Qty: 90 TABLET | Refills: 3 | Status: SHIPPED | OUTPATIENT
Start: 2021-08-17 | End: 2022-08-22

## 2021-08-17 RX ORDER — FUROSEMIDE 20 MG/1
TABLET ORAL
Qty: 90 TABLET | Refills: 3 | Status: SHIPPED | OUTPATIENT
Start: 2021-08-17 | End: 2022-08-22

## 2021-08-30 ENCOUNTER — PROCEDURE VISIT (OUTPATIENT)
Dept: CARDIOLOGY CLINIC | Age: 46
End: 2021-08-30
Payer: COMMERCIAL

## 2021-08-30 ENCOUNTER — CARE COORDINATION (OUTPATIENT)
Dept: OTHER | Facility: CLINIC | Age: 46
End: 2021-08-30

## 2021-08-30 DIAGNOSIS — I50.42 CHRONIC COMBINED SYSTOLIC AND DIASTOLIC CONGESTIVE HEART FAILURE (HCC): Primary | ICD-10-CM

## 2021-08-30 PROCEDURE — G2066 INTER DEVC REMOTE 30D: HCPCS | Performed by: INTERNAL MEDICINE

## 2021-08-30 PROCEDURE — 93297 REM INTERROG DEV EVAL ICPMS: CPT | Performed by: INTERNAL MEDICINE

## 2021-08-30 NOTE — CARE COORDINATION
Associate Care Manager (ACM) sent Altobeam message for Associate Care Management follow up related to acm assistance- supplies need or any questions/concerns. See patient message for details and response (as appropriate). Africa Peña, [address] Coordinator  Associate Care Management  [address] [place] [address], [address]  Phone: [phone]  [email or web address]

## 2021-09-09 RX ORDER — SEMAGLUTIDE 1.34 MG/ML
INJECTION, SOLUTION SUBCUTANEOUS
Refills: 0 | OUTPATIENT
Start: 2021-09-09

## 2021-10-04 ENCOUNTER — PROCEDURE VISIT (OUTPATIENT)
Dept: CARDIOLOGY CLINIC | Age: 46
End: 2021-10-04
Payer: COMMERCIAL

## 2021-10-04 DIAGNOSIS — I50.42 CHRONIC COMBINED SYSTOLIC AND DIASTOLIC CONGESTIVE HEART FAILURE (HCC): Primary | ICD-10-CM

## 2021-10-04 PROCEDURE — G2066 INTER DEVC REMOTE 30D: HCPCS | Performed by: INTERNAL MEDICINE

## 2021-10-04 PROCEDURE — 93297 REM INTERROG DEV EVAL ICPMS: CPT | Performed by: INTERNAL MEDICINE

## 2021-10-04 NOTE — PROGRESS NOTES
[facility] Medtronic Dual ICD Optivol  Pt of Nallu    Battery 10.6 years    Optivol WNL    Night heart rate over 85 bmp for 7 days

## 2021-10-21 RX ORDER — ASPIRIN 81 MG/1
TABLET ORAL
Qty: 90 TABLET | Refills: 1 | Status: SHIPPED | OUTPATIENT
Start: 2021-10-21 | End: 2022-04-05

## 2021-10-21 RX ORDER — SPIRONOLACTONE 25 MG/1
TABLET ORAL
Qty: 90 TABLET | Refills: 3 | Status: SHIPPED | OUTPATIENT
Start: 2021-10-21

## 2021-10-21 RX ORDER — GABAPENTIN 100 MG/1
CAPSULE ORAL
Qty: 270 CAPSULE | Refills: 1 | Status: SHIPPED | OUTPATIENT
Start: 2021-10-21 | End: 2022-04-27

## 2021-10-22 RX ORDER — CARVEDILOL 25 MG/1
TABLET ORAL
Qty: 180 TABLET | Refills: 3 | Status: ON HOLD | OUTPATIENT
Start: 2021-10-22 | End: 2022-09-16 | Stop reason: HOSPADM

## 2021-11-08 ENCOUNTER — PROCEDURE VISIT (OUTPATIENT)
Dept: CARDIOLOGY CLINIC | Age: 46
End: 2021-11-08
Payer: COMMERCIAL

## 2021-11-08 ENCOUNTER — PATIENT MESSAGE (OUTPATIENT)
Dept: CARDIOLOGY CLINIC | Age: 46
End: 2021-11-08

## 2021-11-08 DIAGNOSIS — Z95.810 ICD (IMPLANTABLE CARDIOVERTER-DEFIBRILLATOR) IN PLACE: Primary | ICD-10-CM

## 2021-11-08 PROCEDURE — 93296 REM INTERROG EVL PM/IDS: CPT | Performed by: INTERNAL MEDICINE

## 2021-11-08 PROCEDURE — 93295 DEV INTERROG REMOTE 1/2/MLT: CPT | Performed by: INTERNAL MEDICINE

## 2021-11-08 NOTE — PROGRESS NOTES
Carelink Medtronic Dual ICD ---   Pt of Nallu    Battery 10.5 year     Presenting rhythm AS VS    A Impedance 418  RV Impedance 361    RV shock 51  SVC shock 61    P wave sensing 1.8  R wave sensing 6.6    A Threshold 0.750 @ 0.40  A Amplitude 1.50 @ 0.40  RV Thresholds 1.0 @ 0.40  RV Amplitude 1.50 @ 0.40    A Paced <0.1%  V Paced <0.1%    Programmed Mode AAIR <=> DDDR     Afib Eliot <0.1%    Episodes: none    Optivol WNL

## 2021-12-13 ENCOUNTER — PROCEDURE VISIT (OUTPATIENT)
Dept: CARDIOLOGY CLINIC | Age: 46
End: 2021-12-13
Payer: COMMERCIAL

## 2021-12-13 DIAGNOSIS — I50.42 CHRONIC COMBINED SYSTOLIC AND DIASTOLIC CONGESTIVE HEART FAILURE (HCC): Primary | ICD-10-CM

## 2021-12-13 NOTE — PROGRESS NOTES
[facility]  Pt of Nallu    Battery 10.5 years    Optivol WNL    Night heart rate over 85 bpm for 7 days.

## 2021-12-14 PROCEDURE — G2066 INTER DEVC REMOTE 30D: HCPCS | Performed by: INTERNAL MEDICINE

## 2021-12-14 PROCEDURE — 93297 REM INTERROG DEV EVAL ICPMS: CPT | Performed by: INTERNAL MEDICINE

## 2021-12-15 ENCOUNTER — TELEPHONE (OUTPATIENT)
Dept: PHARMACY | Facility: CLINIC | Age: 46
End: 2021-12-15

## 2021-12-15 NOTE — TELEPHONE ENCOUNTER
According to our records, patient is missing the following requirements that must be completed by December 31st, 2021:   Program Requirements that need to be completed by December 31st, 2021 to remain eligible for program:       Diabetes Visit with your physician in 2021 (Second yearly visit)       Left message for patient on voicemail advising of the above information. Left our phone number: [phone] Option #3 if she has any questions/concerns. Qoostar message sent.         [facility]   Phone: [phone]

## 2021-12-15 NOTE — TELEPHONE ENCOUNTER
For [place] in place:  No   Recommendation Provided To: Patient/Caregiver: 1 via Telephone   Gap Closed?: Yes    Intervention Accepted By: Patient/Caregiver: 1   Time Spent (min): 15

## 2021-12-22 ENCOUNTER — OFFICE VISIT (OUTPATIENT)
Dept: FAMILY MEDICINE CLINIC | Age: 46
End: 2021-12-22
Payer: COMMERCIAL

## 2021-12-22 VITALS
OXYGEN SATURATION: 96 % | BODY MASS INDEX: 49.92 KG/M2 | WEIGHT: 315 LBS | DIASTOLIC BLOOD PRESSURE: 78 MMHG | TEMPERATURE: 98.1 F | HEART RATE: 93 BPM | RESPIRATION RATE: 18 BRPM | SYSTOLIC BLOOD PRESSURE: 118 MMHG

## 2021-12-22 DIAGNOSIS — I42.0 DILATED CARDIOMYOPATHY (HCC): ICD-10-CM

## 2021-12-22 DIAGNOSIS — E66.01 CLASS 3 SEVERE OBESITY WITH SERIOUS COMORBIDITY AND BODY MASS INDEX (BMI) OF 45.0 TO 49.9 IN ADULT, UNSPECIFIED OBESITY TYPE (HCC): ICD-10-CM

## 2021-12-22 DIAGNOSIS — E11.65 TYPE 2 DIABETES MELLITUS WITH HYPERGLYCEMIA, WITHOUT LONG-TERM CURRENT USE OF INSULIN (HCC): Primary | ICD-10-CM

## 2021-12-22 DIAGNOSIS — M75.51 SUBACROMIAL BURSITIS OF RIGHT SHOULDER JOINT: ICD-10-CM

## 2021-12-22 DIAGNOSIS — Z12.11 SCREEN FOR COLON CANCER: ICD-10-CM

## 2021-12-22 DIAGNOSIS — Z00.00 ROUTINE GENERAL MEDICAL EXAMINATION AT A HEALTH CARE FACILITY: ICD-10-CM

## 2021-12-22 PROCEDURE — 3051F HG A1C>EQUAL 7.0%<8.0%: CPT | Performed by: FAMILY MEDICINE

## 2021-12-22 PROCEDURE — 99214 OFFICE O/P EST MOD 30 MIN: CPT | Performed by: FAMILY MEDICINE

## 2021-12-22 PROCEDURE — 96372 THER/PROPH/DIAG INJ SC/IM: CPT | Performed by: FAMILY MEDICINE

## 2021-12-22 RX ORDER — METHYLPREDNISOLONE ACETATE 80 MG/ML
160 INJECTION, SUSPENSION INTRA-ARTICULAR; INTRALESIONAL; INTRAMUSCULAR; SOFT TISSUE ONCE
Status: COMPLETED | OUTPATIENT
Start: 2021-12-22 | End: 2021-12-22

## 2021-12-22 RX ADMIN — METHYLPREDNISOLONE ACETATE 160 MG: 80 INJECTION, SUSPENSION INTRA-ARTICULAR; INTRALESIONAL; INTRAMUSCULAR; SOFT TISSUE at 11:25

## 2021-12-22 SDOH — ECONOMIC STABILITY: FOOD INSECURITY: WITHIN THE PAST 12 MONTHS, THE FOOD YOU BOUGHT JUST DIDN'T LAST AND YOU DIDN'T HAVE MONEY TO GET MORE.: NEVER TRUE

## 2021-12-22 SDOH — ECONOMIC STABILITY: FOOD INSECURITY: WITHIN THE PAST 12 MONTHS, YOU WORRIED THAT YOUR FOOD WOULD RUN OUT BEFORE YOU GOT MONEY TO BUY MORE.: NEVER TRUE

## 2021-12-22 ASSESSMENT — SOCIAL DETERMINANTS OF HEALTH (SDOH): HOW HARD IS IT FOR YOU TO PAY FOR THE VERY BASICS LIKE FOOD, HOUSING, MEDICAL CARE, AND HEATING?: NOT HARD AT ALL

## 2021-12-23 ENCOUNTER — NURSE ONLY (OUTPATIENT)
Dept: LAB | Age: 46
End: 2021-12-23

## 2021-12-23 DIAGNOSIS — E11.65 TYPE 2 DIABETES MELLITUS WITH HYPERGLYCEMIA, WITHOUT LONG-TERM CURRENT USE OF INSULIN (HCC): ICD-10-CM

## 2021-12-23 DIAGNOSIS — Z00.00 ROUTINE GENERAL MEDICAL EXAMINATION AT A HEALTH CARE FACILITY: ICD-10-CM

## 2021-12-23 LAB
ALBUMIN SERPL-MCNC: 4.4 G/DL (ref 3.5–5.1)
ALP BLD-CCNC: 94 U/L (ref 38–126)
ALT SERPL-CCNC: 15 U/L (ref 11–66)
ANION GAP SERPL CALCULATED.3IONS-SCNC: 10 MEQ/L (ref 8–16)
AST SERPL-CCNC: 13 U/L (ref 5–40)
AVERAGE GLUCOSE: 165 MG/DL (ref 70–126)
BILIRUB SERPL-MCNC: 0.3 MG/DL (ref 0.3–1.2)
BUN BLDV-MCNC: 8 MG/DL (ref 7–22)
CALCIUM SERPL-MCNC: 9.6 MG/DL (ref 8.5–10.5)
CHLORIDE BLD-SCNC: 99 MEQ/L (ref 98–111)
CHOLESTEROL, TOTAL: 110 MG/DL (ref 100–199)
CO2: 27 MEQ/L (ref 23–33)
CREAT SERPL-MCNC: 0.7 MG/DL (ref 0.4–1.2)
CREATININE, URINE: 222.2 MG/DL
GFR SERPL CREATININE-BSD FRML MDRD: > 90 ML/MIN/1.73M2
GLUCOSE BLD-MCNC: 182 MG/DL (ref 70–108)
HBA1C MFR BLD: 7.5 % (ref 4.4–6.4)
HDLC SERPL-MCNC: 34 MG/DL
LDL CHOLESTEROL CALCULATED: 55 MG/DL
MICROALBUMIN UR-MCNC: < 1.2 MG/DL
MICROALBUMIN/CREAT UR-RTO: 5 MG/G (ref 0–30)
POTASSIUM SERPL-SCNC: 4.5 MEQ/L (ref 3.5–5.2)
SODIUM BLD-SCNC: 136 MEQ/L (ref 135–145)
TOTAL PROTEIN: 7 G/DL (ref 6.1–8)
TRIGL SERPL-MCNC: 107 MG/DL (ref 0–199)

## 2021-12-23 NOTE — TELEPHONE ENCOUNTER
Incoming call received from the patient's wife - reviewed remaining program requirement for 2021 is the 2nd DM OV. Patient had OV yesterday with DM dx code. Patient has completed all 2021 requirements. Updated the master spreadsheet. Will sign off.      Marco LoomisD, Maureen // Department, toll free [phone], option 1

## 2021-12-27 RX ORDER — SEMAGLUTIDE 1.34 MG/ML
INJECTION, SOLUTION SUBCUTANEOUS
Qty: 24 ML | Refills: 3 | Status: ON HOLD | OUTPATIENT
Start: 2021-12-27 | End: 2022-09-16 | Stop reason: HOSPADM

## 2021-12-27 RX ORDER — INSULIN LISPRO 100 [IU]/ML
INJECTION, SOLUTION INTRAVENOUS; SUBCUTANEOUS
Qty: 135 ML | Refills: 3 | Status: ON HOLD | OUTPATIENT
Start: 2021-12-27 | End: 2022-09-16 | Stop reason: SDUPTHER

## 2021-12-28 RX ORDER — CARVEDILOL 6.25 MG/1
TABLET ORAL
Qty: 180 TABLET | Refills: 3 | Status: ON HOLD | OUTPATIENT
Start: 2021-12-28 | End: 2022-09-16 | Stop reason: HOSPADM

## 2021-12-31 NOTE — PROGRESS NOTES
[address] [place] [facility] [address]  Dept: [phone]  Dept Fax: [phone]  Loc: [phone]  PROGRESS NOTE      VisitDate: 12/22/2021    Bernadette Sheppard is a 55 y.o. male who presents today for:     Chief Complaint   Patient presents with    Follow-up     diabetic follow up for Be Well     Shoulder Pain     5-6 months has had right shoulder pain, possible injury from when he was playing with his dogs.  Health Maintenance     due for colon screen         Subjective:  HPI  Patient comes in follow-up diabetes blood sugars improving controlled. History of dilated carbon apathy. Having right-sided shoulder pain increased with range of motion no acute injury    Review of Systems   Constitutional: Negative for appetite change and fever. HENT: Negative for congestion, ear pain, postnasal drip, rhinorrhea, sinus pressure and sore throat. Eyes: Negative for pain and visual disturbance. Respiratory: Negative for cough and shortness of breath. Cardiovascular: Negative for chest pain. Gastrointestinal: Negative for abdominal distention, abdominal pain, constipation, diarrhea and nausea. Genitourinary: Negative for dysuria, frequency and urgency. Musculoskeletal: Positive for arthralgias. Skin: Negative for rash. Neurological: Negative for dizziness. Past Medical History:   Diagnosis Date    [facility] failure) [facility]     Diabetes mellitus ([facility]     S/P ICD (internal cardiac defibrillator) procedure: 1/15/2015: Medtronic Single Chamber 1/15/2015    1/15/2015: Medtronic Single Chamber.  Dr. Garner Eisenmenger Sleep apnea 06/09/2017    Dr.Rovner Kendell Duron lifevest applied 8/22/14 8/29/2014    returned prior to ICD insertion 1/15      Past Surgical History:   Procedure Laterality Date    CARDIAC CATHETERIZATION  9/2014    [address] [place] [address]  2014   [address] Use to check blood sugar 4 times daily 1 Device 0    Prodigy Lancets 28G MISC Use to check blood sugar 4 times daily 400 each 3    Insulin Pen Needle (PEN NEEDLES 31GX5/16\") 31G X 8 MM MISC Use to inject insulins and Ozempic 400 each 3    omeprazole (PRILOSEC) 40 MG delayed release capsule Take 1 capsule by mouth daily 90 capsule 1    fluticasone (FLONASE) 50 MCG/ACT nasal spray 2 sprays by Nasal route daily 1 Bottle 2    loratadine (CLARITIN) 10 MG tablet Take 10 mg by mouth daily      Respiratory Therapy Supplies (ADULT MASK) MISC Please do mask desensitization and/or provide mask of patient's choice. 1 each 0    acetaminophen (TYLENOL) 325 MG tablet Take 650 mg by mouth every 8 hours as needed for Pain      carvedilol (COREG) 6.25 MG tablet TAKE 1 TABLET BY MOUTH 2 TIMES A DAY WITH MEALS 180 tablet 3    OZEMPIC, 1 MG/DOSE, 4 MG/3ML SOPN INJECT 1MG UNDER THE SKIN ONCE A WEEK 24 mL 3    insulin lispro, 1 Unit Dial, (HUMALOG KWIKPEN) 100 UNIT/ML SOPN INJECT 14 UNITS WITH MEALS PLUS SLIDING SCALE --151-200 3 UNITS, 201-250 5 UNITS, 251-300 8 UNITS, 301-350 10 UNITS, 351-400 12 UNITS, OVER 400 15 UNITS AND CONTACT PHYSICIAN 135 mL 3    nitroGLYCERIN (NITROSTAT) 0.4 MG SL tablet Place 1 tablet under the tongue every 5 minutes as needed for Chest pain up to max of 3 total doses. If no relief after 1 dose, call 911. (Patient not taking: Reported on 12/22/2021) 25 tablet 1     No current facility-administered medications for this visit.      Allergies   Allergen Reactions    Levaquin [Levofloxacin In D5w] Itching     Health Maintenance   Topic Date Due    Hepatitis C screen  Never done    Diabetic foot exam  Never done    HIV screen  Never done    Hepatitis B vaccine (1 of 3 - Risk 3-dose series) Never done    Colon cancer screen colonoscopy  Never done    Diabetic retinal exam  04/24/2022    A1C test (Diabetic or Prediabetic)  12/23/2022    Diabetic microalbuminuria test  12/23/2022    Lipid screen 12/23/2022    Potassium monitoring  12/23/2022    Creatinine monitoring  12/23/2022    DTaP/Tdap/Td vaccine (2 - Td or Tdap) 08/17/2030    Pneumococcal 0-64 years Vaccine (2 of 2 - PPSV23) 07/09/2040    Flu vaccine  Completed    COVID-19 Vaccine  Completed    Hepatitis A vaccine  Aged Out    Hib vaccine  Aged Out    Meningococcal (ACWY) vaccine  Aged Out         Objective:     Physical Exam  Constitutional:       General: He is not in acute distress. Appearance: He is well-developed. He is not diaphoretic. HENT:      Head: Normocephalic and atraumatic. Right Ear: External ear normal.      Left Ear: External ear normal.   Eyes:      Conjunctiva/sclera: Conjunctivae normal.   Neck:      Vascular: No JVD. Cardiovascular:      Rate and Rhythm: Normal rate and regular rhythm. Heart sounds: Normal heart sounds. Pulmonary:      Effort: Pulmonary effort is normal.      Breath sounds: Normal breath sounds. No wheezing or rales. Musculoskeletal:         General: No tenderness. Skin:     General: Skin is warm and dry. Coloration: Skin is not pale. Neurological:      Mental Status: He is alert and oriented to person, place, and time. /78   Pulse 93   Temp 98.1 °F (36.7 °C) (Oral)   Resp 18   Wt (!) 399 lb 6.4 oz (181.2 kg)   SpO2 96%   BMI 49.92 kg/m²       Impression/Plan:  1. Type 2 diabetes mellitus with hyperglycemia, without long-term current use of insulin ([address]    2. Dilated cardiomyopathy ([address]    3. Screen for colon cancer    4. Routine general medical examination at a health care facility    5. Subacromial bursitis of right shoulder joint    6.  Class 3 severe obesity with serious comorbidity and body mass index (BMI) of 45.0 to 49.9 in adult, unspecified obesity type ([address]      Requested Prescriptions      No prescriptions requested or ordered in this encounter     Orders Placed This Encounter   Procedures    Cologuard (For External Results Only)     This test is performed by an external laboratory and is used for result attachment only. It is required that this order requisition be faxed to: [facility] @ [phone]. See [email or web address] for further information. Standing Status:   Future     Standing Expiration Date:   12/22/2022    Lipid Panel     Standing Status:   Future     Number of Occurrences:   1     Standing Expiration Date:   12/22/2022     Order Specific Question:   Is Patient Fasting?/# of Hours     Answer:   yes/12    Comprehensive Metabolic Panel     Standing Status:   Future     Number of Occurrences:   1     Standing Expiration Date:   12/22/2022    Hemoglobin A1C     Standing Status:   Future     Number of Occurrences:   1     Standing Expiration Date:   12/22/2022    Microalbumin / Creatinine Urine Ratio     Standing Status:   Future     Number of Occurrences:   1     Standing Expiration Date:   12/22/2022   [facility]. [employer] [facility] [employer]     Referral Priority:   Routine     Referral Type:   Eval and Treat     Referral Reason:   Specialty Services Required     Requested Specialty:   Bariatrics     Number of Visits Requested:   1       Patient giveneducational materials - see patient instructions. Discussed use, benefit, and side effects of prescribed medications. All patient questions answered. Pt voiced understanding. Reviewed health maintenance. Patient agreedwith treatment plan. Follow up as directed. **This report has been created using voice recognition software. It may contain minor errorswhich are inherent in voice recognition technology. **       Electronically signed by Jose De Jesus Her MD on 12/31/2021 at 9:07 AM

## 2022-01-10 ENCOUNTER — PATIENT MESSAGE (OUTPATIENT)
Dept: FAMILY MEDICINE CLINIC | Age: 47
End: 2022-01-10

## 2022-01-17 ENCOUNTER — PROCEDURE VISIT (OUTPATIENT)
Dept: CARDIOLOGY CLINIC | Age: 47
End: 2022-01-17
Payer: COMMERCIAL

## 2022-01-17 DIAGNOSIS — I50.22 CHRONIC SYSTOLIC CONGESTIVE HEART FAILURE (HCC): Primary | ICD-10-CM

## 2022-01-17 PROCEDURE — G2066 INTER DEVC REMOTE 30D: HCPCS | Performed by: INTERNAL MEDICINE

## 2022-01-17 PROCEDURE — 93297 REM INTERROG DEV EVAL ICPMS: CPT | Performed by: INTERNAL MEDICINE

## 2022-01-20 RX ORDER — IRBESARTAN 75 MG/1
TABLET ORAL
Qty: 90 TABLET | Refills: 3 | Status: ON HOLD | OUTPATIENT
Start: 2022-01-20 | End: 2022-09-16 | Stop reason: HOSPADM

## 2022-01-28 ENCOUNTER — OFFICE VISIT (OUTPATIENT)
Dept: BARIATRICS/WEIGHT MGMT | Age: 47
End: 2022-01-28
Payer: COMMERCIAL

## 2022-01-28 VITALS
WEIGHT: 315 LBS | HEART RATE: 92 BPM | BODY MASS INDEX: 40.43 KG/M2 | DIASTOLIC BLOOD PRESSURE: 86 MMHG | SYSTOLIC BLOOD PRESSURE: 116 MMHG | HEIGHT: 74 IN | TEMPERATURE: 93.2 F

## 2022-01-28 DIAGNOSIS — E66.01 MORBID OBESITY (HCC): Primary | ICD-10-CM

## 2022-01-28 DIAGNOSIS — Z79.4 TYPE 2 DIABETES MELLITUS WITHOUT COMPLICATION, WITH LONG-TERM CURRENT USE OF INSULIN (HCC): ICD-10-CM

## 2022-01-28 DIAGNOSIS — I50.9 CHRONIC CONGESTIVE HEART FAILURE, UNSPECIFIED HEART FAILURE TYPE (HCC): ICD-10-CM

## 2022-01-28 DIAGNOSIS — E78.00 HYPERCHOLESTEREMIA: ICD-10-CM

## 2022-01-28 DIAGNOSIS — Z95.810 AICD (AUTOMATIC CARDIOVERTER/DEFIBRILLATOR) PRESENT: ICD-10-CM

## 2022-01-28 DIAGNOSIS — G89.29 CHRONIC BILATERAL LOW BACK PAIN, UNSPECIFIED WHETHER SCIATICA PRESENT: ICD-10-CM

## 2022-01-28 DIAGNOSIS — I48.0 PAROXYSMAL ATRIAL FIBRILLATION (HCC): ICD-10-CM

## 2022-01-28 DIAGNOSIS — K21.9 GASTROESOPHAGEAL REFLUX DISEASE, UNSPECIFIED WHETHER ESOPHAGITIS PRESENT: ICD-10-CM

## 2022-01-28 DIAGNOSIS — G47.30 SLEEP APNEA, UNSPECIFIED TYPE: ICD-10-CM

## 2022-01-28 DIAGNOSIS — M54.50 CHRONIC BILATERAL LOW BACK PAIN, UNSPECIFIED WHETHER SCIATICA PRESENT: ICD-10-CM

## 2022-01-28 DIAGNOSIS — E11.9 TYPE 2 DIABETES MELLITUS WITHOUT COMPLICATION, WITH LONG-TERM CURRENT USE OF INSULIN (HCC): ICD-10-CM

## 2022-01-28 DIAGNOSIS — I10 HYPERTENSION, UNSPECIFIED TYPE: ICD-10-CM

## 2022-01-28 PROCEDURE — 99203 OFFICE O/P NEW LOW 30 MIN: CPT | Performed by: SURGERY

## 2022-01-28 ASSESSMENT — ENCOUNTER SYMPTOMS
NAUSEA: 0
ALLERGIC/IMMUNOLOGIC NEGATIVE: 1
ABDOMINAL DISTENTION: 0
SINUS PRESSURE: 0
RHINORRHEA: 0
BLOOD IN STOOL: 0
APNEA: 1
STRIDOR: 0
SHORTNESS OF BREATH: 0
TROUBLE SWALLOWING: 0
FACIAL SWELLING: 0
VOICE CHANGE: 0
EYE ITCHING: 0
COLOR CHANGE: 0
SORE THROAT: 0
DIARRHEA: 0
ABDOMINAL PAIN: 0
VOMITING: 0
CHOKING: 0
EYE DISCHARGE: 0
EYE PAIN: 0
RECTAL PAIN: 0
COUGH: 0
CHEST TIGHTNESS: 0
CONSTIPATION: 0
EYE REDNESS: 0
ANAL BLEEDING: 0
BACK PAIN: 1
WHEEZING: 0
PHOTOPHOBIA: 0

## 2022-01-28 NOTE — PROGRESS NOTES
Karthikeyna Funes (:  1975)     ASSESSMENT:  1.  Morbid obesity (BMI 51)  2. Sleep apnea  3. Chronic lower back pain  4. CHF  5. Hypercholesterolemia  6. Diabetes mellitus  7. Hypertension  8. GERD  9. Atrial fibrillation  10. AICD    PLAN:  1. Long discussion about the pros and cons of weight loss surgery. The risks benefits and alternatives to laparoscopic adjustable band, gastric sleeve and gastric Ronaldo-en-Y bypass were discussed in detail. The pros and cons of robotic assisted, laparoscopic and open techniques were discussed. 2.  Behavior modification discussed in detail in regards to dietary habits. 3.  Nutritional education occurred during visit. Will set up a consultation/evaluation with dietitian for further evaluation. 4.  Options for medical management of morbid obesity discussed. 5.  Improvement in fitness/exercise discussed with patient and the need for this with/without surgery. 6.  Obtain medical necessity letter from PCP as needed. 7.  Follow-up in one month at weight management program at [address] [place] [address]. 8.  Signs and symptoms reviewed with patient that would be concerning and need him to return to office for re-evaluation. Patient states he will call if he has questions or concerns. 9. Multivitamin  10. Psychology evaluation  11. EGD prior to any surgical intervention  12. Encouraged support groups  13. Encouraged Naturally Slim/Rev It Up  14. Discussed the pros and cons along with possible side effects/complications of weight loss medications. All questions answered. Patient states that if he is not able to lose enough adequate excess body weight with medical management only then he would be like to proceed with surgical intervention such as sleeve gastrectomy/gastric bypass for further weight loss. More than 40 minutes spent with patient today.   Greater than 50% of the time was involved counseling, educaton and coordinating care.    SUBJECTIVE/OBJECTIVE:    Chief Complaint   Patient presents with    Bariatric, Initial Visit     3 MONTH REQ. FOR SURGERY     HPI  Junior Herrera is a 69-year-old male who presents for initial evaluation at the weight management program secondary to his morbid obesity. BMI 51. Current weight 400 pounds. He has multiple significant comorbid conditions. Diabetes mellitus including insulin-dependent. History of AICD insertion. He admits to being overweight for the past 20 years. He has 4 children ages 23, 21, 21, 32. Denies tobacco abuse. Currently being treated for sleep apnea with CPAP machine. He states he is tried about everything for weight loss. He has tried meal replacement programs, medications, counseling, dietitians, personal trainers, commercial programs such as [employer] watchers and [employer], low calorie diet such as Slim fast, high-protein/low carbohydrate diet such as ketogenic. None of these have been able to be long-term weight loss for him. He usually loses 5-15 pounds but unfortunately gained it all back again. He knows he needs to lose significant weight and keep it off long-term so as to improve some of his comorbid conditions but also feel better. Lower extremity and back aching because of the excess body weight. He feels he is not able to do certain things physically because of the excess body weight. Denies any weekly physical activity regimen. Admits the excess body weight not only affects him physically but also socially/mentally. Denies current chest or abdominal pain. No hematochezia or melena. No new urinary complaints. He admits that if he is not able to lose enough adequate excess body weight with medical management only he would like to proceed with surgical intervention such as sleeve gastrectomy/Ronaldo-en-Y gastric bypass.     Review of Systems   Constitutional: Negative for activity change, appetite change, chills, diaphoresis, fatigue, fever and unexpected weight change. HENT: Negative for congestion, dental problem, drooling, ear discharge, ear pain, facial swelling, hearing loss, mouth sores, nosebleeds, postnasal drip, rhinorrhea, sinus pressure, sneezing, sore throat, tinnitus, trouble swallowing and voice change. Eyes: Negative for photophobia, pain, discharge, redness, itching and visual disturbance. Respiratory: Positive for apnea. Negative for cough, choking, chest tightness, shortness of breath, wheezing and stridor. Cardiovascular: Negative for chest pain, palpitations and leg swelling. Gastrointestinal: Negative for abdominal distention, abdominal pain, anal bleeding, blood in stool, constipation, diarrhea, nausea, rectal pain and vomiting. Endocrine: Negative. Genitourinary: Negative for decreased urine volume, difficulty urinating, dysuria, enuresis, flank pain, frequency, genital sores, hematuria, penile discharge, penile pain, penile swelling, scrotal swelling, testicular pain and urgency. Musculoskeletal: Positive for back pain and joint swelling. Negative for arthralgias, gait problem, myalgias, neck pain and neck stiffness. Skin: Negative for color change, pallor, rash and wound. Allergic/Immunologic: Negative. Neurological: Negative for dizziness, tremors, seizures, syncope, facial asymmetry, speech difficulty, weakness, light-headedness, numbness and headaches. Hematological: Negative for adenopathy. Does not bruise/bleed easily. Psychiatric/Behavioral: Negative for agitation, behavioral problems, confusion, decreased concentration, dysphoric mood, hallucinations, self-injury, sleep disturbance and suicidal ideas. The patient is not nervous/anxious and is not hyperactive.         Past Medical History:   Diagnosis Date    CHF (congestive heart failure) (HCC)     Diabetes mellitus ([facility] [address]     GERD (gastroesophageal reflux disease)     Hypercholesteremia     S/P ICD (internal cardiac defibrillator) procedure: 1/15/2015: Medtronic Single Chamber 1/15/2015    1/15/2015: Medtronic Single Chamber.  Dr. Vane Samuels Sleep apnea 06/09/2017    Dr.Rovner Kendell Duron lifevest applied 8/22/14 8/29/2014    returned prior to ICD insertion 1/15       Past Surgical History:   Procedure Laterality Date    CARDIAC CATHETERIZATION  9/2014    [address] [place] [address]      EYE SURGERY      Lasix     VASECTOMY  2004       Current Outpatient Medications   Medication Sig Dispense Refill    irbesartan (AVAPRO) 75 MG tablet TAKE 1 TABLET BY MOUTH ONE TIME A DAY IN THE EVENING 90 tablet 3    carvedilol (COREG) 6.25 MG tablet TAKE 1 TABLET BY MOUTH 2 TIMES A DAY WITH MEALS 180 tablet 3    OZEMPIC, 1 MG/DOSE, 4 MG/3ML SOPN INJECT 1MG UNDER THE SKIN ONCE A WEEK 24 mL 3    insulin lispro, 1 Unit Dial, (HUMALOG KWIKPEN) 100 UNIT/ML SOPN INJECT 14 UNITS WITH MEALS PLUS SLIDING SCALE --151-200 3 UNITS, 201-250 5 UNITS, 251-300 8 UNITS, 301-350 10 UNITS, 351-400 12 UNITS, OVER 400 15 UNITS AND CONTACT PHYSICIAN 135 mL 3    carvedilol (COREG) 25 MG tablet TAKE 1 TABLET BY MOUTH 2 TIMES A DAY WITH MEALS 180 tablet 3    ASPIRIN ADULT LOW STRENGTH 81 MG EC tablet TAKE 1 TABLET BY MOUTH ONE TIME A DAY 90 tablet 1    gabapentin (NEURONTIN) 100 MG capsule TAKE 1 CAPSULE BY MOUTH 3 TIMES A  capsule 1    spironolactone (ALDACTONE) 25 MG tablet TAKE 1 TABLET BY MOUTH ONE TIME A DAY 90 tablet 3    furosemide (LASIX) 20 MG tablet TAKE ONE TABLET BY MOUTH EVERY EVENING 90 tablet 3    furosemide (LASIX) 40 MG tablet TAKE ONE TABLET BY MOUTH EVERY MORNING 90 tablet 3    metFORMIN (GLUCOPHAGE-XR) 500 MG extended release tablet Take 2 tablets by mouth 2 times daily 360 tablet 2    CPAP Machine MISC by Does not apply route Ramp pressure: 4.0 cm H2O  Treatment pressure: 15.0 cm H2O 1 each 0    rosuvastatin (CRESTOR) 10 MG tablet TAKE 1 TABLET BY MOUTH ONE TIME A DAY 90 tablet 2    insulin glargine (LANTUS SOLOSTAR) 100 UNIT/ML injection pen Inject 30 Units into the skin nightly 10 pen 3    blood glucose test strips (PRODIGY NO CODING BLOOD GLUC) strip Use to check blood sugar 4 times daily 400 each 3    Blood Glucose Monitoring Suppl (PRODIGY AUTOCODE BLOOD GLUCOSE) MAUREEN Use to check blood sugar 4 times daily 1 Device 0    Prodigy Lancets 28G MISC Use to check blood sugar 4 times daily 400 each 3    Insulin Pen Needle (PEN NEEDLES 31GX5/16\") 31G X 8 MM MISC Use to inject insulins and Ozempic 400 each 3    omeprazole (PRILOSEC) 40 MG delayed release capsule Take 1 capsule by mouth daily 90 capsule 1    fluticasone (FLONASE) 50 MCG/ACT nasal spray 2 sprays by Nasal route daily 1 Bottle 2    loratadine (CLARITIN) 10 MG tablet Take 10 mg by mouth daily      Respiratory Therapy Supplies (ADULT MASK) MISC Please do mask desensitization and/or provide mask of patient's choice. 1 each 0    acetaminophen (TYLENOL) 325 MG tablet Take 650 mg by mouth every 8 hours as needed for Pain      nitroGLYCERIN (NITROSTAT) 0.4 MG SL tablet Place 1 tablet under the tongue every 5 minutes as needed for Chest pain up to max of 3 total doses. If no relief after 1 dose, call 911. (Patient not taking: Reported on 12/22/2021) 25 tablet 1     No current facility-administered medications for this visit.        Allergies   Allergen Reactions    Levaquin [Levofloxacin In D5w] Itching       Family History   Problem Relation Age of Onset    Arthritis Mother     Hypertension Father     Heart Disease Maternal Uncle     Heart Disease Maternal Grandfather     Asthma Maternal Grandfather     Diabetes Maternal Grandfather     Arthritis Maternal Grandfather     Heart Disease Paternal Uncle     Heart Disease Maternal Grandmother     Stroke Maternal Grandmother     Asthma Brother        Social History     Socioeconomic History    Marital status:      Spouse name: Not on file    Number of children: 1    Years of education: Not on file    Highest education level: Not on file   Occupational History     Employer: [employer]   Tobacco Use    Smoking status: Never Smoker    Smokeless tobacco: Never Used   Vaping Use    Vaping Use: Never used   Substance and Sexual Activity    Alcohol use: Never     Alcohol/week: 18.0 standard drinks     Types: 18 Cans of beer per week    Drug use: No    Sexual activity: Yes     Partners: Female   Other Topics Concern    Not on file   Social History Narrative    Not on file     Social Determinants of Health     Financial Resource Strain: Low Risk     Difficulty of Paying Living Expenses: Not hard at all   Food Insecurity: No Food Insecurity    Worried About Running Out of Food in the Last Year: Never true    Abel of Food in the Last Year: Never true   Transportation Needs:     Lack of Transportation (Medical): Not on file    Lack of Transportation (Non-Medical):  Not on file   Physical Activity:     Days of Exercise per Week: Not on file    Minutes of Exercise per Session: Not on file   Stress:     Feeling of Stress : Not on file   Social Connections:     Frequency of Communication with Friends and Family: Not on file    Frequency of Social Gatherings with Friends and Family: Not on file    Attends Buddhist Services: Not on file    Active Member of [address] [place] [address] or Organizations: Not on file    Attends Club or Organization Meetings: Not on file    Marital Status: Not on file   Intimate Partner Violence:     Fear of Current or Ex-Partner: Not on file    Emotionally Abused: Not on file    Physically Abused: Not on file    Sexually Abused: Not on file   Housing Stability:     Unable to Pay for Housing in the Last Year: Not on file    Number of [place] in the Last Year: Not on file    Unstable Housing in the Last Year: Not on file     Vitals:    01/28/22 1508   BP: 116/86   Site: Left Lower Arm   Cuff Size: Large Adult   Pulse: 92 Temp: 93.2 °F (34 °C)   TempSrc: Infrared   Weight: (!) 400 lb (181.4 kg)   Height: 6' 1.75\" (1.873 m)     Body mass index is 51.71 kg/m². Wt Readings from Last 3 Encounters:   01/28/22 (!) 400 lb (181.4 kg)   12/22/21 (!) 399 lb 6.4 oz (181.2 kg)   08/16/21 (!) 397 lb (180.1 kg)     Physical Exam  Vitals reviewed. Constitutional:       General: He is not in acute distress. Appearance: He is well-developed. He is not diaphoretic. HENT:      Head: Normocephalic and atraumatic. Right Ear: External ear normal.      Left Ear: External ear normal.      Nose: Nose normal.   Eyes:      General: No scleral icterus. Right eye: No discharge. Left eye: No discharge. Conjunctiva/sclera: Conjunctivae normal.   Cardiovascular:      Rate and Rhythm: Normal rate and regular rhythm. Heart sounds: Normal heart sounds. Pulmonary:      Effort: Pulmonary effort is normal. No respiratory distress. Breath sounds: Normal breath sounds. No wheezing or rales. Chest:      Chest wall: No tenderness. Abdominal:      General: Bowel sounds are normal. There is no distension. Palpations: Abdomen is soft. There is no mass. Tenderness: There is no abdominal tenderness. There is no guarding or rebound. Musculoskeletal:         General: No tenderness. Normal range of motion. Cervical back: Normal range of motion and neck supple. Skin:     General: Skin is warm and dry. Coloration: Skin is not pale. Findings: No erythema or rash. Neurological:      Mental Status: He is alert and oriented to person, place, and time. Cranial Nerves: No cranial nerve deficit. Psychiatric:         Behavior: Behavior normal.         Thought Content:  Thought content normal.         Judgment: Judgment normal.       CBC   Lab Results   Component Value Date    WBC 11.0 04/09/2021    RBC 4.89 04/09/2021    HGB 12.6 04/09/2021    HCT 42.1 04/09/2021    MCV 86.1 04/09/2021    MCH 25.8 04/09/2021    MCHC 29.9 04/09/2021    RDW 15.7 03/15/2017     04/09/2021    MPV 10.1 04/09/2021    RBCMORP NORMAL 03/15/2017    SEGSPCT 68.9 04/09/2021    LABLYMP 21.0 04/09/2021    MONOPCT 5.4 04/09/2021    LABEOS 3.3 04/09/2021    BASO 0.3 04/09/2021    NRBC 0 04/09/2021    ANISOCYTOSIS 1+ 03/15/2017    SEGSABS 7.6 04/09/2021    LYMPHSABS 2.3 04/09/2021    MONOSABS 0.6 04/09/2021    EOSABS 0.4 04/09/2021    BASOSABS 0.0 04/09/2021      BMP/CMP   Lab Results   Component Value Date    GLUCOSE 182 12/23/2021    GLUCOSE 258 12/16/2017    CREATININE 0.7 12/23/2021    BUN 8 12/23/2021     12/23/2021    K 4.5 12/23/2021    K 4.8 04/09/2021    CL 99 12/23/2021    CO2 27 12/23/2021    CALCIUM 9.6 12/23/2021    AST 13 12/23/2021    ALKPHOS 94 12/23/2021    PROT 7.0 12/23/2021    LABALBU 4.4 12/23/2021    BILITOT 0.3 12/23/2021    ALT 15 12/23/2021      PREALBUMIN   No results found for: PREALBUMIN   VITAMIN B12   No results found for: [id number]   VITAMIN D   No results found for: VITD25   PTH  No results found for: IPTH  VITAMIN B1/ THIAMINE   No results found for: [id number]   LIPID SCREEN (FASTING)   Lab Results   Component Value Date    CHOL 110 12/23/2021    TRIG 107 12/23/2021    HDL 34 12/23/2021    LDLCALC 55 12/23/2021   ,   HGA1C (T2DM ONLY)   Lab Results   Component Value Date    LABA1C 7.5 12/23/2021    AVGG 165 12/23/2021      TSH   Lab Results   Component Value Date    TSH 3.000 04/30/2017      IRON   No results found for: IRON   TIBC  No results found for: TIBC  FERRITIN  No results found for: FERRITIN  VITAMIN A  No results found for: RETINOL  NICOTINE  No results found for: NMET  UDS  No results found for: UDP  PSA  No results found for: LABPSA  GFR  Lab Results   Component Value Date    LABGLOM >90 12/23/2021     DEXA  No results found for this or any previous visit.       Patient Active Problem List   Diagnosis    CHF (congestive heart failure) (HCC) systolic chronic     Bronchitis, acute  Cardiomyopathy (HCC)-Nonischemic dilated low EF 25%, newly Dxed 08/2014- med RX- cath  mild CAD- repeat echo Nov 24, 2014- EF 25 to 30%- NEED the ICD    S/P cardiac cath- Angiographically patent coronaries-EF 20, EDP 28 mmhg- med rx    AICD (automatic cardioverter/defibrillator) present    Morbid obesity with BMI of 50.0-59.9, adult (HCC)    HTN (hypertension)    Bilateral leg edema- +1 B/L- RESOLVED    Cough due to ACE inhibitor    Hyponatremia    Hyperglycemia    Weight gain, claimed 18lbs in 10 days since discharge    Chronic systolic congestive heart failure (HCC)    MIKE on CPAP    Hx of AICD discharge    Sinus tachycardia    Chronic combined systolic and diastolic congestive heart failure (HCC)    Nonischemic cardiomyopathy ([address]    A-fib ([address]       An electronic signature was used to authenticate this note.     --Pillo Isidro MD

## 2022-01-31 DIAGNOSIS — G47.33 OBSTRUCTIVE SLEEP APNEA: ICD-10-CM

## 2022-01-31 DIAGNOSIS — E66.01 MORBID OBESITY WITH BMI OF 50.0-59.9, ADULT (HCC): Primary | ICD-10-CM

## 2022-02-08 NOTE — PROGRESS NOTES
Patient is a 55 y.o. male seen for   initial  MNT visit for  pre op bariatric surgery desires sleeve     BMI: Body mass index is 51.45 kg/m². Obesity Classification: Class III    Weight History: Wt Readings from Last 3 Encounters:   02/09/22 (!) 398 lb (180.5 kg)   01/28/22 (!) 400 lb (181.4 kg)   12/22/21 (!) 399 lb 6.4 oz (181.2 kg)     Has ICD- requires cardiac clearance  Has CPAP- needs pulmonary clearance- appointment scheduled 4/27/22  Hx of DM  Patient reports being in the DM program with family PCP. Patient is taking a Multivitamin daily:  Does not take multivitamin. Educated importance of multivitamin pre/post surgery and to review further prior to bariatric surgery with patient. Describe your current weight: 398 lbs. Patient reported that his current weight makes it challenging for him to be mobile for his job. How does your weight affect your daily activities? Concern over medical problems, early fatigue at work, reduced physical mobility at work. Patient's lowest adult weight was *180 lbs at age 25 out of HS. The lowest weight was related to being at younger age. Patient was at his lowest weight during adolescent years about 25years old. Patient's highest adult weight was 420 lbs at age 45. Patient was at his highest weight for 8 years. Patient's triggers/known causes to his highest weight was related to sedentary lifestyle. Patient has participated in the following weight loss programs: Atkins, and [place] (ketosis diet), Weight Watchers, Slim Fast, Ketogenic. Patient has participated in meal replacement/liquid diets. Patient has participated in weight loss medications. ozempic, calitron     Patient is not lactose intolerant. Patient does not have Restorationism/cultural food concerns. Patient does not have food allergies. Patient dines out to a sit down restaurant 1 times per week. Patient has fast food or picks up carry out 2 times per week.      Grocery Shopping in household done by: self and wife  Cooking in household done by: wife    24 hour recall/food frequency chart: verbal food recall taken at today's visit  Breakfast: yes. 4 am - 2 cups coffee in the morning. Chocolate belvita crackers sandwhich with filling. Snack: yes. 9 am rolled deli meat (usually ham)  Lunch: yes. . Usually a sandwhich with wheat bread, moody/mustard, with cheese, and ham deli meat or Hot pocket. Diet Snapple or Powerade zero. Snack: yes. Rolled up deli meat or vanilla ice cream sandwhich  Dinner: yes. Roast beef, beef and noodles with mashed potatoes. Mac and cheese with steamed broccoli or corn with diet cola. Snack: yes. Chips or pretzels  Drinks throughout the day: Diet Snapple, Powerade zero - 3 bottles 16 to 20 oz each. 1 Liter plain water per day. 16 - 20 oz zero sugar soda. Do you drink alcohol? No. Patient reported stopped drinking 1 year 3 months ago. Patient does not meet the criteria for binge eating disorder. Patient does have grazing. Patient does not have night eating. Patient does have a history of eating out of boredom. It does take an unusually large amount of food for the patient to feel comfortably full. Patient describes self as fast eater      Patient describes level of activity as walking around in the building. Patient reported getting a new gym membership and would like goal to be 3 times per week. Assessment  Nutritional Needs: Hendricks-St Jeor = 2724 kcal x 1.2 (activity factor)= 3269 kcal - 500-1000 calories/day  (for 1-2 lb weight loss/week)= 8721-5265 calories per day  Food recall reveals inconsistent meal patterns, low fiber intake, low vegetable intake, and higher processed carbohydrate intake. Patient has made small behavior changes to his diet and lifestyle and has practiced more mindful eating.      PES Statement:  Obesity related to lack of exercise, sedentary lifestyle, unhealthy eating habits, and unsuccessful diet attempts as evidenced by BMI. Body mass index is 51.45 kg/m². .    Surgery  Surgical procedure desired: bypass  Patient's greatest concern about having surgery is: no concern. Patient describes the motivation for weight loss surgery to be: since wife had it and had some success and better quality of life    The expectations following the surgery were reviewed in detail with patient today and patient made aware will require to eliminate carbonated beverages, aim for regular meal times, monitor portion sizes, and balanced meals. .   he does feel he can deal with the dietary restrictions. Patient states he does understand the consequences of not complying with post-op food guidelines. Patient states he understands the long term changes in food intake that will be necessary for all occasions after surgery for the rest of her life. Patient is deemed nutritionally appropriate to proceed if able to show progress towards nutrition behavior changes discussed today. Plan  Patient will begin working on recommendations from Pre-Weight Loss Surgery Behavior Change Goal Sheet that was reviewed today to assist with weight loss and establish a  long term healthy eating pattern. Individual goals set with patient to be reviewed at monthly office visits. Weight loss goal of -5% from initial weight at first visit with Dr Morris Barrera reviewed with patient to achieve over 4-6 month period per insurance requirements. Initial weight was: 400 lbs   -5% Weight Loss goal will be minimum of: 20 lbs weight loss. Plan/Recommendations:   Educational handouts provided and reviewed with patient as follows: Online Support Groups, My Plate Picture Method, Portion Size Guide, Food Journal    -  Patient Instructions   Goals:  1. I will get the lab work done that is mailed to my mailing address before next office visit. You will need to fast 10-12 hours for this (no food or drink besides water).   2  I will aim for at least 64 oz of water each day (= 8 cups

## 2022-02-09 ENCOUNTER — OFFICE VISIT (OUTPATIENT)
Dept: BARIATRICS/WEIGHT MGMT | Age: 47
End: 2022-02-09

## 2022-02-09 VITALS — BODY MASS INDEX: 40.43 KG/M2 | WEIGHT: 315 LBS | HEIGHT: 74 IN

## 2022-02-09 DIAGNOSIS — E66.01 MORBID OBESITY WITH BMI OF 50.0-59.9, ADULT (HCC): Primary | ICD-10-CM

## 2022-02-09 NOTE — PATIENT INSTRUCTIONS
Goals: 1. I will get the lab work done that is mailed to my mailing address before next office visit. You will need to fast 10-12 hours for this (no food or drink besides water). 2  I will aim for at least 64 oz of water each day (= 8 cups per day or four bottled osman)  3. I will use my food journal to record meal times, serving sizes and bring back to next dietitian visit. 4   I will increase my physical activity by starting at [facility] at least three days a week  5. Initial weight loss goal of -5% is recommended over 4-6 month period. This is a minimum of: 20 lbs from your weight when initially met with physician of: 400 lbs.

## 2022-02-14 DIAGNOSIS — Z01.818 PRE-OP TESTING: ICD-10-CM

## 2022-02-14 DIAGNOSIS — I42.0 DILATED CARDIOMYOPATHY (HCC): ICD-10-CM

## 2022-02-14 DIAGNOSIS — E66.01 MORBID OBESITY WITH BMI OF 50.0-59.9, ADULT (HCC): Primary | ICD-10-CM

## 2022-02-14 DIAGNOSIS — Z13.21 MALNUTRITION SCREEN: ICD-10-CM

## 2022-02-14 DIAGNOSIS — R73.9 HYPERGLYCEMIA: ICD-10-CM

## 2022-02-14 DIAGNOSIS — I10 PRIMARY HYPERTENSION: ICD-10-CM

## 2022-02-14 DIAGNOSIS — I48.91 ATRIAL FIBRILLATION, UNSPECIFIED TYPE (HCC): ICD-10-CM

## 2022-02-14 RX ORDER — BLOOD SUGAR DIAGNOSTIC
STRIP MISCELLANEOUS
Qty: 400 STRIP | Refills: 3 | Status: SHIPPED | OUTPATIENT
Start: 2022-02-14

## 2022-02-14 RX ORDER — LANCETS 28 GAUGE
EACH MISCELLANEOUS
Qty: 400 EACH | Refills: 3 | Status: SHIPPED | OUTPATIENT
Start: 2022-02-14

## 2022-02-14 NOTE — TELEPHONE ENCOUNTER
Please approve or deny     Last Visit Date:  12/22/2021       Next Visit Date:    Visit date not found

## 2022-02-21 ENCOUNTER — PROCEDURE VISIT (OUTPATIENT)
Dept: CARDIOLOGY CLINIC | Age: 47
End: 2022-02-21
Payer: COMMERCIAL

## 2022-02-21 DIAGNOSIS — Z95.810 AICD (AUTOMATIC CARDIOVERTER/DEFIBRILLATOR) PRESENT: Primary | ICD-10-CM

## 2022-02-21 NOTE — PROGRESS NOTES
[employer] Medtronic Dual ICD   Pt of N[place]u    Battery 10.3 years     Presenting rhythm AS VS    A Impedance 418  RV Impedance 361    RV shock 51  SVC shock 58    P wave sensing 3.1  R wave sensing 6.6    A Threshold 0.750 @ 0.40  A Amplitude 1.50 @ 0.40  RV Thresholds 1.125 @ 0.40  RV Amplitude 1.75 @ 0.40    A Paced <0.1%  V Paced <0.1%    Programmed Mode AAI <=> DDD     Afib [place] 0%    Episodes:   None    Night heart rate over 85 for 7 days    Optivol mildly elevated. Call to patient. Trying to drink 1.5 liters/day. Working with the [employer] management clinic. No edema. No SOB. Weight has gone down. Aware to call office with issues.

## 2022-02-22 PROCEDURE — 93295 DEV INTERROG REMOTE 1/2/MLT: CPT | Performed by: INTERNAL MEDICINE

## 2022-02-22 PROCEDURE — 93296 REM INTERROG EVL PM/IDS: CPT | Performed by: INTERNAL MEDICINE

## 2022-03-01 ENCOUNTER — TELEPHONE (OUTPATIENT)
Dept: PHARMACY | Facility: CLINIC | Age: 47
End: 2022-03-01

## 2022-03-01 NOTE — TELEPHONE ENCOUNTER
Pharmacy Pop Care Documentation:   2022 Annual Pharmacy  Visit     Called patient to complete yearly pharmacy appointment to discuss the 2022 Diabetes Management Program.     No answer. Left VM. Please call back at [phone] Option #3.       [address]  Clinical Pharmacy   Phone: toll free [phone], option 3

## 2022-03-02 NOTE — TELEPHONE ENCOUNTER
[facility] AT [employer] [facility] - Be Well With Diabetes  =================================================================  Roly Santiago is a 55 y.o. male enrolled in the [address] [place] [address] with patient's wife for yearly visit to discuss enrollment in program. Patient provided writer with verbal consent to remain in the program for this year. Program Requirements to be completed in 2022:  1. Two office visits in 2022 for diabetes (1st must be completed by 7/1/2022)  2. Two A1c levels in 2022 (1st must be completed by 7/1/2022)  3. Yearly lipid panel  4. Yearly urine microalbumin test  5. Diabetes education if A1c is over 8%  6. Taking an ACE/ARB medication if appropriate  7. Taking a statin medication if appropriate  8. Pneumonia vaccine up to date  5. Yearly flu shot (for 0133-2802 season)  10. Medication adherence over 70%. Patients who fall below 70% will be contacted by a pharmacist in the program to discuss any issues. Are you currently using [address] [place] [address] (home delivery pharmacy) to get your prescriptions? Yes    Would you like to speak with a pharmacist about the program, your diabetes, and/or your prescriptions? No    [facility] Clinical Pharmacy Team  Phone: toll free [phone], option #3  Fax [phone]  Email: [email or web address]    For Pharmacy Admin Tracking Only    PHSO: Yes  Recommended intervention potential cost savings: 1  Time Spent (min): 15

## 2022-03-05 ENCOUNTER — NURSE ONLY (OUTPATIENT)
Dept: LAB | Age: 47
End: 2022-03-05

## 2022-03-05 DIAGNOSIS — I48.91 ATRIAL FIBRILLATION, UNSPECIFIED TYPE (HCC): ICD-10-CM

## 2022-03-05 DIAGNOSIS — R73.9 HYPERGLYCEMIA: ICD-10-CM

## 2022-03-05 DIAGNOSIS — E66.01 MORBID OBESITY WITH BMI OF 50.0-59.9, ADULT (HCC): ICD-10-CM

## 2022-03-05 DIAGNOSIS — I42.0 DILATED CARDIOMYOPATHY (HCC): ICD-10-CM

## 2022-03-05 DIAGNOSIS — Z13.21 MALNUTRITION SCREEN: ICD-10-CM

## 2022-03-05 DIAGNOSIS — I10 PRIMARY HYPERTENSION: ICD-10-CM

## 2022-03-05 DIAGNOSIS — Z01.818 PRE-OP TESTING: ICD-10-CM

## 2022-03-05 LAB
ALBUMIN SERPL-MCNC: 4.2 G/DL (ref 3.5–5.1)
ALP BLD-CCNC: 73 U/L (ref 38–126)
ALT SERPL-CCNC: 17 U/L (ref 11–66)
AMPHETAMINE+METHAMPHETAMINE URINE SCREEN: NEGATIVE
ANION GAP SERPL CALCULATED.3IONS-SCNC: 11 MEQ/L (ref 8–16)
APTT: 33.5 SECONDS (ref 22–38)
AST SERPL-CCNC: 14 U/L (ref 5–40)
AVERAGE GLUCOSE: 150 MG/DL (ref 70–126)
BARBITURATE QUANTITATIVE URINE: NEGATIVE
BASOPHILS # BLD: 0.4 %
BASOPHILS ABSOLUTE: 0 THOU/MM3 (ref 0–0.1)
BENZODIAZEPINE QUANTITATIVE URINE: NEGATIVE
BILIRUB SERPL-MCNC: 0.3 MG/DL (ref 0.3–1.2)
BUN BLDV-MCNC: 13 MG/DL (ref 7–22)
CALCIUM SERPL-MCNC: 9.3 MG/DL (ref 8.5–10.5)
CANNABINOID QUANTITATIVE URINE: NEGATIVE
CHLORIDE BLD-SCNC: 100 MEQ/L (ref 98–111)
CHOLESTEROL, TOTAL: 101 MG/DL (ref 100–199)
CO2: 28 MEQ/L (ref 23–33)
COCAINE METABOLITE QUANTITATIVE URINE: NEGATIVE
CREAT SERPL-MCNC: 0.7 MG/DL (ref 0.4–1.2)
EOSINOPHIL # BLD: 3.5 %
EOSINOPHILS ABSOLUTE: 0.4 THOU/MM3 (ref 0–0.4)
ERYTHROCYTE [DISTWIDTH] IN BLOOD BY AUTOMATED COUNT: 15.5 % (ref 11.5–14.5)
ERYTHROCYTE [DISTWIDTH] IN BLOOD BY AUTOMATED COUNT: 48.5 FL (ref 35–45)
FERRITIN: 141 NG/ML (ref 22–322)
FOLATE: 5.5 NG/ML (ref 4.8–24.2)
GFR SERPL CREATININE-BSD FRML MDRD: > 90 ML/MIN/1.73M2
GLUCOSE BLD-MCNC: 127 MG/DL (ref 70–108)
HBA1C MFR BLD: 7 % (ref 4.4–6.4)
HCT VFR BLD CALC: 41.5 % (ref 42–52)
HDLC SERPL-MCNC: 27 MG/DL
HEMOGLOBIN: 12.6 GM/DL (ref 14–18)
IMMATURE GRANS (ABS): 0.1 THOU/MM3 (ref 0–0.07)
IMMATURE GRANULOCYTES: 0.9 %
INR BLD: 1.17 (ref 0.85–1.13)
IRON: 50 UG/DL (ref 65–195)
LDL CHOLESTEROL CALCULATED: 43 MG/DL
LYMPHOCYTES # BLD: 20.8 %
LYMPHOCYTES ABSOLUTE: 2.2 THOU/MM3 (ref 1–4.8)
MCH RBC QN AUTO: 26.2 PG (ref 26–33)
MCHC RBC AUTO-ENTMCNC: 30.4 GM/DL (ref 32.2–35.5)
MCV RBC AUTO: 86.3 FL (ref 80–94)
MONOCYTES # BLD: 5.5 %
MONOCYTES ABSOLUTE: 0.6 THOU/MM3 (ref 0.4–1.3)
NUCLEATED RED BLOOD CELLS: 0 /100 WBC
OPIATES, URINE: NEGATIVE
OXYCODONE: NEGATIVE
PHENCYCLIDINE QUANTITATIVE URINE: NEGATIVE
PLATELET # BLD: 218 THOU/MM3 (ref 130–400)
PMV BLD AUTO: 10.1 FL (ref 9.4–12.4)
POTASSIUM SERPL-SCNC: 4.3 MEQ/L (ref 3.5–5.2)
PREALBUMIN: 22 MG/DL (ref 20–40)
PROSTATE SPECIFIC ANTIGEN: 0.72 NG/ML (ref 0–1)
PTH INTACT: 30.7 PG/ML (ref 15–65)
RBC # BLD: 4.81 MILL/MM3 (ref 4.7–6.1)
SEG NEUTROPHILS: 68.9 %
SEGMENTED NEUTROPHILS ABSOLUTE COUNT: 7.4 THOU/MM3 (ref 1.8–7.7)
SODIUM BLD-SCNC: 139 MEQ/L (ref 135–145)
TOTAL IRON BINDING CAPACITY: 310 UG/DL (ref 171–450)
TOTAL PROTEIN: 6.6 G/DL (ref 6.1–8)
TRIGL SERPL-MCNC: 154 MG/DL (ref 0–199)
TSH SERPL DL<=0.05 MIU/L-ACNC: 2.9 UIU/ML (ref 0.4–4.2)
VITAMIN B-12: 258 PG/ML (ref 211–911)
VITAMIN D 25-HYDROXY: 12 NG/ML (ref 30–100)
WBC # BLD: 10.8 THOU/MM3 (ref 4.8–10.8)

## 2022-03-09 ENCOUNTER — TELEPHONE (OUTPATIENT)
Dept: CARDIOLOGY CLINIC | Age: 47
End: 2022-03-09

## 2022-03-09 ENCOUNTER — OFFICE VISIT (OUTPATIENT)
Dept: BARIATRICS/WEIGHT MGMT | Age: 47
End: 2022-03-09
Payer: COMMERCIAL

## 2022-03-09 VITALS
WEIGHT: 315 LBS | SYSTOLIC BLOOD PRESSURE: 132 MMHG | RESPIRATION RATE: 18 BRPM | HEIGHT: 74 IN | TEMPERATURE: 97.2 F | HEART RATE: 66 BPM | BODY MASS INDEX: 40.43 KG/M2 | DIASTOLIC BLOOD PRESSURE: 76 MMHG

## 2022-03-09 DIAGNOSIS — I50.9 CHRONIC CONGESTIVE HEART FAILURE, UNSPECIFIED HEART FAILURE TYPE (HCC): ICD-10-CM

## 2022-03-09 DIAGNOSIS — Z99.89 OSA ON CPAP: ICD-10-CM

## 2022-03-09 DIAGNOSIS — I10 HYPERTENSION, UNSPECIFIED TYPE: ICD-10-CM

## 2022-03-09 DIAGNOSIS — G47.33 OSA ON CPAP: ICD-10-CM

## 2022-03-09 DIAGNOSIS — G89.29 CHRONIC PAIN OF LEFT KNEE: ICD-10-CM

## 2022-03-09 DIAGNOSIS — Z79.4 TYPE 2 DIABETES MELLITUS WITHOUT COMPLICATION, WITH LONG-TERM CURRENT USE OF INSULIN (HCC): ICD-10-CM

## 2022-03-09 DIAGNOSIS — I48.0 PAROXYSMAL ATRIAL FIBRILLATION (HCC): ICD-10-CM

## 2022-03-09 DIAGNOSIS — Z95.810 AICD (AUTOMATIC CARDIOVERTER/DEFIBRILLATOR) PRESENT: ICD-10-CM

## 2022-03-09 DIAGNOSIS — K21.9 GASTROESOPHAGEAL REFLUX DISEASE, UNSPECIFIED WHETHER ESOPHAGITIS PRESENT: ICD-10-CM

## 2022-03-09 DIAGNOSIS — M54.50 CHRONIC BILATERAL LOW BACK PAIN, UNSPECIFIED WHETHER SCIATICA PRESENT: ICD-10-CM

## 2022-03-09 DIAGNOSIS — E78.00 HYPERCHOLESTEREMIA: ICD-10-CM

## 2022-03-09 DIAGNOSIS — E55.9 VITAMIN D DEFICIENCY: ICD-10-CM

## 2022-03-09 DIAGNOSIS — E11.9 TYPE 2 DIABETES MELLITUS WITHOUT COMPLICATION, WITH LONG-TERM CURRENT USE OF INSULIN (HCC): ICD-10-CM

## 2022-03-09 DIAGNOSIS — M25.562 CHRONIC PAIN OF LEFT KNEE: ICD-10-CM

## 2022-03-09 DIAGNOSIS — G89.29 CHRONIC BILATERAL LOW BACK PAIN, UNSPECIFIED WHETHER SCIATICA PRESENT: ICD-10-CM

## 2022-03-09 PROCEDURE — 3051F HG A1C>EQUAL 7.0%<8.0%: CPT | Performed by: PHYSICIAN ASSISTANT

## 2022-03-09 PROCEDURE — 99214 OFFICE O/P EST MOD 30 MIN: CPT | Performed by: PHYSICIAN ASSISTANT

## 2022-03-09 RX ORDER — CHOLECALCIFEROL (VITAMIN D3) 1250 MCG
1 CAPSULE ORAL WEEKLY
Qty: 12 CAPSULE | Refills: 0 | Status: SHIPPED | OUTPATIENT
Start: 2022-03-09 | End: 2022-06-30 | Stop reason: ALTCHOICE

## 2022-03-09 NOTE — PATIENT INSTRUCTIONS
· Behavior modification discussed in detail in regards to dietary habits. · Nutritional education occurred during visit. Continue following recommendations  per dietitian. · Improvement in fitness/exercise discussed with patient and the need for this  with/without surgery. · Cardiac Clearance needed prior to surgery- Dr. Renard Cobian  · Pulmonary Clearance needed prior to surgery- Anh Smith PA-C  · Psychology evaluation with Dr. Jair Lyon 4/7/22 and 5/3/22  · Physical Therapy referral  · EGD prior to any surgical intervention  · Encouraged to attend support groups. · Goal to lose 5% TBW prior to surgery. · Initial labs completed and reviewed- several pending  · A1C 7.0- must remain < 8 prior to surgery. · Iron low at 50- to start 65mg Iron daily. · Vit D 12- to start weekly Vit D x 12 weeks. Take with food. · Drug screen completed and reviewed  · Nicotine pending. Discussed risks of nicotine a/w bariatric surgery. Must be nicotine free at least 90 days prior to surgery. Avoid nicotine life long post-op. · EKG to be completed with cardiologist.   · Will need to be off work for 3-4 weeks post-bariatric surgery. · No lifting/pushing/pulling over 20# for 4 weeks post-op  · No NSAIDS 10 days prior to bariatric surgery. Avoid NSAID use post-op  · Discussed Lovenox post-op bariatric surgery  · Awaiting LOMN  · Will continue following with multi-disciplinary team in preparation for bariatric surgery with the expectation of lifelong follow-up post-operatively.

## 2022-03-09 NOTE — TELEPHONE ENCOUNTER
Pt wife called stating pt is going through the process of baratric sx, they are asking if there are any fluid restrictions he needs to be following with his CHF?      [phone]

## 2022-03-10 LAB
3-OH-COTININE: < 2 NG/ML
COTININE: < 2 NG/ML
NICOTINE: < 2 NG/ML

## 2022-03-10 NOTE — TELEPHONE ENCOUNTER
Reviewed with Dr Murlean Gilford: No restriction at this time. Left message for [employer] to return call. Please agree with verbal order.

## 2022-03-12 LAB — RETINOL (VITAMIN A): NORMAL

## 2022-03-13 LAB — VITAMIN B1 WHOLE BLOOD: 107 NMOL/L (ref 70–180)

## 2022-03-16 ENCOUNTER — OFFICE VISIT (OUTPATIENT)
Dept: FAMILY MEDICINE CLINIC | Age: 47
End: 2022-03-16
Payer: COMMERCIAL

## 2022-03-16 VITALS
OXYGEN SATURATION: 95 % | WEIGHT: 315 LBS | SYSTOLIC BLOOD PRESSURE: 130 MMHG | DIASTOLIC BLOOD PRESSURE: 70 MMHG | HEART RATE: 95 BPM | HEIGHT: 74 IN | BODY MASS INDEX: 40.43 KG/M2 | RESPIRATION RATE: 18 BRPM | TEMPERATURE: 98.1 F

## 2022-03-16 DIAGNOSIS — M75.51 SUBACROMIAL BURSITIS OF RIGHT SHOULDER JOINT: ICD-10-CM

## 2022-03-16 DIAGNOSIS — E11.65 TYPE 2 DIABETES MELLITUS WITH HYPERGLYCEMIA, WITH LONG-TERM CURRENT USE OF INSULIN (HCC): ICD-10-CM

## 2022-03-16 DIAGNOSIS — I50.22 CHRONIC SYSTOLIC CONGESTIVE HEART FAILURE (HCC): ICD-10-CM

## 2022-03-16 DIAGNOSIS — M75.81 TENDINITIS OF RIGHT ROTATOR CUFF: Primary | ICD-10-CM

## 2022-03-16 DIAGNOSIS — Z79.4 TYPE 2 DIABETES MELLITUS WITH HYPERGLYCEMIA, WITH LONG-TERM CURRENT USE OF INSULIN (HCC): ICD-10-CM

## 2022-03-16 PROCEDURE — 96372 THER/PROPH/DIAG INJ SC/IM: CPT | Performed by: FAMILY MEDICINE

## 2022-03-16 PROCEDURE — 99214 OFFICE O/P EST MOD 30 MIN: CPT | Performed by: FAMILY MEDICINE

## 2022-03-16 PROCEDURE — 3051F HG A1C>EQUAL 7.0%<8.0%: CPT | Performed by: FAMILY MEDICINE

## 2022-03-16 RX ORDER — METHYLPREDNISOLONE ACETATE 40 MG/ML
80 INJECTION, SUSPENSION INTRA-ARTICULAR; INTRALESIONAL; INTRAMUSCULAR; SOFT TISSUE ONCE
Status: COMPLETED | OUTPATIENT
Start: 2022-03-16 | End: 2022-03-16

## 2022-03-16 RX ORDER — METHYLPREDNISOLONE ACETATE 80 MG/ML
160 INJECTION, SUSPENSION INTRA-ARTICULAR; INTRALESIONAL; INTRAMUSCULAR; SOFT TISSUE ONCE
Status: DISCONTINUED | OUTPATIENT
Start: 2022-03-16 | End: 2022-03-16

## 2022-03-16 RX ADMIN — METHYLPREDNISOLONE ACETATE 80 MG: 40 INJECTION, SUSPENSION INTRA-ARTICULAR; INTRALESIONAL; INTRAMUSCULAR; SOFT TISSUE at 12:12

## 2022-03-16 RX ADMIN — METHYLPREDNISOLONE ACETATE 80 MG: 40 INJECTION, SUSPENSION INTRA-ARTICULAR; INTRALESIONAL; INTRAMUSCULAR; SOFT TISSUE at 12:11

## 2022-03-16 NOTE — PROGRESS NOTES
Administrations This Visit     methylPREDNISolone acetate (DEPO-MEDROL) injection 80 mg     Admin Date  03/16/2022  12:11 Action  Given Dose  80 mg Route  IntraMUSCular Site  Deltoid Left Administered By  Jasen Lema CMA ([facility])    Ordering Provider: Tex Dahl MD    NDC: [address]-7    Lot#: [id number]    : [address]    Patient Supplied?: No           Admin Date  03/16/2022  12:12 Action  Given Dose  80 mg Route  IntraMUSCular Site  Deltoid Right Administered By  Jasen Lema CMA ([address] [place] [address])    Ordering Provider: Tex Dahl MD    NDC: [address]-7    Lot#: [id number]    : [address]    Patient Supplied?: No

## 2022-03-19 ASSESSMENT — ENCOUNTER SYMPTOMS
EYE PAIN: 0
SINUS PRESSURE: 0
NAUSEA: 0
ABDOMINAL DISTENTION: 0
COUGH: 0
DIARRHEA: 0
SORE THROAT: 0
ABDOMINAL PAIN: 0
SHORTNESS OF BREATH: 0
CONSTIPATION: 0
RHINORRHEA: 0

## 2022-03-19 NOTE — PROGRESS NOTES
[facility]  [address]  [facility] [place] [address]  Dept: [phone]  Dept Fax: [phone]  Loc: [phone]  PROGRESS NOTE      VisitDate: 3/16/2022    Roly Santiago is a 55 y.o. male who presents today for:     Chief Complaint   Patient presents with    3 Month Follow-Up     DM    Forms     weight mgmt form         Subjective:  HPI  Follow-up diabetes, blood sugar improving slowly. History of chronic CHF stable. Enrolled in the weight loss center. Things are going well but he is not having much success at weight loss and is considering surgery. Seems to have pain in his right shoulder. Did respond to previous methylprednisolone. Review of Systems   Constitutional: Negative for appetite change and fever. HENT: Negative for congestion, ear pain, postnasal drip, rhinorrhea, sinus pressure and sore throat. Eyes: Negative for pain and visual disturbance. Respiratory: Negative for cough and shortness of breath. Cardiovascular: Negative for chest pain. Gastrointestinal: Negative for abdominal distention, abdominal pain, constipation, diarrhea and nausea. Genitourinary: Negative for dysuria, frequency and urgency. Musculoskeletal: Positive for arthralgias. Skin: Negative for rash. Neurological: Negative for dizziness. Past Medical History:   Diagnosis Date    CHF (congestive heart failure) (HCC)     Diabetes mellitus ([facility] [address]     GERD (gastroesophageal reflux disease)     Hypercholesteremia     S/P ICD (internal cardiac defibrillator) procedure: 1/15/2015: Medtronic Single Chamber 1/15/2015    1/15/2015: Medtronic Single Chamber.  Dr. Villalobos Proper Sleep apnea 06/09/2017    Dr.Rovner Kendell Duron lifevest applied 8/22/14 8/29/2014    returned prior to ICD insertion 1/15      Past Surgical History:   Procedure Laterality Date    CARDIAC CATHETERIZATION  9/2014    [address] [place] [address]    CARDIAC DEFIBRILLATOR PLACEMENT      CARDIAC DEFIBRILLATOR PLACEMENT      EYE SURGERY      Lasix     VASECTOMY  2004     Family History   Problem Relation Age of Onset    Arthritis Mother     Hypertension Father     Heart Disease Maternal Uncle     Heart Disease Maternal Grandfather     Asthma Maternal Grandfather     Diabetes Maternal Grandfather     Arthritis Maternal Grandfather     Heart Disease Paternal Uncle     Heart Disease Maternal Grandmother     Stroke Maternal Grandmother     Asthma Brother      Social History     Tobacco Use    Smoking status: Never Smoker    Smokeless tobacco: Never Used   Substance Use Topics    Alcohol use: Never     Alcohol/week: 18.0 standard drinks     Types: 18 Cans of beer per week      Current Outpatient Medications   Medication Sig Dispense Refill    Cholecalciferol (VITAMIN D3) 1.25 MG (85114 UT) CAPS Take 1 capsule by mouth once a week 12 capsule 0    blood glucose test strips (PRODIGY NO CODING BLOOD GLUC) strip USE TO CHECK BLOOD SUGAR FOUR TIMES A  strip 3    Prodigy Twist Top Lancets 28G MISC USE TO TEST FOUR TIMES A  each 3    irbesartan (AVAPRO) 75 MG tablet TAKE 1 TABLET BY MOUTH ONE TIME A DAY IN THE EVENING 90 tablet 3    carvedilol (COREG) 6.25 MG tablet TAKE 1 TABLET BY MOUTH 2 TIMES A DAY WITH MEALS 180 tablet 3    OZEMPIC, 1 MG/DOSE, 4 MG/3ML SOPN INJECT 1MG UNDER THE SKIN ONCE A WEEK 24 mL 3    insulin lispro, 1 Unit Dial, (HUMALOG KWIKPEN) 100 UNIT/ML SOPN INJECT 14 UNITS WITH MEALS PLUS SLIDING SCALE --151-200 3 UNITS, 201-250 5 UNITS, 251-300 8 UNITS, 301-350 10 UNITS, 351-400 12 UNITS, OVER 400 15 UNITS AND CONTACT PHYSICIAN 135 mL 3    carvedilol (COREG) 25 MG tablet TAKE 1 TABLET BY MOUTH 2 TIMES A DAY WITH MEALS 180 tablet 3    ASPIRIN ADULT LOW STRENGTH 81 MG EC tablet TAKE 1 TABLET BY MOUTH ONE TIME A DAY 90 tablet 1    gabapentin (NEURONTIN) 100 MG capsule TAKE 1 CAPSULE BY MOUTH 3 TIMES A  capsule 1    spironolactone (ALDACTONE) 25 MG tablet TAKE 1 TABLET BY MOUTH ONE TIME A DAY 90 tablet 3    furosemide (LASIX) 20 MG tablet TAKE ONE TABLET BY MOUTH EVERY EVENING 90 tablet 3    furosemide (LASIX) 40 MG tablet TAKE ONE TABLET BY MOUTH EVERY MORNING 90 tablet 3    metFORMIN (GLUCOPHAGE-XR) 500 MG extended release tablet Take 2 tablets by mouth 2 times daily 360 tablet 2    CPAP Machine MISC by Does not apply route Ramp pressure: 4.0 cm H2O  Treatment pressure: 15.0 cm H2O 1 each 0    rosuvastatin (CRESTOR) 10 MG tablet TAKE 1 TABLET BY MOUTH ONE TIME A DAY 90 tablet 2    insulin glargine (LANTUS SOLOSTAR) 100 UNIT/ML injection pen Inject 30 Units into the skin nightly 10 pen 3    Blood Glucose Monitoring Suppl (PRODIGY AUTOCODE BLOOD GLUCOSE) MAUREEN Use to check blood sugar 4 times daily 1 Device 0    Insulin Pen Needle (PEN NEEDLES 31GX5/16\") 31G X 8 MM MISC Use to inject insulins and Ozempic 400 each 3    omeprazole (PRILOSEC) 40 MG delayed release capsule Take 1 capsule by mouth daily 90 capsule 1    fluticasone (FLONASE) 50 MCG/ACT nasal spray 2 sprays by Nasal route daily 1 Bottle 2    loratadine (CLARITIN) 10 MG tablet Take 10 mg by mouth daily      Respiratory Therapy Supplies (ADULT MASK) MISC Please do mask desensitization and/or provide mask of patient's choice. 1 each 0    acetaminophen (TYLENOL) 325 MG tablet Take 650 mg by mouth every 8 hours as needed for Pain      nitroGLYCERIN (NITROSTAT) 0.4 MG SL tablet Place 1 tablet under the tongue every 5 minutes as needed for Chest pain up to max of 3 total doses. If no relief after 1 dose, call 911. 25 tablet 1     No current facility-administered medications for this visit.      Allergies   Allergen Reactions    Levaquin [Levofloxacin In D5w] Itching     Health Maintenance   Topic Date Due    Hepatitis C screen  Never done    Diabetic foot exam  Never done    HIV screen  Never done    Hepatitis B vaccine (1 of 3 - Risk 3-dose series) Never done    Depression Screen  03/25/2022    Diabetic retinal exam  04/24/2022    Diabetic microalbuminuria test  12/23/2022    A1C test (Diabetic or Prediabetic)  03/05/2023    Lipid screen  03/05/2023    Potassium monitoring  03/05/2023    Creatinine monitoring  03/05/2023    Colorectal Cancer Screen  01/14/2025    DTaP/Tdap/Td vaccine (2 - Td or Tdap) 08/17/2030    Pneumococcal 0-64 years Vaccine (2 of 2 - PPSV23) 07/09/2040    Flu vaccine  Completed    COVID-19 Vaccine  Completed    Hepatitis A vaccine  Aged Out    Hib vaccine  Aged Out    Meningococcal (ACWY) vaccine  Aged Out         Objective:     Physical Exam  Constitutional:       General: He is not in acute distress. Appearance: He is well-developed. He is not diaphoretic. HENT:      Head: Normocephalic and atraumatic. Right Ear: External ear normal.      Left Ear: External ear normal.   Eyes:      Conjunctiva/sclera: Conjunctivae normal.   Neck:      Vascular: No JVD. Cardiovascular:      Rate and Rhythm: Normal rate and regular rhythm. Heart sounds: Normal heart sounds. Pulmonary:      Effort: Pulmonary effort is normal.      Breath sounds: Normal breath sounds. No wheezing or rales. Musculoskeletal:         General: Tenderness present. Comments: Right shoulder tenderness, pain with Alvarez and Neer's impingement sign as well as some notable weakness in the supraspinatus. Range of motion diminished secondary to pain   Skin:     General: Skin is warm and dry. Coloration: Skin is not pale. Neurological:      Mental Status: He is alert and oriented to person, place, and time. /70 (Site: Left Upper Arm)   Pulse 95   Temp 98.1 °F (36.7 °C) (Oral)   Resp 18   Ht 6' 2\" (1.88 m)   Wt (!) 397 lb 9.6 oz (180.4 kg)   SpO2 95%   BMI 51.05 kg/m²       Impression/Plan:  1. Tendinitis of right rotator cuff    2. Subacromial bursitis of right shoulder joint    3.  Type 2 diabetes mellitus with hyperglycemia, with long-term current use of insulin ([facility] [address]    4. Chronic systolic congestive heart failure (HCC)      Requested Prescriptions      No prescriptions requested or ordered in this encounter     Orders Placed This Encounter   Procedures    Hemoglobin A1C     Standing Status:   Future     Standing Expiration Date:   3/16/2023    Comprehensive Metabolic Panel     Standing Status:   Future     Standing Expiration Date:   3/16/2023    Lipid Panel     Standing Status:   Future     Standing Expiration Date:   3/16/2023     Order Specific Question:   Is Patient Fasting?/# of Hours     Answer:   yes/12   Counseled on diet exercise, form completed for weight loss center. Methylprednisolone 160 IM  Patient giveneducational materials - see patient instructions. Discussed use, benefit, and side effects of prescribed medications. All patient questions answered. Pt voiced understanding. Reviewed health maintenance. Patient agreedwith treatment plan. Follow up as directed. **This report has been created using voice recognition software. It may contain minor errorswhich are inherent in voice recognition technology. **       Electronically signed by Pat Smith MD on 3/19/2022 at 11:11 AM

## 2022-03-21 RX ORDER — ROSUVASTATIN CALCIUM 10 MG/1
TABLET, COATED ORAL
Qty: 90 TABLET | Refills: 3 | Status: SHIPPED | OUTPATIENT
Start: 2022-03-21

## 2022-03-28 ENCOUNTER — PROCEDURE VISIT (OUTPATIENT)
Dept: CARDIOLOGY CLINIC | Age: 47
End: 2022-03-28
Payer: COMMERCIAL

## 2022-03-28 DIAGNOSIS — I50.22 CHRONIC SYSTOLIC CONGESTIVE HEART FAILURE (HCC): Primary | ICD-10-CM

## 2022-03-28 PROCEDURE — 93297 REM INTERROG DEV EVAL ICPMS: CPT | Performed by: INTERNAL MEDICINE

## 2022-03-28 PROCEDURE — G2066 INTER DEVC REMOTE 30D: HCPCS | Performed by: INTERNAL MEDICINE

## 2022-03-28 NOTE — PROGRESS NOTES
[facility] Medtronic Dual ICD Optivol  Pt of [place]    Battery 10.2 years    Optivol WNL    Night heart rates over 85 for 7 days

## 2022-03-29 NOTE — PROGRESS NOTES
Assessment: Patient is a 55 y.o. male seen for   month one  follow up MNT visit for pre op bariatric surgery desires bypass. This is a virtual visit. States weight today home scale ~ 390 lbs    Vitals from current and previous visits:  Vitals 7/2/6989   SYSTOLIC 152   DIASTOLIC 76   Site Right Lower Arm   Position Sitting   Cuff Size Large Adult   Pulse 66   Temp 97.2   Resp 18   SpO2    Weight 395 lb 9.6 oz   Height 6' 1.75\"   Body mass index 51.13 kg/m2   Pain Level    Waist (Inches)    Neck circumference (Inches)        Initial weight at start of Weight Management Program was: 400 lbs     Maria Esther Barton lost 5 lbs over one month  -Weight goal: lose weight.     -Nutritionally relevant labs: A1C-7.0%, B1-107, glucose-127, iron low-50, low h/h   Lab Results   Component Value Date/Time    LABA1C 7.0 (H) 03/05/2022 07:30 AM    LABA1C 7.5 (H) 12/23/2021 06:09 AM    LABA1C 5.3 06/16/2021 06:00 AM    GLUCOSE 127 (H) 03/05/2022 07:29 AM    GLUCOSE 182 (H) 12/23/2021 06:09 AM    GLUCOSE 258 (H) 12/16/2017 08:25 AM    CHOL 101 03/05/2022 07:29 AM    HDL 27 03/05/2022 07:29 AM    LDLCALC 43 03/05/2022 07:29 AM    TRIG 154 03/05/2022 07:29 AM                  Lab Results   Component Value Date    VITD25 12 03/05/2022     Initial Dr Derek Silverman appointment is 4/7/22  MIKE eval is 4/27/22    - Is patient taking daily Multivitamin:   Started Iron 65 mg iron daily Natures Truth for low level. OTC Started weekly Vitamin D3 50,000IUS for low level    -Food Recall: States meal planning going better. Portion sizes are smaller now. Cut out most bread  Breakfast: 5am- eating triple zero yogurt or cottage cheese. 9am- grapes or banana  Lunch: 11-1pm-  Beef stew. Dinner: 5pm-  Beef tips, italian salad, or bbq pulled pork, or wheat and cheese quesidilla and vegetables. Snack: mid afternoon schedule ~ raspberries or pineapple chunks or imitation crab meat.   Has been limiting chips and pretzels- not buying them at grocery store, some keto cookies    Drinks throughout the day: Diet Snapple- once a week, Powerade zero - 1 bottles 16 to 20 oz each. 2 Litersplain water per day. 16 - 20 oz , rarely having a diet pop  -Impression of Dietary Intake: lower carbohydrate, increased fruits and vegetables - Pt  is working towards avoiding high fat/high sugar foods. Pt  is working towards  including protein at meals and snacks. Exercise:  Patient has option to view online fitness Orientation video  -Physical Activity is:  walking outside once a week and crunches at least once a week but most of time doing these daily. Reports getting to [facility] is difficult with work schedule and will focus on increasing walking      Nutrition Diagnosis: Overweight/Obesity related to currently undergoing MNT as evidenced by BMI of 51    Intervention:   Healthy behaviors: consistently logging food intake and adequate fluid intake  Patient has attended support groups via You Tube times two  Good effort over first month towards nutrition behaviors recommended for bariatric surgery. Monthly goals and educational handouts sent to patient via My Chart. Handouts given: Top Notch Protein Foods and Reducing Fat and Calories in Meal Planning  .      -  Patient Instructions   Goals:  1. Nutrition Goal:  I will continue using food journal to record meal times, serving sizes and bring back to next dietitian visit  2. Water Goal:  Great job with water intake- continue at least two liters water a day. Goal is no carbonated beverages  3. Exercise Goal:  I will increase walking outside or at office to twice a week instead of once a week. Continue doing crunches daily if able. -Followup visit: 4 weeks with dietitian.          Brandy Goode RD, LD,   Dietitian- Weight Management [facility] [address]

## 2022-03-30 ENCOUNTER — TELEMEDICINE (OUTPATIENT)
Dept: BARIATRICS/WEIGHT MGMT | Age: 47
End: 2022-03-30

## 2022-03-30 DIAGNOSIS — E66.01 MORBID OBESITY WITH BMI OF 50.0-59.9, ADULT (HCC): Primary | ICD-10-CM

## 2022-03-30 NOTE — PATIENT INSTRUCTIONS
Goals: 1. Nutrition Goal:  I will continue using food journal to record meal times, serving sizes and bring back to next dietitian visit  2. Water Goal:  Great job with water intake- continue at least two liters water a day. Goal is no carbonated beverages  3. Exercise Goal:  I will increase walking outside or at office to twice a week instead of once a week. Continue doing crunches daily if able.

## 2022-04-05 RX ORDER — METFORMIN HYDROCHLORIDE 500 MG/1
TABLET, EXTENDED RELEASE ORAL
Qty: 360 TABLET | Refills: 2 | Status: ON HOLD | OUTPATIENT
Start: 2022-04-05 | End: 2022-09-16 | Stop reason: HOSPADM

## 2022-04-05 RX ORDER — ASPIRIN 81 MG/1
TABLET, COATED ORAL
Qty: 90 TABLET | Refills: 1 | Status: SHIPPED | OUTPATIENT
Start: 2022-04-05

## 2022-04-07 ENCOUNTER — OFFICE VISIT (OUTPATIENT)
Dept: PSYCHOLOGY | Age: 47
End: 2022-04-07
Payer: COMMERCIAL

## 2022-04-07 DIAGNOSIS — F10.11 HISTORY OF ALCOHOL ABUSE: ICD-10-CM

## 2022-04-07 DIAGNOSIS — E11.65 TYPE 2 DIABETES MELLITUS WITH HYPERGLYCEMIA, WITHOUT LONG-TERM CURRENT USE OF INSULIN (HCC): ICD-10-CM

## 2022-04-07 DIAGNOSIS — F54 PSYCHOLOGICAL FACTORS AFFECTING MEDICAL CONDITION: Primary | ICD-10-CM

## 2022-04-07 DIAGNOSIS — E66.01 MORBID OBESITY (HCC): ICD-10-CM

## 2022-04-07 PROCEDURE — 90791 PSYCH DIAGNOSTIC EVALUATION: CPT | Performed by: PSYCHOLOGIST

## 2022-04-07 PROCEDURE — 96131 PSYCL TST EVAL PHYS/QHP EA: CPT | Performed by: PSYCHOLOGIST

## 2022-04-07 PROCEDURE — 96130 PSYCL TST EVAL PHYS/QHP 1ST: CPT | Performed by: PSYCHOLOGIST

## 2022-04-07 RX ORDER — INSULIN GLARGINE 100 [IU]/ML
INJECTION, SOLUTION SUBCUTANEOUS
Qty: 30 ML | Refills: 3 | OUTPATIENT
Start: 2022-04-07

## 2022-04-07 RX ORDER — INSULIN GLARGINE 100 [IU]/ML
30 INJECTION, SOLUTION SUBCUTANEOUS NIGHTLY
Qty: 10 PEN | Refills: 3 | Status: ON HOLD | OUTPATIENT
Start: 2022-04-07 | End: 2022-09-16 | Stop reason: HOSPADM
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
## 2022-04-12 NOTE — PROGRESS NOTES
Assessment: Patient is a 55 y.o. male seen for month two  follow up MNT visit for pre op bariatric surgery desires bypass. Vitals from current and previous visits:    Vitals 0/09/1431   SYSTOLIC 374   DIASTOLIC 84   Site Right Upper Arm   Position Sitting   Cuff Size Large Adult   Pulse 88   Temp    Resp 18   SpO2    Weight 388 lb 12.8 oz   Height 6' 1.75\"   Body mass index 50.25 kg/m2   Pain Level    Waist (Inches)    Neck circumference (Inches)        Initial weight at start of Weight Management Program was: 400 lbs     Marleny Lines lost 13 lbs over one month  -Weight goal: lose weight.     -Nutritionally relevant labs: A1C-7.0%, B1-107, glucose-127, iron low-50, low h/h   Lab Results   Component Value Date/Time    LABA1C 7.0 (H) 03/05/2022 07:30 AM    LABA1C 7.5 (H) 12/23/2021 06:09 AM    LABA1C 5.3 06/16/2021 06:00 AM    GLUCOSE 127 (H) 03/05/2022 07:29 AM    GLUCOSE 182 (H) 12/23/2021 06:09 AM    GLUCOSE 258 (H) 12/16/2017 08:25 AM    CHOL 101 03/05/2022 07:29 AM    HDL 27 03/05/2022 07:29 AM    LDLCALC 43 03/05/2022 07:29 AM    TRIG 154 03/05/2022 07:29 AM                  Lab Results   Component Value Date    VITD25 12 03/05/2022     Initial Dr Creston Mohs was  4/7/22 with follow up next month  Patient has appointment tomorrow for pulmonary clearance    - Is patient taking daily Multivitamin:   Iron 65 mg iron daily N[place] Truth for low level- causing constipation- advised pt if becomes worse can go down to every other day with the iron supplement . weekly Vitamin D3 50,000IUS for low level    States no longer eating until miserably full and has cut back portion sizes. Denies feeling hungry. Had food journal in office  -Food Recall:    Breakfast: 5am- triple zero yogurt, banana and string cheese stick.   9am- grapes or banana or ham and cheese deli slices  Lunch: 40:68FK- chicken and rice or or rasin bran with skim milk  Dinner: 5pm-  manwich or chick pea pasta or chicken strips, potato with veggies or ham and green bean casserole  Snack: mid afternoon schedule ~ summer sausage slices or rasin bran      Drinks throughout the day:  Propel or  Powerade zero at times, . 2 Liters plain water per day.,stopped all diet pop  -Impression of Dietary Intake: lower carbohydrate, increased fruits and vegetables - Pt  is working towards avoiding high fat/high sugar foods. Pt  is working towards  including protein at meals and snacks. Exercise:  Patient has option to view online fitness Orientation video  -Physical Activity is: [employer] once a week walking track and at work walking 1-2 times a week, crunches every other day    Nutrition Diagnosis: Overweight/Obesity related to currently undergoing MNT as evidenced by BMI of 50.2    Intervention:   Healthy behaviors: consistently logging food intake and adequate fluid intake  Patient has attended support groups via You Tube times two  Good effort  towards nutrition behaviors recommended for bariatric surgery. Monthly goals reviewed with patient  Positive reinforcement given. Patient Instructions   Goals:  1. Nutrition Goal:  I will continue using food journal to record meal times, serving sizes and bring back to next dietitian visit  2. Water Goal:  Great job with water intake- continue at least two liters water a day. 3.  Exercise Goal:  Continue walking at least twice a week or at least three times a week. Continue doing crunches every other day             -Followup visit: 4 weeks with dietitian.          Yanet Rose RD, LD,   Dietitian- Weight Management [address] [place] [address]

## 2022-04-14 ENCOUNTER — E-VISIT (OUTPATIENT)
Dept: PRIMARY CARE CLINIC | Age: 47
End: 2022-04-14
Payer: COMMERCIAL

## 2022-04-14 DIAGNOSIS — J06.9 UPPER RESPIRATORY TRACT INFECTION, UNSPECIFIED TYPE: Primary | ICD-10-CM

## 2022-04-14 PROCEDURE — 99422 OL DIG E/M SVC 11-20 MIN: CPT | Performed by: NURSE PRACTITIONER

## 2022-04-14 RX ORDER — AZITHROMYCIN 250 MG/1
TABLET, FILM COATED ORAL
Qty: 1 PACKET | Refills: 0 | Status: SHIPPED | OUTPATIENT
Start: 2022-04-14 | End: 2022-05-25 | Stop reason: ALTCHOICE

## 2022-04-14 ASSESSMENT — LIFESTYLE VARIABLES: SMOKING_STATUS: NO, I'VE NEVER SMOKED

## 2022-04-14 NOTE — PROGRESS NOTES
Reviewed questionnaire     Reviewed meds/allergies    Dx URI    Plan Rx given for zpack, follow up with PCP if no improvement    Time spent on visit 11 min

## 2022-04-18 ENCOUNTER — INITIAL CONSULT (OUTPATIENT)
Dept: BARIATRICS/WEIGHT MGMT | Age: 47
End: 2022-04-18
Payer: COMMERCIAL

## 2022-04-18 ENCOUNTER — INITIAL CONSULT (OUTPATIENT)
Dept: BARIATRICS/WEIGHT MGMT | Age: 47
End: 2022-04-18

## 2022-04-18 VITALS
HEIGHT: 74 IN | HEART RATE: 88 BPM | RESPIRATION RATE: 18 BRPM | SYSTOLIC BLOOD PRESSURE: 128 MMHG | BODY MASS INDEX: 40.43 KG/M2 | WEIGHT: 315 LBS | DIASTOLIC BLOOD PRESSURE: 84 MMHG

## 2022-04-18 DIAGNOSIS — Z79.4 TYPE 2 DIABETES MELLITUS WITHOUT COMPLICATION, WITH LONG-TERM CURRENT USE OF INSULIN (HCC): ICD-10-CM

## 2022-04-18 DIAGNOSIS — E11.9 TYPE 2 DIABETES MELLITUS WITHOUT COMPLICATION, WITH LONG-TERM CURRENT USE OF INSULIN (HCC): ICD-10-CM

## 2022-04-18 DIAGNOSIS — M54.50 CHRONIC BILATERAL LOW BACK PAIN, UNSPECIFIED WHETHER SCIATICA PRESENT: ICD-10-CM

## 2022-04-18 DIAGNOSIS — E78.00 HYPERCHOLESTEREMIA: ICD-10-CM

## 2022-04-18 DIAGNOSIS — E55.9 VITAMIN D DEFICIENCY: ICD-10-CM

## 2022-04-18 DIAGNOSIS — G47.33 OSA ON CPAP: ICD-10-CM

## 2022-04-18 DIAGNOSIS — I10 HYPERTENSION, UNSPECIFIED TYPE: ICD-10-CM

## 2022-04-18 DIAGNOSIS — K21.9 GASTROESOPHAGEAL REFLUX DISEASE, UNSPECIFIED WHETHER ESOPHAGITIS PRESENT: ICD-10-CM

## 2022-04-18 DIAGNOSIS — Z99.89 OSA ON CPAP: ICD-10-CM

## 2022-04-18 DIAGNOSIS — Z95.810 AICD (AUTOMATIC CARDIOVERTER/DEFIBRILLATOR) PRESENT: ICD-10-CM

## 2022-04-18 DIAGNOSIS — I48.0 PAROXYSMAL ATRIAL FIBRILLATION (HCC): ICD-10-CM

## 2022-04-18 DIAGNOSIS — I50.9 CHRONIC CONGESTIVE HEART FAILURE, UNSPECIFIED HEART FAILURE TYPE (HCC): ICD-10-CM

## 2022-04-18 DIAGNOSIS — G89.29 CHRONIC BILATERAL LOW BACK PAIN, UNSPECIFIED WHETHER SCIATICA PRESENT: ICD-10-CM

## 2022-04-18 DIAGNOSIS — G89.29 CHRONIC PAIN OF LEFT KNEE: ICD-10-CM

## 2022-04-18 DIAGNOSIS — M25.562 CHRONIC PAIN OF LEFT KNEE: ICD-10-CM

## 2022-04-18 PROCEDURE — 99213 OFFICE O/P EST LOW 20 MIN: CPT | Performed by: PHYSICIAN ASSISTANT

## 2022-04-18 PROCEDURE — 3051F HG A1C>EQUAL 7.0%<8.0%: CPT | Performed by: PHYSICIAN ASSISTANT

## 2022-04-18 NOTE — PROGRESS NOTES
[facility]  [address]  [place], [address] [facility]  [phone]      Visit Date:  4/18/2022  Weight Management Pre-Op Follow-up    HPI:    Medically Supervised follow-up- Month #2 of 4/6- desires RYGB    Jb Valencia is here today for continued supervised weight management of morbid obesity. Weight today 388#. Down 7# since last month. Down 12# since starting with weight management. BMI 50. Has been tracking all food intake. Has been eating 3 meals most days, occasionally skipping lunch. Eating cheese or nuts for a snack as needed. Doing better with portion control. Has cut back on eating out- typically 1-2 times per week. Rarely eating sweets. Drinking 2 liters of water/ propel and Powerade zero daily. Drinking 1 cup of coffee daily. No pop. Comorbid conditions include HTN, IDDM, GERD, ICD placement 2016 due to CHF, MIKE tx with CPAP, chronic left knee pain and low back pain. Addison Manrique well controlled with Prilosec. Follows with CHF Clinic- he checked and  is not on a fluid restriction at this time. Wearing leg compressions as needed only- once over the last month. Only wearing if on feet all day. Checking blood sugars 3 x daily- have been running around 120. Currently taking Ozempic, metformin, Lantus 30 units QHS and Humalog sliding scale as needed- typically only using with lunch. Following with diabetic clinic. Initial labs completed and reviewed. A1C 7.0- must remain < 8 prior to surgery. Iron low at 50- taking 65mg Iron daily. Vit D 12- taking weekly Vit D x 12 weeks. Taking with food. LOMN received. Completed support groups x 2. EKG to be completed with cardiac clearance. Discussed pre-op EGD- will send referral. Cardiac clearance needed prior to surgery- Dr. Tiffanie Foy, apt 7/22/22. Pulmonary clearance needed prior to surgery, apt 4/19/22 with Louisa Duron PA-C. Psychological clearance with Dr. Lulu Ingram in process, next apt 5/3/22.  If he does not lose enough weight with medical management he would like to proceed with RYGB. Physical Activity:  Walking at [facility] or work at least twice per week and added crunches 30- twice daily. Current BMI: Body mass index is 50.26 kg/m². Current Weight:   Wt Readings from Last 3 Encounters:   04/18/22 (!) 388 lb 12.8 oz (176.4 kg)   03/16/22 (!) 397 lb 9.6 oz (180.4 kg)   03/09/22 (!) 395 lb 9.6 oz (179.4 kg)     Initial Body Weight:400    Past Medical History:  Past Medical History:   Diagnosis Date    [facility]) [facility] [address]     Diabetes mellitus ([facility] [address]     GERD (gastroesophageal reflux disease)     Hypercholesteremia     S/P ICD (internal cardiac defibrillator) procedure: 1/15/2015: Medtronic Single Chamber 1/15/2015    1/15/2015: Medtronic Single Chamber.  Dr. Lolita Soliz Sleep apnea 06/09/2017    Ramon Li    Zoll lifevest applied 8/22/14 8/29/2014    returned prior to ICD insertion 1/15       Past Surgical History:  Past Surgical History:   Procedure Laterality Date    CARD[facility]AC [facility]TERIZATION  9/2014    [facility]    CARDIAC DEFIBRILLATOR PLACEMENT  2014    CARDIAC DEFIBRILLATOR PLACEMENT      CARDIAC DEFIBRILLATOR PLACEMENT      EYE SURGERY      Lasix     VASECTOMY  2004       Past Social History:  Social History     Socioeconomic History    Marital status:      Spouse name: Not on file    Number of children: 1    Years of education: Not on file    Highest education level: Not on file   Occupational History     Employer: [employer]   Tobacco Use    Smoking status: Never Smoker    Smokeless tobacco: Never Used   Vaping Use    Vaping Use: Never used   Substance and Sexual Activity    Alcohol use: Never     Alcohol/week: 18.0 standard drinks     Types: 18 Cans of beer per week    Drug use: No    Sexual activity: Yes     Partners: Female   Other Topics Concern    Not on file   Social History Narrative    Not on file     Social Determinants of Health     Financial Resource Strain: Low Risk     Difficulty of Paying Living Expenses: Not hard at all   Food Insecurity: No Food Insecurity    Worried About Running Out of Food in the Last Year: Never true    Abel of Food in the Last Year: Never true   Transportation Needs:     Lack of Transportation (Medical): Not on file    Lack of Transportation (Non-Medical):  Not on file   Physical Activity:     Days of Exercise per Week: Not on file    Minutes of Exercise per Session: Not on file   Stress:     Feeling of Stress : Not on file   Social Connections:     Frequency of Communication with Friends and Family: Not on file    Frequency of Social Gatherings with Friends and Family: Not on file    Attends Buddhism Services: Not on file    Active Member of Clubs or Organizations: Not on file    Attends Club or Organization Meetings: Not on file    Marital Status: Not on file   Intimate Partner Violence:     Fear of Current or Ex-Partner: Not on file    Emotionally Abused: Not on file    Physically Abused: Not on file    Sexually Abused: Not on file   Housing Stability:     Unable to Pay for Housing in the Last Year: Not on file    Number of Places Lived in the Last Year: Not on file    Unstable Housing in the Last Year: Not on file        Medications:   Current Outpatient Medications   Medication Sig Dispense Refill    azithromycin (ZITHROMAX) 250 MG tablet Take 2 tablets (500 mg) on Day 1, followed by 1 tablet (250 mg) once daily on Days 2 through 5. 1 packet 0    insulin glargine (LANTUS SOLOSTAR) 100 UNIT/ML injection pen Inject 30 Units into the skin nightly 10 pen 3    metFORMIN (GLUCOPHAGE-XR) 500 MG extended release tablet TAKE TWO TABLETS BY MOUTH TWICE A  tablet 2    ASPIRIN LOW DOSE 81 MG EC tablet TAKE 1 TABLET BY MOUTH ONE TIME A DAY 90 tablet 1    rosuvastatin (CRESTOR) 10 MG tablet TAKE 1 TABLET BY MOUTH ONE TIME A DAY 90 tablet 3    Cholecalciferol (VITAMIN D3) 1.25 MG (73476 UT) CAPS Take 1 capsule by mouth once a week 12 capsule 0    blood glucose test strips (PRODIGY NO CODING BLOOD GLUC) strip USE TO CHECK BLOOD SUGAR FOUR TIMES A  strip 3    Prodigy Twist Top Lancets 28G MISC USE TO TEST FOUR TIMES A  each 3    irbesartan (AVAPRO) 75 MG tablet TAKE 1 TABLET BY MOUTH ONE TIME A DAY IN THE EVENING 90 tablet 3    carvedilol (COREG) 6.25 MG tablet TAKE 1 TABLET BY MOUTH 2 TIMES A DAY WITH MEALS 180 tablet 3    OZEMPIC, 1 MG/DOSE, 4 MG/3ML SOPN INJECT 1MG UNDER THE SKIN ONCE A WEEK 24 mL 3    insulin lispro, 1 Unit Dial, (HUMALOG KWIKPEN) 100 UNIT/ML SOPN INJECT 14 UNITS WITH MEALS PLUS SLIDING SCALE --151-200 3 UNITS, 201-250 5 UNITS, 251-300 8 UNITS, 301-350 10 UNITS, 351-400 12 UNITS, OVER 400 15 UNITS AND CONTACT PHYSICIAN 135 mL 3    carvedilol (COREG) 25 MG tablet TAKE 1 TABLET BY MOUTH 2 TIMES A DAY WITH MEALS 180 tablet 3    gabapentin (NEURONTIN) 100 MG capsule TAKE 1 CAPSULE BY MOUTH 3 TIMES A  capsule 1    spironolactone (ALDACTONE) 25 MG tablet TAKE 1 TABLET BY MOUTH ONE TIME A DAY 90 tablet 3    furosemide (LASIX) 20 MG tablet TAKE ONE TABLET BY MOUTH EVERY EVENING 90 tablet 3    furosemide (LASIX) 40 MG tablet TAKE ONE TABLET BY MOUTH EVERY MORNING 90 tablet 3    CPAP Machine MISC by Does not apply route Ramp pressure: 4.0 cm H2O  Treatment pressure: 15.0 cm H2O 1 each 0    Blood Glucose Monitoring Suppl (PRODIGY AUTOCODE BLOOD GLUCOSE) MAUREEN Use to check blood sugar 4 times daily 1 Device 0    Insulin Pen Needle (PEN NEEDLES 31GX5/16\") 31G X 8 MM MISC Use to inject insulins and Ozempic 400 each 3    omeprazole (PRILOSEC) 40 MG delayed release capsule Take 1 capsule by mouth daily 90 capsule 1    fluticasone (FLONASE) 50 MCG/ACT nasal spray 2 sprays by Nasal route daily 1 Bottle 2    loratadine (CLARITIN) 10 MG tablet Take 10 mg by mouth daily      Respiratory Therapy Supplies (ADULT MASK) MISC Please do mask desensitization and/or provide mask of patient's choice. 1 each 0    acetaminophen (TYLENOL) 325 MG tablet Take 650 mg by mouth every 8 hours as needed for Pain      nitroGLYCERIN (NITROSTAT) 0.4 MG SL tablet Place 1 tablet under the tongue every 5 minutes as needed for Chest pain up to max of 3 total doses. If no relief after 1 dose, call 911. 25 tablet 1     No current facility-administered medications for this visit. Allergies: Allergies   Allergen Reactions    Levaquin [Levofloxacin In D5w] Itching       Subjective:    Review of Systems:  Constitutional: Denies any fever, chills, (+) fatigue. Wound: Denies any rash, skin color changes or wound problems. Resp: Denies any cough, (+)shortness of breath. CV: Denies any chest pain, orthopnea or syncope. MS: Denies myalgias, (+)arthralgias. GI: Denies any nausea, vomiting, diarrhea, (+)constipation, melena, hematochezia. (+)reflux  : Denies any hematuria, hesitancy or dysuria. NEURO: Denies seizures, headache. Objective:    /84 (Site: Right Upper Arm, Position: Sitting, Cuff Size: Large Adult)   Pulse 88   Resp 18   Ht 6' 1.75\" (1.873 m)   Wt (!) 388 lb 12.8 oz (176.4 kg)   BMI 50.26 kg/m²   Physical Examination:   Constitutional: Alert and oriented to person, place and time. Well-developed, well- nourished. Head: Normocephalic and atraumatic  Neck: Supple. Eyes: EOMI b/l. Conjunctivae normal.  No scleral icterus. Respiratory: Effort normal. No respiratory distress. Abd: Benign  Ext:  Movement x 4. No edema  Skin; Warm and dry, no visible rashes, lesions or ulcers.    Neuro: Cranial Nerves Grossly Intact; nml coordination      CBC   Lab Results   Component Value Date    WBC 10.8 03/05/2022    RBC 4.81 03/05/2022    HGB 12.6 03/05/2022    HCT 41.5 03/05/2022    MCV 86.3 03/05/2022    MCH 26.2 03/05/2022    MCHC 30.4 03/05/2022    RDW 15.7 03/15/2017     03/05/2022    MPV 10.1 03/05/2022    RBCMORP NORMAL 03/15/2017    SEGSPCT 68.9 03/05/2022    LABLYMP 20.8 03/05/2022 MONOPCT 5.5 03/05/2022    LABEOS 3.5 03/05/2022    BASO 0.4 03/05/2022    NRBC 0 03/05/2022    ANISOCYTOSIS 1+ 03/15/2017    SEGSABS 7.4 03/05/2022    LYMPHSABS 2.2 03/05/2022    MONOSABS 0.6 03/05/2022    EOSABS 0.4 03/05/2022    BASOSABS 0.0 03/05/2022        BMP/CMP   Lab Results   Component Value Date    GLUCOSE 127 03/05/2022    GLUCOSE 258 12/16/2017    CREATININE 0.7 03/05/2022    BUN 13 03/05/2022     03/05/2022    K 4.3 03/05/2022    K 4.8 04/09/2021     03/05/2022    CO2 28 03/05/2022    CALCIUM 9.3 03/05/2022    AST 14 03/05/2022    ALKPHOS 73 03/05/2022    PROT 6.6 03/05/2022    LABALBU 4.2 03/05/2022    BILITOT 0.3 03/05/2022    ALT 17 03/05/2022        PREALBUMIN   Lab Results   Component Value Date    PREALBUMIN 22.0 03/05/2022        VITAMIN B12   Lab Results   Component Value Date    JSPPIKZO44 258 03/05/2022        VITAMIN D   Lab Results   Component Value Date    VITD25 12 03/05/2022        PTH  Lab Results   Component Value Date    IPTH 30.7 03/05/2022       VITAMIN B1/ THIAMINE   Lab Results   Component Value Date    [id number] 107 03/05/2022        LIPID SCREEN (FASTING)   Lab Results   Component Value Date    CHOL 101 03/05/2022    TRIG 154 03/05/2022    HDL 27 03/05/2022    LDLCALC 43 03/05/2022   ,     HGA1C (T2DM ONLY)   Lab Results   Component Value Date    LABA1C 7.0 03/05/2022    AVGG 150 03/05/2022        TSH   Lab Results   Component Value Date    TSH 2.900 03/05/2022        IRON   Lab Results   Component Value Date    IRON 50 03/05/2022        TIBC  Lab Results   Component Value Date    TIBC 310 03/05/2022       FERRITIN  Lab Results   Component Value Date    FERRITIN 141 03/05/2022       VITAMIN A  Lab Results   Component Value Date    RETINOL SEE BELOW 03/05/2022       NICOTINE  No results found for: NMET    UDS  No results found for: UDP    PSA  No results found for: LABPSA    GFR  Lab Results   Component Value Date    LABGLOM >90 03/05/2022       DEXA  No results found for this or any previous visit. Assessment:       Diagnosis Orders   1. BMI 50.0-59.9, adult ([facility] [address]     2. Vitamin D deficiency     3. Chronic bilateral low back pain, unspecified whether sciatica present     4. Chronic congestive heart failure, unspecified heart failure type ([facility] [address]     5. Type 2 diabetes mellitus without complication, with [facility]term [facility]     6. Hypertension, unspecified type     7. AICD (automatic cardioverter/defibrillator) present     8. Gastroesophageal reflux disease, unspecified whether esophagitis present     9. Paroxysmal atrial fibrillation (HCC)     10. MIKE on CPAP     11. Chronic pain of left knee     12. Hypercholesteremia         Plan:    · Behavior modification discussed in detail in regards to dietary habits. · Nutritional education occurred during visit. Continue following recommendations  per dietitian. · Improvement in fitness/exercise discussed with patient and the need for this  with/without surgery. · Cardiac Clearance needed prior to surgery- Dr. Andrew Phillips currently scheduled 7/22/22 ( Patient will try to move up apt.)  · Pulmonary Clearance needed prior to surgery- Da Black PA-C 4/19/22  · Psychology evaluation with Dr. Maxim Hernandez in process, next apt 5/3/22  · Physical Therapy referral  · EGD prior to any surgical intervention- will send referral  · Encouraged to attend support groups. · Goal to lose 5% TBW prior to surgery. · Initial labs completed and reviewed- several pending  · A1C 7.0- must remain < 8 prior to surgery. · Iron low at 50- continue 65mg Iron daily. · Vit D 12- continue weekly Vit D x 12 weeks. Take with food. · Drug screen completed and reviewed  · Nicotine (-). Discussed risks of nicotine a/w bariatric surgery. Must be nicotine free at least 90 days prior to surgery. Avoid nicotine life long post-op.    · EKG to be completed with cardiologist.   · Will need to be off work for 3-4 weeks post-bariatric surgery. · No lifting/pushing/pulling over 20# for 4 weeks post-op  · No NSAIDS 10 days prior to bariatric surgery. Avoid NSAID use post-op  · Discussed Lovenox post-op bariatric surgery  · LOMN recieved  · Will continue following with multi-disciplinary team in preparation for bariatric surgery with the expectation of lifelong follow-up post-operatively. · No fluid restriction per CHF clinic. Return in about 1 month (around 5/18/2022) for Follow up. I spent over 20 minutes with the patient, with greater that 50% of that time spent on education, counseling and coordination of care.      Electronically signed by BART Stern on 4/18/2022 at 3:12 PM

## 2022-04-18 NOTE — PATIENT INSTRUCTIONS
· Behavior modification discussed in detail in regards to dietary habits. · Nutritional education occurred during visit. Continue following recommendations  per dietitian. · Improvement in fitness/exercise discussed with patient and the need for this  with/without surgery. · Cardiac Clearance needed prior to surgery- Dr. Eris Turcios 7/22/22  · Pulmonary Clearance needed prior to surgery- Brielle Gonzalez PA-C 4/19/22  · Psychology evaluation with Dr. Elba Babb in process, next apt 5/3/22  · Physical Therapy referral  · EGD prior to any surgical intervention- will send referral  · Encouraged to attend support groups. · Goal to lose 5% TBW prior to surgery. · Initial labs completed and reviewed- several pending  · A1C 7.0- must remain < 8 prior to surgery. · Iron low at 50- continue 65mg Iron daily. · Vit D 12- continue weekly Vit D x 12 weeks. Take with food. · Drug screen completed and reviewed  · Nicotine (-). Discussed risks of nicotine a/w bariatric surgery. Must be nicotine free at least 90 days prior to surgery. Avoid nicotine life long post-op. · EKG to be completed with cardiologist.   · Will need to be off work for 3-4 weeks post-bariatric surgery. · No lifting/pushing/pulling over 20# for 4 weeks post-op  · No NSAIDS 10 days prior to bariatric surgery. Avoid NSAID use post-op  · Discussed Lovenox post-op bariatric surgery  · LOMN recieved  · Will continue following with multi-disciplinary team in preparation for bariatric surgery with the expectation of lifelong follow-up post-operatively. · No fluid restriction per CHF clinic.

## 2022-04-18 NOTE — PATIENT INSTRUCTIONS
Goals: 1. Nutrition Goal:  I will continue using food journal to record meal times, serving sizes and bring back to next dietitian visit  2. Water Goal:  Great job with water intake- continue at least two liters water a day. 3.  Exercise Goal:  Continue walking at least twice a week or at least three times a week.    Continue doing crunches every other day

## 2022-04-19 ENCOUNTER — INITIAL CONSULT (OUTPATIENT)
Dept: PULMONOLOGY | Age: 47
End: 2022-04-19
Payer: COMMERCIAL

## 2022-04-19 VITALS
BODY MASS INDEX: 39.17 KG/M2 | OXYGEN SATURATION: 96 % | WEIGHT: 315 LBS | SYSTOLIC BLOOD PRESSURE: 130 MMHG | TEMPERATURE: 97.3 F | HEART RATE: 105 BPM | HEIGHT: 75 IN | DIASTOLIC BLOOD PRESSURE: 82 MMHG

## 2022-04-19 DIAGNOSIS — G47.33 OSA (OBSTRUCTIVE SLEEP APNEA): Primary | ICD-10-CM

## 2022-04-19 DIAGNOSIS — I42.8 NICM (NONISCHEMIC CARDIOMYOPATHY) (HCC): ICD-10-CM

## 2022-04-19 DIAGNOSIS — E66.01 MORBID OBESITY (HCC): ICD-10-CM

## 2022-04-19 PROCEDURE — 99203 OFFICE O/P NEW LOW 30 MIN: CPT | Performed by: INTERNAL MEDICINE

## 2022-04-19 NOTE — PROGRESS NOTES
New Sleep Patient H/P    Presentation:  Juve Benton is referred by Dr Titus Beckwith   for  MIKE. Juve Benton is in the weight management program being evaluated for a much needed gastric bypass. Diagnosed with severe MIKE in 2016 by Dr Margaret Orellana 6/2017 with a , Juve Benton is very complaint with CPAP 15 cwp and nasal mask, D/L available for review, Juve Benton feels good and quit following with sleep clinic years ago however due to incoming surgery he is referred to make sure he is appropriately controlled. Juve Benton denies daytime somnolence, uses his CPAP even during diurnal naps, denies breathing difficulties during sleep, not snoring while on CPAP. D/L : 4h compliance 100%, residual AHI 0.9, average nightly use 7h 20 min. H/O NICMP c/p ICD, DM2  ESS 3SAQLI 92      Past Medical History:   Diagnosis Date    [facility])     Diabetes mellitus ([facility] [address]     GERD (gastroesophageal reflux disease)     Hypercholesteremia     S/P ICD (internal cardiac defibrillator) procedure: 1/15/2015: Medtronic Single Chamber 1/15/2015    1/15/2015: Medtronic Single Chamber.  Dr. Cullen White Sleep apnea 06/09/2017    Marleny Thea    Zoll lifevest applied 8/22/14 8/29/2014    returned prior to ICD insertion 1/15       Past Surgical History:   Procedure Laterality Date    [facility]    CARDIAC DEFIBRILL[facility]      EYE SURGERY      Lasix     VASECTOMY  2004       Social History     Tobacco Use    Smoking status: Never Smoker    Smokeless tobacco: Never Used   Vaping Use    Vaping Use: Never used   Substance Use Topics    Alcohol use: Never     Alcohol/week: 18.0 standard drinks     Types: 18 Cans of beer per week    Drug use: No       Allergies   Allergen Reactions    Levaquin [Levofloxacin In D5w] Itching       Current Outpatient Medications   Medication Sig Dispense Refill    azithromycin (ZITHROMAX) 250 MG tablet Take 2 tablets (500 mg) on Day 1, followed by 1 tablet (250 mg) once daily on Days 2 through 5. 1 packet 0    insulin glargine (LANTUS SOLOSTAR) 100 UNIT/ML injection pen Inject 30 Units into the skin nightly 10 pen 3    metFORMIN (GLUCOPHAGE-XR) 500 MG extended release tablet TAKE TWO TABLETS BY MOUTH TWICE A  tablet 2    ASPIRIN LOW DOSE 81 MG EC tablet TAKE 1 TABLET BY MOUTH ONE TIME A DAY 90 tablet 1    rosuvastatin (CRESTOR) 10 MG tablet TAKE 1 TABLET BY MOUTH ONE TIME A DAY 90 tablet 3    Cholecalciferol (VITAMIN D3) 1.25 MG (92412 UT) CAPS Take 1 capsule by mouth once a week 12 capsule 0    blood glucose test strips (PRODIGY NO CODING BLOOD GLUC) strip USE TO CHECK BLOOD SUGAR FOUR TIMES A  strip 3    Prodigy Twist Top Lancets 28G MISC USE TO TEST FOUR TIMES A  each 3    irbesartan (AVAPRO) 75 MG tablet TAKE 1 TABLET BY MOUTH ONE TIME A DAY IN THE EVENING 90 tablet 3    carvedilol (COREG) 6.25 MG tablet TAKE 1 TABLET BY MOUTH 2 TIMES A DAY WITH MEALS 180 tablet 3    OZEMPIC, 1 MG/DOSE, 4 MG/3ML SOPN INJECT 1MG UNDER THE SKIN ONCE A WEEK 24 mL 3    insulin lispro, 1 Unit Dial, (HUMALOG KWIKPEN) 100 UNIT/ML SOPN INJECT 14 UNITS WITH MEALS PLUS SLIDING SCALE --151-200 3 UNITS, 201-250 5 UNITS, 251-300 8 UNITS, 301-350 10 UNITS, 351-400 12 UNITS, OVER 400 15 UNITS AND CONTACT PHYSICIAN 135 mL 3    carvedilol (COREG) 25 MG tablet TAKE 1 TABLET BY MOUTH 2 TIMES A DAY WITH MEALS 180 tablet 3    gabapentin (NEURONTIN) 100 MG capsule TAKE 1 CAPSULE BY MOUTH 3 TIMES A  capsule 1    spironolactone (ALDACTONE) 25 MG tablet TAKE 1 TABLET BY MOUTH ONE TIME A DAY 90 tablet 3    furosemide (LASIX) 20 MG tablet TAKE ONE TABLET BY MOUTH EVERY EVENING 90 tablet 3    furosemide (LASIX) 40 MG tablet TAKE ONE TABLET BY MOUTH EVERY MORNING 90 tablet 3    CPAP Machine MISC by Does not apply route Ramp pressure: 4.0 cm H2O  Treatment pressure: 15.0 cm H2O 1 each 0    Blood Glucose Monitoring Suppl (PRODIGY AUTOCODE BLOOD GLUCOSE) MAUREEN Use to check blood sugar 4 times daily 1 Device 0    Insulin Pen Needle (PEN NEEDLES 31GX5/16\") 31G X 8 MM MISC Use to inject insulins and Ozempic 400 each 3    omeprazole (PRILOSEC) 40 MG delayed release capsule Take 1 capsule by mouth daily 90 capsule 1    fluticasone (FLONASE) 50 MCG/ACT nasal spray 2 sprays by Nasal route daily 1 Bottle 2    loratadine (CLARITIN) 10 MG tablet Take 10 mg by mouth daily      Respiratory Therapy Supplies (ADULT MASK) MISC Please do mask desensitization and/or provide mask of patient's choice. 1 each 0    acetaminophen (TYLENOL) 325 MG tablet Take 650 mg by mouth every 8 hours as needed for Pain      nitroGLYCERIN (NITROSTAT) 0.4 MG SL tablet Place 1 tablet under the tongue every 5 minutes as needed for Chest pain up to max of 3 total doses. If no relief after 1 dose, call 911. 25 tablet 1     No current facility-administered medications for this visit. Family History   Problem Relation Age of Onset    Arthritis Mother     Hypertension Father     Heart Disease Maternal Uncle     Heart Disease Maternal Grandfather     Asthma Maternal Grandfather     Diabetes Maternal Grandfather     Arthritis Maternal Grandfather     Heart Disease Paternal Uncle     Heart Disease Maternal Grandmother     Stroke Maternal Grandmother     Asthma Brother           Social History     Tobacco Use    Smoking status: Never Smoker    Smokeless tobacco: Never Used   Vaping Use    Vaping Use: Never used   Substance Use Topics    Alcohol use: Never     Alcohol/week: 18.0 standard drinks     Types: 18 Cans of beer per week    Drug use: No         Review of Systems:   General/Constitutional: No recent loss of weight or appetite changes. No fever or chills. HENT: Negative. Eyes: Negative. Upper respiratory tract: No nasal stuffiness or post nasal drip.   Lower respiratory tract/ lungs: No cough or sputum production. No hemoptysis. Cardiovascular: No palpitations or chest pain. Gastrointestinal: No nausea or vomiting. Neurological: No focal neurologiacal weakness. Extremities: No edema. Musculoskeletal: No complaints. Genitourinary: No complaints. Hematological: Negative. Psychiatric/Behavioral: Negative. Skin: No itching. Physical Exam:    HEIGHTHeight: 6' 3\" (190.5 cm) WEIGHTWeight: (!) 394 lb 3.2 oz (178.8 kg)      BMI:  Body mass index is 49.27 kg/m². Neck Size: 22        Vitals: Ht 6' 3\" (1.905 m)   Wt (!) 394 lb 3.2 oz (178.8 kg)   BMI 49.27 kg/m²       Mallampati Score: 2    Physical Exam :  Constitutional: BMI 49  HENT:   Head: Normocephalic and atraumatic. Mouth/Throat: Large tongue, crowded pharynx, mallampati class 3   Eyes: Conjunctivae are normal. PERRLA. No scleral icterus. Neck: Neck supple. No JVD present. No tracheal deviation present. 22 in circumference  Cardiovascular: Normal rate, regular rhythm, normal heart sounds. No murmur heard. Pulmonary/Chest: Effort normal and breath sounds normal. No stridor. No respiratory distress. No wheezes. No rales. Abdominal: Soft. No distension. No tenderness. Musculoskeletal: Normal range of motion. Lymphadenopathy:  No cervical adenopathy. Neurological: Alert and oriented to person, place, and time. No focal deficits. Skin: Skin is warm and dry. Patient is not diaphoretic. Psychiatric: Normal behavior with normal mood and affect. Diagnostic Data:    PAP Download:   Sleep Medicine Follow Up  Randa Bridge, MD Ephriam Kayser, 55 y.o.  [id number]     Nurses Notes   This pt is here for consult needs established for sleep    Date of Last Visit  10/16/17      Scan of Download      Summary of PAP Download     Dates  3/19/22  -   4/14/22  Four Hour Compliance - 100 %.  Total Hours -  4  Average Hours/Day -  7 hrs 55 minutes       AHI -  0.9    Original AHI -  121.7     Initial Study Date -  6/9/17  PAP Type -  Airsense 10 autoset      Machine Type - resmed    Pressure Settings -  15 cwp  Interface -  nasal    Provider  [x]SR-MAMTA  []Shara  []Lucy  []Janes         [][facility] []Other:     ESS: 3  SAQLI: 92           Assessment    Diagnosis Orders   1. MIKE (obstructive sleep apnea)  DME Order for CPAP as OP   2. Morbid obesity ([facility] [address]  DME Order for CPAP as OP   3. NICM (nonischemic cardiomyopathy) ([facility] [address]       Excellent compliance and control, good candidate for bariatric intervention  prescription for supplies sent  Stable for surgery, to take his PAP device to the hospital the day of the surgery    Plan   Orders Placed This Encounter   Procedures    DME Order for CPAP as OP     No Humidifier     Nasal Mask 1 per 3 months and Cushions/Seals 2 per month. Headgear 1 per 6 months, Chin Strap 1 per 6 months, Disposable Filters 2 per month, Non-disposable Filters 1 per 6 months, Tubing 1 per 3 months, Chambers 1 per 6 months, Tubing with Integrated Heating Element 1 per 3 months and Other Related Supplies. Replace supplies and accessories as needed. Patient may choose another interface for compliance and comfort.     Diagnosis: G47.33   Length of need: 12 Months          RTC 1 year

## 2022-04-19 NOTE — PROGRESS NOTES
Sleep Medicine Follow Up  Kiley Burgess MD    Ines Fuentes, 55 y.o.  [id number]     Nurses Notes   This pt is here for consult needs established for sleep    Date of Last Visit  10/16/17      Scan of Download      Summary of PAP Download     Dates  3/19/22  -   4/14/22  Four Hour Compliance - 100 %. Total Hours -  4  Average Hours/Day -  7 hrs 55 minutes       AHI -  0.9    Original AHI -  121.7     Initial Study Date -  6/9/17  PAP Type -  Airsense 10 autoset      Machine Type - resmed  (If a Respironics device is the patient signed up for Sleep[facility]?  ***)    Pressure Settings -  15 cwp  Interface -  ***    Provider  []SR-HME  []Shara  []Lucy  []Janes         [][facility] []Other:     ESS: ***   SAQLI:    Neck Circumference:    ***  MP:    ***

## 2022-04-27 RX ORDER — GABAPENTIN 100 MG/1
CAPSULE ORAL
Qty: 270 CAPSULE | Refills: 1 | Status: SHIPPED | OUTPATIENT
Start: 2022-04-27 | End: 2022-10-31

## 2022-05-02 ENCOUNTER — PROCEDURE VISIT (OUTPATIENT)
Dept: CARDIOLOGY CLINIC | Age: 47
End: 2022-05-02
Payer: COMMERCIAL

## 2022-05-02 DIAGNOSIS — I50.22 CHRONIC SYSTOLIC CONGESTIVE HEART FAILURE (HCC): Primary | ICD-10-CM

## 2022-05-03 ENCOUNTER — OFFICE VISIT (OUTPATIENT)
Dept: PSYCHOLOGY | Age: 47
End: 2022-05-03
Payer: COMMERCIAL

## 2022-05-03 DIAGNOSIS — F10.11 HISTORY OF ALCOHOL ABUSE: ICD-10-CM

## 2022-05-03 DIAGNOSIS — F54 PSYCHOLOGICAL FACTORS AFFECTING MEDICAL CONDITION: Primary | ICD-10-CM

## 2022-05-03 DIAGNOSIS — E66.01 MORBID OBESITY (HCC): ICD-10-CM

## 2022-05-03 PROCEDURE — 93297 REM INTERROG DEV EVAL ICPMS: CPT | Performed by: INTERNAL MEDICINE

## 2022-05-03 PROCEDURE — 90832 PSYTX W PT 30 MINUTES: CPT | Performed by: PSYCHOLOGIST

## 2022-05-03 PROCEDURE — G2066 INTER DEVC REMOTE 30D: HCPCS | Performed by: INTERNAL MEDICINE

## 2022-05-03 NOTE — PROGRESS NOTES
PSYCHOLOGICAL FOLLOW-UP FOR BARIATRIC SURGERY:      History of Presenting Illness:   Efren Brown was initially referred for a routine psychological evaluation for bariatric surgery on 04/07/2022. At this evaluation, the following requirements were given prior to psychological clearance for WLS:  - make progress towards pre-surgical weight loss goal   - improve eating behaviors  - limit caffeine intake    Current weight at today's visit: 388lbs    Assessment:    Eating behaviors: he notes he is having a protein shake instead of skipping meals. He reports less \"food searching,\" and notes this has improved from telling himself he is bored. He reports using a hunger scale. Carbonation/caffeine: he reports 1 soda since last visit. He reports he is currently consuming 12 ounces of coffee per day. He reports the glass is 12 ounces but he doesn't always drink the whole glass. Exercise: her reports walking twice per week, and added crunches 3 times per week. Weight loss: he reports losing 12 pounds since starting weight management program.  Continues to log in blue journal.     Mood: He reports mood has been \"good. \" Denies any bad dreams since last visit. Denies suicidal thoughts, plan, and intent. MEDICAL NUMBERS  4/4; patient able to complete basic math necessary for independent medication changes. Psychological Interpretation of the MMPI-2: Patient's MMPI-2 appears to be valid. There were 2 slight elevations on scales 9 and 1; however, these were not statistically significant. This suggests that patient may have a lot of ideas and experience short periods of energy.  Additionally, he may experience somatic complaints that is likely related to weight.        MENTAL STATUS EXAM  General appearance: dressed appropriately, well-groomed, glasses, obese  Attitude & Behavior: pleasant and cooperative  Motor Activity & Musculoskeletal: no abnormal movement  Speech & Language: normal rate, volume, and prosody  Gait & Station: sitting  Mood: calm to content  Affect: congruent with mood, appropriate to setting   Thought content: appropriate  Suicidal ideation: denies   Homicidal ideation: denies   Thought process & Associations: logical, coherent, and goal-directed  Perceptions: appropriate  Orientation: A&OX6  Attention & Concentration: normal  Recent & Remote Memory: normal 4/5 words recalled at 5 minutes  Executive function: intact  Visual spatial: impaired  Fund of knowledge: average  Insight: average  Judgment:  Average     DSM DIAGNOSIS:  Psychological factors affecting medical condition  History of alcohol abuse   Morbid obesity     PLAN:  Obey Clarke has completed minimum requirements and is now cleared from a psychological/substance perspective for bariatric surgery. Patient has demonstrated that they have the ability to understand the surgical procedure and to make a responsible decision. Patient has demonstrated that they are mentally stable enough to understand the risks and benefits of weight loss surgery; follow through with the extensive aftercare plan; withstand the rigors of surgery; and there is no evidence at this time that there is likelihood of patient being suicidal or likelihood to significantly decompensate if the procedure is not successful in helping to lose weight.        Psychology Clinician:  López Ewing, PhD     Start time: 11:30am  End time: 11:55am

## 2022-05-12 LAB
CHOLESTEROL, TOTAL: 102 MG/DL (ref 0–199)
FASTING: YES
GLUCOSE BLD-MCNC: 126 MG/DL (ref 74–109)
HBA1C MFR BLD: 6.5 % (ref 4.4–6.4)
HDLC SERPL-MCNC: 25 MG/DL (ref 40–90)
LDL CHOLESTEROL CALCULATED: 40 MG/DL
TRIGL SERPL-MCNC: 187 MG/DL (ref 0–199)

## 2022-05-13 NOTE — PROGRESS NOTES
Assessment: Patient is a 55 y.o. male seen for month three follow up MNT visit for pre op bariatric surgery desires bypass. Vitals from current and previous visits:    Vitals 8/90/8545   SYSTOLIC 467   DIASTOLIC 84   Site Right Upper Arm   Position Sitting   Cuff Size Large Adult   Pulse 100   Temp 97.3   Resp    SpO2    Weight 390 lb 6.4 oz   Height 6' 1.75\"   Body mass index 50.46 kg/m2   Pain Level    Waist (Inches)    Neck circumference (Inches)      Initial weight at start of Weight Management Program was: 400 lbs     David Solis gained 2 lbs over one month  -Weight goal: lose weight.     -Nutritionally relevant labs: A1C-7.0%, B1-107, glucose-127, iron low-50, low h/h   Lab Results   Component Value Date/Time    LABA1C 6.5 (H) 05/12/2022 10:30 AM    LABA1C 7.0 (H) 03/05/2022 07:30 AM    LABA1C 7.5 (H) 12/23/2021 06:09 AM    GLUCOSE 126 (H) 05/12/2022 10:30 AM    GLUCOSE 127 (H) 03/05/2022 07:29 AM    GLUCOSE 258 (H) 12/16/2017 08:25 AM    CHOL 102 05/12/2022 10:30 AM    HDL 25 (L) 05/12/2022 10:30 AM    LDLCALC 40 05/12/2022 10:30 AM    TRIG 187 05/12/2022 10:30 AM                  Lab Results   Component Value Date    VITD25 12 03/05/2022     Dr Emil Chaudhary clearance obtained  Pulmonary clearance obtained  EGD initial appointment is next week  Cardiology appointment May 23rd     - Is patient taking daily Multivitamin:   Iron 65 mg iron daily Natures Truth for low level- causing constipation-every other day. weekly Vitamin D3 50,000IUS for low level    -Food Recall:  Pt had food journal in office for review  Breakfast: 5am- deli slices ham and yogurt  9am- banana  Lunch: 11:30am- grilled chicken and mac and cheese or magic spoon cereal  Dinner: 5pm-  Baked chicken, asparagus or burger no bun pickles   Snack: mid afternoon- pepperonis or watermelon, rasberries    Drinks throughout the day:  Propel or  Powerade zero at times, . 2 Liters plain water per day ( two 33 oz bottles) and two more 20 oz water. sugar free lemonade, stopped all diet pop, 10 oz coffee M-F    -Impression of Dietary Intake: lower carbohydrate, increased fruits and vegetables - Pt  is working towards avoiding high fat/high sugar foods. Pt  is working towards  including protein at meals and snacks. Exercise:  Patient has option to view online fitness Orientation video  -Physical Activity is:  Started walking on commercial breaks two six minute breakfast every hour-. Walking outside 10-15 minutes once a week- encouraged pt to increase this to twice a week, crunches every other day    Nutrition Diagnosis: Overweight/Obesity related to currently undergoing MNT as evidenced by BMI of 50.4    Intervention:   Healthy behaviors: consistently logging food intake and adequate fluid intake  Patient has attended support groups via You Tube times two  Patient continues with good effort towards nutrition behaviors recommended for bariatric surgery. Monthly goals reviewed with patient  Positive reinforcement given. Patient Instructions   Goals:  1.  Nutrition Goal:  I will continue using food journal to record meal times, serving sizes and bring back to next dietitian visit  2.  Water Goal:  Great job with water intake- continue at least two liters water a day.    3.  Exercise Goal:  Continue walking outside at least twice a week instead once a week. Also continue six minute walk twice every hour when working. [employer] doing crunches every other day           -Followup visit: 4 weeks with dietitian.          Aquilino Petty RD, LD,   Dietitian- Weight Management [address] [place] [address]

## 2022-05-16 ENCOUNTER — OFFICE VISIT (OUTPATIENT)
Dept: BARIATRICS/WEIGHT MGMT | Age: 47
End: 2022-05-16
Payer: COMMERCIAL

## 2022-05-16 ENCOUNTER — OFFICE VISIT (OUTPATIENT)
Dept: BARIATRICS/WEIGHT MGMT | Age: 47
End: 2022-05-16

## 2022-05-16 VITALS
DIASTOLIC BLOOD PRESSURE: 84 MMHG | TEMPERATURE: 97.3 F | HEIGHT: 74 IN | BODY MASS INDEX: 40.43 KG/M2 | SYSTOLIC BLOOD PRESSURE: 122 MMHG | HEART RATE: 100 BPM | WEIGHT: 315 LBS

## 2022-05-16 DIAGNOSIS — Z95.810 AICD (AUTOMATIC CARDIOVERTER/DEFIBRILLATOR) PRESENT: ICD-10-CM

## 2022-05-16 DIAGNOSIS — M54.50 CHRONIC BILATERAL LOW BACK PAIN, UNSPECIFIED WHETHER SCIATICA PRESENT: ICD-10-CM

## 2022-05-16 DIAGNOSIS — E61.1 LOW IRON: ICD-10-CM

## 2022-05-16 DIAGNOSIS — K21.9 GASTROESOPHAGEAL REFLUX DISEASE, UNSPECIFIED WHETHER ESOPHAGITIS PRESENT: ICD-10-CM

## 2022-05-16 DIAGNOSIS — G89.29 CHRONIC BILATERAL LOW BACK PAIN, UNSPECIFIED WHETHER SCIATICA PRESENT: ICD-10-CM

## 2022-05-16 DIAGNOSIS — I50.9 CHRONIC CONGESTIVE HEART FAILURE, UNSPECIFIED HEART FAILURE TYPE (HCC): ICD-10-CM

## 2022-05-16 DIAGNOSIS — Z79.4 TYPE 2 DIABETES MELLITUS WITHOUT COMPLICATION, WITH LONG-TERM CURRENT USE OF INSULIN (HCC): ICD-10-CM

## 2022-05-16 DIAGNOSIS — G89.29 CHRONIC PAIN OF LEFT KNEE: ICD-10-CM

## 2022-05-16 DIAGNOSIS — E55.9 VITAMIN D DEFICIENCY: ICD-10-CM

## 2022-05-16 DIAGNOSIS — E11.9 TYPE 2 DIABETES MELLITUS WITHOUT COMPLICATION, WITH LONG-TERM CURRENT USE OF INSULIN (HCC): ICD-10-CM

## 2022-05-16 DIAGNOSIS — M25.562 CHRONIC PAIN OF LEFT KNEE: ICD-10-CM

## 2022-05-16 DIAGNOSIS — G47.33 OSA ON CPAP: ICD-10-CM

## 2022-05-16 DIAGNOSIS — Z99.89 OSA ON CPAP: ICD-10-CM

## 2022-05-16 DIAGNOSIS — I10 HYPERTENSION, UNSPECIFIED TYPE: ICD-10-CM

## 2022-05-16 DIAGNOSIS — E78.00 HYPERCHOLESTEREMIA: ICD-10-CM

## 2022-05-16 DIAGNOSIS — I48.0 PAROXYSMAL ATRIAL FIBRILLATION (HCC): ICD-10-CM

## 2022-05-16 PROCEDURE — 3044F HG A1C LEVEL LT 7.0%: CPT | Performed by: PHYSICIAN ASSISTANT

## 2022-05-16 PROCEDURE — 99213 OFFICE O/P EST LOW 20 MIN: CPT | Performed by: PHYSICIAN ASSISTANT

## 2022-05-16 RX ORDER — FERROUS SULFATE 325(65) MG
325 TABLET ORAL
COMMUNITY
End: 2022-09-01 | Stop reason: ALTCHOICE

## 2022-05-16 NOTE — PATIENT INSTRUCTIONS
Goals: 1.  Nutrition Goal:  I will continue using food journal to record meal times, serving sizes and bring back to next dietitian visit  2.  Water Goal:  Great job with water intake- continue at least two liters water a day.    3.  Exercise Goal:  Continue walking outside at least twice a week instead once a week.   Also continue six minute walk twice every hour when working. [employer] doing crunches every other day

## 2022-05-16 NOTE — PROGRESS NOTES
[address] [facility]  [address]  [place] [address] [facility]  [phone]      Visit Date:  5/16/2022  Weight Management Pre-Op Follow-up    HPI:    Medically Supervised follow-up- Month #3 of 4/6- desires RYGB    Macrina Adorno is here today for continued supervised weight management of morbid obesity. Weight today 390#. Up 2# since last month. Down 10# since starting with weight management. BMI 50. Reports that he is tracking all food intake. Eating 3 meals daily- supplementing a meal with a protein shake at times. Eating a healthy snack 1 time daily. Has been eating out 1 time per week- but making better decisions. Drinking 80oz of water daily. Drinking 10oz of coffee daily. Has increased activity. Walking at work on work breaks 6 minutes twice per hour- total of 40 minutes on work days. Continues going to the [facility] and 60 crunches daily. Comorbid conditions include HTN, IDDM, GERD, ICD placement 2016 due to CHF, MIKE tx with CPAP, chronic left knee pain and low back pain. Jennifer Daley well controlled with Prilosec. Follows with CHF Clinic- no fluid restriction at this time. Wearing leg compressions as needed only. . Only wearing if on feet all day. Checking blood sugars 3 x daily- have been running around 120-150. Currently taking Ozempic, metformin, Lantus 30 units QHS and Humalog sliding scale as needed- typically only using with lunch. Following with diabetic clinic. A1C 6.5 ( 5/12/22)- must remain < 8 prior to surgery. Iron low at 50- taking 65mg Iron daily. No BM in 3 days. Took stool softener x 1 and OTC laxative x 2 without relief. Advised to try magnesium citrate and then resume stool softener twice daily. Vit D 12- taking weekly Vit D with food. Will repeat Vit D and Iron prior to next apt. LOMN received. Completed support groups x 2. EKG to be completed with cardiac clearance. Initial apt for EGD 5/25/22.   Cardiac clearance needed prior to surgery- Dr. Zander Duque, apt 5/23/22. 100% compliant with CPAP- Pulmonary clearance received from Dr. Dereck Li. Psychological clearance with Dr. Lulu Ingram completed and reviewed. If he does not lose enough weight with medical management he would like to proceed with RYGB. Physical Activity: Walking on breaks at [employer] and crunches 30- twice daily. Current BMI: Body mass index is 50.46 kg/m². Current Weight:   Wt Readings from Last 3 Encounters:   05/16/22 (!) 390 lb 6.4 oz (177.1 kg)   04/19/22 (!) 394 lb 3.2 oz (178.8 kg)   04/18/22 (!) 388 lb 12.8 oz (176.4 kg)     Initial Body Weight:400    Past Medical History:  Past Medical History:   Diagnosis Date    [facility]est[facility]     Diabetes mellitus ([facility]     GERD (gastroesophageal reflux disease)     Hypercholesteremia     S/P ICD (internal cardiac defibrillator) procedure: 1/15/2015: Medtronic Single Chamber 1/15/2015    1/15/2015: Medtronic Single Chamber.  Dr. Shelia Hamilton Sleep apnea 06/09/2017    M[facility] Whittington    Zoll lifevest applied 8/22/14 8/29/2014    returned prior to ICD insertion 1/15       Past Surgical History:  Past Surgical History:   Procedure Laterality Date    [facility]ARD[facility]AC [facility]  9/2014    [facility]    CARDIAC DEFIBRILLATOR PLACEMENT  2014    CARDIAC DEFIBRILLATOR PLACEMENT      CARDIAC DEFIBRILLATOR PLACEMENT      EYE SURGERY      Lasix     VASECTOMY  2004       Past Social History:  Social History     Socioeconomic History    Marital status:      Spouse name: Not on file    Number of children: 1    Years of education: Not on file    Highest education level: Not on file   Occupational History     Employer: [employer]   Tobacco Use    Smoking status: Never Smoker    Smokeless tobacco: Never Used   Vaping Use    Vaping Use: Never used   Substance and Sexual Activity    Alcohol use: Never     Alcohol/week: 18.0 standard drinks     Types: 18 Cans of beer per week    Drug use: No    Sexual activity: Yes     Partners: Female   Other Topics Concern    Not on file   Social History Narrative    Not on file     Social Determinants of Health     Financial Resource Strain: Low Risk     Difficulty of Paying Living Expenses: Not hard at all   Food Insecurity: No Food Insecurity    Worried About Running Out of Food in the Last Year: Never true    [address] in the Last Year: Never true   Transportation Needs:     Lack of Transportation (Medical): Not on file    Lack of Transportation (Non-Medical):  Not on file   Physical Activity:     Days of Exercise per Week: Not on file    Minutes of Exercise per Session: Not on file   Stress:     Feeling of Stress : Not on file   Social Connections:     Frequency of Communication with Friends and Family: Not on file    Frequency of Social Gatherings with Friends and Family: Not on file    Attends Congregational Services: Not on file    Active Member of [address] [place] [address] [employer] or Organizations: Not on file    Attends Club or Organization Meetings: Not on file    Marital Status: Not on file   Intimate Partner Violence:     Fear of Current or Ex-Partner: Not on file    Emotionally Abused: Not on file    Physically Abused: Not on file    Sexually Abused: Not on file   Housing Stability:     Unable to Pay for Housing in the Last Year: Not on file    Number of [place] in the Last Year: Not on file    Unstable Housing in the Last Year: Not on file        Medications:   Current Outpatient Medications   Medication Sig Dispense Refill    ferrous sulfate (IRON 325) 325 (65 Fe) MG tablet Take 325 mg by mouth daily (with breakfast)      gabapentin (NEURONTIN) 100 MG capsule TAKE 1 CAPSULE BY MOUTH 3 TIMES A  capsule 1    insulin glargine (LANTUS SOLOSTAR) 100 UNIT/ML injection pen Inject 30 Units into the skin nightly 10 pen 3    metFORMIN (GLUCOPHAGE-XR) 500 MG extended release tablet TAKE TWO TABLETS BY MOUTH TWICE A  tablet 2    ASPIRIN LOW DOSE 81 MG EC tablet TAKE 1 TABLET BY MOUTH ONE TIME A DAY 90 tablet 1    rosuvastatin (CRESTOR) 10 MG tablet TAKE 1 TABLET BY MOUTH ONE TIME A DAY 90 tablet 3    Cholecalciferol (VITAMIN D3) 1.25 MG (96151 UT) CAPS Take 1 capsule by mouth once a week 12 capsule 0    blood glucose test strips (PRODIGY NO CODING BLOOD GLUC) strip USE TO CHECK BLOOD SUGAR FOUR TIMES A  strip 3    Prodigy Twist Top Lancets 28G MISC USE TO TEST FOUR TIMES A  each 3    irbesartan (AVAPRO) 75 MG tablet TAKE 1 TABLET BY MOUTH ONE TIME A DAY IN THE EVENING 90 tablet 3    carvedilol (COREG) 6.25 MG tablet TAKE 1 TABLET BY MOUTH 2 TIMES A DAY WITH MEALS 180 tablet 3    OZEMPIC, 1 MG/DOSE, 4 MG/3ML SOPN INJECT 1MG UNDER THE SKIN ONCE A WEEK 24 mL 3    insulin lispro, 1 Unit Dial, (HUMALOG KWIKPEN) 100 UNIT/ML SOPN INJECT 14 UNITS WITH MEALS PLUS SLIDING SCALE --151-200 3 UNITS, 201-250 5 UNITS, 251-300 8 UNITS, 301-350 10 UNITS, 351-400 12 UNITS, OVER 400 15 UNITS AND CONTACT PHYSICIAN 135 mL 3    carvedilol (COREG) 25 MG tablet TAKE 1 TABLET BY MOUTH 2 TIMES A DAY WITH MEALS 180 tablet 3    spironolactone (ALDACTONE) 25 MG tablet TAKE 1 TABLET BY MOUTH ONE TIME A DAY 90 tablet 3    furosemide (LASIX) 20 MG tablet TAKE ONE TABLET BY MOUTH EVERY EVENING 90 tablet 3    furosemide (LASIX) 40 MG tablet TAKE ONE TABLET BY MOUTH EVERY MORNING 90 tablet 3    CPAP Machine MISC by Does not apply route Ramp pressure: 4.0 cm H2O  Treatment pressure: 15.0 cm H2O 1 each 0    Blood Glucose Monitoring Suppl (PRODIGY AUTOCODE BLOOD GLUCOSE) MAUREEN Use to check blood sugar 4 times daily 1 Device 0    omeprazole (PRILOSEC) 40 MG delayed release capsule Take 1 capsule by mouth daily 90 capsule 1    fluticasone (FLONASE) 50 MCG/ACT nasal spray 2 sprays by Nasal route daily 1 Bottle 2    loratadine (CLARITIN) 10 MG tablet Take 10 mg by mouth daily      Respiratory Therapy Supplies (ADULT MASK) MISC Please do mask desensitization and/or provide mask of patient's choice.  1 each 0    nitroGLYCERIN (NITROSTAT) 0.4 MG SL tablet Place 1 tablet under the tongue every 5 minutes as needed for Chest pain up to max of 3 total doses. If no relief after 1 dose, call 911. 25 tablet 1    azithromycin (ZITHROMAX) 250 MG tablet Take 2 tablets (500 mg) on Day 1, followed by 1 tablet (250 mg) once daily on Days 2 through 5. (Patient not taking: Reported on 5/16/2022) 1 packet 0    Insulin Pen Needle (PEN NEEDLES 31GX5/16\") 31G X 8 MM MISC Use to inject insulins and Ozempic 400 each 3    acetaminophen (TYLENOL) 325 MG tablet Take 650 mg by mouth every 8 hours as needed for Pain       No current facility-administered medications for this visit. Allergies: Allergies   Allergen Reactions    Levaquin [Levofloxacin In D5w] Itching       Subjective:    Review of Systems:  Constitutional: Denies any fever, chills, (+) fatigue. Wound: Denies any rash, skin color changes or wound problems. Resp: Denies any cough, (+)shortness of breath. CV: Denies any chest pain, orthopnea or syncope. MS: Denies myalgias, (+)arthralgias. GI: Denies any nausea, vomiting, diarrhea, (+)constipation, melena, hematochezia. (+)reflux  : Denies any hematuria, hesitancy or dysuria. NEURO: Denies seizures, headache. Objective:    /84 (Site: Right Upper Arm, Position: Sitting, Cuff Size: Large Adult)   Pulse 100   Temp 97.3 °F (36.3 °C) (Infrared)   Ht 6' 1.75\" (1.873 m)   Wt (!) 390 lb 6.4 oz (177.1 kg)   BMI 50.46 kg/m²   Physical Examination:   Constitutional: Alert and oriented to person, place and time. Well-developed, well- nourished. Head: Normocephalic and atraumatic  Neck: Supple. Eyes: EOMI b/l. Conjunctivae normal.  No scleral icterus. Respiratory: Effort normal. No respiratory distress. Abd: Benign  Ext:  Movement x 4. No edema  Skin; Warm and dry, no visible rashes, lesions or ulcers.    Neuro: Cranial Nerves Grossly Intact; nml coordination      CBC   Lab Results   Component Value Date    WBC 10.8 03/05/2022    RBC 4.81 03/05/2022    HGB 12.6 03/05/2022    HCT 41.5 03/05/2022    MCV 86.3 03/05/2022    MCH 26.2 03/05/2022    MCHC 30.4 03/05/2022    RDW 15.7 03/15/2017     03/05/2022    MPV 10.1 03/05/2022    RBCMORP NORMAL 03/15/2017    SEGSPCT 68.9 03/05/2022    LABLYMP 20.8 03/05/2022    MONOPCT 5.5 03/05/2022    LABEOS 3.5 03/05/2022    BASO 0.4 03/05/2022    NRBC 0 03/05/2022    ANISOCYTOSIS 1+ 03/15/2017    SEGSABS 7.4 03/05/2022    LYMPHSABS 2.2 03/05/2022    MONOSABS 0.6 03/05/2022    EOSABS 0.4 03/05/2022    BASOSABS 0.0 03/05/2022        BMP/CMP   Lab Results   Component Value Date    GLUCOSE 126 05/12/2022    GLUCOSE 258 12/16/2017    CREATININE 0.7 03/05/2022    BUN 13 03/05/2022     03/05/2022    K 4.3 03/05/2022    K 4.8 04/09/2021     03/05/2022    CO2 28 03/05/2022    CALCIUM 9.3 03/05/2022    AST 14 03/05/2022    ALKPHOS 73 03/05/2022    PROT 6.6 03/05/2022    LABALBU 4.2 03/05/2022    BILITOT 0.3 03/05/2022    ALT 17 03/05/2022        PREALBUMIN   Lab Results   Component Value Date    PREALBUMIN 22.0 03/05/2022        VITAMIN B12   Lab Results   Component Value Date    DQWDEQQI14 258 03/05/2022        VITAMIN D   Lab Results   Component Value Date    VITD25 12 03/05/2022        PTH  Lab Results   Component Value Date    IPTH 30.7 03/05/2022       VITAMIN B1/ THIAMINE   Lab Results   Component Value Date    [id number] 107 03/05/2022        LIPID SCREEN (FASTING)   Lab Results   Component Value Date    CHOL 102 05/12/2022    TRIG 187 05/12/2022    HDL 25 05/12/2022    LDLCALC 40 05/12/2022   ,     HGA1C (T2DM ONLY)   Lab Results   Component Value Date    LABA1C 6.5 05/12/2022    AVGG 150 03/05/2022        TSH   Lab Results   Component Value Date    TSH 2.900 03/05/2022        IRON   Lab Results   Component Value Date    IRON 50 03/05/2022        TIBC  Lab Results   Component Value Date    TIBC 310 03/05/2022       FERRITIN  Lab Results   Component Value Date FERRITIN 141 03/05/2022       VITAMIN A  Lab Results   Component Value Date    RETINOL SEE BELOW 03/05/2022       NICOTINE  No results found for: NMET    UDS  No results found for: UDP    PSA  No results found for: LABPSA    GFR  Lab Results   Component Value Date    LABGLOM >90 03/05/2022       DEXA  No results found for this or any previous visit. Assessment:       Diagnosis Orders   1. BMI 45.0-49.9, adult ([facility]     2. Chronic bilateral low back pain, unspecified whether sciatica present     3. Chronic congestive heart failure, unspecified heart failure type ([facility]     4. Type 2 diabetes mellitus without complication, with long-term current use of insulin ([facility]     5. Hypertension, unspecified type     6. AICD (automatic cardioverter/defibrillator) present     7. Gastroesophageal reflux disease, unspecified whether esophagitis present     8. Paroxysmal atrial fibrillation (HCC)     9. MIKE on CPAP     10. Chronic pain of left knee     11. Hypercholesteremia     12. Vitamin D deficiency  Vitamin D 25 Hydroxy   13. Low iron  Iron    Iron Binding Capacity    Ferritin    CBC with Auto Differential       Plan:    · Behavior modification discussed in detail in regards to dietary habits. · Nutritional education occurred during visit. Continue following recommendations  per dietitian. · Improvement in fitness/exercise discussed with patient and the need for this  with/without surgery. · Cardiac Clearance needed prior to surgery- Dr. Stella Dunn currently scheduled 5/23/22  · Pulmonary Clearance received from Dr. Kalee Mcgrath. · Psychology evaluation with Dr. Leanne Ahuja completed and reviewed. · Physical Therapy referral  · EGD prior to any surgical intervention- initial apt 5/25/22  · Encouraged to attend support groups. · Goal to lose 5% TBW prior to surgery. · Initial labs completed and reviewed. · A1C 6.5 ( 5/12/22)- must remain < 8 prior to surgery. · Iron low at 50- continue 65mg Iron daily.  Order given to repeat level prior to next apt. · Vit D 12- continue weekly Vit D. Take with food. Order given to repeat level once supplement completed. · Try magnesium citrate x 1.  · Continue docusate sodium twice daily  · Drug screen completed and reviewed  · Nicotine (-). Discussed risks of nicotine a/w bariatric surgery. Must be nicotine free at least 90 days prior to surgery. Avoid nicotine life long post-op. · EKG to be completed with cardiologist.   · Will need to be off work for 3-4 weeks post-bariatric surgery. · No lifting/pushing/pulling over 20# for 4 weeks post-op  · No NSAIDS 10 days prior to bariatric surgery. Avoid NSAID use post-op  · Discussed Lovenox post-op bariatric surgery  · LOMN recieved  · Will continue following with multi-disciplinary team in preparation for bariatric surgery with the expectation of lifelong follow-up post-operatively. · No fluid restriction per CHF clinic. Return in about 1 month (around 6/16/2022) for Follow up. I spent over 20 minutes with the patient, with greater that 50% of that time spent on education, counseling and coordination of care.      Electronically signed by BART Kern on 5/16/2022 at 3:41 PM

## 2022-05-16 NOTE — PATIENT INSTRUCTIONS
· Behavior modification discussed in detail in regards to dietary habits. · Nutritional education occurred during visit. Continue following recommendations  per dietitian. · Improvement in fitness/exercise discussed with patient and the need for this  with/without surgery. · Cardiac Clearance needed prior to surgery- Dr. Andrew Phillips currently scheduled 5/23/22  · Pulmonary Clearance received from Dr. Frankie Canales. · Psychology evaluation with Dr. Maxim Hernandez completed and reviewed. · Physical Therapy referral  · EGD prior to any surgical intervention- initial apt 5/25/22  · Encouraged to attend support groups. · Goal to lose 5% TBW prior to surgery. · Initial labs completed and reviewed. · A1C 6.5 ( 5/12/22)- must remain < 8 prior to surgery. · Iron low at 50- continue 65mg Iron daily. Order given to repeat level prior to next apt. · Vit D 12- continue weekly Vit D. Take with food. Order given to repeat level once supplement completed. · Try magnesium citrate x 1.  · Continue docusate sodium twice daily  · Drug screen completed and reviewed  · Nicotine (-). Discussed risks of nicotine a/w bariatric surgery. Must be nicotine free at least 90 days prior to surgery. Avoid nicotine life long post-op. · EKG to be completed with cardiologist.   · Will need to be off work for 3-4 weeks post-bariatric surgery. · No lifting/pushing/pulling over 20# for 4 weeks post-op  · No NSAIDS 10 days prior to bariatric surgery. Avoid NSAID use post-op  · Discussed Lovenox post-op bariatric surgery  · LOMN recieved  · Will continue following with multi-disciplinary team in preparation for bariatric surgery with the expectation of lifelong follow-up post-operatively. · No fluid restriction per CHF clinic.

## 2022-05-23 ENCOUNTER — OFFICE VISIT (OUTPATIENT)
Dept: CARDIOLOGY CLINIC | Age: 47
End: 2022-05-23
Payer: COMMERCIAL

## 2022-05-23 VITALS
HEART RATE: 109 BPM | HEIGHT: 75 IN | SYSTOLIC BLOOD PRESSURE: 104 MMHG | WEIGHT: 315 LBS | BODY MASS INDEX: 39.17 KG/M2 | DIASTOLIC BLOOD PRESSURE: 67 MMHG

## 2022-05-23 DIAGNOSIS — Z01.818 PREOPERATIVE CLEARANCE: ICD-10-CM

## 2022-05-23 DIAGNOSIS — I50.22 CHRONIC SYSTOLIC CONGESTIVE HEART FAILURE (HCC): Primary | ICD-10-CM

## 2022-05-23 PROCEDURE — 99213 OFFICE O/P EST LOW 20 MIN: CPT | Performed by: INTERNAL MEDICINE

## 2022-05-23 PROCEDURE — 93000 ELECTROCARDIOGRAM COMPLETE: CPT | Performed by: INTERNAL MEDICINE

## 2022-05-23 NOTE — PROGRESS NOTES
Pt here for 1 yr office visit needs clearance for bariatric sx with Dr. uYn Hinton skyla TBD    Pt states med list is correct from last appt 5/16/22    EKG done today     Pt states no c/o

## 2022-05-23 NOTE — PROGRESS NOTES
[id number] [facility] [place] [address] [facility] [address]  Dept: [phone]  Dept Fax: [phone]  Loc: [phone]    Visit Date: 5/23/2022    Mr. Ml Gibson is a 55 y.o. male  who presented for:  Chief Complaint   Patient presents with    Check-Up    Cardiomyopathy    Cardiac Clearance    Congestive Heart Failure       HPI:   56 yo M c hx of CHF s/p ICD 1/2015, nonischemic CMP, HTN, is here for a follow up. Overall doing well  Consider weight loss surgery, he is to follow up with bariatric surgery, could get surgery in summer. Denies any chest pain, sob, palpitations, lightheadedness, dizziness, orthopnea, PND or pedal edema. EKg today shows Sinus tachycardia, poor R wave progression, no changes from prior. Cath in the past showed patent coronaries. Current Outpatient Medications:     ferrous sulfate (IRON 325) 325 (65 Fe) MG tablet, Take 325 mg by mouth daily (with breakfast), Disp: , Rfl:     gabapentin (NEURONTIN) 100 MG capsule, TAKE 1 CAPSULE BY MOUTH 3 TIMES A DAY, Disp: 270 capsule, Rfl: 1    azithromycin (ZITHROMAX) 250 MG tablet, Take 2 tablets (500 mg) on Day 1, followed by 1 tablet (250 mg) once daily on Days 2 through 5.  (Patient not taking: Reported on 5/16/2022), Disp: 1 packet, Rfl: 0    insulin glargine (LANTUS SOLOSTAR) 100 UNIT/ML injection pen, Inject 30 Units into the skin nightly, Disp: 10 pen, Rfl: 3    metFORMIN (GLUCOPHAGE-XR) 500 MG extended release tablet, TAKE TWO TABLETS BY MOUTH TWICE A DAY, Disp: 360 tablet, Rfl: 2    ASPIRIN LOW DOSE 81 MG EC tablet, TAKE 1 TABLET BY MOUTH ONE TIME A DAY, Disp: 90 tablet, Rfl: 1    rosuvastatin (CRESTOR) 10 MG tablet, TAKE 1 TABLET BY MOUTH ONE TIME A DAY, Disp: 90 tablet, Rfl: 3    Cholecalciferol (VITAMIN D3) 1.25 MG (26108 UT) CAPS, Take 1 capsule by mouth once a week, Disp: 12 capsule, Rfl: 0    blood glucose test strips (PRODIGY NO CODING BLOOD GLUC) strip, USE TO CHECK BLOOD SUGAR FOUR TIMES A DAY, Disp: 400 strip, Rfl: 3    Prodigy Twist Top Lancets 28G MISC, USE TO TEST FOUR TIMES A DAY, Disp: 400 each, Rfl: 3    irbesartan (AVAPRO) 75 MG tablet, TAKE 1 TABLET BY MOUTH ONE TIME A DAY IN THE EVENING, Disp: 90 tablet, Rfl: 3    carvedilol (COREG) 6.25 MG tablet, TAKE 1 TABLET BY MOUTH 2 TIMES A DAY WITH MEALS, Disp: 180 tablet, Rfl: 3    OZEMPIC, 1 MG/DOSE, 4 MG/3ML SOPN, INJECT 1MG UNDER THE SKIN ONCE A WEEK, Disp: 24 mL, Rfl: 3    insulin lispro, 1 Unit Dial, (HUMALOG KWIKPEN) 100 UNIT/ML SOPN, INJECT 14 UNITS WITH MEALS PLUS SLIDING SCALE --151-200 3 UNITS, 201-250 5 UNITS, 251-300 8 UNITS, 301-350 10 UNITS, 351-400 12 UNITS, OVER 400 15 UNITS AND CONTACT PHYSICIAN, Disp: 135 mL, Rfl: 3    carvedilol (COREG) 25 MG tablet, TAKE 1 TABLET BY MOUTH 2 TIMES A DAY WITH MEALS, Disp: 180 tablet, Rfl: 3    spironolactone (ALDACTONE) 25 MG tablet, TAKE 1 TABLET BY MOUTH ONE TIME A DAY, Disp: 90 tablet, Rfl: 3    furosemide (LASIX) 20 MG tablet, TAKE ONE TABLET BY MOUTH EVERY EVENING, Disp: 90 tablet, Rfl: 3    furosemide (LASIX) 40 MG tablet, TAKE ONE TABLET BY MOUTH EVERY MORNING, Disp: 90 tablet, Rfl: 3    CPAP Machine MISC, by Does not apply route Ramp pressure: 4.0 cm H2O Treatment pressure: 15.0 cm H2O, Disp: 1 each, Rfl: 0    Blood Glucose Monitoring Suppl (PRODIGY AUTOCODE BLOOD GLUCOSE) MAUREEN, Use to check blood sugar 4 times daily, Disp: 1 Device, Rfl: 0    Insulin Pen Needle (PEN NEEDLES 31GX5/16\") 31G X 8 MM MISC, Use to inject insulins and Ozempic, Disp: 400 each, Rfl: 3    omeprazole (PRILOSEC) 40 MG delayed release capsule, Take 1 capsule by mouth daily, Disp: 90 capsule, Rfl: 1    fluticasone (FLONASE) 50 MCG/ACT nasal spray, 2 sprays by Nasal route daily, Disp: 1 Bottle, Rfl: 2    loratadine (CLARITIN) 10 MG tablet, Take 10 mg by mouth daily, Disp: , Rfl:     Respiratory Therapy Supplies (ADULT MASK) MISC, Please do mask desensitization and/or provide mask of patient's choice. , Disp: 1 each, Rfl: 0    acetaminophen (TYLENOL) 325 MG tablet, Take 650 mg by mouth every 8 hours as needed for Pain, Disp: , Rfl:     nitroGLYCERIN (NITROSTAT) 0.4 MG SL tablet, Place 1 tablet under the tongue every 5 minutes as needed for Chest pain up to max of 3 total doses. If no relief after 1 dose, call 911., Disp: 25 tablet, Rfl: 1    Past Medical History  Roz Joseph  has a past medical history of CHF (congestive heart failure) ([facility] [address] Diabetes mellitus ([facility] [address] GERD (gastroesophageal reflux disease), Hypercholesteremia, S/P ICD (internal cardiac defibrillator) procedure: 1/15/2015: Medtronic Single Chamber, Sleep apnea, and Zoll lifevest applied 8/22/14. Social History  Roz Joseph  reports that he has never smoked. He has never used smokeless tobacco. He reports that he does not drink alcohol and does not use drugs. Family History  Roz Joseph family history includes Arthritis in his maternal grandfather and mother; Asthma in his brother and maternal grandfather; Diabetes in his maternal grandfather; Heart Disease in his maternal grandfather, maternal grandmother, maternal uncle, and paternal uncle; Hypertension in his father; Stroke in his maternal grandmother. Past Surgical History   Past Surgical History:   Procedure Laterality Date    [facility]  9/[facility]    CARDIAC DEFIBRILLATOR PLACEMENT  2014    CARDIAC DEFIBRILLATOR PLACEMENT      CARDIAC DEFIBRILLATOR PLACEMENT      EYE SURGERY      Lasix     VASECTOMY  2004       Subjective:     REVIEW OF SYSTEMS  Constitutional: denies sweats, chills and fever  HENT: denies  congestion, sinus pressure, sneezing and sore throat. Eyes: denies  pain, discharge, redness and itching. Respiratory: denies apnea, cough  Gastrointestinal: denies blood in stool, constipation, diarrhea   Endocrine: denies cold intolerance, heat intolerance, polydipsia.   Genitourinary: denies dysuria, enuresis, flank pain and hematuria. Musculoskeletal: denies arthralgias, joint swelling and neck pain. Neurological: denies numbness and headaches. Psychiatric/Behavioral: denies agitation, confusion, decreased concentration and dysphoric mood    All others reviewed and are negative. Objective:     /67   Pulse 109   Ht 6' 3\" (1.905 m)   Wt (!) 386 lb (175.1 kg)   BMI 48.25 kg/m²     Wt Readings from Last 3 Encounters:   05/23/22 (!) 386 lb (175.1 kg)   05/16/22 (!) 390 lb 6.4 oz (177.1 kg)   04/19/22 (!) 394 lb 3.2 oz (178.8 kg)     BP Readings from Last 3 Encounters:   05/23/22 104/67   05/16/22 122/84   04/19/22 130/82       PHYSICAL EXAM  Constitutional: Oriented to person, place, and time. Appears well-developed and well-nourished. HENT:   Head: Normocephalic and atraumatic. Eyes: EOM are normal. Pupils are equal, round, and reactive to light. Neck: Normal range of motion. Neck supple. No JVD present. Cardiovascular: Normal rate , normal heart sounds and intact distal pulses. Pulmonary/Chest: Effort normal and breath sounds normal. No respiratory distress. No wheezes. No rales. Abdominal: Soft. Bowel sounds are normal. No distension. There is no tenderness. Musculoskeletal: Normal range of motion. No edema. Neurological: Alert and oriented to person, place, and time. No cranial nerve deficit. Coordination normal.   Skin: Skin is warm and dry. Psychiatric: Normal mood and affect.        No results found for: CKTOTAL, CKMB, CKMBINDEX    Lab Results   Component Value Date    WBC 10.8 03/05/2022    RBC 4.81 03/05/2022    HGB 12.6 03/05/2022    HCT 41.5 03/05/2022    MCV 86.3 03/05/2022    MCH 26.2 03/05/2022    MCHC 30.4 03/05/2022    RDW 15.7 03/15/2017     03/05/2022    MPV 10.1 03/05/2022       Lab Results   Component Value Date     03/05/2022    K 4.3 03/05/2022    K 4.8 04/09/2021     03/05/2022    CO2 28 03/05/2022    BUN 13 03/05/2022    LABALBU 4.2 03/05/2022    CREATININE 0.7 03/05/2022    CALCIUM 9.3 03/05/2022    LABGLOM >90 03/05/2022    GLUCOSE 126 05/12/2022    GLUCOSE 258 12/16/2017       Lab Results   Component Value Date    ALKPHOS 73 03/05/2022    ALT 17 03/05/2022    AST 14 03/05/2022    PROT 6.6 03/05/2022    BILITOT 0.3 03/05/2022    BILIDIR <0.2 11/13/2020    LABALBU 4.2 03/05/2022       Lab Results   Component Value Date    MG 1.9 03/26/2021       Lab Results   Component Value Date    INR 1.17 (H) 03/05/2022    INR 1.09 04/09/2021    INR 1.08 11/13/2020         Lab Results   Component Value Date    LABA1C 6.5 05/12/2022       Lab Results   Component Value Date    TRIG 187 05/12/2022    HDL 25 05/12/2022    LDLCALC 40 05/12/2022       Lab Results   Component Value Date    TSH 2.900 03/05/2022         Testing Reviewed:      I haveindividually reviewed the below cardiac tests    EKG:    ECHO: Results for orders placed during the hospital encounter of 11/13/20    ECHO Complete 2D W Doppler W Color    Narrative  Transthoracic Echocardiography Report (TTE)    Demographics    Patient Name  [facility]       Gender             Male  Keren Alexander    MR #          [id number]      Race                   Ethnicity    Account #     [de-identified]      Room Number        0025    Accession     [id number]     Date of Study      11/14/2020  Number    Date of Birth 1975     Referring          Bartolome Carrillo MD  Physician          [facility]    Age           39 year(s)     Sonographer        ARVIND Payan, RDCS,  RDMS, RVT    Interpreting       Aleshia Marrero MD  Physician    Procedure    Type of Study    TTE procedure:ECHOCARDIOGRAM COMPLETE 2D W DOPPLER W COLOR. Procedure Date  Date: 11/14/2020 Start: 08:19 AM    Study Location: Bedside  Technical Quality: Limited visualization due to body habitus. Indications:AICD Discharge.     Additional Medical History:AICD discharge, congestive heart failure,  cardiomyopathy, morbid obesity, hypertenison, sleep apnea, AICD    Patient Status: Routine    Height: 75 inches Weight: 399.01 pounds BSA: 2.94 m^2 BMI: 49.87 kg/m^2    BP: 131/88 mmHg    Conclusions    Summary  Technically difficult study due to poor acoustic windows. Left ventricular size is normal and systolic function is moderate to  severely reduced. Ejection fraction was estimated at 30-35%. LV wall  thickness is within normal limits. The left atrium is Mildly dilated. Mild mitral regurgitation is present. Signature    ----------------------------------------------------------------  Electronically signed by Zoë Durand MD (Interpreting  physician) on 11/14/2020 at 01:24 PM  ----------------------------------------------------------------    Findings    Mitral Valve  The mitral valve was not well visualized . Mild mitral regurgitation is present. Aortic Valve  The aortic valve leaflets were not well visualized. DOPPLER: Transaortic velocity was within the normal range with no evidence  of aortic stenosis. There was no evidence of aortic regurgitation. Tricuspid Valve  Tricuspid valve was not well visualized. Mild tricuspid regurgitation visualized. Pulmonic Valve  The pulmonic valve was not well visualized . Left Atrium  The left atrium is Mildly dilated. Left Ventricle  Left ventricular size is normal and systolic function is moderate to  severely reduced. Ejection fraction was estimated at 30-35%. LV wall  thickness is within normal limits. Right Atrium  Right atrial size was normal.    Right Ventricle  The right ventricular size was normal with normal systolic function and  wall thickness. Pacer Wire visualized in right ventricle. Pericardial Effusion  The pericardium was normal in appearance with no evidence of a pericardial  effusion. Pleural Effusion  No evidence of pleural effusion. Aorta / Great Vessels  -Aortic root dimension within normal limits.  -The Pulmonary artery is within normal limits.   -IVC size is within normal limits with normal respiratory phasic changes. M-Mode/2D Measurements & Calculations    LV Diastolic   LV Systolic Dimension:    AV Cusp Separation: 1.5 cmLA  Dimension: 6.7 5.6 cm                    Dimension: 4.6 cmAO Root  cm             LV Volume Diastolic:      Dimension: 2.7 cmLA Area: 25.9  LV FS:16.4 %   168.1 ml                  cm^2  LV PW          LV Volume Systolic: 510.8  Diastolic: 1.1 ml  cm             LV EDV/LV EDV Index:  Septum         168.1 ml/57 m^2LV ESV/LV  RV Diastolic Dimension: 3.3 cm  Diastolic: 0.7 ESV Index: 832.5 ml/39  cm             m^2                       LA/Aorta: 1.7  EF Calculated: 32.2 %     Ascending Aorta: 3 cm  LA volume/Index: 85.5 ml /29m^2  LV Length: 9.8 cm  LV Area  Diastolic:  07.3 cm^2  LV Area  Systolic: 88.8  cm^2    Doppler Measurements & Calculations    MV Peak E-Wave: 109 cm/s AV Peak Velocity: 125  LVOT Peak Velocity: 77.1  cm/s                   cm/s  MV Peak Gradient: 4.75   AV Peak Gradient: 6.25 LVOT Peak Gradient: 2 mmHg  mmHg                     mmHg  TV Peak E-Wave: 82.6 cm/s  MV Deceleration Time:  127 msec                                        TV Peak Gradient: 2.73  mmHg  IVRT: 63 msec          TR Velocity:266 cm/s  TR Gradient:28.3 mmHg  PV Peak Velocity: 63 cm/s  AV DVI (Vmax):0.62     PV Peak Gradient: 1.59  MR Velocity: 352 cm/s                           mmHg    [id number]      STRESS:    CATH:    Assessment/Plan       Diagnosis Orders   1. Chronic systolic congestive heart failure (HCC)  EKG 12 lead   2.  Preoperative clearance  EKG 12 lead       Nonischemic cardiomyopathy with EF 30%  Preop risk stratification for bariatric surgery  ICD discharge for ventricular tachycardia 11/2020  Diabetes  Morbid obesity  Hypertension  MIKE on CPAP     Consider weight loss surgery  Doing well from cardiac standpoint  BMI 48  Recent device check was within normal  Doing well since last visit, no symptoms  May proceed with scheduled surgery at low to moderate risk  Advised weight loss  Continue Coreg, Cardizem, Lasix, Aldactone. Continue monitoring heart failure symptoms and blood pressure at home  Continue rest of medications  The patient is asked to make an attempt to improve diet and exercise patterns to aid in medical management of this problem. Advised more plant based nutrition/meditarrean diet   Advised patient to call office or seek immediate medical attention if there is any new onset of  any chest pain, sob, palpitations, lightheadedness, dizziness, orthopnea, PND or pedal edema. All medication side effects were discussed in details.     Thank youfor allowing me to participate in the care of this patient. Please do not hesitate to contact me for any further questions. Return in about 1 year (around 5/23/2023), or if symptoms worsen or fail to improve, for Review testing, Regular follow up.        Electronically signed by Geovanna Jacob MD [facility]  5/23/2022 at 3:46 PM EDT

## 2022-06-07 NOTE — PROGRESS NOTES
Assessment: Patient is a 55 y.o. male seen for month four follow up MNT visit for pre op bariatric surgery desires bypass. This is a virtual visit per patient request- difficulty with connection and switched to phone visit instead. Vitals from current and previous visits:    Vitals 6/37/9052   SYSTOLIC 242   DIASTOLIC 84   Site Right Upper Arm   Position Sitting   Cuff Size Large Adult   Pulse 100   Temp 97.3   Resp    SpO2    Weight 390 lb 6.4 oz   Height 6' 1.75\"   Body mass index 50.46 kg/m2   Pain Level    Waist (Inches)    Neck circumference (Inches)      Initial weight at start of Weight Management Program was: 400 lbs     Lui Floyd  Lost 5 lbs over one month. Pt reports stated weight of 385 lbs  at [facility] consultation  -Weight goal: lose weight.     -Nutritionally relevant labs: A1C-7.0%, B1-107, glucose-127, iron low-50, low h/h   Lab Results   Component Value Date/Time    LABA1C 6.5 (H) 05/12/2022 10:30 AM    LABA1C 7.0 (H) 03/05/2022 07:30 AM    LABA1C 7.5 (H) 12/23/2021 06:09 AM    GLUCOSE 126 (H) 05/12/2022 10:30 AM    GLUCOSE 127 (H) 03/05/2022 07:29 AM    GLUCOSE 258 (H) 12/16/2017 08:25 AM    CHOL 102 05/12/2022 10:30 AM    HDL 25 (L) 05/12/2022 10:30 AM    LDLCALC 40 05/12/2022 10:30 AM    TRIG 187 05/12/2022 10:30 AM                  Lab Results   Component Value Date    VITD25 12 03/05/2022     Dr Pardeep Mayorga clearance obtained  Pulmonary clearance obtained  EGD is 6/27/22 with Dr Bishop Foote  Cardiology clearance obtained    - Is patient taking daily Multivitamin:   Iron 65 mg iron daily Natures Truth for low level- causing constipation-every other day. weekly Vitamin D3 50,000IUS for low level. Pt plans to get levels repeated by end of Month    -Food Recall:  States meal times have been staying pretty consistent during the week and on weekends. Denies feeling hungry with exception when was on event and went longer period without anything to eat.   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 Breakfast: 5am- deli slices ham and yogurt  9am- string cheese stick, yogurt  Lunch: 11:30am-12pm --  Dinner: 5pm-  Cooking meals at home  Snack: Denies much snacking in evening. Mid- afternoon- may have pepperoni slices    Drinks throughout the day:  Propel or  Powerade zero at times, . 2 Liters plain water per day ( two 33 oz bottles) and two more 20 oz water. sugar free lemonade, stopped all diet pop, 10 oz coffee M-F    -Impression of Dietary Intake: monitoring portion sizes, mindful eating - Pt  is working towards avoiding high fat/high sugar foods. Pt  is working towards  including protein at meals and snacks. Exercise:  Patient has option to view online fitness Orientation video  -Physical Activity is:  Walking on commercial breaks two six minute breakfast every hour-. Walking outside 10-15 minutes once a week- encouraged pt to increase this to twice a week, crunches every other day    Nutrition Diagnosis: Overweight/Obesity related to currently undergoing MNT as evidenced by BMI of 48    Intervention:   Healthy behaviors: Pt remains engaged in program.   Patient has attended support groups via You Tube times two  Patient continues with good effort towards nutrition behaviors recommended for bariatric surgery. Monthly goals reviewed and sent to patient via My Chart. Positive reinforcement given. Patient Instructions   Goals:  1.  Nutrition Goal: Continue to aim for regular meal times. Remember order you want to eat- choose lean protein food first, followed by vegetables and fruit, then starch food last if still hungry  2.  Water Goal:  Great job with water intake- continue at least two liters water a day.    3.  Exercise Goal:  Continue walking outside at least twice a week or greater. Also continue six minute walk twice every hour when working.  Continue doing crunches every other day   4.   Get the lab work done that was ordered for you in the next 1-2 weeks           -Followup visit: 7/15/22 and will review out of pocket cost for bariatric vitamins and protein powder        Shiloh Stockton, RD, LD,   Dietitian- Weight Management [address]

## 2022-06-08 ENCOUNTER — TELEMEDICINE (OUTPATIENT)
Dept: BARIATRICS/WEIGHT MGMT | Age: 47
End: 2022-06-08

## 2022-06-08 NOTE — PATIENT INSTRUCTIONS
Goals: 1.  Nutrition Goal: Continue to aim for regular meal times. Remember order you want to eat- choose lean protein food first, followed by vegetables and fruit, then starch food last if still hungry  2.  Water Goal:  Great job with water intake- continue at least two liters water a day.    3.  Exercise Goal:  Continue walking outside at least twice a week or greater. Also continue six minute walk twice every hour when working.  Continue doing crunches every other day   4.   Get the lab work done that was ordered for you in the next 1-2 weeks

## 2022-06-13 ENCOUNTER — PROCEDURE VISIT (OUTPATIENT)
Dept: CARDIOLOGY CLINIC | Age: 47
End: 2022-06-13
Payer: COMMERCIAL

## 2022-06-13 DIAGNOSIS — Z95.810 ICD (IMPLANTABLE CARDIOVERTER-DEFIBRILLATOR) IN PLACE: Primary | ICD-10-CM

## 2022-06-13 PROCEDURE — 93295 DEV INTERROG REMOTE 1/2/MLT: CPT | Performed by: INTERNAL MEDICINE

## 2022-06-13 PROCEDURE — 93296 REM INTERROG EVL PM/IDS: CPT | Performed by: INTERNAL MEDICINE

## 2022-06-13 NOTE — PROGRESS NOTES
[facility] medtronic dual icd     . [place] Battery longevity:  10 years   Presenting rhythm  AS VS   optivol mildly elevated, will continue to monitor     Atrial impedance 418  RV impedance 361  Shock 49  SVC 56    P wave sensing 2.8  R wave sensing 5.9    0.1 % atrial paced  0.4 % RV paced     Atrial threshold 0.875 V  at 0.4ms  RV threshold 1 V at 0.4ms  Mode switches   0

## 2022-06-16 ENCOUNTER — OFFICE VISIT (OUTPATIENT)
Dept: FAMILY MEDICINE CLINIC | Age: 47
End: 2022-06-16
Payer: COMMERCIAL

## 2022-06-16 VITALS
TEMPERATURE: 98.2 F | DIASTOLIC BLOOD PRESSURE: 80 MMHG | BODY MASS INDEX: 48.37 KG/M2 | SYSTOLIC BLOOD PRESSURE: 118 MMHG | WEIGHT: 315 LBS | HEART RATE: 88 BPM

## 2022-06-16 DIAGNOSIS — I42.8 NONISCHEMIC CARDIOMYOPATHY (HCC): ICD-10-CM

## 2022-06-16 DIAGNOSIS — Z99.89 OSA ON CPAP: ICD-10-CM

## 2022-06-16 DIAGNOSIS — E66.01 MORBID OBESITY WITH BMI OF 50.0-59.9, ADULT (HCC): ICD-10-CM

## 2022-06-16 DIAGNOSIS — Z00.00 ROUTINE GENERAL MEDICAL EXAMINATION AT A HEALTH CARE FACILITY: Primary | ICD-10-CM

## 2022-06-16 DIAGNOSIS — G47.33 OSA ON CPAP: ICD-10-CM

## 2022-06-16 DIAGNOSIS — Z95.810 AICD (AUTOMATIC CARDIOVERTER/DEFIBRILLATOR) PRESENT: ICD-10-CM

## 2022-06-16 PROCEDURE — 99396 PREV VISIT EST AGE 40-64: CPT | Performed by: FAMILY MEDICINE

## 2022-06-16 ASSESSMENT — PATIENT HEALTH QUESTIONNAIRE - PHQ9
SUM OF ALL RESPONSES TO PHQ QUESTIONS 1-9: 0
SUM OF ALL RESPONSES TO PHQ QUESTIONS 1-9: 0
1. LITTLE INTEREST OR PLEASURE IN DOING THINGS: 0
SUM OF ALL RESPONSES TO PHQ9 QUESTIONS 1 & 2: 0
2. FEELING DOWN, DEPRESSED OR HOPELESS: 0
SUM OF ALL RESPONSES TO PHQ QUESTIONS 1-9: 0
SUM OF ALL RESPONSES TO PHQ QUESTIONS 1-9: 0

## 2022-06-17 ASSESSMENT — ENCOUNTER SYMPTOMS
EYE PAIN: 0
SHORTNESS OF BREATH: 0
ABDOMINAL PAIN: 0
SORE THROAT: 0
NAUSEA: 0
COUGH: 0
SINUS PRESSURE: 0
CONSTIPATION: 0
RHINORRHEA: 0
ABDOMINAL DISTENTION: 0
DIARRHEA: 0

## 2022-06-17 NOTE — PROGRESS NOTES
06/09/2017    Dr.Rovner Kendell Duron lifevest applied 8/22/14 8/29/2014    returned prior to ICD insertion 1/15      Past Surgical History:   Procedure Laterality Date    CARDIAC CATHETERIZATION  9/2014    [address] [place] [address]      EYE SURGERY      Lasix     VASECTOMY  2004     Family History   Problem Relation Age of Onset    Arthritis Mother     Hypertension Father     Heart Disease Maternal Uncle     Heart Disease Maternal Grandfather     Asthma Maternal Grandfather     Diabetes Maternal Grandfather     Arthritis Maternal Grandfather     Heart Disease Paternal Uncle     Heart Disease Maternal Grandmother     Stroke Maternal Grandmother     Asthma Brother     Colon Cancer Neg Hx     Colon Polyps Neg Hx     Esophageal Cancer Neg Hx      Social History     Tobacco Use    Smoking status: Never Smoker    Smokeless tobacco: Never Used   Substance Use Topics    Alcohol use: Never     Alcohol/week: 18.0 standard drinks     Types: 18 Cans of beer per week      Current Outpatient Medications   Medication Sig Dispense Refill    ferrous sulfate (IRON 325) 325 (65 Fe) MG tablet Take 325 mg by mouth daily (with breakfast)      gabapentin (NEURONTIN) 100 MG capsule TAKE 1 CAPSULE BY MOUTH 3 TIMES A  capsule 1    insulin glargine (LANTUS SOLOSTAR) 100 UNIT/ML injection pen Inject 30 Units into the skin nightly 10 pen 3    metFORMIN (GLUCOPHAGE-XR) 500 MG extended release tablet TAKE TWO TABLETS BY MOUTH TWICE A  tablet 2    ASPIRIN LOW DOSE 81 MG EC tablet TAKE 1 TABLET BY MOUTH ONE TIME A DAY 90 tablet 1    rosuvastatin (CRESTOR) 10 MG tablet TAKE 1 TABLET BY MOUTH ONE TIME A DAY 90 tablet 3    Cholecalciferol (VITAMIN D3) 1.25 MG (02529 UT) CAPS Take 1 capsule by mouth once a week 12 capsule 0    blood glucose test strips (PRODIGY NO CODING BLOOD GLUC) strip USE TO CHECK BLOOD SUGAR FOUR TIMES A  strip 3    Prodigy Twist Top Lancets 28G MISC USE TO TEST FOUR TIMES A  each 3    irbesartan (AVAPRO) 75 MG tablet TAKE 1 TABLET BY MOUTH ONE TIME A DAY IN THE EVENING 90 tablet 3    carvedilol (COREG) 6.25 MG tablet TAKE 1 TABLET BY MOUTH 2 TIMES A DAY WITH MEALS 180 tablet 3    OZEMPIC, 1 MG/DOSE, 4 MG/3ML SOPN INJECT 1MG UNDER THE SKIN ONCE A WEEK 24 mL 3    insulin lispro, 1 Unit Dial, (HUMALOG KWIKPEN) 100 UNIT/ML SOPN INJECT 14 UNITS WITH MEALS PLUS SLIDING SCALE --151-200 3 UNITS, 201-250 5 UNITS, 251-300 8 UNITS, 301-350 10 UNITS, 351-400 12 UNITS, OVER 400 15 UNITS AND CONTACT PHYSICIAN 135 mL 3    carvedilol (COREG) 25 MG tablet TAKE 1 TABLET BY MOUTH 2 TIMES A DAY WITH MEALS 180 tablet 3    spironolactone (ALDACTONE) 25 MG tablet TAKE 1 TABLET BY MOUTH ONE TIME A DAY 90 tablet 3    furosemide (LASIX) 20 MG tablet TAKE ONE TABLET BY MOUTH EVERY EVENING 90 tablet 3    furosemide (LASIX) 40 MG tablet TAKE ONE TABLET BY MOUTH EVERY MORNING 90 tablet 3    CPAP Machine MISC by Does not apply route Ramp pressure: 4.0 cm H2O  Treatment pressure: 15.0 cm H2O 1 each 0    Blood Glucose Monitoring Suppl (PRODIGY AUTOCODE BLOOD GLUCOSE) MAUREEN Use to check blood sugar 4 times daily 1 Device 0    Insulin Pen Needle (PEN NEEDLES 31GX5/16\") 31G X 8 MM MISC Use to inject insulins and Ozempic 400 each 3    omeprazole (PRILOSEC) 40 MG delayed release capsule Take 1 capsule by mouth daily 90 capsule 1    fluticasone (FLONASE) 50 MCG/ACT nasal spray 2 sprays by Nasal route daily 1 Bottle 2    loratadine (CLARITIN) 10 MG tablet Take 10 mg by mouth daily      Respiratory Therapy Supplies (ADULT MASK) MISC Please do mask desensitization and/or provide mask of patient's choice.  1 each 0    acetaminophen (TYLENOL) 325 MG tablet Take 650 mg by mouth every 8 hours as needed for Pain      nitroGLYCERIN (NITROSTAT) 0.4 MG SL tablet Place 1 tablet under the tongue every 5 minutes as needed for Chest pain up to max of 3 total doses. If no relief after 1 dose, call 320. 87 tablet 1     No current facility-administered medications for this visit. Allergies   Allergen Reactions    Levaquin [Levofloxacin In D5w] Itching     Health Maintenance   Topic Date Due    Diabetic foot exam  Never done    HIV screen  Never done    Hepatitis C screen  Never done    Hepatitis B vaccine (1 of 3 - Risk 3-dose series) Never done    Pneumococcal 0-64 years Vaccine (2 - PCV) 08/17/2021    Diabetic retinal exam  04/24/2022    Diabetic microalbuminuria test  12/23/2022    A1C test (Diabetic or Prediabetic)  05/12/2023    Lipids  05/12/2023    Depression Screen  06/16/2023    Colorectal Cancer Screen  01/14/2025    DTaP/Tdap/Td vaccine (2 - Td or Tdap) 08/17/2030    Flu vaccine  Completed    COVID-19 Vaccine  Completed    Hepatitis A vaccine  Aged Out    Hib vaccine  Aged Out    Meningococcal (ACWY) vaccine  Aged Out         Objective:     Physical Exam  Constitutional:       General: He is not in acute distress. Appearance: He is well-developed. He is not diaphoretic. HENT:      Head: Normocephalic and atraumatic. Right Ear: External ear normal.      Left Ear: External ear normal.   Eyes:      Conjunctiva/sclera: Conjunctivae normal.   Neck:      Vascular: No JVD. Cardiovascular:      Rate and Rhythm: Normal rate and regular rhythm. Heart sounds: Normal heart sounds. Pulmonary:      Effort: Pulmonary effort is normal.      Breath sounds: Normal breath sounds. No wheezing or rales. Musculoskeletal:         General: Tenderness present. Comments: Minimal right shoulder tenderness with Alvarez and Neer's impingement sign, no rotator cuff weakness   Skin:     General: Skin is warm and dry. Coloration: Skin is not pale. Neurological:      Mental Status: He is alert and oriented to person, place, and time.        /80   Pulse 88   Temp 98.2 °F (36.8 °C)   Wt (!) 387 lb (175.5 kg)   BMI 48.37 kg/m²       Impression/Plan:  1. Routine general medical examination at a health care facility    2. MIKE on CPAP    3. Nonischemic cardiomyopathy ([facility] [address]    4. Morbid obesity with BMI of 50.0-59.9, adult ([facility] [address]    5. AICD (automatic cardioverter/defibrillator) present      Requested Prescriptions      No prescriptions requested or ordered in this encounter     No orders of the defined types were placed in this encounter. Seen today for wellness visit. Discussed the importance of a healthy life style. Balanced diet, nutrition, physical activity,and injury prevention. Also discussed the importance of up to date immunizations and annual screenings. Patient giveneducational materials - see patient instructions. Discussed use, benefit, and side effects of prescribed medications. All patient questions answered. Pt voiced understanding. Reviewed health maintenance. Patient agreedwith treatment plan. Follow up as directed. **This report has been created using voice recognition software. It may contain minor errorswhich are inherent in voice recognition technology. **       Electronically signed by Felicitas Contreras MD on 6/17/2022 at 5:45 AM

## 2022-06-18 ENCOUNTER — NURSE ONLY (OUTPATIENT)
Dept: LAB | Age: 47
End: 2022-06-18

## 2022-06-18 DIAGNOSIS — E55.9 VITAMIN D DEFICIENCY: ICD-10-CM

## 2022-06-18 DIAGNOSIS — E61.1 LOW IRON: ICD-10-CM

## 2022-06-18 LAB
BASOPHILS # BLD: 0.4 %
BASOPHILS ABSOLUTE: 0 THOU/MM3 (ref 0–0.1)
EOSINOPHIL # BLD: 2.5 %
EOSINOPHILS ABSOLUTE: 0.3 THOU/MM3 (ref 0–0.4)
ERYTHROCYTE [DISTWIDTH] IN BLOOD BY AUTOMATED COUNT: 15.1 % (ref 11.5–14.5)
ERYTHROCYTE [DISTWIDTH] IN BLOOD BY AUTOMATED COUNT: 45.8 FL (ref 35–45)
FERRITIN: 110 NG/ML (ref 22–322)
HCT VFR BLD CALC: 42.1 % (ref 42–52)
HEMOGLOBIN: 12.8 GM/DL (ref 14–18)
IMMATURE GRANS (ABS): 0.09 THOU/MM3 (ref 0–0.07)
IMMATURE GRANULOCYTES: 0.8 %
IRON: 49 UG/DL (ref 65–195)
LYMPHOCYTES # BLD: 20 %
LYMPHOCYTES ABSOLUTE: 2.2 THOU/MM3 (ref 1–4.8)
MCH RBC QN AUTO: 25.6 PG (ref 26–33)
MCHC RBC AUTO-ENTMCNC: 30.4 GM/DL (ref 32.2–35.5)
MCV RBC AUTO: 84.2 FL (ref 80–94)
MONOCYTES # BLD: 4.6 %
MONOCYTES ABSOLUTE: 0.5 THOU/MM3 (ref 0.4–1.3)
NUCLEATED RED BLOOD CELLS: 0 /100 WBC
PLATELET # BLD: 235 THOU/MM3 (ref 130–400)
PMV BLD AUTO: 10 FL (ref 9.4–12.4)
RBC # BLD: 5 MILL/MM3 (ref 4.7–6.1)
SEG NEUTROPHILS: 71.7 %
SEGMENTED NEUTROPHILS ABSOLUTE COUNT: 7.7 THOU/MM3 (ref 1.8–7.7)
TOTAL IRON BINDING CAPACITY: 309 UG/DL (ref 171–450)
VITAMIN D 25-HYDROXY: 30 NG/ML (ref 30–100)
WBC # BLD: 10.8 THOU/MM3 (ref 4.8–10.8)

## 2022-06-27 ENCOUNTER — ANESTHESIA EVENT (OUTPATIENT)
Dept: ENDOSCOPY | Age: 47
End: 2022-06-27
Payer: COMMERCIAL

## 2022-06-27 ENCOUNTER — ANESTHESIA (OUTPATIENT)
Dept: ENDOSCOPY | Age: 47
End: 2022-06-27
Payer: COMMERCIAL

## 2022-06-27 ENCOUNTER — HOSPITAL ENCOUNTER (OUTPATIENT)
Age: 47
Setting detail: OUTPATIENT SURGERY
Discharge: HOME | End: 2022-06-27
Attending: INTERNAL MEDICINE | Admitting: INTERNAL MEDICINE
Payer: COMMERCIAL

## 2022-06-27 VITALS
WEIGHT: 315 LBS | HEIGHT: 75 IN | HEART RATE: 100 BPM | OXYGEN SATURATION: 96 % | BODY MASS INDEX: 39.17 KG/M2 | DIASTOLIC BLOOD PRESSURE: 83 MMHG | TEMPERATURE: 97.4 F | RESPIRATION RATE: 18 BRPM | SYSTOLIC BLOOD PRESSURE: 129 MMHG

## 2022-06-27 PROCEDURE — 2720000010 HC SURG SUPPLY STERILE: Performed by: INTERNAL MEDICINE

## 2022-06-27 PROCEDURE — 3700000001 HC ADD 15 MINUTES (ANESTHESIA): Performed by: INTERNAL MEDICINE

## 2022-06-27 PROCEDURE — 2580000003 HC RX 258: Performed by: NURSE ANESTHETIST, CERTIFIED REGISTERED

## 2022-06-27 PROCEDURE — 6360000002 HC RX W HCPCS: Performed by: NURSE ANESTHETIST, CERTIFIED REGISTERED

## 2022-06-27 PROCEDURE — 3700000000 HC ANESTHESIA ATTENDED CARE: Performed by: INTERNAL MEDICINE

## 2022-06-27 PROCEDURE — 3609012400 HC EGD TRANSORAL BIOPSY SINGLE/MULTIPLE: Performed by: INTERNAL MEDICINE

## 2022-06-27 PROCEDURE — 88305 TISSUE EXAM BY PATHOLOGIST: CPT

## 2022-06-27 PROCEDURE — 2500000003 HC RX 250 WO HCPCS: Performed by: NURSE ANESTHETIST, CERTIFIED REGISTERED

## 2022-06-27 PROCEDURE — 7100000010 HC PHASE II RECOVERY - FIRST 15 MIN: Performed by: INTERNAL MEDICINE

## 2022-06-27 RX ORDER — PROPOFOL 10 MG/ML
INJECTION, EMULSION INTRAVENOUS PRN
Status: DISCONTINUED | OUTPATIENT
Start: 2022-06-27 | End: 2022-06-27 | Stop reason: SDUPTHER

## 2022-06-27 RX ORDER — LIDOCAINE HYDROCHLORIDE 20 MG/ML
INJECTION, SOLUTION INFILTRATION; PERINEURAL PRN
Status: DISCONTINUED | OUTPATIENT
Start: 2022-06-27 | End: 2022-06-27 | Stop reason: SDUPTHER

## 2022-06-27 RX ORDER — SODIUM CHLORIDE 9 MG/ML
INJECTION, SOLUTION INTRAVENOUS CONTINUOUS PRN
Status: DISCONTINUED | OUTPATIENT
Start: 2022-06-27 | End: 2022-06-27 | Stop reason: SDUPTHER

## 2022-06-27 RX ADMIN — LIDOCAINE HYDROCHLORIDE 100 MG: 20 INJECTION, SOLUTION INFILTRATION; PERINEURAL at 11:00

## 2022-06-27 RX ADMIN — PROPOFOL 300 MG: 10 INJECTION, EMULSION INTRAVENOUS at 11:00

## 2022-06-27 RX ADMIN — SODIUM CHLORIDE: 9 INJECTION, SOLUTION INTRAVENOUS at 10:59

## 2022-06-27 ASSESSMENT — PAIN - FUNCTIONAL ASSESSMENT: PAIN_FUNCTIONAL_ASSESSMENT: NONE - DENIES PAIN

## 2022-06-27 NOTE — ANESTHESIA POSTPROCEDURE EVALUATION
Department of Anesthesiology  Postprocedure Note    Patient: Al Dudley  MRN: [id number]  YOB: 1975  Date of evaluation: 6/27/2022      Procedure Summary     Date: 06/27/22 Room / Location: [address] [place] [address] 12 / [address] [facility]    Anesthesia Start: 1059 Anesthesia Stop: 1119    Procedure: EGD BIOPSY (N/A ) Diagnosis:       Gastroesophageal reflux disease without esophagitis      (gerd without esophagitis, weight loss)    Surgeons: Marquita Martínez MD Responsible Provider: Ciara Salas DO    Anesthesia Type: MAC ASA Status: 4          Anesthesia Type: No value filed.     Jarod Phase I: Jarod Score: 10    Jarod Phase II:        Anesthesia Post Evaluation    Patient location during evaluation: bedside  Patient participation: complete - patient participated  Level of consciousness: awake  Airway patency: patent  Nausea & Vomiting: no vomiting and no nausea  Complications: no  Cardiovascular status: hemodynamically stable  Respiratory status: acceptable  Hydration status: stable

## 2022-06-27 NOTE — OP NOTE
[address] [place] [address]                                OPERATIVE REPORT    PATIENT NAME: Nemo Wilson                  :        1975  MED REC NO:   [id number]                           ROOM:  ACCOUNT NO:   [de-identified]                           ADMIT DATE: 2022  PROVIDER:     Jeffery Dc M.D.    DATE OF PROCEDURE:  2022    SURGEON:  Jeffery Dc MD    INDICATION:  The patient with history of morbid obesity and gastric  reflux. Future plan for gastric bypass surgery. Plan today for upper  endoscopy to evaluate. ASA CLASSIFICATION:  III. ESTIMATED BLOOD LOSS:  None. DESCRIPTION OF PROCEDURE:  The patient was brought to the GI lab. Consent was obtained. Risks involved with the procedure were explained  to the patient. Informed consent was obtained. The patient was  monitored during the procedure with pulse oximetry, blood pressure  monitoring, and oxygen by nasal cannula. Sedation by incremental doses  of IV propofol given by the Anesthesia Service to achieve monitored  anesthesia care. For ASA classification and medication given during the  procedure, please see Anesthesia note. PROCEDURE PERFORMED:  EGD with biopsy. DESCRIPTION OF PROCEDURE:  A standard video Olympus GIF- adult  upper scope advanced under direct vision from the oral cavity up to the  duodenum. Esophagus featured acid reflux, slightly thickened surface of  distal esophagus. No erosion or ulcer seen. No feature of Manzo's  esophagus seen. The GE junction was longer than expected 46 cm from the  incisors. Scope was advanced into the stomach. Retroflex examination  of the cardia revealed normal cardia with no evidence of hiatus hernia. Mild gastritis seen in the body. Biopsy obtained to evaluate. Antrum  showed erosive gastritis , biopsy obtained in different jars to  evaluate.   Scope advanced to duodenum, which appears normal.  I elected  to take a biopsy from the duodenum to rule out malabsorption in the  duodenum and in preparation for gastric bypass surgery. Scope was  withdrawn with no immediate complications. Message sent to Pathology to specially stain for H. pylori gastritis in  the body of the antrum in preparation for gastric bypass surgery. IMPRESSION:  1. Feature of mild acid reflux. No Manzo's esophagus seen. GE  junction is 46 cm from the incisors. 2.  No hiatus hernia seen. 3.  Gastritis in the body. Healing erosive gastritis seen in the  antrum. Biopsy obtained from both locations with special request for H.  pylori special stain. 4.  Biopsy obtain to rule out malabsorption and celiac disease. PLAN:  1. Followup with biopsy results in the GI clinic for evaluation. More  recommendation after reviewing the biopsy. 2.  I do not see any contraindication to prevent proceeding with gastric  bypass surgery for this patient.         Radha Meier M.D.    D: 06/27/2022 11:39:04       T: 06/27/2022 14:38:28     [email or web address]  Job#: [id number]     Doc#: [id number]    CC:  PILO Whitman M.D.

## 2022-06-27 NOTE — ANESTHESIA PRE PROCEDURE
Department of Anesthesiology  Preprocedure Note       Name:  Gela Li   Age:  55 y.o.  :  1975                                          MRN:  [id number]         Date:  2022      Surgeon: Rebecca Jaffe):  Sim Sinha MD    Procedure: Procedure(s):  EGD With H Plyoric Biopsy    Medications prior to admission:   Prior to Admission medications    Medication Sig Start Date End Date Taking?  Authorizing Provider   ferrous sulfate (IRON 325) 325 (65 Fe) MG tablet Take 325 mg by mouth daily (with breakfast)    Historical Provider, MD   gabapentin (NEURONTIN) 100 MG capsule TAKE 1 CAPSULE BY MOUTH 3 TIMES A DAY 4/27/22 10/24/22  Hang Session, APRN - CNP   insulin glargine (LANTUS SOLOSTAR) 100 UNIT/ML injection pen Inject 30 Units into the skin nightly 22   Mari Beavers MD   metFORMIN (GLUCOPHAGE-XR) 500 MG extended release tablet TAKE TWO TABLETS BY MOUTH TWICE A DAY 22   Mari Beavers MD   ASPIRIN LOW DOSE 81 MG EC tablet TAKE 1 TABLET BY MOUTH ONE TIME A DAY 22   Mari Beavers MD   rosuvastatin (CRESTOR) 10 MG tablet TAKE 1 TABLET BY MOUTH ONE TIME A DAY 3/21/22   Mari Beavers MD   Cholecalciferol (VITAMIN D3) 1.25 MG (25573 UT) CAPS Take 1 capsule by mouth once a week 3/9/22   [employer]   blood glucose test strips (PRODIGY NO CODING BLOOD GLUC) strip USE TO CHECK BLOOD SUGAR FOUR TIMES A DAY 22   MD Sanjiv Conroy Twist Top Lancets 28G MISC USE TO TEST FOUR TIMES A DAY 22   Mari Beavers MD   irbesartan (AVAPRO) 75 MG tablet TAKE 1 TABLET BY MOUTH ONE TIME A DAY IN THE EVENING 22   Migue Mota MD   carvedilol (COREG) 6.25 MG tablet TAKE 1 TABLET BY MOUTH 2 TIMES A DAY WITH MEALS 21   Migue Mota MD   OZEMPIC, 1 MG/DOSE, 4 MG/3ML SOPN INJECT 1MG UNDER THE SKIN ONCE A WEEK 21   Mari Beavers MD   insulin lispro, 1 Unit Dial, (HUMALOG KWIKPEN) 100 UNIT/ML SOPN INJECT 14 UNITS WITH MEALS PLUS SLIDING SCALE --151-200 3 UNITS, 201-250 5 UNITS, 251-300 8 UNITS, 301-350 10 UNITS, 351-400 12 UNITS, OVER 400 15 UNITS AND CONTACT PHYSICIAN 12/27/21   Shea Khoury, APRN - CNP   carvedilol (COREG) 25 MG tablet TAKE 1 TABLET BY MOUTH 2 TIMES A DAY WITH MEALS 10/22/21   Mc Cole PA-C   spironolactone (ALDACTONE) 25 MG tablet TAKE 1 TABLET BY MOUTH ONE TIME A DAY 10/21/21   Lucho Ramirez MD   furosemide (LASIX) 20 MG tablet TAKE ONE TABLET BY MOUTH EVERY EVENING 8/17/21   Brady More MD   furosemide (LASIX) 40 MG tablet TAKE ONE TABLET BY MOUTH EVERY MORNING 8/17/21   Brady More MD   CPAP Machine MISC by Does not apply route Ramp pressure: 4.0 cm H2O  Treatment pressure: 15.0 cm H2O 5/17/21   Trenton Rhodes MD   Blood Glucose Monitoring Suppl (PRODIGY AUTOCODE BLOOD GLUCOSE) MAUREEN Use to check blood sugar 4 times daily 4/4/20   Trenton Rhodes MD   Insulin Pen Needle (PEN NEEDLES 31GX5/16\") 31G X 8 MM MISC Use to inject insulins and Ozempic 4/4/20   Trenton Rhodes MD   omeprazole (PRILOSEC) 40 MG delayed release capsule Take 1 capsule by mouth daily 3/16/20   Lucho Ramirez MD   fluticasone Merle Saliva) 50 MCG/ACT nasal spray 2 sprays by Nasal route daily 2/24/20   Trenton Rhodes MD   loratadine (CLARITIN) 10 MG tablet Take 10 mg by mouth daily    Historical Provider, MD   Respiratory Therapy Supplies (ADULT MASK) MISC Please do mask desensitization and/or provide mask of patient's choice. 5/11/17   Niko Calvillo MD   acetaminophen (TYLENOL) 325 MG tablet Take 650 mg by mouth every 8 hours as needed for Pain    Historical Provider, MD   nitroGLYCERIN (NITROSTAT) 0.4 MG SL tablet Place 1 tablet under the tongue every 5 minutes as needed for Chest pain up to max of 3 total doses. If no relief after 1 dose, call 911. 3/16/17   Rekha Francois MD       Current medications:    No current facility-administered medications for this encounter. Allergies:     Allergies   Allergen Reactions    Levaquin [Levofloxacin In D5w] Itching Problem List:    Patient Active Problem List   Diagnosis Code    CHF (congestive heart failure) (HCC) systolic chronic  J21.5    Bronchitis, acute J20.9    Cardiomyopathy (HCC)-Nonischemic dilated low EF 25%, newly Dxed 08/2014- med RX- cath  mild CAD- repeat echo Nov 24, 2014- EF 25 to 30%- NEED the ICD I42.9    S/P cardiac cath- Angiographically patent coronaries-EF 20, EDP 28 mmhg- med rx Z98.890    AICD (automatic cardioverter/defibrillator) present Z95.810    Morbid obesity with BMI of 50.0-59.9, adult (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  Dr. Rosa Dahl Sleep apnea 06/09/2017    Dr.Rovner Kendell Duron lifevest applied 8/22/14 8/29/2014    returned prior to ICD insertion 1/15       Past Surgical History:        Procedure Laterality Date    [facility]    CARDIAC DEFIBRILL[facility]ACEMENT  2014   [facility]      EYE SURGERY      Lasix     VASECTOMY  2004       Social History:    Social History     Tobacco Use    Smoking status: Never Smoker    Smokeless tobacco: Never Used   Substance Use Topics    Alcohol use: Never     Alcohol/week: 18.0 standard drinks     Types: 18 Cans of beer per week                                Counseling given: Not Answered      Vital Signs (Current):   Vitals:    06/27/22 1013   BP: 136/80   Pulse: 97   Resp: 18   Temp: 36.3 °C (97.4 °F)   TempSrc: Temporal   SpO2: 95%   Weight: (!) 386 lb (175.1 kg)   Height: 6' 3\" (1.905 m)                                              BP Readings from Last 3 Encounters:   06/27/22 136/80   06/16/22 118/80   05/25/22 121/78       NPO Status: Time of last liquid consumption: 1700                        Time of last solid consumption: 2100                        Date of last liquid consumption: 06/26/22                        Date of last solid food consumption: 06/26/22    BMI:   Wt Readings from Last 3 Encounters:   06/27/22 (!) 386 lb (175.1 kg)   06/16/22 (!) 387 lb (175.5 kg)   06/16/22 (!) 387 lb (175.5 kg)     Body mass index is 48.25 kg/m². CBC:   Lab Results   Component Value Date    WBC 10.8 06/18/2022    RBC 5.00 06/18/2022    HGB 12.8 06/18/2022    HCT 42.1 06/18/2022    MCV 84.2 06/18/2022    RDW 15.7 03/15/2017     06/18/2022       CMP:   Lab Results   Component Value Date     03/05/2022    K 4.3 03/05/2022    K 4.8 04/09/2021     03/05/2022    CO2 28 03/05/2022    BUN 13 03/05/2022    CREATININE 0.7 03/05/2022    LABGLOM >90 03/05/2022    GLUCOSE 126 05/12/2022    GLUCOSE 258 12/16/2017    PROT 6.6 03/05/2022    CALCIUM 9.3 03/05/2022    BILITOT 0.3 03/05/2022    ALKPHOS 73 03/05/2022    AST 14 03/05/2022    ALT 17 03/05/2022       POC Tests: No results for input(s): POCGLU, POCNA, POCK, POCCL, POCBUN, POCHEMO, POCHCT in the last 72 hours.     Coags:   Lab Results   Component Value Date    INR 1.17 03/05/2022    APTT 33.5 03/05/2022       HCG (If Applicable): No results found for: PREGTESTUR, PREGSERUM, HCG, HCGQUANT     ABGs: No results found for: PHART, PO2ART, QZY1OLW, NEA5TQT, BEART, J9XDAMBP     Type & Screen (If Applicable):  Lab Results   Component Value Date    LABRH POS 04/09/2021       Drug/Infectious Status (If Applicable):  No results found for: HIV, HEPCAB    COVID-19 Screening (If Applicable): No results found for: COVID19        Anesthesia Evaluation  Patient summary reviewed  Airway: Mallampati: III  TM distance: >3 FB     Mouth opening: > = 3 FB   Dental:          Pulmonary:   (+) sleep apnea: on CPAP,                             Cardiovascular:    (+) hypertension:, pacemaker: AICD, CHF:,                   Neuro/Psych:               GI/Hepatic/Renal:   (+) GERD:,           Endo/Other:    (+) Diabetes, . Abdominal:   (+) obese,           Vascular: Other Findings:           Anesthesia Plan      MAC     ASA 4       Induction: intravenous. Anesthetic plan and risks discussed with patient. Plan discussed with attending.                     JACOB Cifuentes - ZULMA   6/27/2022

## 2022-06-27 NOTE — PROGRESS NOTES
Recovery mode. Denies discomfort,taking fluids. Dr. Arely Al discussed findings and plan of care with patient and . Discharge instructions provided, understanding verbalized.

## 2022-06-27 NOTE — BRIEF OP NOTE
Brief Postoperative Note      Patient: Dirk Whittington  YOB: 1975  MRN: [id number]    Date of Procedure: 6/27/2022    Pre-Op Diagnosis: GERD, morbid obese and future plan for gastric bypass surgery     Post-Op Diagnosis: GERD, erosive gastritis in the antrum biopsy taken to evaluate, duodenal biopsy obtained to evaluate and rule out celiac disease and biopsy obtain full body also to evaluate\" H. pylori gastritis special stain for H. pylori requested from the pathologist.       Procedure(s):  EGD BIOPSY    Surgeon(s):  Jose Yousif MD    Assistant:  * No surgical staff found *    Anesthesia: Monitor Anesthesia Care    Estimated Blood Loss (mL): none    Complications: None    Specimens:   ID Type Source Tests Collected by Time Destination   A : biopsy duodenum Tissue Duodenum SURGICAL PATHOLOGY Jose Yousif MD 6/27/2022 1111    B : biopsy gastric antrum- healing erosive gastritis Tissue Stomach SURGICAL PATHOLOGY Jose Yousif MD 6/27/2022 1114    C : biopsy gastric body Tissue Stomach SURGICAL PATHOLOGY Jose Yousif MD 6/27/2022 1115        Implants:  * No implants in log *      Drains: * No LDAs found *    Findings:  GERD, erosive gastritis in the antrum biopsy taken to evaluate, duodenal biopsy obtained to evaluate and rule out celiac disease and biopsy obtain full body also to evaluate\" H. pylori gastritis special stain for H. pylori requested from the pathologist.      Electronically signed by Jose Yousif MD on 6/27/2022 at 11:23 AM

## 2022-06-27 NOTE — H&P
[facility]     Sedation/Analgesia History & Physical    Patient: Jb Valencia : 1975  Med Rec#: [id number] Acc#: [id number]   Provider Performing Procedure: Kenroy Oliva MD  Primary Care Physician: Acosta Ahmadi MD    PRE-PROCEDURE   Full CODE [x]Yes  DNR-CCA/DNR-CC []Yes   Brief History/Pre-Procedure Diagnosis:    Gastroesophageal reflux disease without esophagitis  ESOPHAGOSCOPY / EGD    2. Weight loss  ESOPHAGOSCOPY / EGD     intentional due to bariatric program   3. Pre-operative clearance        EGD prior to Gastric bypass surgery, required by surgeon               MEDICAL HISTORY    []Additional information:       has a past medical history of CHF (congestive heart failure) ([facility] [address] Constipation, Diabetes mellitus ([facility] [address] GERD (gastroesophageal reflux disease), Hypercholesteremia, S/P ICD (internal cardiac defibrillator) procedure: 1/15/2015: Medtronic Single Chamber, Sleep apnea, and Zoll lifevest applied 14. SOCIAL HISTORY  Social History     Tobacco History     Smoking Status  Never Smoker    Smokeless Tobacco Use  Never Used          Alcohol History     Alcohol Use Status  Never          Drug Use     Drug Use Status  No          Sexual Activity     Sexually Active  Yes Partners  Female                FAMILY HISTORY     Family History   Problem Relation Age of Onset    Arthritis Mother     Hypertension Father     Heart Disease Maternal Uncle     Heart Disease Maternal Grandfather     Asthma Maternal Grandfather     Diabetes Maternal Grandfather     Arthritis Maternal Grandfather     Heart Disease Paternal Uncle     Heart Disease Maternal Grandmother     Stroke Maternal Grandmother     Asthma Brother     Colon Cancer Neg Hx     Colon Polyps Neg Hx     Esophageal Cancer Neg Hx        SURGICAL HISTORY   has a past surgical history that includes eye surgery; Cardiac catheterization (2014); Cardiac defibrillator placement ();  Cardiac defibrillator placement; Cardiac defibrillator placement; and Vasectomy (2004). Additional information:       ALLERGIES   Allergies as of 05/25/2022 - Fully Reviewed 05/25/2022   Allergen Reaction Noted    Levaquin [levofloxacin in d5w] Itching 08/19/2014     Additional information:       MEDICATIONS   Coumadin Use Last 7 Days [x]No []Yes  Antiplatelet drug therapy use last 7 days  [x]No []Yes  Other anticoagulant use last 7 days  [x]No []Yes  No current facility-administered medications for this encounter. Prior to Admission medications    Medication Sig Start Date End Date Taking?  Authorizing Provider   ferrous sulfate (IRON 325) 325 (65 Fe) MG tablet Take 325 mg by mouth daily (with breakfast)    Historical Provider, MD   gabapentin (NEURONTIN) 100 MG capsule TAKE 1 CAPSULE BY MOUTH 3 TIMES A DAY 4/27/22 10/24/22  JACOB Cintron - CNP   insulin glargine (LANTUS SOLOSTAR) 100 UNIT/ML injection pen Inject 30 Units into the skin nightly 4/7/22   Stephan Weinberg MD   metFORMIN (GLUCOPHAGE-XR) 500 MG extended release tablet TAKE TWO TABLETS BY MOUTH TWICE A DAY 4/5/22   Stephan Weinberg MD   ASPIRIN LOW DOSE 81 MG EC tablet TAKE 1 TABLET BY MOUTH ONE TIME A DAY 4/5/22   Stephan Weinberg MD   rosuvastatin (CRESTOR) 10 MG tablet TAKE 1 TABLET BY MOUTH ONE TIME A DAY 3/21/22   Stephan Weinberg MD   Cholecalciferol (VITAMIN D3) 1.25 MG (81122 UT) CAPS Take 1 capsule by mouth once a week 3/9/22   BART Yanes   blood glucose test strips (PRODIGY NO CODING BLOOD GLUC) strip USE TO CHECK BLOOD SUGAR FOUR TIMES A DAY 2/14/22   MD Sanjiv Sage Twist Top Lancets 28G MISC USE TO TEST FOUR TIMES A DAY 2/14/22   Stephan Weinberg MD   irbesartan (AVAPRO) 75 MG tablet TAKE 1 TABLET BY MOUTH ONE TIME A DAY IN THE EVENING 1/20/22   Migue Mota MD   carvedilol (COREG) 6.25 MG tablet TAKE 1 TABLET BY MOUTH 2 TIMES A DAY WITH MEALS 12/28/21   Migue Mota MD   OZEMPIC, 1 MG/DOSE, 4 MG/3ML SOPN INJECT 1MG UNDER THE SKIN ONCE A WEEK 12/27/21   Luz Couch MD   insulin lispro, 1 Unit Dial, (HUMALOG KWIKPEN) 100 UNIT/ML SOPN INJECT 14 UNITS WITH MEALS PLUS SLIDING SCALE --151-200 3 UNITS, 201-250 5 UNITS, 251-300 8 UNITS, 301-350 10 UNITS, 351-400 12 UNITS, OVER 400 15 UNITS AND CONTACT PHYSICIAN 12/27/21   JACOB Gomez - CNP   carvedilol (COREG) 25 MG tablet TAKE 1 TABLET BY MOUTH 2 TIMES A DAY WITH MEALS 10/22/21   Tony Trejo PA-C   spironolactone (ALDACTONE) 25 MG tablet TAKE 1 TABLET BY MOUTH ONE TIME A DAY 10/21/21   Briana Sandra MD   furosemide (LASIX) 20 MG tablet TAKE ONE TABLET BY MOUTH EVERY EVENING 8/17/21   Edmond Cole MD   furosemide (LASIX) 40 MG tablet TAKE ONE TABLET BY MOUTH EVERY MORNING 8/17/21   Edmond Cole MD   CPAP Machine MISC by Does not apply route Ramp pressure: 4.0 cm H2O  Treatment pressure: 15.0 cm H2O 5/17/21   Luz Couch MD   Blood Glucose Monitoring Suppl (PRODIGY AUTOCODE BLOOD GLUCOSE) MAUREEN Use to check blood sugar 4 times daily 4/4/20   Luz Couch MD   Insulin Pen Needle (PEN NEEDLES 31GX5/16\") 31G X 8 MM MISC Use to inject insulins and Ozempic 4/4/20   Luz Couch MD   omeprazole (PRILOSEC) 40 MG delayed release capsule Take 1 capsule by mouth daily 3/16/20   Briana Sandra MD   fluticasone [facility]) 50 MCG/ACT nasal spray 2 sprays by Nasal route daily 2/24/20   Luz Couch MD   loratadine (CLARITIN) 10 MG tablet Take 10 mg by mouth daily    Historical Provider, MD   Respiratory Therapy Supplies (ADULT MASK) MISC Please do mask desensitization and/or provide mask of patient's choice. 5/11/17   Mathieu Hardy MD   acetaminophen (TYLENOL) 325 MG tablet Take 650 mg by mouth every 8 hours as needed for Pain    Historical Provider, MD   nitroGLYCERIN (NITROSTAT) 0.4 MG SL tablet Place 1 tablet under the tongue every 5 minutes as needed for Chest pain up to max of 3 total doses.  If no relief after 1 dose, call 911. 3/16/17   Francisco Klinefelter, MD

## 2022-06-29 ENCOUNTER — TELEPHONE (OUTPATIENT)
Dept: BARIATRICS/WEIGHT MGMT | Age: 47
End: 2022-06-29

## 2022-06-29 NOTE — TELEPHONE ENCOUNTER
----- Message from BART Stern sent at 6/20/2022  2:27 PM EDT -----  Iron low at 49- please advise  ----- Message -----  From: Lynnwood Sacks Incoming Lab Results From Soft  Sent: 6/18/2022  12:37 PM EDT  To: BART Stern

## 2022-06-29 NOTE — TELEPHONE ENCOUNTER
Spoke with patient on phone today regarding continued low iron level. Pt has had ongoing difficulty with the 65 mg iron tablet ferrous sulfate and constipation and therefore unable to take it. Reviewed with patient three different options of different iron source tablets to try that aide in less GI distress. Also only contain between ~ 28 mg-45 mg iron daily when taking one tablet each day. Pt will try one of the options and call RD in ~ 1-2 weeks if stll having difficulty. These options will be given to patient at office visit with PA tomorrow 6/30/22. Pt voiced understanding.

## 2022-06-30 ENCOUNTER — OFFICE VISIT (OUTPATIENT)
Dept: BARIATRICS/WEIGHT MGMT | Age: 47
End: 2022-06-30
Payer: COMMERCIAL

## 2022-06-30 VITALS
BODY MASS INDEX: 40.43 KG/M2 | HEIGHT: 74 IN | TEMPERATURE: 97.8 F | DIASTOLIC BLOOD PRESSURE: 88 MMHG | HEART RATE: 96 BPM | SYSTOLIC BLOOD PRESSURE: 128 MMHG | WEIGHT: 315 LBS

## 2022-06-30 DIAGNOSIS — I10 HYPERTENSION, UNSPECIFIED TYPE: ICD-10-CM

## 2022-06-30 DIAGNOSIS — M25.562 CHRONIC PAIN OF LEFT KNEE: ICD-10-CM

## 2022-06-30 DIAGNOSIS — K21.9 GASTROESOPHAGEAL REFLUX DISEASE, UNSPECIFIED WHETHER ESOPHAGITIS PRESENT: ICD-10-CM

## 2022-06-30 DIAGNOSIS — G89.29 CHRONIC PAIN OF LEFT KNEE: ICD-10-CM

## 2022-06-30 DIAGNOSIS — G47.33 OSA ON CPAP: ICD-10-CM

## 2022-06-30 DIAGNOSIS — Z95.810 AICD (AUTOMATIC CARDIOVERTER/DEFIBRILLATOR) PRESENT: ICD-10-CM

## 2022-06-30 DIAGNOSIS — Z99.89 OSA ON CPAP: ICD-10-CM

## 2022-06-30 DIAGNOSIS — E78.00 HYPERCHOLESTEREMIA: ICD-10-CM

## 2022-06-30 DIAGNOSIS — E61.1 LOW IRON: ICD-10-CM

## 2022-06-30 DIAGNOSIS — I50.9 CHRONIC CONGESTIVE HEART FAILURE, UNSPECIFIED HEART FAILURE TYPE (HCC): ICD-10-CM

## 2022-06-30 DIAGNOSIS — M54.50 CHRONIC BILATERAL LOW BACK PAIN, UNSPECIFIED WHETHER SCIATICA PRESENT: ICD-10-CM

## 2022-06-30 DIAGNOSIS — E11.9 TYPE 2 DIABETES MELLITUS WITHOUT COMPLICATION, WITH LONG-TERM CURRENT USE OF INSULIN (HCC): ICD-10-CM

## 2022-06-30 DIAGNOSIS — G89.29 CHRONIC BILATERAL LOW BACK PAIN, UNSPECIFIED WHETHER SCIATICA PRESENT: ICD-10-CM

## 2022-06-30 DIAGNOSIS — Z79.4 TYPE 2 DIABETES MELLITUS WITHOUT COMPLICATION, WITH LONG-TERM CURRENT USE OF INSULIN (HCC): ICD-10-CM

## 2022-06-30 DIAGNOSIS — I48.0 PAROXYSMAL ATRIAL FIBRILLATION (HCC): ICD-10-CM

## 2022-06-30 PROCEDURE — 99213 OFFICE O/P EST LOW 20 MIN: CPT | Performed by: PHYSICIAN ASSISTANT

## 2022-06-30 PROCEDURE — 3044F HG A1C LEVEL LT 7.0%: CPT | Performed by: PHYSICIAN ASSISTANT

## 2022-06-30 NOTE — PATIENT INSTRUCTIONS
· Behavior modification discussed in detail in regards to dietary habits. · Nutritional education occurred during visit. Continue following recommendations  per dietitian. · Improvement in fitness/exercise discussed with patient and the need for this  with/without surgery. · EKG completed/ Cardiac Clearance received from Dr. Zander Duque ( note 5/23/22)  · Pulmonary Clearance received from Dr. Bud Shukla. · Psychology evaluation with Dr. Speedy Hernandez completed and reviewed. · EGD completed and reviewed. H. Pylori (-)  · Encouraged to attend support groups. · Goal to lose 5% TBW prior to surgery. Down 15#. · Initial labs completed and reviewed. · A1C 6.5 ( 5/12/22)- must remain < 8 prior to surgery. · Iron low at 49- information given to patient on alternate Iron supplements to try. Will call office if unable to tolerated. · Vit D now within normal range at 30  · Drug screen completed and reviewed  · Nicotine (-). Discussed risks of nicotine a/w bariatric surgery. Must be nicotine free at least 90 days prior to surgery. Avoid nicotine life long post-op. · Will need to be off work for 3-4 weeks post-bariatric surgery. · No lifting/pushing/pulling over 20# for 4 weeks post-op  · No NSAIDS 10 days prior to bariatric surgery. Avoid NSAID use post-op  · Discussed Lovenox post-op bariatric surgery  · LOMN recieved  · Will continue following with multi-disciplinary team in preparation for bariatric surgery with the expectation of lifelong follow-up post-operatively. · No fluid restriction per CHF clinic. · To follow up with Dr. Nataliia Moran next month.

## 2022-06-30 NOTE — PROGRESS NOTES
[address] [facility]  [address]  [place], [address] [facility]  [phone]      Visit Date:  6/30/2022  Weight Management Pre-Op Follow-up    HPI:    Medically Supervised follow-up- Month #4 of 4/6- desires RYGB    Blayne Quesada is here today for continued supervised weight management of morbid obesity. Weight today 385#. Down 5# since last month. Down 15# since starting with weight management. BMI 49. Continues to do well with nutrition. Mindful of food choices. Doing well with portion control. Continues to eat 3 meals daily. On a good routine. Continues to drink 80oz of water daily. Drinking 8-10oz of coffee daily. Continues dedicated physical activity several days per week. Comorbid conditions include HTN, IDDM, GERD, ICD placement 2016 due to CHF, MIKE tx with CPAP, chronic left knee pain and low back pain. Tarsha Opitz well controlled with Prilosec. Follows with CHF Clinic- no fluid restriction at this time. Has not been wearing leg compressions routinely. Only wearing if on feet all day. Checking blood sugars 3 x daily- have been running around . Currently taking Ozempic, metformin, Lantus 30 units QHS and Humalog sliding scale as needed- typically only using with breakfast. Following with diabetic clinic. A1C 6.5 ( 5/12/22). Repeat Vit D now within normal range at 30. Iron 49- was not consistent with taking iron supplement as causing constipation. Information given on iron supplementation that will be gentler on the bowels. Advised to [facility] office if still unable to tolerate. LOMN received. Completed support groups x 2. EGD completed and reviewed. LAVELL Pedroza (-). EKG completed 5/23/22 with Dr. Sebastian Fernández. Cardiac clearance received. 100% compliant with CPAP- Pulmonary clearance received from Dr. Julia Rea. Psychological clearance with Dr. Kit Garcia completed and reviewed. Discussed what to expect moving forward with bariatric surgery. All questions answered.  Excited to proceed with bariatric surgery. Physical Activity: Walking on breaks at [employer] and crunches 30- twice daily. Current BMI: Body mass index is 49.77 kg/m². Current Weight:   Wt Readings from Last 3 Encounters:   06/30/22 (!) 385 lb (174.6 kg)   06/27/22 (!) 386 lb (175.1 kg)   06/16/22 (!) 387 lb (175.5 kg)     Initial Body Weight:400    Past Medical History:  Past Medical History:   Diagnosis Date    CHF (congestive heart failure) (HCC)     Constipation     Diabetes mellitus (HCC)     GERD (gastroesophageal reflux disease)     Hypercholesteremia     S/P ICD (internal cardiac defibrillator) procedure: 1/15/2015: Medtronic Single Chamber 1/15/2015    1/15/2015: Medtronic Single Chamber.  Dr. Duana Fothergill Sleep apnea 06/09/2017    [place]    Zoll lifevest applied 8/22/14 8/29/2014    returned prior to ICD insertion 1/15       Past Surgical History:  Past Surgical History:   Procedure Laterality Date    [facility]2014    [facility]    CARDIAC DEFIBRILLATOR PLACEMENT  2014    CARDIAC DEFIBRILLATOR PLACEMENT      CARDIAC DEFIBRILLATOR PLACEMENT      EYE SURGERY      Lasix     UPPER GASTROINTESTINAL ENDOSCOPY N/A 6/27/2022    EGD BIOPSY performed by Kelsey Murphy MD at [facility]    VASECTOMY  2004       Past Social History:  Social History     Socioeconomic History    Marital status:      Spouse name: Not on file    Number of children: 1    Years of education: Not on file    Highest education level: Not on file   Occupational History     Employer: [employer]   Tobacco Use    Smoking status: Never Smoker    Smokeless tobacco: Never Used   Vaping Use    Vaping Use: Never used   Substance and Sexual Activity    Alcohol use: Never     Alcohol/week: 18.0 standard drinks     Types: 18 Cans of beer per week    Drug use: No    Sexual activity: Yes     Partners: Female   Other Topics Concern    Not on file   Social History Narrative    Not on file     Social Determinants of Health     Financial Resource Strain: Low Risk     Difficulty of Paying Living Expenses: Not hard at all   Food Insecurity: No Food Insecurity    Worried About Running Out of Food in the Last Year: Never true    Abel of Food in the Last Year: Never true   Transportation Needs:     Lack of Transportation (Medical): Not on file    Lack of Transportation (Non-Medical):  Not on file   Physical Activity:     Days of Exercise per Week: Not on file    Minutes of Exercise per Session: Not on file   Stress:     Feeling of Stress : Not on file   Social Connections:     Frequency of Communication with Friends and Family: Not on file    Frequency of Social Gatherings with Friends and Family: Not on file    Attends Baptism Services: Not on file    Active Member of Clubs or Organizations: Not on file    Attends Club or Organization Meetings: Not on file    Marital Status: Not on file   Intimate Partner Violence:     Fear of Current or Ex-Partner: Not on file    Emotionally Abused: Not on file    Physically Abused: Not on file    Sexually Abused: Not on file   Housing Stability:     Unable to Pay for Housing in the Last Year: Not on file    Number of Places Lived in the Last Year: Not on file    Unstable Housing in the Last Year: Not on file        Medications:   Current Outpatient Medications   Medication Sig Dispense Refill    ferrous sulfate (IRON 325) 325 (65 Fe) MG tablet Take 325 mg by mouth daily (with breakfast)      gabapentin (NEURONTIN) 100 MG capsule TAKE 1 CAPSULE BY MOUTH 3 TIMES A  capsule 1    insulin glargine (LANTUS SOLOSTAR) 100 UNIT/ML injection pen Inject 30 Units into the skin nightly 10 pen 3    metFORMIN (GLUCOPHAGE-XR) 500 MG extended release tablet TAKE TWO TABLETS BY MOUTH TWICE A  tablet 2    ASPIRIN LOW DOSE 81 MG EC tablet TAKE 1 TABLET BY MOUTH ONE TIME A DAY 90 tablet 1    rosuvastatin (CRESTOR) 10 MG tablet TAKE 1 TABLET BY MOUTH ONE TIME A DAY 90 tablet 3    blood glucose test strips (PRODIGY NO CODING BLOOD GLUC) strip USE TO CHECK BLOOD SUGAR FOUR TIMES A  strip 3    Prodigy Twist Top Lancets 28G MISC USE TO TEST FOUR TIMES A  each 3    irbesartan (AVAPRO) 75 MG tablet TAKE 1 TABLET BY MOUTH ONE TIME A DAY IN THE EVENING 90 tablet 3    carvedilol (COREG) 6.25 MG tablet TAKE 1 TABLET BY MOUTH 2 TIMES A DAY WITH MEALS 180 tablet 3    OZEMPIC, 1 MG/DOSE, 4 MG/3ML SOPN INJECT 1MG UNDER THE SKIN ONCE A WEEK 24 mL 3    insulin lispro, 1 Unit Dial, (HUMALOG KWIKPEN) 100 UNIT/ML SOPN INJECT 14 UNITS WITH MEALS PLUS SLIDING SCALE --151-200 3 UNITS, 201-250 5 UNITS, 251-300 8 UNITS, 301-350 10 UNITS, 351-400 12 UNITS, OVER 400 15 UNITS AND CONTACT PHYSICIAN 135 mL 3    carvedilol (COREG) 25 MG tablet TAKE 1 TABLET BY MOUTH 2 TIMES A DAY WITH MEALS 180 tablet 3    spironolactone (ALDACTONE) 25 MG tablet TAKE 1 TABLET BY MOUTH ONE TIME A DAY 90 tablet 3    furosemide (LASIX) 20 MG tablet TAKE ONE TABLET BY MOUTH EVERY EVENING 90 tablet 3    furosemide (LASIX) 40 MG tablet TAKE ONE TABLET BY MOUTH EVERY MORNING 90 tablet 3    CPAP Machine MISC by Does not apply route Ramp pressure: 4.0 cm H2O  Treatment pressure: 15.0 cm H2O 1 each 0    Blood Glucose Monitoring Suppl (PRODIGY AUTOCODE BLOOD GLUCOSE) MAUREEN Use to check blood sugar 4 times daily 1 Device 0    Insulin Pen Needle (PEN NEEDLES 31GX5/16\") 31G X 8 MM MISC Use to inject insulins and Ozempic 400 each 3    omeprazole (PRILOSEC) 40 MG delayed release capsule Take 1 capsule by mouth daily 90 capsule 1    fluticasone (FLONASE) 50 MCG/ACT nasal spray 2 sprays by Nasal route daily 1 Bottle 2    loratadine (CLARITIN) 10 MG tablet Take 10 mg by mouth daily      Respiratory Therapy Supplies (ADULT MASK) MISC Please do mask desensitization and/or provide mask of patient's choice.  1 each 0    acetaminophen (TYLENOL) 325 MG tablet Take 650 mg by mouth every 8 hours as needed for Pain No current facility-administered medications for this visit. Allergies: Allergies   Allergen Reactions    Levaquin [Levofloxacin In D5w] Itching       Subjective:    Review of Systems:  Constitutional: Denies any fever, chills, (+) fatigue. Wound: Denies any rash, skin color changes or wound problems. Resp: Denies any cough, (+)shortness of breath. CV: Denies any chest pain, orthopnea or syncope. MS: Denies myalgias, (+)arthralgias. GI: Denies any nausea, vomiting, diarrhea, (+)constipation, melena, hematochezia. (+)reflux  : Denies any hematuria, hesitancy or dysuria. NEURO: Denies seizures, headache. Objective:    /88 (Site: Right Upper Arm, Position: Sitting, Cuff Size: Large Adult)   Pulse 96   Temp 97.8 °F (36.6 °C) (Oral)   Ht 6' 1.75\" (1.873 m)   Wt (!) 385 lb (174.6 kg)   BMI 49.77 kg/m²   Physical Examination:   Constitutional: Alert and oriented to person, place and time. Well-developed, well- nourished. Head: Normocephalic and atraumatic  Neck: Supple. Eyes: EOMI b/l. Conjunctivae normal.  No scleral icterus. Respiratory: Effort normal. No respiratory distress. Abd: Benign  Ext:  Movement x 4. No edema  Skin; Warm and dry, no visible rashes, lesions or ulcers.    Neuro: Cranial Nerves Grossly Intact; nml coordination      CBC   Lab Results   Component Value Date/Time    WBC 10.8 06/18/2022 08:28 AM    RBC 5.00 06/18/2022 08:28 AM    HGB 12.8 06/18/2022 08:28 AM    HCT 42.1 06/18/2022 08:28 AM    MCV 84.2 06/18/2022 08:28 AM    MCH 25.6 06/18/2022 08:28 AM    MCHC 30.4 06/18/2022 08:28 AM    RDW 15.7 03/15/2017 06:30 PM     06/18/2022 08:28 AM    MPV 10.0 06/18/2022 08:28 AM    RBCMORP NORMAL 03/15/2017 06:30 PM    SEGSPCT 71.7 06/18/2022 08:28 AM    LABLYMP 20.0 06/18/2022 08:28 AM    MONOPCT 4.6 06/18/2022 08:28 AM    LABEOS 2.5 06/18/2022 08:28 AM    BASO 0.4 06/18/2022 08:28 AM    NRBC 0 06/18/2022 08:28 AM    ANISOCYTOSIS 1+ 03/15/2017 06:30 PM SEGSABS 7.7 06/18/2022 08:28 AM    LYMPHSABS 2.2 06/18/2022 08:28 AM    MONOSABS 0.5 06/18/2022 08:28 AM    EOSABS 0.3 06/18/2022 08:28 AM    BASOSABS 0.0 06/18/2022 08:28 AM        BMP/CMP   Lab Results   Component Value Date/Time    GLUCOSE 126 05/12/2022 10:30 AM    GLUCOSE 258 12/16/2017 08:25 AM    CREATININE 0.7 03/05/2022 07:29 AM    BUN 13 03/05/2022 07:29 AM     03/05/2022 07:29 AM    K 4.3 03/05/2022 07:29 AM    K 4.8 04/09/2021 06:10 AM     03/05/2022 07:29 AM    CO2 28 03/05/2022 07:29 AM    CALCIUM 9.3 03/05/2022 07:29 AM    AST 14 03/05/2022 07:29 AM    ALKPHOS 73 03/05/2022 07:29 AM    PROT 6.6 03/05/2022 07:29 AM    LABALBU 4.2 03/05/2022 07:29 AM    BILITOT 0.3 03/05/2022 07:29 AM    ALT 17 03/05/2022 07:29 AM        PREALBUMIN   Lab Results   Component Value Date/Time    PREALBUMIN 22.0 03/05/2022 07:29 AM        VITAMIN B12   Lab Results   Component Value Date/Time    RFMHTHAV81 258 03/05/2022 07:29 AM        VITAMIN D   Lab Results   Component Value Date/Time    VITD25 30 06/18/2022 08:28 AM        PTH  Lab Results   Component Value Date/Time    IPTH 30.7 03/05/2022 07:29 AM       VITAMIN B1/ THIAMINE   Lab Results   Component Value Date/Time    VUGJ2XIXOKV 107 03/05/2022 07:30 AM        LIPID SCREEN (FASTING)   Lab Results   Component Value Date/Time    CHOL 102 05/12/2022 10:30 AM    TRIG 187 05/12/2022 10:30 AM    HDL 25 05/12/2022 10:30 AM    LDLCALC 40 05/12/2022 10:30 AM   ,     HGA1C (T2DM ONLY)   Lab Results   Component Value Date/Time    LABA1C 6.5 05/12/2022 10:30 AM    AVGG 150 03/05/2022 07:30 AM        TSH   Lab Results   Component Value Date/Time    TSH 2.900 03/05/2022 07:29 AM        IRON   Lab Results   Component Value Date/Time    IRON 49 06/18/2022 08:28 AM        TIBC  Lab Results   Component Value Date/Time    TIBC 309 06/18/2022 08:28 AM       FERRITIN  Lab Results   Component Value Date/Time    FERRITIN 110 06/18/2022 08:28 AM       VITAMIN A  Lab Results Component Value Date/Time    RETINOL SEE BELOW 03/05/2022 07:30 AM       NICOTINE  No results found for: NMET    UDS  No results found for: UDP    PSA  No results found for: LABPSA    GFR  Lab Results   Component Value Date/Time    LABGLOM >90 03/05/2022 07:29 AM       DEXA  No results found for this or any previous visit. Assessment:       Diagnosis Orders   1. BMI 45.0-49.9, adult ([facility] [address]     2. Chronic congestive heart failure, unspecified heart failure type ([facility] [address]     3. Chronic bilateral low back pain, unspecified whether sciatica present     4. Type 2 diabetes mellitus without complication, with long-term current use of insulin ([facility] [address]     5. Hypertension, unspecified type     6. AICD (automatic cardioverter/defibrillator) present     7. Gastroesophageal reflux disease, unspecified whether esophagitis present     8. Paroxysmal atrial fibrillation (HCC)     9. MIKE on CPAP     10. Low iron     11. Hypercholesteremia     12. Chronic pain of left knee         Plan:    · Behavior modification discussed in detail in regards to dietary habits. · Nutritional education occurred during visit. Continue following recommendations  per dietitian. · Improvement in fitness/exercise discussed with patient and the need for this  with/without surgery. · EKG completed/ Cardiac Clearance received from Dr. Nancy Vivar ( note 5/23/22)  · Pulmonary Clearance received from Dr. Silvia Chowdary. · Psychology evaluation with Dr. Presley Shahid completed and reviewed. · EGD completed and reviewed. H. Pylori (-)  · Encouraged to attend support groups. · Goal to lose 5% TBW prior to surgery. Down 15#. · Initial labs completed and reviewed. · A1C 6.5 ( 5/12/22)- must remain < 8 prior to surgery. · Iron low at 49- information given to patient on alternate Iron supplements to try. Will call office if unable to tolerated. · Vit D now within normal range at 30  · Drug screen completed and reviewed  · Nicotine (-).  Discussed risks of nicotine a/w bariatric surgery. Must be nicotine free at least 90 days prior to surgery. Avoid nicotine life long post-op. · Will need to be off work for 3-4 weeks post-bariatric surgery. · No lifting/pushing/pulling over 20# for 4 weeks post-op  · No NSAIDS 10 days prior to bariatric surgery. Avoid NSAID use post-op  · Discussed Lovenox post-op bariatric surgery  · LOMN recieved  · Will continue following with multi-disciplinary team in preparation for bariatric surgery with the expectation of lifelong follow-up post-operatively. · No fluid restriction per CHF clinic. · To follow up with Dr. Ana Rosa Ireland next month. Return in about 1 month (around 7/30/2022) for postop follow up. I spent over 20 minutes with the patient, with greater that 50% of that time spent on education, counseling and coordination of care.      Electronically signed by BART Dominguez on 6/30/2022 at 1:35 PM

## 2022-07-08 ENCOUNTER — OFFICE VISIT (OUTPATIENT)
Dept: BARIATRICS/WEIGHT MGMT | Age: 47
End: 2022-07-08
Payer: COMMERCIAL

## 2022-07-08 VITALS
TEMPERATURE: 97.8 F | WEIGHT: 315 LBS | HEIGHT: 74 IN | SYSTOLIC BLOOD PRESSURE: 118 MMHG | HEART RATE: 84 BPM | BODY MASS INDEX: 40.43 KG/M2 | DIASTOLIC BLOOD PRESSURE: 80 MMHG

## 2022-07-08 DIAGNOSIS — E61.1 LOW IRON: ICD-10-CM

## 2022-07-08 DIAGNOSIS — E11.9 TYPE 2 DIABETES MELLITUS WITHOUT COMPLICATION, WITH LONG-TERM CURRENT USE OF INSULIN (HCC): ICD-10-CM

## 2022-07-08 DIAGNOSIS — Z95.810 AICD (AUTOMATIC CARDIOVERTER/DEFIBRILLATOR) PRESENT: ICD-10-CM

## 2022-07-08 DIAGNOSIS — Z99.89 OSA ON CPAP: ICD-10-CM

## 2022-07-08 DIAGNOSIS — K21.9 GASTROESOPHAGEAL REFLUX DISEASE, UNSPECIFIED WHETHER ESOPHAGITIS PRESENT: ICD-10-CM

## 2022-07-08 DIAGNOSIS — M54.50 CHRONIC BILATERAL LOW BACK PAIN, UNSPECIFIED WHETHER SCIATICA PRESENT: ICD-10-CM

## 2022-07-08 DIAGNOSIS — I50.9 CHRONIC CONGESTIVE HEART FAILURE, UNSPECIFIED HEART FAILURE TYPE (HCC): ICD-10-CM

## 2022-07-08 DIAGNOSIS — G89.29 CHRONIC PAIN OF LEFT KNEE: ICD-10-CM

## 2022-07-08 DIAGNOSIS — E66.01 MORBID OBESITY (HCC): Primary | ICD-10-CM

## 2022-07-08 DIAGNOSIS — E78.00 HYPERCHOLESTEREMIA: ICD-10-CM

## 2022-07-08 DIAGNOSIS — G89.29 CHRONIC BILATERAL LOW BACK PAIN, UNSPECIFIED WHETHER SCIATICA PRESENT: ICD-10-CM

## 2022-07-08 DIAGNOSIS — E55.9 VITAMIN D DEFICIENCY: ICD-10-CM

## 2022-07-08 DIAGNOSIS — G47.33 OSA ON CPAP: ICD-10-CM

## 2022-07-08 DIAGNOSIS — I48.0 PAROXYSMAL ATRIAL FIBRILLATION (HCC): ICD-10-CM

## 2022-07-08 DIAGNOSIS — I10 HYPERTENSION, UNSPECIFIED TYPE: ICD-10-CM

## 2022-07-08 DIAGNOSIS — Z79.4 TYPE 2 DIABETES MELLITUS WITHOUT COMPLICATION, WITH LONG-TERM CURRENT USE OF INSULIN (HCC): ICD-10-CM

## 2022-07-08 DIAGNOSIS — M25.562 CHRONIC PAIN OF LEFT KNEE: ICD-10-CM

## 2022-07-08 PROCEDURE — 3044F HG A1C LEVEL LT 7.0%: CPT | Performed by: SURGERY

## 2022-07-08 PROCEDURE — 99213 OFFICE O/P EST LOW 20 MIN: CPT | Performed by: SURGERY

## 2022-07-09 ASSESSMENT — ENCOUNTER SYMPTOMS
TROUBLE SWALLOWING: 0
BACK PAIN: 0
SHORTNESS OF BREATH: 0
VOICE CHANGE: 0
EYE PAIN: 0
NAUSEA: 0
CONSTIPATION: 1
ANAL BLEEDING: 0
VOMITING: 0
COUGH: 0
ABDOMINAL DISTENTION: 0
CHOKING: 0
APNEA: 0
STRIDOR: 0
EYE ITCHING: 0
CHEST TIGHTNESS: 0
BLOOD IN STOOL: 0
ABDOMINAL PAIN: 0
RECTAL PAIN: 0
ALLERGIC/IMMUNOLOGIC NEGATIVE: 1
RHINORRHEA: 0
EYE REDNESS: 0
SINUS PRESSURE: 0
DIARRHEA: 0
WHEEZING: 0
COLOR CHANGE: 0
FACIAL SWELLING: 0
EYE DISCHARGE: 0
PHOTOPHOBIA: 0
SORE THROAT: 0

## 2022-07-09 NOTE — PROGRESS NOTES
Niecyjenn Kleiner (:  1975)     ASSESSMENT:  1.  Morbid obesity (BMI 49)  2. Sleep apnea  3. Chronic lower back pain  4. CHF  5. Hypercholesterolemia  6. Diabetes mellitus  7. Hypertension  8. GERD  9. Atrial fibrillation  10. AICD  11. Vitamin D deficiency  12. Iron deficiency  13. Chronic pain left knee    PLAN:  1. Discussion again about the pros and cons of weight loss surgery. The risks benefits and alternatives to laparoscopic adjustable band, gastric sleeve and gastric Ronaldo-en-Y bypass were discussed in detail. The pros and cons of robotic assisted, laparoscopic and open techniques were discussed. 2.  Behavior modification discussed again in regards to dietary habits. 3.  Nutritional education occurred during visit. Follow up with dietitian for further evaluation. Continue to follow recommendations as directed. 4.  Options for medical management of morbid obesity again discussed. 5.  Improvement in fitness/exercise discussed with patient and the need for this with/without surgery. 6.  Medical necessity letter from PCP. 7.  Follow-up in one month at weight management program at [address] [place] [address]. 8.  Signs and symptoms reviewed with patient that would be concerning and need him to return to office for re-evaluation. Patient states he will call if he has questions or concerns. 9. Multivitamin  10. Psychology evaluation completed. Follow-up as needed. 11. EGD completed. H. pylori negative. No significant hiatal hernia. 12.  Encouraged support groups  13. Send LOMN    Patient states that he has not been able to lose enough adequate excess body weight with medical management only and would like to proceed with a robotic gastric bypass for further weight loss. More than 30 minutes spent with patient today. Greater than 50% of the time was involved counseling, educaton and coordinating care.     SUBJECTIVE/OBJECTIVE:    Chief Complaint   Patient presents with   Aide Thomas Follow-up     month 5 of 4/6 - bypass - submint to insurance     HPI  Rosey Mitchell is a 80-year-old male who presents for follow-up at the weight management program secondary to his morbid obesity. Current weight 385 pounds. BMI 49. He is the same weight as he was on last visit. Continues to follow with the dietitian. Working on portion control and food selection. Continues to do well with water intake. CPAP machine for sleep apnea. Continues with Prilosec as needed for GERD. Follows with the CHF clinic. Also follows with diabetic clinic. Most recent hemoglobin A1c 6.5. Completed support groups. Upper endoscopy completed. H. pylori negative. Completed psychology evaluation. Denies current chest or abdominal pain. No hematochezia or melena. No new urinary complaints. Currently taking multivitamin. Replacing vitamin D3. Has now started iron. Admits this is given him constipation issues. Taking stool softeners as needed. Continue to try to improve with exercise/fitness. Doing more walking daily. He states he has not been able to lose and of adequate excess body weight with medical management only and is wishing to proceed with Ronaldo-en-Y gastric bypass for further weight loss. Review of Systems   Constitutional: Negative for activity change, appetite change, chills, diaphoresis, fatigue, fever and unexpected weight change. HENT: Negative for congestion, dental problem, drooling, ear discharge, ear pain, facial swelling, hearing loss, mouth sores, nosebleeds, postnasal drip, rhinorrhea, sinus pressure, sneezing, sore throat, tinnitus, trouble swallowing and voice change. Eyes: Negative for photophobia, pain, discharge, redness, itching and visual disturbance. Respiratory: Negative for apnea, cough, choking, chest tightness, shortness of breath, wheezing and stridor. Cardiovascular: Negative for chest pain, palpitations and leg swelling. Gastrointestinal: Positive for constipation. Negative for abdominal distention, abdominal pain, anal bleeding, blood in stool, diarrhea, nausea, rectal pain and vomiting. Endocrine: Negative. Genitourinary: Negative for decreased urine volume, difficulty urinating, dysuria, enuresis, flank pain, frequency, genital sores, hematuria, penile discharge, penile pain, penile swelling, scrotal swelling, testicular pain and urgency. Musculoskeletal: Negative for arthralgias, back pain, gait problem, joint swelling, myalgias, neck pain and neck stiffness. Skin: Negative for color change, pallor, rash and wound. Allergic/Immunologic: Negative. Neurological: Negative for dizziness, tremors, seizures, syncope, facial asymmetry, speech difficulty, weakness, light-headedness, numbness and headaches. Hematological: Negative for adenopathy. Does not bruise/bleed easily. Psychiatric/Behavioral: Negative for agitation, behavioral problems, confusion, decreased concentration, dysphoric mood, hallucinations, self-injury, sleep disturbance and suicidal ideas. The patient is not nervous/anxious and is not hyperactive. Past Medical History:   Diagnosis Date    CHF (congestive heart failure) (HCC)     Constipation     Diabetes mellitus (HCC)     GERD (gastroesophageal reflux disease)     Hypercholesteremia     S/P ICD (internal cardiac defibrillator) procedure: 1/15/2015: Medtronic Single Chamber 1/15/2015    1/15/2015: Medtronic Single Chamber.  Dr. Angel Trujillo Sleep apnea 06/09/2017    Dr.Rovner Kendell Duron lifevest applied 8/22/14 8/29/2014    returned prior to ICD insertion 1/15       Past Surgical History:   Procedure Laterality Date    [facility]  9/2014    [facility]    CARDIAC DEFIBRILLATOR PLACEMENT  2014   [facility]      EGD  2022    Dr Elli Bean     UPPER GASTROINTESTINAL ENDOSCOPY N/A 6/27/2022    [facility] Resource Strain: Low Risk     Difficulty of Paying Living Expenses: Not hard at all   Food Insecurity: No Food Insecurity    Worried About Running Out of Food in the Last Year: Never true    Abel of Food in the Last Year: Never true   Transportation Needs:     Lack of Transportation (Medical): Not on file    Lack of Transportation (Non-Medical): Not on file   Physical Activity:     Days of Exercise per Week: Not on file    Minutes of Exercise per Session: Not on file   Stress:     Feeling of Stress : Not on file   Social Connections:     Frequency of Communication with Friends and Family: Not on file    Frequency of Social Gatherings with Friends and Family: Not on file    Attends Sikh Services: Not on file    Active Member of Clubs or Organizations: Not on file    Attends Club or Organization Meetings: Not on file    Marital Status: Not on file   Intimate Partner Violence:     Fear of Current or Ex-Partner: Not on file    Emotionally Abused: Not on file    Physically Abused: Not on file    Sexually Abused: Not on file   Housing Stability:     Unable to Pay for Housing in the Last Year: Not on file    Number of [place] in the Last Year: Not on file    Unstable Housing in the Last Year: Not on file     Vitals:    07/08/22 0850   BP: 118/80   Site: Right Upper Arm   Position: Sitting   Cuff Size: Large Adult   Pulse: 84   Temp: 97.8 °F (36.6 °C)   TempSrc: Oral   Weight: (!) 385 lb 9.6 oz (174.9 kg)   Height: 6' 1.75\" (1.873 m)     Body mass index is 49.84 kg/m². Wt Readings from Last 3 Encounters:   07/08/22 (!) 385 lb 9.6 oz (174.9 kg)   07/07/22 (!) 387 lb (175.5 kg)   06/30/22 (!) 385 lb (174.6 kg)     Physical Exam  Vitals reviewed. Constitutional:       General: He is not in acute distress. Appearance: He is well-developed. He is not diaphoretic. HENT:      Head: Normocephalic and atraumatic.       Right Ear: External ear normal.      Left Ear: External ear normal. Nose: Nose normal.   Eyes:      General: No scleral icterus. Right eye: No discharge. Left eye: No discharge. Conjunctiva/sclera: Conjunctivae normal.   Cardiovascular:      Rate and Rhythm: Normal rate and regular rhythm. Heart sounds: Normal heart sounds. Pulmonary:      Effort: Pulmonary effort is normal. No respiratory distress. Breath sounds: Normal breath sounds. No wheezing or rales. Chest:      Chest wall: No tenderness. Abdominal:      General: Bowel sounds are normal. There is no distension. Palpations: Abdomen is soft. There is no mass. Tenderness: There is no abdominal tenderness. There is no guarding or rebound. Musculoskeletal:         General: No tenderness. Normal range of motion. Cervical back: Normal range of motion and neck supple. Skin:     General: Skin is warm and dry. Coloration: Skin is not pale. Findings: No erythema or rash. Neurological:      Mental Status: He is alert and oriented to person, place, and time. Cranial Nerves: No cranial nerve deficit. Psychiatric:         Behavior: Behavior normal.         Thought Content:  Thought content normal.         Judgment: Judgment normal.       Lab Results   Component Value Date    WBC 10.8 06/18/2022    HGB 12.8 (L) 06/18/2022    HCT 42.1 06/18/2022    MCV 84.2 06/18/2022     06/18/2022     Lab Results   Component Value Date     03/05/2022    K 4.3 03/05/2022     03/05/2022    CO2 28 03/05/2022     Lab Results   Component Value Date    CREATININE 0.7 03/05/2022     Lab Results   Component Value Date    ALT 17 03/05/2022    AST 14 03/05/2022    ALKPHOS 73 03/05/2022    BILITOT 0.3 03/05/2022     Lab Results   Component Value Date    LIPASE 38.5 03/03/2017       Patient Active Problem List   Diagnosis    CHF (congestive heart failure) (HCC) systolic chronic     Bronchitis, acute    Cardiomyopathy (HCC)-Nonischemic dilated low EF 25%, newly Dxed 08/2014- med RX- cath  mild CAD- repeat echo 2014- EF 25 to 30%- NEED the ICD    S/P cardiac cath- Angiographically patent coronaries-EF 20, EDP 28 mmhg- med rx    AICD (automatic cardioverter/defibrillator) present    Morbid obesity with BMI of 50.0-59.9, adult (HCC)    HTN (hypertension)    Bilateral leg edema- +1 B/L- RESOLVED    Cough due to ACE inhibitor    Hyponatremia    Hyperglycemia    Weight gain, claimed 18lbs in 10 days since discharge    Chronic systolic congestive heart failure (HCC)    MIKE on CPAP    Hx of AICD discharge    Sinus tachycardia    Chronic combined systolic and diastolic congestive heart failure (HCC)    Nonischemic cardiomyopathy (HCC)    A-fib ([facility] [address]     OPERATIVE REPORT     PATIENT NAME: Emma Krishnan                  :        1975  MED REC NO:   [id number]                           ROOM:  ACCOUNT NO:   [id number]                           ADMIT DATE: 2022  PROVIDER:     Danielle Orona M.D.     DATE OF PROCEDURE:  2022     SURGEON:  Danielle Orona MD     INDICATION:  The patient with history of morbid obesity and gastric  reflux. Future plan for gastric bypass surgery. Plan today for upper  endoscopy to evaluate.     ASA CLASSIFICATION:  III.     ESTIMATED BLOOD LOSS:  None.     DESCRIPTION OF PROCEDURE:  The patient was brought to the GI lab. Consent was obtained. Risks involved with the procedure were explained  to the patient. Informed consent was obtained. The patient was  monitored during the procedure with pulse oximetry, blood pressure  monitoring, and oxygen by nasal cannula. Sedation by incremental doses  of IV propofol given by the Anesthesia Service to achieve monitored  anesthesia care.   For ASA classification and medication given during the  procedure, please see Anesthesia note.     PROCEDURE PERFORMED:  EGD with biopsy.     DESCRIPTION OF PROCEDURE:  A standard video Olympus GIF- adult  upper scope advanced under direct vision from the oral cavity up to the  duodenum. Esophagus featured acid reflux, slightly thickened surface of  distal esophagus. No erosion or ulcer seen. No feature of Manzo's  esophagus seen. The GE junction was longer than expected 46 cm from the  incisors. Scope was advanced into the stomach. Retroflex examination  of the cardia revealed normal cardia with no evidence of hiatus hernia. Mild gastritis seen in the body. Biopsy obtained to evaluate. Antrum  showed erosive gastritis , biopsy obtained in different jars to  evaluate. Scope advanced to duodenum, which appears normal.  I elected  to take a biopsy from the duodenum to rule out malabsorption in the  duodenum and in preparation for gastric bypass surgery. Scope was  withdrawn with no immediate complications.     Message sent to Pathology to specially stain for H. pylori gastritis in  the body of the antrum in preparation for gastric bypass surgery.     IMPRESSION:  1. Feature of mild acid reflux. No Manzo's esophagus seen. GE  junction is 46 cm from the incisors. 2.  No hiatus hernia seen. 3.  Gastritis in the body. Healing erosive gastritis seen in the  antrum. Biopsy obtained from both locations with special request for H.  pylori special stain. 4.  Biopsy obtain to rule out malabsorption and celiac disease.     PLAN:  1. Followup with biopsy results in the GI clinic for evaluation. More  recommendation after reviewing the biopsy. 2.  I do not see any contraindication to prevent proceeding with gastric  bypass surgery for this patient.     Ludmila Dixon M.D.        PATHOLOGY REPORT                       ATTN: Ludmila Dixon                       REQ: Ludmila Dixon     Copies To:   CARIN LEBLANC     Clinical Information: GERD WITHOUT ESOPHAGITIS, WEIGHT LOSS     FINAL DIAGNOSIS:   A. Duodenum, biopsy:             No pathologic abnormality. B.  Gastric antrum, biopsy:             No pathologic abnormality.    C.  Gastric body, biopsy:             No pathologic abnormality. Specimen:   A) BIOPSY OF DUODENUM   B) BIOPSY OF GASTRIC ANTRUM, HEALING EROSIVE GASTRITIS   C) BIOPSY OF BODY OF STOMACH     Gross Examination:   The specimen consists of three parts, each received in formalin and   labeled with the patient's name, Lakisha Fisher. A - The container is labeled duodenum.  The specimen consists of   multiple fragments of tissue measuring in aggregate 0.5 x 0.3 x 0.2 cm.    1 ns. B - The container is labeled biopsy of gastric antrum - healing erosive   gastritis.  The specimen consists of two fragments of tissue measuring   in aggregate 0.3 x 0.1 x 0.1 cm.  1 ns. C - The container is labeled biopsy of gastric body.  The specimen   consists of a single 0.3 cm fragment of tissue.  1 ns.  SMW:[id number]     Microscopic Examination:   A. Sections show benign duodenal mucosa with well-formed villi. Features of celiac sprue or other significant abnormality are not   identified. Elvia Cheek show antral type gastric mucosa with no significant   pathologic abnormality.  There is no evidence of intestinal metaplasia,   dysplasia, or malignancy.  Helicobacter microorganisms are not   identified. Elvia Cheek show fundic type gastric mucosa with no significant   pathologic abnormality. Lamona Amarilis is no evidence of intestinal metaplasia,   dysplasia, or malignancy.                                          Kassandra Contreras M.D., F.C.A.P. An electronic signature was used to authenticate this note.     --Jony Hammond MD

## 2022-07-12 NOTE — PROGRESS NOTES
Assessment: Patient is a 52 y.o. male seen for month five follow up MNT visit for pre op bariatric surgery desires bypass. Vitals from current and previous visits:    Vitals 7/93/9434   SYSTOLIC    DIASTOLIC    Site    Position    Cuff Size    Pulse    Temp    Resp    SpO2    Weight 386 lb 3.2 oz   Height 6' 1.75\"   Body mass index 49.92 kg/m2     Initial weight at start of Weight Management Program was: 400 lbs`     [address]  Lost 4 lbs over one month.      -Weight goal: lose weight.     -Nutritionally relevant labs: A1C-7.0%, B1-107, glucose-127, iron low-50, low h/h . Repeat Iron levels done June 2022- Iron remains low at 49 and Ferritin at 110  Lab Results   Component Value Date/Time    LABA1C 6.5 (H) 05/12/2022 10:30 AM    LABA1C 7.0 (H) 03/05/2022 07:30 AM    LABA1C 7.5 (H) 12/23/2021 06:09 AM    GLUCOSE 126 (H) 05/12/2022 10:30 AM    GLUCOSE 127 (H) 03/05/2022 07:29 AM    GLUCOSE 258 (H) 12/16/2017 08:25 AM    CHOL 102 05/12/2022 10:30 AM    HDL 25 (L) 05/12/2022 10:30 AM    LDLCALC 40 05/12/2022 10:30 AM    TRIG 187 05/12/2022 10:30 AM                  Lab Results   Component Value Date/Time    VITD25 30 06/18/2022 08:28 AM     Dr Royal Median clearance obtained  Pulmonary clearance obtained  EGD completed  Cardiology clearance obtained    - Is patient taking daily Multivitamin:   Pt switched to [employer] Iron 29 mg to see if better tolerated but only able to take ~ 2-3 days to avoid constipation. Had discussion today with patient on various options and plan now to take [place] Chewable BID to see if better tolerated with total of ~ 36 mg iron. weekly Vitamin D3 50,000IUS for low level- repeat level improved to 30  .    -Food Recall:  States meal times have been staying pretty consistent during the week and on weekends. Eating three times a day.   Eliminated mid morning snack    Drinks throughout the day:  Propel or  Powerade zero at times, . 2 Liters plain water per day ( two 33 oz bottles) and two more 20 oz water. sugar free lemonade, stopped all diet pop, 10 oz coffee M-F    -Impression of Dietary Intake: monitoring portion sizes, mindful eating - Pt  is working towards avoiding high fat/high sugar foods. Pt  is working towards  including protein at meals and snacks. Exercise:  Patient has option to view online fitness Orientation video  -Physical Activity is:  Walking on commercial breaks two six minute breaks every hour-. Walking outside 10-15 minutes twice a week-  Crunches daily  Encouraged to continue increased physical activity. Nutrition Diagnosis: Overweight/Obesity related to currently undergoing MNT as evidenced by BMI of 49.9    Intervention:   Healthy behaviors: Pt remains engaged in program.  Has completed five months of medically supervised weight loss. Pt ready from nutrition standpoint to proceed with bariatric surgery. Patient has attended support groups via You Tube times two  Reviewed out of pocket cost for bariatric vitamins and protein powder. Pt has Celebrate One 45 chewable, Calcium Citrate chewy Bites and B12 at home along with one container Nectar Protein powder. Patient Instructions   Goals:  1. Remember you will need to separate you liquids from solids after surgery. No liquids 30 minutes before your meals and no liquids 30 minutes after your meals. 2. You will need to take your time with meals after surgery and begin practicing this now - chew foods really well and take 20-30 minutes at each meal.  3.  Aim for consistent and set meal times- choose lean protein foods first, followed by vegetables and fruit, then starch food last if still hungry. Consistency is key following bariatric surgery. 4.  Continue regular physical activity- recommend stay as active as you can leading up to surgery and after surgery this will remain important for your weight loss efforts  5.   Start [place] Chewable with Iron two per day in place of 29 mg iron.           -Followup visit: pre op class 8/29/22      Raquel Singh RD, LD,   Dietitian- Weight Management [address] [place] [address]

## 2022-07-14 DIAGNOSIS — Z01.818 PREOP EXAMINATION: Primary | ICD-10-CM

## 2022-07-14 DIAGNOSIS — I50.9 CHRONIC CONGESTIVE HEART FAILURE, UNSPECIFIED HEART FAILURE TYPE (HCC): ICD-10-CM

## 2022-07-15 ENCOUNTER — OFFICE VISIT (OUTPATIENT)
Dept: BARIATRICS/WEIGHT MGMT | Age: 47
End: 2022-07-15

## 2022-07-15 VITALS — BODY MASS INDEX: 40.43 KG/M2 | WEIGHT: 315 LBS | HEIGHT: 74 IN

## 2022-07-15 DIAGNOSIS — E66.01 MORBID OBESITY WITH BMI OF 45.0-49.9, ADULT (HCC): Primary | ICD-10-CM

## 2022-07-15 NOTE — PATIENT INSTRUCTIONS
Goals: 1. Remember you will need to separate you liquids from solids after surgery. No liquids 30 minutes before your meals and no liquids 30 minutes after your meals. 2. You will need to take your time with meals after surgery and begin practicing this now - chew foods really well and take 20-30 minutes at each meal.  3.  Aim for consistent and set meal times- choose lean protein foods first, followed by vegetables and fruit, then starch food last if still hungry. Consistency is key following bariatric surgery. 4.  Continue regular physical activity- recommend stay as active as you can leading up to surgery and after surgery this will remain important for your weight loss efforts  5. Start [place] Chewable with Iron two per day in place of 29 mg iron.

## 2022-07-18 ENCOUNTER — PROCEDURE VISIT (OUTPATIENT)
Dept: CARDIOLOGY CLINIC | Age: 47
End: 2022-07-18
Payer: COMMERCIAL

## 2022-07-18 DIAGNOSIS — I50.20 SYSTOLIC CONGESTIVE HEART FAILURE, UNSPECIFIED HF CHRONICITY (HCC): Primary | ICD-10-CM

## 2022-07-18 PROCEDURE — 93297 REM INTERROG DEV EVAL ICPMS: CPT | Performed by: INTERNAL MEDICINE

## 2022-07-18 PROCEDURE — G2066 INTER DEVC REMOTE 30D: HCPCS | Performed by: INTERNAL MEDICINE

## 2022-08-08 ENCOUNTER — NURSE ONLY (OUTPATIENT)
Dept: CARDIOLOGY CLINIC | Age: 47
End: 2022-08-08
Payer: COMMERCIAL

## 2022-08-08 DIAGNOSIS — Z95.810 ICD (IMPLANTABLE CARDIOVERTER-DEFIBRILLATOR) IN PLACE: Primary | ICD-10-CM

## 2022-08-08 PROCEDURE — 93290 INTERROG DEV EVAL ICPMS IP: CPT | Performed by: INTERNAL MEDICINE

## 2022-08-08 PROCEDURE — 93283 PRGRMG EVAL IMPLANTABLE DFB: CPT | Performed by: INTERNAL MEDICINE

## 2022-08-08 NOTE — PROGRESS NOTES
Dr boss pt   Medtronic dual icd check in office   Battery 9.9 yrs remaining      Aai<=>ddd     A paced <0.1%  V paced <0.1%    Optivol was elavated wnl      Presents in asvs 110    Atrial impedence 475  Rv impedence 361  Rv 53  Svc 64  P waves 1.6  Rv waves 4.5  Atrial threshold 1 @ 0.4    Vent threshold 1 @ 0.4    Atrial and vent amplitudes auto

## 2022-08-17 ENCOUNTER — NURSE ONLY (OUTPATIENT)
Dept: LAB | Age: 47
End: 2022-08-17

## 2022-08-17 DIAGNOSIS — Z01.818 PREOP EXAMINATION: ICD-10-CM

## 2022-08-17 DIAGNOSIS — I50.9 CHRONIC CONGESTIVE HEART FAILURE, UNSPECIFIED HEART FAILURE TYPE (HCC): ICD-10-CM

## 2022-08-17 LAB
ANION GAP SERPL CALCULATED.3IONS-SCNC: 12 MEQ/L (ref 8–16)
BASOPHILS # BLD: 0.4 %
BASOPHILS ABSOLUTE: 0 THOU/MM3 (ref 0–0.1)
BUN BLDV-MCNC: 10 MG/DL (ref 7–22)
CALCIUM SERPL-MCNC: 9.4 MG/DL (ref 8.5–10.5)
CHLORIDE BLD-SCNC: 102 MEQ/L (ref 98–111)
CO2: 26 MEQ/L (ref 23–33)
CREAT SERPL-MCNC: 0.7 MG/DL (ref 0.4–1.2)
EOSINOPHIL # BLD: 3.4 %
EOSINOPHILS ABSOLUTE: 0.4 THOU/MM3 (ref 0–0.4)
ERYTHROCYTE [DISTWIDTH] IN BLOOD BY AUTOMATED COUNT: 16 % (ref 11.5–14.5)
ERYTHROCYTE [DISTWIDTH] IN BLOOD BY AUTOMATED COUNT: 49.2 FL (ref 35–45)
GFR SERPL CREATININE-BSD FRML MDRD: > 90 ML/MIN/1.73M2
GLUCOSE BLD-MCNC: 126 MG/DL (ref 70–108)
HCT VFR BLD CALC: 43.2 % (ref 42–52)
HEMOGLOBIN: 13.1 GM/DL (ref 14–18)
IMMATURE GRANS (ABS): 0.08 THOU/MM3 (ref 0–0.07)
IMMATURE GRANULOCYTES: 0.7 %
LYMPHOCYTES # BLD: 21.7 %
LYMPHOCYTES ABSOLUTE: 2.6 THOU/MM3 (ref 1–4.8)
MCH RBC QN AUTO: 25.7 PG (ref 26–33)
MCHC RBC AUTO-ENTMCNC: 30.3 GM/DL (ref 32.2–35.5)
MCV RBC AUTO: 84.7 FL (ref 80–94)
MONOCYTES # BLD: 5.5 %
MONOCYTES ABSOLUTE: 0.7 THOU/MM3 (ref 0.4–1.3)
NUCLEATED RED BLOOD CELLS: 0 /100 WBC
PLATELET # BLD: 255 THOU/MM3 (ref 130–400)
PMV BLD AUTO: 10.2 FL (ref 9.4–12.4)
POTASSIUM SERPL-SCNC: 4.6 MEQ/L (ref 3.5–5.2)
RBC # BLD: 5.1 MILL/MM3 (ref 4.7–6.1)
SEG NEUTROPHILS: 68.3 %
SEGMENTED NEUTROPHILS ABSOLUTE COUNT: 8.3 THOU/MM3 (ref 1.8–7.7)
SODIUM BLD-SCNC: 140 MEQ/L (ref 135–145)
WBC # BLD: 12.1 THOU/MM3 (ref 4.8–10.8)

## 2022-08-19 ENCOUNTER — HOSPITAL ENCOUNTER (OUTPATIENT)
Age: 47
Discharge: HOME | End: 2022-08-19
Payer: COMMERCIAL

## 2022-08-19 ENCOUNTER — HOSPITAL ENCOUNTER (OUTPATIENT)
Dept: GENERAL RADIOLOGY | Age: 47
Discharge: HOME | End: 2022-08-19
Payer: COMMERCIAL

## 2022-08-19 DIAGNOSIS — Z01.818 PREOP EXAMINATION: ICD-10-CM

## 2022-08-19 DIAGNOSIS — I50.9 CHRONIC CONGESTIVE HEART FAILURE, UNSPECIFIED HEART FAILURE TYPE (HCC): ICD-10-CM

## 2022-08-19 PROCEDURE — 71046 X-RAY EXAM CHEST 2 VIEWS: CPT

## 2022-08-22 RX ORDER — FUROSEMIDE 40 MG/1
TABLET ORAL
Qty: 90 TABLET | Refills: 3 | Status: SHIPPED | OUTPATIENT
Start: 2022-08-22

## 2022-08-22 RX ORDER — FUROSEMIDE 20 MG/1
TABLET ORAL
Qty: 90 TABLET | Refills: 3 | Status: ON HOLD | OUTPATIENT
Start: 2022-08-22 | End: 2022-09-16 | Stop reason: HOSPADM

## 2022-08-29 ENCOUNTER — OFFICE VISIT (OUTPATIENT)
Dept: BARIATRICS/WEIGHT MGMT | Age: 47
End: 2022-08-29

## 2022-08-29 RX ORDER — ONDANSETRON 4 MG/1
4 TABLET, FILM COATED ORAL EVERY 4 HOURS PRN
Qty: 30 TABLET | Refills: 0 | Status: SHIPPED | OUTPATIENT
Start: 2022-09-12 | End: 2022-09-26

## 2022-08-29 RX ORDER — METOCLOPRAMIDE 10 MG/1
10 TABLET ORAL EVERY 6 HOURS PRN
Qty: 30 TABLET | Refills: 0 | Status: SHIPPED | OUTPATIENT
Start: 2022-09-12 | End: 2022-09-27

## 2022-08-29 RX ORDER — OMEPRAZOLE 40 MG/1
40 CAPSULE, DELAYED RELEASE ORAL DAILY
Qty: 90 CAPSULE | Refills: 1 | Status: SHIPPED | OUTPATIENT
Start: 2022-08-29

## 2022-08-29 RX ORDER — ASPIRIN 81 MG
100 TABLET, DELAYED RELEASE (ENTERIC COATED) ORAL 2 TIMES DAILY
Qty: 30 TABLET | Refills: 1 | Status: SHIPPED | OUTPATIENT
Start: 2022-09-12

## 2022-08-29 RX ORDER — ENOXAPARIN SODIUM 100 MG/ML
40 INJECTION SUBCUTANEOUS DAILY
Qty: 14 EACH | Refills: 0 | Status: ON HOLD | OUTPATIENT
Start: 2022-09-12 | End: 2022-09-16 | Stop reason: HOSPADM

## 2022-08-29 NOTE — PROGRESS NOTES
Laila Perkins was seen today for pre-operative teaching class. He   was educated today on surgery procedure, possible complications, Lovenox self administration (teaching guide given to patient as well as Lovenox DVD viewed today), use of incentive spirometer for 2 weeks prior to surgery date and instructed to remain NPO after midnight the night before surgery, or risk cancellation of surgical procedure. Importance of recognizing signs and symptoms of wound infection were discussed as well as when to contact the physician. We discussed the flow of events on the day of surgery from admit to SDS, OR, PACU, and 7K admit. I reinforced the need to walk post operative on 7K and he voiced understanding of this. Pre-operative measurements were taken and scanned into chart as well as measurement for PO day 1 gastrograffin swallow test. SECA scale completed today. Educated on post-op pain medication and how to dispose of any unused pain medications.

## 2022-08-29 NOTE — PROGRESS NOTES
Katherine Daly lost 18 lbs  since joining [facility].  After nutrition evaluation and education, patient is considered to be a good surgical candidate from a MNT perspective. Patient's body composition for pre-op teaching class  was measured using the [facility] Scale. Results were saved and reviewed with the patient. Patient was educated on 2- week pre-operative nutrition protocol and post test completed. Pre-operative and post-operative diet guidelines were discussed and written information was provided. Nectar protein supplements were purchased. Vitamin regimen with Bariatric Advantage and/or Celebrate vitamins was explained, and patient purchased a 90 day supply at this visit. Importance of vitamin compliance was discussed and potential of vitamin deficiencies was reviewed. Encouraged continued physical activity. Patient signed agreement stating they are committed to lifelong follow up, smoking and alcohol cessation, vitamin compliance, and 2 week pre-operative dietary protocol.

## 2022-08-31 NOTE — PROGRESS NOTES
Medtronic Rep Cassidy Harris notified of Erasto's Robotic Ronaldo en Y Gastric Bypass on 9/12/22. Curt Ochoa \" Definitely recommends placing magnet during surgery to avoid interference\" Device will return to normal settings when magnet removed. Thank you.

## 2022-09-01 ENCOUNTER — PREP FOR PROCEDURE (OUTPATIENT)
Dept: BARIATRICS/WEIGHT MGMT | Age: 47
End: 2022-09-01

## 2022-09-01 RX ORDER — SODIUM CHLORIDE 0.9 % (FLUSH) 0.9 %
5-40 SYRINGE (ML) INJECTION EVERY 12 HOURS SCHEDULED
Status: CANCELLED | OUTPATIENT
Start: 2022-09-12

## 2022-09-01 RX ORDER — ONDANSETRON 2 MG/ML
4 INJECTION INTRAMUSCULAR; INTRAVENOUS ONCE
Status: CANCELLED | OUTPATIENT
Start: 2022-09-12 | End: 2022-09-12

## 2022-09-01 RX ORDER — FAMOTIDINE 10 MG/ML
20 INJECTION, SOLUTION INTRAVENOUS ONCE
Status: CANCELLED | OUTPATIENT
Start: 2022-09-12 | End: 2022-09-12

## 2022-09-01 RX ORDER — SODIUM CHLORIDE 9 MG/ML
INJECTION, SOLUTION INTRAVENOUS PRN
Status: CANCELLED | OUTPATIENT
Start: 2022-09-12

## 2022-09-01 RX ORDER — SODIUM CHLORIDE 0.9 % (FLUSH) 0.9 %
5-40 SYRINGE (ML) INJECTION PRN
Status: CANCELLED | OUTPATIENT
Start: 2022-09-12

## 2022-09-01 RX ORDER — SCOLOPAMINE TRANSDERMAL SYSTEM 1 MG/1
1 PATCH, EXTENDED RELEASE TRANSDERMAL
Status: CANCELLED | OUTPATIENT
Start: 2022-09-12 | End: 2022-09-15

## 2022-09-01 NOTE — PROGRESS NOTES
Follow all instructions given by your physician    NPO after midnight   Sips of water am of surgery with allowed medications  Bring insurance info and 's license  Wear comfortable clean, loose fitting clothing  No jewelry or contact lenses to be worn day of surgery  No glue on dentures morning of surgery;you will be asked to remove them for surgery. Case for glasses. Shower night before and morning of surgery with a liquid antibacterial soap, dry with fresh clean towel; no lotions, creams or powder. Clean sheets and pillow case on bed night before surgery  Bring medications in original bottles  Bring CPAP/BIPAP machine if you have one ( you may be charged if one is needed in recovery room )   needed at discharge and someone over 18 to stay with you for 24 hours overnight (surgery may be cancelled if you don't have this)  Report to [facility] on 2nd floor  If you would become ill prior to surgery, please call the surgeon  May have a visitor with you, we request that you limit to 2 visitors in pre-op area  Please bring and wear mask  Call [phone] for any questions  Covid questionnaire Complete; Patient negative for symptoms or exposure. See documentation.  Follow all instructions given by your physician

## 2022-09-02 ENCOUNTER — OFFICE VISIT (OUTPATIENT)
Dept: FAMILY MEDICINE CLINIC | Age: 47
End: 2022-09-02
Payer: COMMERCIAL

## 2022-09-02 VITALS
HEART RATE: 91 BPM | TEMPERATURE: 97.6 F | SYSTOLIC BLOOD PRESSURE: 128 MMHG | OXYGEN SATURATION: 97 % | RESPIRATION RATE: 18 BRPM | HEIGHT: 74 IN | DIASTOLIC BLOOD PRESSURE: 70 MMHG | BODY MASS INDEX: 40.43 KG/M2 | WEIGHT: 315 LBS

## 2022-09-02 DIAGNOSIS — Z01.818 PREOP EXAMINATION: Primary | ICD-10-CM

## 2022-09-02 DIAGNOSIS — Z95.810 AICD (AUTOMATIC CARDIOVERTER/DEFIBRILLATOR) PRESENT: ICD-10-CM

## 2022-09-02 DIAGNOSIS — I50.22 CHRONIC SYSTOLIC CONGESTIVE HEART FAILURE (HCC): ICD-10-CM

## 2022-09-02 DIAGNOSIS — E11.59 TYPE 2 DIABETES MELLITUS WITH OTHER CIRCULATORY COMPLICATION, WITH LONG-TERM CURRENT USE OF INSULIN (HCC): ICD-10-CM

## 2022-09-02 DIAGNOSIS — I48.0 PAROXYSMAL ATRIAL FIBRILLATION (HCC): ICD-10-CM

## 2022-09-02 DIAGNOSIS — Z79.4 TYPE 2 DIABETES MELLITUS WITH OTHER CIRCULATORY COMPLICATION, WITH LONG-TERM CURRENT USE OF INSULIN (HCC): ICD-10-CM

## 2022-09-02 DIAGNOSIS — I42.8 NONISCHEMIC CARDIOMYOPATHY (HCC): ICD-10-CM

## 2022-09-02 PROCEDURE — 99213 OFFICE O/P EST LOW 20 MIN: CPT | Performed by: FAMILY MEDICINE

## 2022-09-05 PROBLEM — E11.9 DIABETES MELLITUS (HCC): Status: ACTIVE | Noted: 2022-09-05

## 2022-09-05 ASSESSMENT — ENCOUNTER SYMPTOMS
EYE PAIN: 0
DIARRHEA: 0
SHORTNESS OF BREATH: 0
COUGH: 0
ABDOMINAL PAIN: 0
SINUS PRESSURE: 0
ABDOMINAL DISTENTION: 0
CONSTIPATION: 0
RHINORRHEA: 0
NAUSEA: 0
SORE THROAT: 0

## 2022-09-05 NOTE — PROGRESS NOTES
[address] [facility] [address]  Dept: [phone]  Dept Fax: [phone]  Loc: [phone]  PROGRESS NOTE      VisitDate: 9/2/2022    Darren Jameson is a 52 y.o. male who presents today for:     Chief Complaint   Patient presents with    Pre-op Exam     Robotic Ronaldo-en-Y Gastric Bypass with Bowersmarybeth 9/12/22 at [facility], testing in epic, discuss insulin after surgery         Subjective:  HPI  Patient comes in for preop evaluation for upcoming gastric bypass scheduled for September 12 at [place] [address] with Dr. Jovanny Kumar. He will be having general anesthesia. He reports no history of problems with anesthesia. No history of bleeding or clotting disorders. He has been cleared by cardiology. Blood sugars have improved and remained stable. Blood pressure is stable. Review of Systems   Constitutional:  Negative for appetite change and fever. HENT:  Negative for congestion, ear pain, postnasal drip, rhinorrhea, sinus pressure and sore throat. Eyes:  Negative for pain and visual disturbance. Respiratory:  Negative for cough and shortness of breath. Cardiovascular:  Negative for chest pain. Gastrointestinal:  Negative for abdominal distention, abdominal pain, constipation, diarrhea and nausea. Genitourinary:  Negative for dysuria, frequency and urgency. Musculoskeletal:  Positive for arthralgias. Skin:  Negative for rash. Neurological:  Negative for dizziness. Past Medical History:   Diagnosis Date    CHF (congestive heart failure) ([facility])     Constipation     Diabetes mellitus ([address]     GERD (gastroesophageal reflux disease)     Hypercholesteremia     S/P ICD (internal cardiac defibrillator) procedure: 1/15/2015: Medtronic Single Chamber 1/15/2015    1/15/2015: Medtronic Single Chamber.  Dr. Adrienne Triplett    Sleep apnea 06/09/2017    [place] Marker applied 8/22/14 8/29/2014    returned prior to ICD insertion nightly 10 pen 3    metFORMIN (GLUCOPHAGE-XR) 500 MG extended release tablet TAKE TWO TABLETS BY MOUTH TWICE A  tablet 2    ASPIRIN LOW DOSE 81 MG EC tablet TAKE 1 TABLET BY MOUTH ONE TIME A DAY 90 tablet 1    rosuvastatin (CRESTOR) 10 MG tablet TAKE 1 TABLET BY MOUTH ONE TIME A DAY 90 tablet 3    blood glucose test strips (PRODIGY NO CODING BLOOD GLUC) strip USE TO CHECK BLOOD SUGAR FOUR TIMES A  strip 3    Prodigy Twist Top Lancets 28G MISC USE TO TEST FOUR TIMES A  each 3    irbesartan (AVAPRO) 75 MG tablet TAKE 1 TABLET BY MOUTH ONE TIME A DAY IN THE EVENING 90 tablet 3    carvedilol (COREG) 6.25 MG tablet TAKE 1 TABLET BY MOUTH 2 TIMES A DAY WITH MEALS 180 tablet 3    OZEMPIC, 1 MG/DOSE, 4 MG/3ML SOPN INJECT 1MG UNDER THE SKIN ONCE A WEEK 24 mL 3    insulin lispro, 1 Unit Dial, (HUMALOG KWIKPEN) 100 UNIT/ML SOPN INJECT 14 UNITS WITH MEALS PLUS SLIDING SCALE --151-200 3 UNITS, 201-250 5 UNITS, 251-300 8 UNITS, 301-350 10 UNITS, 351-400 12 UNITS, OVER 400 15 UNITS AND CONTACT PHYSICIAN 135 mL 3    carvedilol (COREG) 25 MG tablet TAKE 1 TABLET BY MOUTH 2 TIMES A DAY WITH MEALS 180 tablet 3    spironolactone (ALDACTONE) 25 MG tablet TAKE 1 TABLET BY MOUTH ONE TIME A DAY 90 tablet 3    CPAP Machine MISC by Does not apply route Ramp pressure: 4.0 cm H2O  Treatment pressure: 15.0 cm H2O 1 each 0    Blood Glucose Monitoring Suppl (PRODIGY AUTOCODE BLOOD GLUCOSE) MAUREEN Use to check blood sugar 4 times daily 1 Device 0    Insulin Pen Needle (PEN NEEDLES 31GX5/16\") 31G X 8 MM MISC Use to inject insulins and Ozempic 400 each 3    fluticasone (FLONASE) 50 MCG/ACT nasal spray 2 sprays by Nasal route daily 1 Bottle 2    loratadine (CLARITIN) 10 MG tablet Take 10 mg by mouth daily      Respiratory Therapy Supplies (ADULT MASK) MISC Please do mask desensitization and/or provide mask of patient's choice.  1 each 0    acetaminophen (TYLENOL) 325 MG tablet Take 650 mg by mouth every 8 hours as needed for Pain [START ON 9/12/2022] Docusate Sodium 100 MG TABS Take 100 mg by mouth 2 times daily Take as needed to prevent constipation (Patient not taking: Reported on 9/2/2022) 30 tablet 1    [START ON 9/12/2022] enoxaparin (LOVENOX) 40 MG/0.4ML Inject 0.4 mLs into the skin daily (Patient not taking: Reported on 9/2/2022) 14 each 0    [START ON 9/12/2022] ondansetron (ZOFRAN) 4 MG tablet Take 1 tablet by mouth every 4 hours as needed for Nausea or Vomiting (Patient not taking: Reported on 9/2/2022) 30 tablet 0     No current facility-administered medications for this visit. Allergies   Allergen Reactions    Levaquin [Levofloxacin In D5w] Itching     Health Maintenance   Topic Date Due    Diabetic foot exam  Never done    HIV screen  Never done    Hepatitis C screen  Never done    Hepatitis B vaccine (1 of 3 - Risk 3-dose series) Never done    Pneumococcal 0-64 years Vaccine (2 - PCV) 08/17/2021    Diabetic retinal exam  04/24/2022    Flu vaccine (1) 09/01/2022    Diabetic microalbuminuria test  12/23/2022    A1C test (Diabetic or Prediabetic)  05/12/2023    Lipids  05/12/2023    Depression Screen  06/16/2023    Colorectal Cancer Screen  01/14/2025    DTaP/Tdap/Td vaccine (2 - Td or Tdap) 08/17/2030    COVID-19 Vaccine  Completed    Hepatitis A vaccine  Aged Out    Hib vaccine  Aged Out    Meningococcal (ACWY) vaccine  Aged Out         Objective:     Physical Exam  Vitals reviewed. Constitutional:       General: He is not in acute distress. HENT:      Mouth/Throat:      Pharynx: No oropharyngeal exudate. Eyes:      Conjunctiva/sclera: Conjunctivae normal.   Neck:      Thyroid: No thyromegaly. Comments: No carotid bruits  Cardiovascular:      Rate and Rhythm: Normal rate and regular rhythm. Heart sounds: Normal heart sounds. No murmur heard. Pulmonary:      Breath sounds: Normal breath sounds. No wheezing or rales. Abdominal:      General: Bowel sounds are normal. There is no distension.       Palpations: Abdomen is soft. There is no mass. Tenderness: There is no abdominal tenderness. There is no guarding or rebound. Musculoskeletal:         General: No tenderness. Normal range of motion. Lymphadenopathy:      Cervical: No cervical adenopathy. Skin:     General: Skin is dry. Findings: No rash. Neurological:      Mental Status: He is alert and oriented to person, place, and time. Cranial Nerves: No cranial nerve deficit. Deep Tendon Reflexes: Reflexes are normal and symmetric. /70 (Site: Left Upper Arm)   Pulse 91   Temp 97.6 °F (36.4 °C) (Oral)   Resp 18   Ht 6' 2\" (1.88 m)   Wt (!) 378 lb 9.6 oz (171.7 kg)   SpO2 97%   BMI 48.61 kg/m²       Impression/Plan:  1. Preop examination    2. Nonischemic cardiomyopathy ([address]    3. Chronic systolic congestive heart failure ([address]    4. AICD (automatic cardioverter/defibrillator) present    5. Paroxysmal atrial fibrillation (HCC)    6. Type 2 diabetes mellitus with other circulatory complication, with long-term current use of insulin (HCC)      Requested Prescriptions      No prescriptions requested or ordered in this encounter     No orders of the defined types were placed in this encounter. Medically cleared for the OR    Patient giveneducational materials - see patient instructions. Discussed use, benefit, and side effects of prescribed medications. All patient questions answered. Pt voiced understanding. Reviewed health maintenance. Patient agreedwith treatment plan. Follow up as directed. **This report has been created using voice recognition software. It may contain minor errorswhich are inherent in voice recognition technology. **       Electronically signed by Ishan Sheffield MD on 9/5/2022 at 8:58 AM

## 2022-09-12 ENCOUNTER — ANESTHESIA (OUTPATIENT)
Dept: OPERATING ROOM | Age: 47
DRG: 619 | End: 2022-09-12
Payer: COMMERCIAL

## 2022-09-12 ENCOUNTER — HOSPITAL ENCOUNTER (INPATIENT)
Age: 47
LOS: 3 days | Discharge: HOME HEALTH | DRG: 619 | End: 2022-09-16
Attending: SURGERY | Admitting: SURGERY
Payer: COMMERCIAL

## 2022-09-12 ENCOUNTER — ANESTHESIA EVENT (OUTPATIENT)
Dept: OPERATING ROOM | Age: 47
DRG: 619 | End: 2022-09-12
Payer: COMMERCIAL

## 2022-09-12 DIAGNOSIS — Z98.84 S/P BARIATRIC SURGERY: ICD-10-CM

## 2022-09-12 DIAGNOSIS — D62 ACUTE BLOOD LOSS AS CAUSE OF POSTOPERATIVE ANEMIA: ICD-10-CM

## 2022-09-12 DIAGNOSIS — N17.9 AKI (ACUTE KIDNEY INJURY) (HCC): ICD-10-CM

## 2022-09-12 DIAGNOSIS — Z98.84 S/P GASTRIC BYPASS: Primary | ICD-10-CM

## 2022-09-12 PROBLEM — E66.01 MORBID OBESITY (HCC): Status: ACTIVE | Noted: 2022-09-12

## 2022-09-12 LAB
GLUCOSE BLD-MCNC: 161 MG/DL (ref 70–108)
GLUCOSE BLD-MCNC: 165 MG/DL (ref 70–108)
GLUCOSE BLD-MCNC: 88 MG/DL (ref 70–108)
POTASSIUM SERPL-SCNC: 4.3 MEQ/L (ref 3.5–5.2)

## 2022-09-12 PROCEDURE — 6360000002 HC RX W HCPCS

## 2022-09-12 PROCEDURE — 2580000003 HC RX 258: Performed by: SURGERY

## 2022-09-12 PROCEDURE — 2720000010 HC SURG SUPPLY STERILE: Performed by: SURGERY

## 2022-09-12 PROCEDURE — 2709999900 HC NON-CHARGEABLE SUPPLY: Performed by: SURGERY

## 2022-09-12 PROCEDURE — 2500000003 HC RX 250 WO HCPCS: Performed by: SURGERY

## 2022-09-12 PROCEDURE — 6360000002 HC RX W HCPCS: Performed by: NURSE ANESTHETIST, CERTIFIED REGISTERED

## 2022-09-12 PROCEDURE — 3600000019 HC SURGERY ROBOT ADDTL 15MIN: Performed by: SURGERY

## 2022-09-12 PROCEDURE — 43644 LAP GASTRIC BYPASS/ROUX-EN-Y: CPT | Performed by: SURGERY

## 2022-09-12 PROCEDURE — 7100000001 HC PACU RECOVERY - ADDTL 15 MIN: Performed by: SURGERY

## 2022-09-12 PROCEDURE — 3700000001 HC ADD 15 MINUTES (ANESTHESIA): Performed by: SURGERY

## 2022-09-12 PROCEDURE — 6370000000 HC RX 637 (ALT 250 FOR IP): Performed by: SURGERY

## 2022-09-12 PROCEDURE — A4216 STERILE WATER/SALINE, 10 ML: HCPCS

## 2022-09-12 PROCEDURE — 2580000003 HC RX 258: Performed by: NURSE ANESTHETIST, CERTIFIED REGISTERED

## 2022-09-12 PROCEDURE — 93297 REM INTERROG DEV EVAL ICPMS: CPT | Performed by: INTERNAL MEDICINE

## 2022-09-12 PROCEDURE — 6360000002 HC RX W HCPCS: Performed by: SURGERY

## 2022-09-12 PROCEDURE — 6370000000 HC RX 637 (ALT 250 FOR IP)

## 2022-09-12 PROCEDURE — S2900 ROBOTIC SURGICAL SYSTEM: HCPCS | Performed by: SURGERY

## 2022-09-12 PROCEDURE — 2500000003 HC RX 250 WO HCPCS: Performed by: NURSE ANESTHETIST, CERTIFIED REGISTERED

## 2022-09-12 PROCEDURE — G2066 INTER DEVC REMOTE 30D: HCPCS | Performed by: INTERNAL MEDICINE

## 2022-09-12 PROCEDURE — 3700000000 HC ANESTHESIA ATTENDED CARE: Performed by: SURGERY

## 2022-09-12 PROCEDURE — 2580000003 HC RX 258

## 2022-09-12 PROCEDURE — 6360000002 HC RX W HCPCS: Performed by: ANESTHESIOLOGY

## 2022-09-12 PROCEDURE — 2500000003 HC RX 250 WO HCPCS

## 2022-09-12 PROCEDURE — 36415 COLL VENOUS BLD VENIPUNCTURE: CPT

## 2022-09-12 PROCEDURE — 84132 ASSAY OF SERUM POTASSIUM: CPT

## 2022-09-12 PROCEDURE — 82948 REAGENT STRIP/BLOOD GLUCOSE: CPT

## 2022-09-12 PROCEDURE — 3600000009 HC SURGERY ROBOT BASE: Performed by: SURGERY

## 2022-09-12 PROCEDURE — 7100000000 HC PACU RECOVERY - FIRST 15 MIN: Performed by: SURGERY

## 2022-09-12 RX ORDER — LABETALOL 20 MG/4 ML (5 MG/ML) INTRAVENOUS SYRINGE
10
Status: DISCONTINUED | OUTPATIENT
Start: 2022-09-12 | End: 2022-09-12 | Stop reason: HOSPADM

## 2022-09-12 RX ORDER — ONDANSETRON 2 MG/ML
4 INJECTION INTRAMUSCULAR; INTRAVENOUS EVERY 6 HOURS
Status: DISCONTINUED | OUTPATIENT
Start: 2022-09-12 | End: 2022-09-16

## 2022-09-12 RX ORDER — ONDANSETRON 2 MG/ML
INJECTION INTRAMUSCULAR; INTRAVENOUS
Status: COMPLETED
Start: 2022-09-12 | End: 2022-09-12

## 2022-09-12 RX ORDER — DEXTROSE MONOHYDRATE 100 MG/ML
INJECTION, SOLUTION INTRAVENOUS CONTINUOUS PRN
Status: DISCONTINUED | OUTPATIENT
Start: 2022-09-12 | End: 2022-09-16 | Stop reason: HOSPADM

## 2022-09-12 RX ORDER — SODIUM CHLORIDE 9 MG/ML
INJECTION, SOLUTION INTRAVENOUS CONTINUOUS PRN
Status: DISCONTINUED | OUTPATIENT
Start: 2022-09-12 | End: 2022-09-12 | Stop reason: SDUPTHER

## 2022-09-12 RX ORDER — INSULIN LISPRO 100 [IU]/ML
0-4 INJECTION, SOLUTION INTRAVENOUS; SUBCUTANEOUS NIGHTLY
Status: DISCONTINUED | OUTPATIENT
Start: 2022-09-12 | End: 2022-09-16 | Stop reason: HOSPADM

## 2022-09-12 RX ORDER — DEXAMETHASONE SODIUM PHOSPHATE 10 MG/ML
INJECTION, EMULSION INTRAMUSCULAR; INTRAVENOUS PRN
Status: DISCONTINUED | OUTPATIENT
Start: 2022-09-12 | End: 2022-09-12 | Stop reason: SDUPTHER

## 2022-09-12 RX ORDER — MORPHINE SULFATE 2 MG/ML
2 INJECTION, SOLUTION INTRAMUSCULAR; INTRAVENOUS
Status: DISCONTINUED | OUTPATIENT
Start: 2022-09-12 | End: 2022-09-16 | Stop reason: HOSPADM

## 2022-09-12 RX ORDER — ONDANSETRON 2 MG/ML
INJECTION INTRAMUSCULAR; INTRAVENOUS PRN
Status: DISCONTINUED | OUTPATIENT
Start: 2022-09-12 | End: 2022-09-12 | Stop reason: SDUPTHER

## 2022-09-12 RX ORDER — SODIUM CHLORIDE 9 MG/ML
INJECTION, SOLUTION INTRAVENOUS PRN
Status: DISCONTINUED | OUTPATIENT
Start: 2022-09-12 | End: 2022-09-16 | Stop reason: HOSPADM

## 2022-09-12 RX ORDER — INSULIN LISPRO 100 [IU]/ML
0-4 INJECTION, SOLUTION INTRAVENOUS; SUBCUTANEOUS
Status: DISCONTINUED | OUTPATIENT
Start: 2022-09-12 | End: 2022-09-16 | Stop reason: HOSPADM

## 2022-09-12 RX ORDER — CEFOXITIN 1 G/1
INJECTION, POWDER, FOR SOLUTION INTRAVENOUS PRN
Status: DISCONTINUED | OUTPATIENT
Start: 2022-09-12 | End: 2022-09-12 | Stop reason: SDUPTHER

## 2022-09-12 RX ORDER — KETOROLAC TROMETHAMINE 30 MG/ML
15 INJECTION, SOLUTION INTRAMUSCULAR; INTRAVENOUS EVERY 6 HOURS
Status: DISCONTINUED | OUTPATIENT
Start: 2022-09-12 | End: 2022-09-14

## 2022-09-12 RX ORDER — BUPIVACAINE HYDROCHLORIDE 5 MG/ML
INJECTION, SOLUTION PERINEURAL PRN
Status: DISCONTINUED | OUTPATIENT
Start: 2022-09-12 | End: 2022-09-12 | Stop reason: ALTCHOICE

## 2022-09-12 RX ORDER — PROPOFOL 10 MG/ML
INJECTION, EMULSION INTRAVENOUS PRN
Status: DISCONTINUED | OUTPATIENT
Start: 2022-09-12 | End: 2022-09-12 | Stop reason: SDUPTHER

## 2022-09-12 RX ORDER — SODIUM CHLORIDE 0.9 % (FLUSH) 0.9 %
5-40 SYRINGE (ML) INJECTION PRN
Status: DISCONTINUED | OUTPATIENT
Start: 2022-09-12 | End: 2022-09-16 | Stop reason: HOSPADM

## 2022-09-12 RX ORDER — PROMETHAZINE HYDROCHLORIDE 25 MG/1
25 SUPPOSITORY RECTAL EVERY 6 HOURS PRN
Status: DISCONTINUED | OUTPATIENT
Start: 2022-09-12 | End: 2022-09-16 | Stop reason: HOSPADM

## 2022-09-12 RX ORDER — ONDANSETRON 2 MG/ML
4 INJECTION INTRAMUSCULAR; INTRAVENOUS ONCE
Status: COMPLETED | OUTPATIENT
Start: 2022-09-12 | End: 2022-09-12

## 2022-09-12 RX ORDER — SCOLOPAMINE TRANSDERMAL SYSTEM 1 MG/1
1 PATCH, EXTENDED RELEASE TRANSDERMAL
Status: DISCONTINUED | OUTPATIENT
Start: 2022-09-15 | End: 2022-09-16 | Stop reason: HOSPADM

## 2022-09-12 RX ORDER — ENOXAPARIN SODIUM 100 MG/ML
40 INJECTION SUBCUTANEOUS EVERY 12 HOURS SCHEDULED
Status: DISCONTINUED | OUTPATIENT
Start: 2022-09-13 | End: 2022-09-14

## 2022-09-12 RX ORDER — ROCURONIUM BROMIDE 10 MG/ML
INJECTION, SOLUTION INTRAVENOUS PRN
Status: DISCONTINUED | OUTPATIENT
Start: 2022-09-12 | End: 2022-09-12 | Stop reason: SDUPTHER

## 2022-09-12 RX ORDER — SCOLOPAMINE TRANSDERMAL SYSTEM 1 MG/1
1 PATCH, EXTENDED RELEASE TRANSDERMAL
Status: DISCONTINUED | OUTPATIENT
Start: 2022-09-12 | End: 2022-09-12

## 2022-09-12 RX ORDER — SODIUM CHLORIDE 9 MG/ML
INJECTION, SOLUTION INTRAVENOUS CONTINUOUS
Status: DISCONTINUED | OUTPATIENT
Start: 2022-09-12 | End: 2022-09-13

## 2022-09-12 RX ORDER — FENTANYL CITRATE 50 UG/ML
INJECTION, SOLUTION INTRAMUSCULAR; INTRAVENOUS PRN
Status: DISCONTINUED | OUTPATIENT
Start: 2022-09-12 | End: 2022-09-12 | Stop reason: SDUPTHER

## 2022-09-12 RX ORDER — HYDRALAZINE HYDROCHLORIDE 20 MG/ML
10 INJECTION INTRAMUSCULAR; INTRAVENOUS
Status: DISCONTINUED | OUTPATIENT
Start: 2022-09-12 | End: 2022-09-12 | Stop reason: HOSPADM

## 2022-09-12 RX ORDER — SODIUM CHLORIDE 9 MG/ML
INJECTION, SOLUTION INTRAVENOUS PRN
Status: DISCONTINUED | OUTPATIENT
Start: 2022-09-12 | End: 2022-09-12 | Stop reason: HOSPADM

## 2022-09-12 RX ORDER — KETOROLAC TROMETHAMINE 30 MG/ML
INJECTION, SOLUTION INTRAMUSCULAR; INTRAVENOUS PRN
Status: DISCONTINUED | OUTPATIENT
Start: 2022-09-12 | End: 2022-09-12 | Stop reason: SDUPTHER

## 2022-09-12 RX ORDER — ONDANSETRON 2 MG/ML
4 INJECTION INTRAMUSCULAR; INTRAVENOUS
Status: COMPLETED | OUTPATIENT
Start: 2022-09-12 | End: 2022-09-12

## 2022-09-12 RX ORDER — SUCCINYLCHOLINE CHLORIDE 20 MG/ML
INJECTION INTRAMUSCULAR; INTRAVENOUS PRN
Status: DISCONTINUED | OUTPATIENT
Start: 2022-09-12 | End: 2022-09-12 | Stop reason: SDUPTHER

## 2022-09-12 RX ORDER — SODIUM CHLORIDE 0.9 % (FLUSH) 0.9 %
5-40 SYRINGE (ML) INJECTION EVERY 12 HOURS SCHEDULED
Status: DISCONTINUED | OUTPATIENT
Start: 2022-09-12 | End: 2022-09-12 | Stop reason: HOSPADM

## 2022-09-12 RX ORDER — SODIUM CHLORIDE 0.9 % (FLUSH) 0.9 %
5-40 SYRINGE (ML) INJECTION PRN
Status: DISCONTINUED | OUTPATIENT
Start: 2022-09-12 | End: 2022-09-12 | Stop reason: HOSPADM

## 2022-09-12 RX ORDER — HYOSCYAMINE SULFATE 0.125 MG
125 TABLET,DISINTEGRATING ORAL EVERY 4 HOURS PRN
Status: DISCONTINUED | OUTPATIENT
Start: 2022-09-12 | End: 2022-09-13

## 2022-09-12 RX ORDER — MORPHINE SULFATE 4 MG/ML
4 INJECTION, SOLUTION INTRAMUSCULAR; INTRAVENOUS
Status: DISCONTINUED | OUTPATIENT
Start: 2022-09-12 | End: 2022-09-16 | Stop reason: HOSPADM

## 2022-09-12 RX ORDER — SODIUM CHLORIDE 0.9 % (FLUSH) 0.9 %
5-40 SYRINGE (ML) INJECTION EVERY 12 HOURS SCHEDULED
Status: DISCONTINUED | OUTPATIENT
Start: 2022-09-12 | End: 2022-09-16 | Stop reason: HOSPADM

## 2022-09-12 RX ORDER — MIDAZOLAM HYDROCHLORIDE 1 MG/ML
INJECTION INTRAMUSCULAR; INTRAVENOUS PRN
Status: DISCONTINUED | OUTPATIENT
Start: 2022-09-12 | End: 2022-09-12 | Stop reason: SDUPTHER

## 2022-09-12 RX ORDER — LIDOCAINE HYDROCHLORIDE 20 MG/ML
INJECTION, SOLUTION EPIDURAL; INFILTRATION; INTRACAUDAL; PERINEURAL PRN
Status: DISCONTINUED | OUTPATIENT
Start: 2022-09-12 | End: 2022-09-12 | Stop reason: SDUPTHER

## 2022-09-12 RX ORDER — METOCLOPRAMIDE HYDROCHLORIDE 5 MG/ML
10 INJECTION INTRAMUSCULAR; INTRAVENOUS EVERY 6 HOURS PRN
Status: DISCONTINUED | OUTPATIENT
Start: 2022-09-12 | End: 2022-09-16 | Stop reason: HOSPADM

## 2022-09-12 RX ADMIN — ONDANSETRON 4 MG: 2 INJECTION INTRAMUSCULAR; INTRAVENOUS at 12:56

## 2022-09-12 RX ADMIN — ROCURONIUM BROMIDE 10 MG: 10 INJECTION INTRAVENOUS at 14:32

## 2022-09-12 RX ADMIN — KETOROLAC TROMETHAMINE 30 MG: 30 INJECTION, SOLUTION INTRAMUSCULAR; INTRAVENOUS at 15:21

## 2022-09-12 RX ADMIN — ROCURONIUM BROMIDE 10 MG: 10 INJECTION INTRAVENOUS at 14:59

## 2022-09-12 RX ADMIN — MORPHINE SULFATE 4 MG: 4 INJECTION, SOLUTION INTRAMUSCULAR; INTRAVENOUS at 23:06

## 2022-09-12 RX ADMIN — METOCLOPRAMIDE 10 MG: 5 INJECTION, SOLUTION INTRAMUSCULAR; INTRAVENOUS at 17:39

## 2022-09-12 RX ADMIN — SODIUM CHLORIDE: 9 INJECTION, SOLUTION INTRAVENOUS at 20:32

## 2022-09-12 RX ADMIN — MORPHINE SULFATE 2 MG: 2 INJECTION, SOLUTION INTRAMUSCULAR; INTRAVENOUS at 20:33

## 2022-09-12 RX ADMIN — ROCURONIUM BROMIDE 20 MG: 10 INJECTION INTRAVENOUS at 14:17

## 2022-09-12 RX ADMIN — SODIUM CHLORIDE: 9 INJECTION, SOLUTION INTRAVENOUS at 13:16

## 2022-09-12 RX ADMIN — FENTANYL CITRATE 100 MCG: 50 INJECTION, SOLUTION INTRAMUSCULAR; INTRAVENOUS at 13:21

## 2022-09-12 RX ADMIN — HYDROMORPHONE HYDROCHLORIDE 0.5 MG: 1 INJECTION, SOLUTION INTRAMUSCULAR; INTRAVENOUS; SUBCUTANEOUS at 15:48

## 2022-09-12 RX ADMIN — KETOROLAC TROMETHAMINE 15 MG: 30 INJECTION, SOLUTION INTRAMUSCULAR; INTRAVENOUS at 21:06

## 2022-09-12 RX ADMIN — ROCURONIUM BROMIDE 45 MG: 10 INJECTION INTRAVENOUS at 13:30

## 2022-09-12 RX ADMIN — HYDROMORPHONE HYDROCHLORIDE 0.5 MG: 1 INJECTION, SOLUTION INTRAMUSCULAR; INTRAVENOUS; SUBCUTANEOUS at 16:07

## 2022-09-12 RX ADMIN — ONDANSETRON 4 MG: 2 INJECTION INTRAMUSCULAR; INTRAVENOUS at 23:03

## 2022-09-12 RX ADMIN — FENTANYL CITRATE 100 MCG: 50 INJECTION, SOLUTION INTRAMUSCULAR; INTRAVENOUS at 14:16

## 2022-09-12 RX ADMIN — ROCURONIUM BROMIDE 20 MG: 10 INJECTION INTRAVENOUS at 13:56

## 2022-09-12 RX ADMIN — ROCURONIUM BROMIDE 5 MG: 10 INJECTION INTRAVENOUS at 13:22

## 2022-09-12 RX ADMIN — ONDANSETRON 4 MG: 2 INJECTION INTRAMUSCULAR; INTRAVENOUS at 15:48

## 2022-09-12 RX ADMIN — SUGAMMADEX 200 MG: 100 INJECTION, SOLUTION INTRAVENOUS at 15:29

## 2022-09-12 RX ADMIN — ONDANSETRON 4 MG: 2 INJECTION INTRAMUSCULAR; INTRAVENOUS at 15:21

## 2022-09-12 RX ADMIN — Medication 160 MG: at 13:22

## 2022-09-12 RX ADMIN — DEXAMETHASONE SODIUM PHOSPHATE 8 MG: 10 INJECTION, EMULSION INTRAMUSCULAR; INTRAVENOUS at 13:33

## 2022-09-12 RX ADMIN — LIDOCAINE HYDROCHLORIDE 100 MG: 20 INJECTION, SOLUTION EPIDURAL; INFILTRATION; INTRACAUDAL; PERINEURAL at 13:22

## 2022-09-12 RX ADMIN — HYOSCYAMINE SULFATE 125 MCG: 0.12 TABLET, ORALLY DISINTEGRATING ORAL at 18:32

## 2022-09-12 RX ADMIN — HYDROMORPHONE HYDROCHLORIDE 0.5 MG: 1 INJECTION, SOLUTION INTRAMUSCULAR; INTRAVENOUS; SUBCUTANEOUS at 15:56

## 2022-09-12 RX ADMIN — FAMOTIDINE 20 MG: 10 INJECTION, SOLUTION INTRAVENOUS at 12:56

## 2022-09-12 RX ADMIN — HYDROMORPHONE HYDROCHLORIDE 0.5 MG: 1 INJECTION, SOLUTION INTRAMUSCULAR; INTRAVENOUS; SUBCUTANEOUS at 16:02

## 2022-09-12 RX ADMIN — FENTANYL CITRATE 100 MCG: 50 INJECTION, SOLUTION INTRAMUSCULAR; INTRAVENOUS at 15:15

## 2022-09-12 RX ADMIN — MIDAZOLAM 2 MG: 1 INJECTION INTRAMUSCULAR; INTRAVENOUS at 13:17

## 2022-09-12 RX ADMIN — CEFOXITIN 3000 MG: 1 INJECTION, POWDER, FOR SOLUTION INTRAVENOUS at 13:30

## 2022-09-12 RX ADMIN — PROPOFOL 200 MG: 10 INJECTION, EMULSION INTRAVENOUS at 13:22

## 2022-09-12 ASSESSMENT — PAIN DESCRIPTION - ORIENTATION
ORIENTATION: MID
ORIENTATION: MID

## 2022-09-12 ASSESSMENT — PAIN SCALES - GENERAL
PAINLEVEL_OUTOF10: 7
PAINLEVEL_OUTOF10: 7
PAINLEVEL_OUTOF10: 0
PAINLEVEL_OUTOF10: 8
PAINLEVEL_OUTOF10: 6
PAINLEVEL_OUTOF10: 8
PAINLEVEL_OUTOF10: 8
PAINLEVEL_OUTOF10: 3
PAINLEVEL_OUTOF10: 7
PAINLEVEL_OUTOF10: 6
PAINLEVEL_OUTOF10: 5

## 2022-09-12 ASSESSMENT — PAIN - FUNCTIONAL ASSESSMENT
PAIN_FUNCTIONAL_ASSESSMENT: ACTIVITIES ARE NOT PREVENTED
PAIN_FUNCTIONAL_ASSESSMENT: NONE - DENIES PAIN

## 2022-09-12 ASSESSMENT — PAIN DESCRIPTION - LOCATION
LOCATION: ABDOMEN
LOCATION: ABDOMEN

## 2022-09-12 ASSESSMENT — PAIN DESCRIPTION - FREQUENCY: FREQUENCY: CONTINUOUS

## 2022-09-12 ASSESSMENT — PAIN DESCRIPTION - PAIN TYPE: TYPE: ACUTE PAIN;SURGICAL PAIN

## 2022-09-12 ASSESSMENT — PAIN DESCRIPTION - DESCRIPTORS
DESCRIPTORS: SHARP
DESCRIPTORS: SHARP;CRAMPING

## 2022-09-12 NOTE — PROGRESS NOTES
1537- pt to pacu    1600- pt reports pain, nausea slighly improving. Drifts back to sleep quickly with snoring resp at times.     1624- report called to [address] [facility]    0964- pt meets criteria for discharge from pacu, awaiting transport to [address] [place] [address] Road- Pt to [address] [place] [address]

## 2022-09-12 NOTE — ANESTHESIA PRE PROCEDURE
Department of Anesthesiology  Preprocedure Note       Name:  Rm Sibley   Age:  52 y.o.  :  1975                                          MRN:  [id number]         Date:  2022      Surgeon: Bruna Hall):  Pastor Velazquez MD    Procedure: Procedure(s):  Robotic Ronaldo-en-Y Gastric Bypass    Medications prior to admission:   Prior to Admission medications    Medication Sig Start Date End Date Taking? Authorizing Provider   Pediatric Multivitamins-Iron (FLINTSTONES PLUS IRON PO) Take 1 tablet by mouth daily   Yes Historical Provider, MD   Fexofenadine HCl (MUCINEX ALLERGY PO) Take 1 tablet by mouth daily   Yes Historical Provider, MD   omeprazole (PRILOSEC) 40 MG delayed release capsule Take 1 capsule by mouth daily 22   BART Quintana   Docusate Sodium 100 MG TABS Take 100 mg by mouth 2 times daily Take as needed to prevent constipation  Patient not taking: Reported on 2022   BART Quintana   enoxaparin (LOVENOX) 40 MG/0.4ML Inject 0.4 mLs into the skin daily  Patient not taking: Reported on 2022   BART Quintana   metoclopramide (REGLAN) 10 MG tablet Take 1 tablet by mouth every 6 hours as needed (nausea/vomiting) 22  BART Quintana   ondansetron (ZOFRAN) 4 MG tablet Take 1 tablet by mouth every 4 hours as needed for Nausea or Vomiting  Patient not taking: Reported on 2022  BART Quintana   furosemide (LASIX) 20 MG tablet TAKE ONE TABLET BY MOUTH EVERY EVENING  Patient taking differently: Take 20 mg by mouth daily 22   JACOB Ballesteros CNP   furosemide (LASIX) 40 MG tablet TAKE ONE TABLET BY MOUTH EVERY MORNING 22   JACOB Ballesteros CNP   gabapentin (NEURONTIN) 100 MG capsule TAKE 1 CAPSULE BY MOUTH 3 TIMES A DAY  Patient taking differently: 100 mg 3 times daily as needed.  4/27/22 10/24/22  JACOB Guan CNP   insulin glargine (LANTUS SOLOSTAR) 100 UNIT/ML injection pen Inject 30 Units into the skin nightly  Patient taking differently: Inject 30 Units into the skin nightly Took 15U 9/11/22 2200 4/7/22   Ankit Kimball MD   metFORMIN (GLUCOPHAGE-XR) 500 MG extended release tablet TAKE TWO TABLETS BY MOUTH TWICE A DAY 4/5/22   Ankit Kimball MD   ASPIRIN LOW DOSE 81 MG EC tablet TAKE 1 TABLET BY MOUTH ONE TIME A DAY 4/5/22   Ankit Kimball MD   rosuvastatin (CRESTOR) 10 MG tablet TAKE 1 TABLET BY MOUTH ONE TIME A DAY 3/21/22   Ankit Kimball MD   blood glucose test strips (PRODIGY NO CODING BLOOD GLUC) strip USE TO CHECK BLOOD SUGAR FOUR TIMES A DAY 2/14/22   MD Sanjiv Simms Twist Top Lancets 28G MISC USE TO TEST FOUR TIMES A DAY 2/14/22   Ankit Kimball MD   irbesartan (AVAPRO) 75 MG tablet TAKE 1 TABLET BY MOUTH ONE TIME A DAY IN THE EVENING 1/20/22   Migue Mota MD   carvedilol (COREG) 6.25 MG tablet TAKE 1 TABLET BY MOUTH 2 TIMES A DAY WITH MEALS 12/28/21   Migue Mota MD   OZEMPIC, 1 MG/DOSE, 4 MG/3ML SOPN INJECT 1MG UNDER THE SKIN ONCE A WEEK 12/27/21   Ankit Kimball MD   insulin lispro, 1 Unit Dial, (HUMALOG KWIKPEN) 100 UNIT/ML SOPN INJECT 14 UNITS WITH MEALS PLUS SLIDING SCALE --151-200 3 UNITS, 201-250 5 UNITS, 251-300 8 UNITS, 301-350 10 UNITS, 351-400 12 UNITS, OVER 400 15 UNITS AND CONTACT PHYSICIAN 12/27/21   JACOB Lam - CNP   carvedilol (COREG) 25 MG tablet TAKE 1 TABLET BY MOUTH 2 TIMES A DAY WITH MEALS 10/22/21   Gamal Omalley PA-C   spironolactone (ALDACTONE) 25 MG tablet TAKE 1 TABLET BY MOUTH ONE TIME A DAY 10/21/21   Anirudh Erazo MD   CPAP Machine MISC by Does not apply route Ramp pressure: 4.0 cm H2O  Treatment pressure: 15.0 cm H2O 5/17/21   Ankit Kimball MD   Blood Glucose Monitoring Suppl (PRODIGY AUTOCODE BLOOD GLUCOSE) MAUREEN Use to check blood sugar 4 times daily 4/4/20   Ankit Kimball MD   Insulin Pen Needle (PEN NEEDLES 31GX5/16\") 31G X 8 MM MISC Use to inject insulins and Ozempic 4/4/20   Myah  Gabriele Hurd MD   fluticasone Arabella Contras) 50 MCG/ACT nasal spray 2 sprays by Nasal route daily 2/24/20   Shahram Jackson MD   loratadine (CLARITIN) 10 MG tablet Take 10 mg by mouth daily    Historical Provider, MD   Respiratory Therapy Supplies (ADULT MASK) MISC Please do mask desensitization and/or provide mask of patient's choice. 5/11/17   Vania Kruger MD   acetaminophen (TYLENOL) 325 MG tablet Take 650 mg by mouth every 8 hours as needed for Pain    Historical Provider, MD       Current medications:    Current Facility-Administered Medications   Medication Dose Route Frequency Provider Last Rate Last Admin    cefOXitin (MEFOXIN) 3000 mg in dextrose 5% 100 mL  3,000 mg IntraVENous On Call to [facility], MD        sodium chloride flush 0.9 % injection 5-40 mL  5-40 mL IntraVENous 2 times per day Kami Tavarez MA        sodium chloride flush 0.9 % injection 5-40 mL  5-40 mL IntraVENous PRN Kami Tavarez MA        0.9 % sodium chloride infusion   IntraVENous PRN Kami Tavarez MA        scopolamine (TRANSDERM-SCOP) transdermal patch 1 patch  1 patch TransDERmal Q72H Kami Tavarez MA   1 patch at 09/12/22 1226       Allergies:     Allergies   Allergen Reactions    Levaquin [Levofloxacin In D5w] Itching       Problem List:    Patient Active Problem List   Diagnosis Code    [facility]congestive heart failure) [facility] systolic chronic  I16.8    Bronchitis, acute J20.9    Cardiomyopathy (HCC)-Nonischemic dilated low EF 25%, newly Dxed 08/2014- med RX- cath  mild CAD- repeat echo Nov 24, 2014- EF 25 to 30%- NEED the ICD I42.9    S/P cardiac cath- Angiographically patent coronaries-EF 20, EDP 28 mmhg- med rx Z98.890    AICD (automatic cardioverter/defibrillator) present Z95.810    Morbid obesity with BMI of 50.0-59.9, adult ([facility] E66.01, Z68.43    HTN (hypertension) I10    Bilateral leg edema- +1 B/L- RESOLVED R60.0    Cough due to ACE inhibitor R05.8, T46.4X5A    Hyponatremia E87.1    Hyperglycemia R73.9    Weight gain, claimed 18lbs in 10 days since discharge R63.5    Chronic systolic congestive heart failure (HCC) I50.22    MIKE on CPAP G47.33, Z99.89    Hx of AICD discharge Z45.02    Sinus tachycardia R00.0    Chronic combined systolic and diastolic congestive heart failure (HCC) I50.42    Nonischemic cardiomyopathy (HCC) I42.8    A-fib (HCC) I48.91    Diabetes mellitus ([address] E11.9       Past Medical History:        Diagnosis Date    CHF (congestive heart failure) (HCC)     Constipation     Diabetes mellitus (HCC)     GERD (gastroesophageal reflux disease)     Hypercholesteremia     S/P ICD (internal cardiac defibrillator) procedure: 1/15/2015: Medtronic Single Chamber 1/15/2015    1/15/2015: Medtronic Single Chamber.  Dr. Rizzo Favorite Sleep apnea 06/09/2017    Dr.Rovner Kendell Duron lifevest applied 8/22/14 8/29/2014    returned prior to ICD insertion 1/15       Past Surgical History:        Procedure Laterality Date    CARDIAC CATHETERIZATION  9/2014    [address] [place] [address]      EGD  2022    Dr Bora Holt    [address] [place] [address] 12      [facility]     UPPER GASTROINTESTINAL ENDOSCOPY N/A 6/27/2022    EGD BIOPSY performed by Brian Baltazar MD at [address] [facility] End[facility]    [address] [place] [address]       Social History:    Social History     Tobacco Use    Smoking status: Never    Smokeless tobacco: Never   Substance Use Topics    Alcohol use: Never                                Counseling given: Not Answered      Vital Signs (Current):   Vitals:    09/01/22 1112 09/12/22 1142   BP:  120/71   Pulse:  90   Resp:  16   Temp:  97.4 °F (36.3 °C)   TempSrc:  Temporal   SpO2:  95%   Weight: (!) 379 lb (171.9 kg) (!) 369 lb 2 oz (167.4 kg)   Height: 6' 3\" (1.905 m) 6' 3\" (1.905 m)                                              BP Readings from Last 3 Encounters:   09/12/22 120/71   09/02/22 128/70   07/08/22 118/80       NPO Status: Time of last liquid consumption: 2330 (0900 meds/ sip water)                        Time of last solid consumption: 0900                        Date of last liquid consumption: 09/11/22                        Date of last solid food consumption: 09/11/22    BMI:   Wt Readings from Last 3 Encounters:   09/12/22 (!) 369 lb 2 oz (167.4 kg)   09/02/22 (!) 378 lb 9.6 oz (171.7 kg)   07/15/22 (!) 386 lb 3.2 oz (175.2 kg)     Body mass index is 46.14 kg/m².     CBC:   Lab Results   Component Value Date/Time    WBC 12.1 08/17/2022 06:19 AM    RBC 5.10 08/17/2022 06:19 AM    HGB 13.1 08/17/2022 06:19 AM    HCT 43.2 08/17/2022 06:19 AM    MCV 84.7 08/17/2022 06:19 AM    RDW 15.7 03/15/2017 06:30 PM     08/17/2022 06:19 AM       CMP:   Lab Results   Component Value Date/Time     08/17/2022 06:19 AM    K 4.3 09/12/2022 11:59 AM    K 4.8 04/09/2021 06:10 AM     08/17/2022 06:19 AM    CO2 26 08/17/2022 06:19 AM    BUN 10 08/17/2022 06:19 AM    CREATININE 0.7 08/17/2022 06:19 AM    LABGLOM >90 08/17/2022 06:19 AM    GLUCOSE 126 08/17/2022 06:19 AM    GLUCOSE 258 12/16/2017 08:25 AM    PROT 6.6 03/05/2022 07:29 AM    CALCIUM 9.4 08/17/2022 06:19 AM    BILITOT 0.3 03/05/2022 07:29 AM    ALKPHOS 73 03/05/2022 07:29 AM    AST 14 03/05/2022 07:29 AM    ALT 17 03/05/2022 07:29 AM       POC Tests:   Recent Labs     09/12/22  1225   POCGLU 88       Coags:   Lab Results   Component Value Date/Time    INR 1.17 03/05/2022 07:29 AM    APTT 33.5 03/05/2022 07:29 AM       HCG (If Applicable): No results found for: PREGTESTUR, PREGSERUM, HCG, HCGQUANT     ABGs: No results found for: PHART, PO2ART, ZHM1FMM, PRW0ASU, BEART, D6VHLPKT     Type & Screen (If Applicable):  Lab Results   Component Value Date    LAB POS 04/09/2021       Drug/Infectious Status (If Applicable):  No results found for: HIV, HEPCAB    COVID-19 Screening (If Applicable): No results found for: COVID19        Anesthesia Evaluation   no history of anesthetic complications:   Airway: Mallampati: II  TM distance: >3 FB   Neck ROM: full  Mouth opening: > = 3 FB   Dental:          Pulmonary:normal exam    (+) sleep apnea:            Patient did not smoke on day of surgery. Cardiovascular:  Exercise tolerance: good (>4 METS),   (+) hypertension:, pacemaker:, CHF: systolic,       ECG reviewed      Echocardiogram reviewed    Cleared by cardiology              Neuro/Psych:   Negative Neuro/Psych ROS              GI/Hepatic/Renal:   (+) GERD:, morbid obesity          Endo/Other:    (+) Diabetes, . Pt had no PAT visit       Abdominal:   (+) obese,           Vascular: Other Findings:           Anesthesia Plan      general     ASA 3       Induction: intravenous. MIPS: Postoperative opioids intended and Prophylactic antiemetics administered. Anesthetic plan and risks discussed with patient. Plan discussed with CRNA.                     Lottie Jaimes MD   9/12/2022

## 2022-09-12 NOTE — INTERVAL H&P NOTE
Update History & Physical    The patient's History and Physical was reviewed with the patient and I examined the patient. There was no change. The surgical site was confirmed by the patient and me. Plan: The risks, benefits, expected outcome, and alternative to the recommended procedure have been discussed with the patient. Patient understands and wants to proceed with the procedure. The patient was counseled at length about the risks of ana luisa Covid-19 during their perioperative period and any recovery window from their procedure. The patient was made aware that ana luisa Covid-19  may worsen their prognosis for recovering from their procedure  and lend to a higher morbidity and/or mortality risk. All material risks, benefits, and reasonable alternatives including postponing the procedure were discussed. The patient does wish to proceed with the procedure at this time.     Electronically signed by Azam Edmonds MD on 9/12/2022 at 12:36 PM

## 2022-09-12 NOTE — PROGRESS NOTES
Pt to [facility] rm 16. Wife, Crystal Groves , with pt. Consents to name on white board. Call light within pt reach. 1245 unable to staart IV, Dr. Nandini Nicholas here to start IV.

## 2022-09-12 NOTE — H&P
Tommi Lanes (:  1975)      ASSESSMENT:  1.  Morbid obesity (BMI 49)  2. Sleep apnea  3. Chronic lower back pain  4. CHF  5. Hypercholesterolemia  6. Diabetes mellitus  7. Hypertension  8. GERD  9. Atrial fibrillation  10. AICD  11. Vitamin D deficiency  12. Iron deficiency  13. Chronic pain left knee     PLAN:  1. Discussion again about the pros and cons of weight loss surgery. The risks benefits and alternatives to laparoscopic adjustable band, gastric sleeve and gastric Ronaldo-en-Y bypass were discussed in detail. The pros and cons of robotic assisted, laparoscopic and open techniques were discussed. 2.  Behavior modification discussed again in regards to dietary habits. 3.  Nutritional education occurred during visit. Follow up with dietitian for further evaluation. Continue to follow recommendations as directed. 4.  Options for medical management of morbid obesity again discussed. 5.  Improvement in fitness/exercise discussed with patient and the need for this with/without surgery. 6.  Medical necessity letter from PCP. 7.  Follow-up in one month at weight management program at [address] [place] [address]. 8.  Signs and symptoms reviewed with patient that would be concerning and need him to return to office for re-evaluation. Patient states he will call if he has questions or concerns. 9. Multivitamin  10. Psychology evaluation completed. Follow-up as needed. 11. EGD completed. H. pylori negative. No significant hiatal hernia. 12.  Encouraged support groups  13. Send LOMN     Patient states that he has not been able to lose enough adequate excess body weight with medical management only and would like to proceed with a robotic gastric bypass for further weight loss. More than 30 minutes spent with patient today. Greater than 50% of the time was involved counseling, educaton and coordinating care.      SUBJECTIVE/OBJECTIVE:          Chief Complaint   Patient presents with    Follow-up       month 5 of 4/6 - bypass - submint to insurance      HPI  Toro Dee is a 40-year-old male who presents for follow-up at the weight management program secondary to his morbid obesity. Current weight 385 pounds. BMI 49. He is the same weight as he was on last visit. Continues to follow with the dietitian. Working on portion control and food selection. Continues to do well with water intake. CPAP machine for sleep apnea. Continues with Prilosec as needed for GERD. Follows with the CHF clinic. Also follows with diabetic clinic. Most recent hemoglobin A1c 6.5. Completed support groups. Upper endoscopy completed. H. pylori negative. Completed psychology evaluation. Denies current chest or abdominal pain. No hematochezia or melena. No new urinary complaints. Currently taking multivitamin. Replacing vitamin D3. Has now started iron. Admits this is given him constipation issues. Taking stool softeners as needed. Continue to try to improve with exercise/fitness. Doing more walking daily. He states he has not been able to lose and of adequate excess body weight with medical management only and is wishing to proceed with Ronaldo-en-Y gastric bypass for further weight loss. Review of Systems   Constitutional: Negative for activity change, appetite change, chills, diaphoresis, fatigue, fever and unexpected weight change. HENT: Negative for congestion, dental problem, drooling, ear discharge, ear pain, facial swelling, hearing loss, mouth sores, nosebleeds, postnasal drip, rhinorrhea, sinus pressure, sneezing, sore throat, tinnitus, trouble swallowing and voice change. Eyes: Negative for photophobia, pain, discharge, redness, itching and visual disturbance. Respiratory: Negative for apnea, cough, choking, chest tightness, shortness of breath, wheezing and stridor. Cardiovascular: Negative for chest pain, palpitations and leg swelling.    Gastrointestinal: Positive for constipation. Negative for abdominal distention, abdominal pain, anal bleeding, blood in stool, diarrhea, nausea, rectal pain and vomiting. Endocrine: Negative. Genitourinary: Negative for decreased urine volume, difficulty urinating, dysuria, enuresis, flank pain, frequency, genital sores, hematuria, penile discharge, penile pain, penile swelling, scrotal swelling, testicular pain and urgency. Musculoskeletal: Negative for arthralgias, back pain, gait problem, joint swelling, myalgias, neck pain and neck stiffness. Skin: Negative for color change, pallor, rash and wound. Allergic/Immunologic: Negative. Neurological: Negative for dizziness, tremors, seizures, syncope, facial asymmetry, speech difficulty, weakness, light-headedness, numbness and headaches. Hematological: Negative for adenopathy. Does not bruise/bleed easily. Psychiatric/Behavioral: Negative for agitation, behavioral problems, confusion, decreased concentration, dysphoric mood, hallucinations, self-injury, sleep disturbance and suicidal ideas. The patient is not nervous/anxious and is not hyperactive. Past Medical History        Past Medical History:   Diagnosis Date    CHF (congestive heart failure) ([facility])      Constipation      Diabetes mellitus ([facility] [address]      GERD (gastroesophageal reflux disease)      Hypercholesteremia      S/P ICD (internal cardiac defibrillator) procedure: 1/15/2015: Medtronic Single Chamber 1/15/2015     1/15/2015: Medtronic Single Chamber.  Dr. Hoda Rhoades    Sleep apnea 06/09/2017     Maryam Linares applied 8/22/14 8/29/2014     returned prior to ICD insertion 1/15            Past Surgical History         Past Surgical History:   Procedure Laterality Date    CARDIAC CATHETERIZATION   9/2014     [address]    2014    [address]        EGD   2022     Dr Owen Genera         Lasix     UPPER GASTROINTESTINAL ENDOSCOPY N/A 6/27/2022     EGD BIOPSY performed by Ulisses Garay MD at [address] [place] [address]   2004            Current Facility-Administered Medications          Current Outpatient Medications   Medication Sig Dispense Refill    ferrous sulfate (IRON 325) 325 (65 Fe) MG tablet Take 325 mg by mouth daily (with breakfast)        gabapentin (NEURONTIN) 100 MG capsule TAKE 1 CAPSULE BY MOUTH 3 TIMES A  capsule 1    insulin glargine (LANTUS SOLOSTAR) 100 UNIT/ML injection pen Inject 30 Units into the skin nightly 10 pen 3    metFORMIN (GLUCOPHAGE-XR) 500 MG extended release tablet TAKE TWO TABLETS BY MOUTH TWICE A  tablet 2    ASPIRIN LOW DOSE 81 MG EC tablet TAKE 1 TABLET BY MOUTH ONE TIME A DAY 90 tablet 1    rosuvastatin (CRESTOR) 10 MG tablet TAKE 1 TABLET BY MOUTH ONE TIME A DAY 90 tablet 3    blood glucose test strips (PRODIGY NO CODING BLOOD GLUC) strip USE TO CHECK BLOOD SUGAR FOUR TIMES A  strip 3    Prodigy Twist Top Lancets 28G MISC USE TO TEST FOUR TIMES A  each 3    irbesartan (AVAPRO) 75 MG tablet TAKE 1 TABLET BY MOUTH ONE TIME A DAY IN THE EVENING 90 tablet 3    carvedilol (COREG) 6.25 MG tablet TAKE 1 TABLET BY MOUTH 2 TIMES A DAY WITH MEALS 180 tablet 3    OZEMPIC, 1 MG/DOSE, 4 MG/3ML SOPN INJECT 1MG UNDER THE SKIN ONCE A WEEK 24 mL 3    insulin lispro, 1 Unit Dial, (HUMALOG KWIKPEN) 100 UNIT/ML SOPN INJECT 14 UNITS WITH MEALS PLUS SLIDING SCALE --151-200 3 UNITS, 201-250 5 UNITS, 251-300 8 UNITS, 301-350 10 UNITS, 351-400 12 UNITS, OVER 400 15 UNITS AND CONTACT PHYSICIAN 135 mL 3    carvedilol (COREG) 25 MG tablet TAKE 1 TABLET BY MOUTH 2 TIMES A DAY WITH MEALS 180 tablet 3    spironolactone (ALDACTONE) 25 MG tablet TAKE 1 TABLET BY MOUTH ONE TIME A DAY 90 tablet 3    furosemide (LASIX) 20 MG tablet TAKE ONE TABLET BY MOUTH EVERY EVENING 90 tablet 3    furosemide (LASIX) 40 MG tablet TAKE ONE TABLET BY MOUTH EVERY MORNING 90 tablet 3    CPAP Machine MISC by Does not apply route Ramp pressure: 4.0 cm H2O  Treatment pressure: 15.0 cm H2O 1 each 0    Blood Glucose Monitoring Suppl (PRODIGY AUTOCODE BLOOD GLUCOSE) MAUREEN Use to check blood sugar 4 times daily 1 Device 0    Insulin Pen Needle (PEN NEEDLES 31GX5/16\") 31G X 8 MM MISC Use to inject insulins and Ozempic 400 each 3    omeprazole (PRILOSEC) 40 MG delayed release capsule Take 1 capsule by mouth daily 90 capsule 1    fluticasone (FLONASE) 50 MCG/ACT nasal spray 2 sprays by Nasal route daily 1 Bottle 2    loratadine (CLARITIN) 10 MG tablet Take 10 mg by mouth daily        Respiratory Therapy Supplies (ADULT MASK) MISC Please do mask desensitization and/or provide mask of patient's choice. 1 each 0    acetaminophen (TYLENOL) 325 MG tablet Take 650 mg by mouth every 8 hours as needed for Pain          No current facility-administered medications for this visit. Allergies   Allergen Reactions    Levaquin [Levofloxacin In D5w] Itching         Family History         Family History   Problem Relation Age of Onset    Arthritis Mother      Hypertension Father      Heart Disease Maternal Uncle      Heart Disease Maternal Grandfather      Asthma Maternal Grandfather      Diabetes Maternal Grandfather      Arthritis Maternal Grandfather      Heart Disease Paternal Uncle      Heart Disease Maternal Grandmother      Stroke Maternal Grandmother      Asthma Brother      Colon Cancer Neg Hx      Colon Polyps Neg Hx      Esophageal Cancer Neg Hx              Social History               Socioeconomic History    Marital status:        Spouse name: Not on file    Number of children: 1    Years of education: Not on file    Highest education level: Not on file   Occupational History       Employer: [employer]   Tobacco Use    Smoking status: Never Smoker    Smokeless tobacco: Never Used   Vaping Use    Vaping Use: Never used   Substance and Sexual Activity    Alcohol use: Never    Drug use:  No Sexual activity: Yes       Partners: Female   Other Topics Concern    Not on file   Social History Narrative    Not on file      Social Determinants of Health          Financial Resource Strain: Low Risk     Difficulty of Paying Living Expenses: Not hard at all   Food Insecurity: No Food Insecurity    Worried About [address] in the Last Year: Never true    [address] in the Last Year: Never true   Transportation Needs:     Lack of Transportation (Medical): Not on file    Lack of Transportation (Non-Medical): Not on file   Physical Activity:     Days of Exercise per Week: Not on file    Minutes of Exercise per Session: Not on file   Stress:     Feeling of Stress : Not on file   Social Connections:     Frequency of Communication with Friends and Family: Not on file    Frequency of Social Gatherings with Friends and Family: Not on file    Attends Hinduism Services: Not on file    Active Member of Clubs or Organizations: Not on file    Attends Club or Organization Meetings: Not on file    Marital Status: Not on file   Intimate Partner Violence:     Fear of Current or Ex-Partner: Not on file    Emotionally Abused: Not on file    Physically Abused: Not on file    Sexually Abused: Not on file   Housing Stability:     Unable to Pay for Housing in the Last Year: Not on file    Number of [place] in the Last Year: Not on file    Unstable Housing in the Last Year: Not on file         Vitals       Vitals:     07/08/22 0850   BP: 118/80   Site: Right Upper Arm   Position: Sitting   Cuff Size: Large Adult   Pulse: 84   Temp: 97.8 °F (36.6 °C)   TempSrc: Oral   Weight: (!) 385 lb 9.6 oz (174.9 kg)   Height: 6' 1.75\" (1.873 m)         Body mass index is 49.84 kg/m². Wt Readings from Last 3 Encounters:   07/08/22 (!) 385 lb 9.6 oz (174.9 kg)   07/07/22 (!) 387 lb (175.5 kg)   06/30/22 (!) 385 lb (174.6 kg)      Physical Exam  Vitals reviewed. Constitutional:       General: He is not in acute distress. Appearance: He is well-developed. He is not diaphoretic. HENT:      Head: Normocephalic and atraumatic. Right Ear: External ear normal.      Left Ear: External ear normal.      Nose: Nose normal.   Eyes:      General: No scleral icterus. Right eye: No discharge. Left eye: No discharge. Conjunctiva/sclera: Conjunctivae normal.   Cardiovascular:      Rate and Rhythm: Normal rate and regular rhythm. Heart sounds: Normal heart sounds. Pulmonary:      Effort: Pulmonary effort is normal. No respiratory distress. Breath sounds: Normal breath sounds. No wheezing or rales. Chest:      Chest wall: No tenderness. Abdominal:      General: Bowel sounds are normal. There is no distension. Palpations: Abdomen is soft. There is no mass. Tenderness: There is no abdominal tenderness. There is no guarding or rebound. Musculoskeletal:         General: No tenderness. Normal range of motion. Cervical back: Normal range of motion and neck supple. Skin:     General: Skin is warm and dry. Coloration: Skin is not pale. Findings: No erythema or rash. Neurological:      Mental Status: He is alert and oriented to person, place, and time. Cranial Nerves: No cranial nerve deficit. Psychiatric:         Behavior: Behavior normal.         Thought Content:  Thought content normal.         Judgment: Judgment normal.               Lab Results   Component Value Date     WBC 10.8 06/18/2022     HGB 12.8 (L) 06/18/2022     HCT 42.1 06/18/2022     MCV 84.2 06/18/2022      06/18/2022            Lab Results   Component Value Date      03/05/2022     K 4.3 03/05/2022      03/05/2022     CO2 28 03/05/2022            Lab Results   Component Value Date     CREATININE 0.7 03/05/2022            Lab Results   Component Value Date     ALT 17 03/05/2022     AST 14 03/05/2022     ALKPHOS 73 03/05/2022     BILITOT 0.3 03/05/2022            Lab Results   Component Value Date     LIPASE 38.5 2017             Patient Active Problem List   Diagnosis    CHF (congestive heart failure) (HCC) systolic chronic     Bronchitis, acute    Cardiomyopathy (HCC)-Nonischemic dilated low EF 25%, newly Dxed 2014- med RX- cath  mild CAD- repeat echo 2014- EF 25 to 30%- NEED the ICD    S/P cardiac cath- Angiographically patent coronaries-EF 20, EDP 28 mmhg- med rx    AICD (automatic cardioverter/defibrillator) present    Morbid obesity with BMI of 50.0-59.9, adult (HCC)    HTN (hypertension)    Bilateral leg edema- +1 B/L- RESOLVED    Cough due to ACE inhibitor    Hyponatremia    Hyperglycemia    Weight gain, claimed 18lbs in 10 days since discharge    Chronic systolic congestive heart failure (HCC)    MIKE on CPAP    Hx of AICD discharge    Sinus tachycardia    Chronic combined systolic and diastolic congestive heart failure (HCC)    Nonischemic cardiomyopathy (HCC)    A-fib ([facility] [address]      OPERATIVE REPORT     PATIENT NAME: Kinjal Tamez                  :        1975  MED REC NO:   [id number]                           ROOM:  ACCOUNT NO:   [de-identified]                           ADMIT DATE: 2022  PROVIDER:     Mary Lou Goldman M.D.     DATE OF PROCEDURE:  2022     SURGEON:  Mary Lou Goldman MD     INDICATION:  The patient with history of morbid obesity and gastric  reflux. Future plan for gastric bypass surgery. Plan today for upper  endoscopy to evaluate. ASA CLASSIFICATION:  III. ESTIMATED BLOOD LOSS:  None. DESCRIPTION OF PROCEDURE:  The patient was brought to the GI lab. Consent was obtained. Risks involved with the procedure were explained  to the patient. Informed consent was obtained. The patient was  monitored during the procedure with pulse oximetry, blood pressure  monitoring, and oxygen by nasal cannula. Sedation by incremental doses  of IV propofol given by the Anesthesia Service to achieve monitored  anesthesia care.   For ASA classification and medication given during the  procedure, please see Anesthesia note. PROCEDURE PERFORMED:  EGD with biopsy. DESCRIPTION OF PROCEDURE:  A standard video Olympus GIF- adult  upper scope advanced under direct vision from the oral cavity up to the  duodenum. Esophagus featured acid reflux, slightly thickened surface of  distal esophagus. No erosion or ulcer seen. No feature of Manzo's  esophagus seen. The GE junction was longer than expected 46 cm from the  incisors. Scope was advanced into the stomach. Retroflex examination  of the cardia revealed normal cardia with no evidence of hiatus hernia. Mild gastritis seen in the body. Biopsy obtained to evaluate. Antrum  showed erosive gastritis , biopsy obtained in different jars to  evaluate. Scope advanced to duodenum, which appears normal.  I elected  to take a biopsy from the duodenum to rule out malabsorption in the  duodenum and in preparation for gastric bypass surgery. Scope was  withdrawn with no immediate complications. Message sent to Pathology to specially stain for H. pylori gastritis in  the body of the antrum in preparation for gastric bypass surgery. IMPRESSION:  1. Feature of mild acid reflux. No Manzo's esophagus seen. GE  junction is 46 cm from the incisors. 2.  No hiatus hernia seen. 3.  Gastritis in the body. Healing erosive gastritis seen in the  antrum. Biopsy obtained from both locations with special request for H.  pylori special stain. 4.  Biopsy obtain to rule out malabsorption and celiac disease. PLAN:  1. Followup with biopsy results in the GI clinic for evaluation. More  recommendation after reviewing the biopsy. 2.  I do not see any contraindication to prevent proceeding with gastric  bypass surgery for this patient. James Dykes M.D.           PATHOLOGY REPORT                       ATTN: James Dykes                       REQ: James Dykes     Copies To:   Manuel Aguayo 4 Information: GERD WITHOUT ESOPHAGITIS, WEIGHT LOSS     FINAL DIAGNOSIS:   A. Duodenum, biopsy:             No pathologic abnormality. B.  Gastric antrum, biopsy:             No pathologic abnormality. C.  Gastric body, biopsy:             No pathologic abnormality. Specimen:   A) BIOPSY OF DUODENUM   B) BIOPSY OF GASTRIC ANTRUM, HEALING EROSIVE GASTRITIS   C) BIOPSY OF BODY OF STOMACH     Gross Examination:   The specimen consists of three parts, each received in formalin and   labeled with the patient's name, Alejandro Campbell. A - The container is labeled duodenum. The specimen consists of   multiple fragments of tissue measuring in aggregate 0.5 x 0.3 x 0.2 cm.    1 ns. B - The container is labeled biopsy of gastric antrum - healing erosive   gastritis. The specimen consists of two fragments of tissue measuring   in aggregate 0.3 x 0.1 x 0.1 cm.  1 ns. C - The container is labeled biopsy of gastric body. The specimen   consists of a single 0.3 cm fragment of tissue. 1 ns. SMW:[id number]     Microscopic Examination:   A. Sections show benign duodenal mucosa with well-formed villi. Features of celiac sprue or other significant abnormality are not   identified. B. Sections show antral type gastric mucosa with no significant   pathologic abnormality. There is no evidence of intestinal metaplasia,   dysplasia, or malignancy. Helicobacter microorganisms are not   identified. C.  Sections show fundic type gastric mucosa with no significant   pathologic abnormality. There is no evidence of intestinal metaplasia,   dysplasia, or malignancy. Elle Velazquez M.D., F.C.A.P. An electronic signature was used to authenticate this note. --Azam Edmonds MD     ADDENDUM:  1. Schedule Joshua Jessica for robotic possible open Ronaldo-en-Y gastric bypass; possible sleeve gastrectomy if unable to complete Ronaldo-en-Y gastric bypass.   2. He will undergo pre-operative clearance per anesthesia guidelines with risk factors listed under the past medical history diagnosis & problem list.  3. The risks, benefits and alternatives were discussed with José Miguel Oliver including non-operative management. The pros and cons of robotic, laparoscopic and open techniques were discussed. All questions answered. He understands and wishes to proceed with surgical intervention. 4. Restrictions discussed with José Miguel Oliver and he expresses understanding. 5. He is advised to call back directly if there are further questions/concerns, or if his symptoms worsen prior to surgery.     Electronically signed by Willie Conway MD on 9/12/22 at 6:09 AM EDT

## 2022-09-12 NOTE — PROGRESS NOTES
Pt to 7k23 via bed. Family x 1 at pt side. Call light placed within reach. No ramírez or drains noted. 0.9 infusing at 125 ml/hr with approx 500 ml to count. See flowsheet for further info.

## 2022-09-12 NOTE — BRIEF OP NOTE
Brief Postoperative Note      Patient: Darren Jameson  YOB: 1975  MRN: [id number]    Date of Procedure: 9/12/2022    Pre-Op Diagnosis: Class 3 severe obesity with body mass index (BMI) of 45.0 to 49.9 in adult, unspecified obesity type, unspecified whether serious comorbidity present ([facility] [E66.01, Z68.42]    Post-Op Diagnosis: Same       Procedure(s):  Robotic Ronaldo-en-Y Gastric Bypass    Surgeon(s):  Roland Aguilar MD    Assistant:  First Assistant: Amrit Urbina RN    Anesthesia: General/local    Estimated Blood Loss (mL): 12DP    Complications: None    Specimens:   * No specimens in log *    Implants:  * No implants in log *      Drains: * No LDAs found *    Findings: as above - see op note for details    Electronically signed by Roland Aguilar MD on 9/12/2022 at 3:25 PM

## 2022-09-12 NOTE — OP NOTE
Operative Note      Patient: Tommi Lanes  YOB: 1975  MRN: [id number]    Date of Procedure: 9/12/2022    Pre-Op Diagnosis: 1. Morbid Obesity  2. BMI 46  3. DM     Post-Op Diagnosis: Same       Procedure(s):  ROBOTIC HÉCTOR-EN-Y  GASTRIC BYPASS -antecolic and antigastric     Surgeon(s):  Dimple Lomeli MD     Assistant:   First Assistant: Julio Barrera RN     Anesthesia: General/local     Estimated Blood Loss (mL): 10 ml     Complications: None     Specimens:   * No specimens in log *     Implants:  * No implants in log *      Drains: * No LDAs found *     Findings: none     INDICATIONS:  The patient is a 15-year-old male who is a morbidly  obese individual I had been seeing in the weight management program.   He was not able to lose enough adequate excess body weight with medical  management only and he wished to proceed with surgical intervention. He wishes to proceed with a robotic Héctor-en-Y gastric bypass. Both operative and  nonoperative intervention plans were discussed. Risks of surgery were  further discussed. Some of the risks included but were not limited to  bleeding, infection, the need for reoperation, severe chronic  postoperative pain or numbness, major vascular nerve injury,  cardiopulmonary complications, anesthetic complications, seroma or  hematoma formation, wound breakdown, trocar site herniation, anastomotic  leak, anastomotic stricture, anastomotic bleed, anastomotic ulcerations,  chronic pain, chronic nausea, and death. After all of the questions  were answered in their entirety and the patient was completely aware of  the current situation, he elected to proceed with the procedure. DESCRIPTION OF PROCEDURE:  After informed consent was signed and placed  on the chart, the patient was taken back to the operating room and  placed supine on the operating room table. General anesthesia was  induced. He tolerated this throughout the case.   All pressure points  were padded. He was on preoperative antibiotics. Bilateral lower  extremity sequential compression devices were placed prior to incision. His abdomen and pelvis were prepped and draped in the usual sterile  standard fashion. Timeout occurred prior to the operation which not  only identified the patient but also the planned procedure to be  performed. At the end of the time-out, there were no questions or  concerns. I began the operation by making a small transverse incision  just to the right of the umbilicus. Using low flow insufflation in the  Optiview, the abdomen was entered into without much difficulty. The  intraabdominal cavity was insufflated to a pressure of approximately 15  mmHg with CO2 gas. The patient tolerated the insufflation well. Four  separate 8 mm trocars were then placed in their standard location under  direct vision. 5 mm trocar was then placed in the far right lateral  upper abdominal region under direct vision. The liver retractor was  brought in and secured under direct vision. The patient was then placed  in a reverse Trendelenburg. Robot was brought in and docked. The  instruments were placed under direct vision. Once everything was  aligned and in order, I then unscrubbed them back to the console. I  began the operation first by evaluating the hiatus. There appeared to  be no significant hiatal hernia. At that time I focused on the lesser curvature of the stomach just adjacent to the  caudate lobe. Here, a small window was then made into the retrogastric  lesser sac space. The retrogastric tunnel was then created with gentle  blunt dissection up to the angle.   There was excess scar in this region which was expected secondary to the patient previously having a adjustable gastric band inserted and then removed in the past.  After this was all mobilized, I then  came across at a right angle ensuring that the 32-Greek Bougie was  pulled back far enough with a SEEC AB 45 purple stapling load. This  was just next to the caudate lobe. 32-Egyptian Bougie was then placed  back down to lie next to my staple line. Three separate Covidien 60 mm  purple loads were then used to come along the 32-Egyptian Bougie up to the  angle there by the left sejal. This allowed me to completely separate  the remnant stomach from my new pouch. Pouch was nice and small and was  only about 30 mL. After the pouch was formed, I then identified the  ligament of Treitz. This was then carried down about 50 cm. Care was  taken to ensure proximal and distal directions. Omentum was then  divided using the vessel sealer. The loop was then brought up to ensure  orientation to the pouch. This reached without too much difficulty at  all. The anterior wall of the stomach in the antimesenteric border of  the small bowel was then reapproximated with a Vicryl 3-0 suture. Enterotomy and the gastrostomy were then made on the antimesenteric  border of the small bowel and the anterior wall of the stomach with  monopolar scissors. Covidien 45 mm purple load was then brought through  the enterotomy and the gastrotomy to about 25-30 mm and then closed,  fired, and the anastomosis was then formed. 32-Egyptian Bougie was able  to easily flow down through this and into the Ronaldo limb. The anterior  gastric wall/enterotomy was then closed with a running 2-0 V-Loc x2. Reinforced with interrupted Vicryl sutures. Anastomosis appeared to be  widely patent. Adequate hemostasis. Good blood flow. This was  confirmed with Firefly. A small defect was then made there just to the  left of the pouch there on the small bowel with a monopolar scissor. This was fired across with a tan load transecting the small bowel. The  GJ anastomosis was then tested under irrigation and clamped distally on  the Ronaldo limb with air insufflation to 32-Egyptian Bougie.   This appeared  to demonstrate no air bubbles of any kind or any concern for leak.   Irrigation was then suctioned out. The biliopancreatic limb was then  slightly mobilized at the mesentery with a vessel sealer. This was then  left there in the left upper quadrant. The Ronaldo limb was then measured  down about 100 cm. Here, the jejunojejunal anastomosis was then  formed with a biliopancreatic limb end. This was done in a similar  fashion as to the [address] anastomosis. The antimesenteric borders of the  small bowel were reapproximated to one another with a Vicryl suture. Enterotomies made on the antimesenteric border. 60-mm tan load was then  brought in, closed, fired, and the anastomosis formed. 3-0 V-Loc in a  running fashion x2 was used to close the enterotomy. This was  reinforced again with the Vicryl suture. Anastomosis appeared to be  widely patent. Mesenteric defects were closed with 3-0 suture. It  should be noted that Firefly was used to ensure good blood flow to the  GJ and the JJ anastomosis. I then reevaluated the [address] anastomosis 1 last time. I did reinforce this on the right side with another 2 OV lock to ensure good closure. After mesenteric defects closed, the  anastomoses were both covered with VISTASEAL to ensure hemostasis and  also give some added support. No other abnormalities were identified in  the abdomen. Instruments at that time were then removed. Robot  undocked. I scrubbed back into the table. Large trocar site was then  closed at the fascial level with Vicryl suture and a Storz suture  passer. After completion of this, there were no pressure defect. Subcutaneous tissues were irrigated. Hemostasis adequate. Skin  reapproximated at all the incisional sites with 4-0 Vicryl in a  subcuticular fashion. Closed incisions were then cleaned, dried, and  Steri-Strips applied. The patient tolerated local anesthetic well. Less than 20 mL blood loss.   Easily brought out of general anesthesia  and transferred to postanesthesia care unit in stable condition. Sponge, needle, and instrumentation count was correct at the end of the  procedure.      Electronically signed by Roland Aguilar MD on 9/12/2022 at 6:00 PM

## 2022-09-13 ENCOUNTER — APPOINTMENT (OUTPATIENT)
Dept: GENERAL RADIOLOGY | Age: 47
DRG: 619 | End: 2022-09-13
Attending: SURGERY
Payer: COMMERCIAL

## 2022-09-13 ENCOUNTER — PROCEDURE VISIT (OUTPATIENT)
Dept: CARDIOLOGY CLINIC | Age: 47
End: 2022-09-13
Payer: COMMERCIAL

## 2022-09-13 DIAGNOSIS — I50.20 SYSTOLIC CONGESTIVE HEART FAILURE, UNSPECIFIED HF CHRONICITY (HCC): Primary | ICD-10-CM

## 2022-09-13 PROBLEM — R42 ORTHOSTATIC DIZZINESS: Status: ACTIVE | Noted: 2022-09-13

## 2022-09-13 PROBLEM — Z98.84 S/P GASTRIC BYPASS: Status: ACTIVE | Noted: 2022-09-13

## 2022-09-13 PROBLEM — Z98.84 S/P BARIATRIC SURGERY: Status: ACTIVE | Noted: 2022-09-13

## 2022-09-13 PROBLEM — I95.9 SYMPTOMATIC HYPOTENSION: Status: ACTIVE | Noted: 2022-09-13

## 2022-09-13 LAB
ALBUMIN SERPL-MCNC: 4.2 G/DL (ref 3.5–5.1)
ALP BLD-CCNC: 48 U/L (ref 38–126)
ALT SERPL-CCNC: 58 U/L (ref 11–66)
ANION GAP SERPL CALCULATED.3IONS-SCNC: 11 MEQ/L (ref 8–16)
ANION GAP SERPL CALCULATED.3IONS-SCNC: 9 MEQ/L (ref 8–16)
AST SERPL-CCNC: 46 U/L (ref 5–40)
BACTERIA: ABNORMAL
BASOPHILS # BLD: 0.1 %
BASOPHILS ABSOLUTE: 0 THOU/MM3 (ref 0–0.1)
BILIRUB SERPL-MCNC: 0.3 MG/DL (ref 0.3–1.2)
BILIRUBIN DIRECT: < 0.2 MG/DL (ref 0–0.3)
BILIRUBIN URINE: NEGATIVE
BLOOD, URINE: NEGATIVE
BUN BLDV-MCNC: 18 MG/DL (ref 7–22)
BUN BLDV-MCNC: 25 MG/DL (ref 7–22)
CALCIUM SERPL-MCNC: 8.5 MG/DL (ref 8.5–10.5)
CALCIUM SERPL-MCNC: 8.5 MG/DL (ref 8.5–10.5)
CASTS: ABNORMAL /LPF
CASTS: ABNORMAL /LPF
CHARACTER, URINE: CLEAR
CHLORIDE BLD-SCNC: 104 MEQ/L (ref 98–111)
CHLORIDE BLD-SCNC: 105 MEQ/L (ref 98–111)
CO2: 22 MEQ/L (ref 23–33)
CO2: 23 MEQ/L (ref 23–33)
COLOR: YELLOW
CREAT SERPL-MCNC: 0.9 MG/DL (ref 0.4–1.2)
CREAT SERPL-MCNC: 1.6 MG/DL (ref 0.4–1.2)
CRYSTALS: ABNORMAL
EKG ATRIAL RATE: 107 BPM
EKG ATRIAL RATE: 131 BPM
EKG P AXIS: -2 DEGREES
EKG P AXIS: 31 DEGREES
EKG P-R INTERVAL: 160 MS
EKG P-R INTERVAL: 184 MS
EKG Q-T INTERVAL: 304 MS
EKG Q-T INTERVAL: 358 MS
EKG QRS DURATION: 92 MS
EKG QRS DURATION: 96 MS
EKG QTC CALCULATION (BAZETT): 448 MS
EKG QTC CALCULATION (BAZETT): 477 MS
EKG R AXIS: -16 DEGREES
EKG R AXIS: -19 DEGREES
EKG T AXIS: 37 DEGREES
EKG T AXIS: 39 DEGREES
EKG VENTRICULAR RATE: 107 BPM
EKG VENTRICULAR RATE: 131 BPM
EOSINOPHIL # BLD: 0 %
EOSINOPHILS ABSOLUTE: 0 THOU/MM3 (ref 0–0.4)
EPITHELIAL CELLS, UA: ABNORMAL /HPF
ERYTHROCYTE [DISTWIDTH] IN BLOOD BY AUTOMATED COUNT: 16.6 % (ref 11.5–14.5)
ERYTHROCYTE [DISTWIDTH] IN BLOOD BY AUTOMATED COUNT: 50.6 FL (ref 35–45)
GFR SERPL CREATININE-BSD FRML MDRD: 47 ML/MIN/1.73M2
GFR SERPL CREATININE-BSD FRML MDRD: > 90 ML/MIN/1.73M2
GLUCOSE BLD-MCNC: 141 MG/DL (ref 70–108)
GLUCOSE BLD-MCNC: 156 MG/DL (ref 70–108)
GLUCOSE BLD-MCNC: 157 MG/DL (ref 70–108)
GLUCOSE BLD-MCNC: 164 MG/DL (ref 70–108)
GLUCOSE BLD-MCNC: 168 MG/DL (ref 70–108)
GLUCOSE BLD-MCNC: 202 MG/DL (ref 70–108)
GLUCOSE, URINE: NEGATIVE MG/DL
HCT VFR BLD CALC: 30.7 % (ref 42–52)
HCT VFR BLD CALC: 33.4 % (ref 42–52)
HCT VFR BLD CALC: 36.3 % (ref 42–52)
HEMOGLOBIN: 10.4 GM/DL (ref 14–18)
HEMOGLOBIN: 11.4 GM/DL (ref 14–18)
HEMOGLOBIN: 9.5 GM/DL (ref 14–18)
IMMATURE GRANS (ABS): 0.25 THOU/MM3 (ref 0–0.07)
IMMATURE GRANULOCYTES: 1.5 %
KETONES, URINE: ABNORMAL
LEUKOCYTE ESTERASE, URINE: NEGATIVE
LYMPHOCYTES # BLD: 9 %
LYMPHOCYTES ABSOLUTE: 1.5 THOU/MM3 (ref 1–4.8)
MCH RBC QN AUTO: 26.1 PG (ref 26–33)
MCHC RBC AUTO-ENTMCNC: 31.1 GM/DL (ref 32.2–35.5)
MCV RBC AUTO: 83.7 FL (ref 80–94)
MISCELLANEOUS LAB TEST RESULT: ABNORMAL
MONOCYTES # BLD: 8.1 %
MONOCYTES ABSOLUTE: 1.3 THOU/MM3 (ref 0.4–1.3)
NITRITE, URINE: NEGATIVE
NUCLEATED RED BLOOD CELLS: 0 /100 WBC
PH UA: 6 (ref 5–9)
PLATELET # BLD: 241 THOU/MM3 (ref 130–400)
PMV BLD AUTO: 9.9 FL (ref 9.4–12.4)
POTASSIUM REFLEX MAGNESIUM: 4.6 MEQ/L (ref 3.5–5.2)
POTASSIUM SERPL-SCNC: 4.4 MEQ/L (ref 3.5–5.2)
PRO-BNP: 1237 PG/ML (ref 0–450)
PROTEIN UA: 100 MG/DL
RBC # BLD: 3.99 MILL/MM3 (ref 4.7–6.1)
RBC URINE: ABNORMAL /HPF
RENAL EPITHELIAL, UA: ABNORMAL
SEG NEUTROPHILS: 81.3 %
SEGMENTED NEUTROPHILS ABSOLUTE COUNT: 13.3 THOU/MM3 (ref 1.8–7.7)
SODIUM BLD-SCNC: 136 MEQ/L (ref 135–145)
SODIUM BLD-SCNC: 138 MEQ/L (ref 135–145)
SPECIFIC GRAVITY UA: 1.02 (ref 1–1.03)
TOTAL PROTEIN: 6.4 G/DL (ref 6.1–8)
TROPONIN T: < 0.01 NG/ML
TSH SERPL DL<=0.05 MIU/L-ACNC: 2.47 UIU/ML (ref 0.4–4.2)
UROBILINOGEN, URINE: 0.2 EU/DL (ref 0–1)
WBC # BLD: 16.3 THOU/MM3 (ref 4.8–10.8)
WBC UA: ABNORMAL /HPF
YEAST: ABNORMAL

## 2022-09-13 PROCEDURE — 99223 1ST HOSP IP/OBS HIGH 75: CPT | Performed by: INTERNAL MEDICINE

## 2022-09-13 PROCEDURE — 8E0W4CZ ROBOTIC ASSISTED PROCEDURE OF TRUNK REGION, PERCUTANEOUS ENDOSCOPIC APPROACH: ICD-10-PCS | Performed by: SURGERY

## 2022-09-13 PROCEDURE — 36415 COLL VENOUS BLD VENIPUNCTURE: CPT

## 2022-09-13 PROCEDURE — 83880 ASSAY OF NATRIURETIC PEPTIDE: CPT

## 2022-09-13 PROCEDURE — 84443 ASSAY THYROID STIM HORMONE: CPT

## 2022-09-13 PROCEDURE — 2580000003 HC RX 258: Performed by: SURGERY

## 2022-09-13 PROCEDURE — APPSS60 APP SPLIT SHARED TIME 46-60 MINUTES: Performed by: NURSE PRACTITIONER

## 2022-09-13 PROCEDURE — 81001 URINALYSIS AUTO W/SCOPE: CPT

## 2022-09-13 PROCEDURE — 74240 X-RAY XM UPR GI TRC 1CNTRST: CPT

## 2022-09-13 PROCEDURE — 71046 X-RAY EXAM CHEST 2 VIEWS: CPT

## 2022-09-13 PROCEDURE — 6370000000 HC RX 637 (ALT 250 FOR IP): Performed by: NURSE PRACTITIONER

## 2022-09-13 PROCEDURE — 93010 ELECTROCARDIOGRAM REPORT: CPT | Performed by: INTERNAL MEDICINE

## 2022-09-13 PROCEDURE — 82948 REAGENT STRIP/BLOOD GLUCOSE: CPT

## 2022-09-13 PROCEDURE — 84484 ASSAY OF TROPONIN QUANT: CPT

## 2022-09-13 PROCEDURE — 6360000002 HC RX W HCPCS: Performed by: SURGERY

## 2022-09-13 PROCEDURE — 85014 HEMATOCRIT: CPT

## 2022-09-13 PROCEDURE — 99024 POSTOP FOLLOW-UP VISIT: CPT | Performed by: NURSE PRACTITIONER

## 2022-09-13 PROCEDURE — 6370000000 HC RX 637 (ALT 250 FOR IP): Performed by: SURGERY

## 2022-09-13 PROCEDURE — A4641 RADIOPHARM DX AGENT NOC: HCPCS | Performed by: SURGERY

## 2022-09-13 PROCEDURE — 0D164ZA BYPASS STOMACH TO JEJUNUM, PERCUTANEOUS ENDOSCOPIC APPROACH: ICD-10-PCS | Performed by: SURGERY

## 2022-09-13 PROCEDURE — 80048 BASIC METABOLIC PNL TOTAL CA: CPT

## 2022-09-13 PROCEDURE — 93005 ELECTROCARDIOGRAM TRACING: CPT | Performed by: SURGERY

## 2022-09-13 PROCEDURE — 6360000004 HC RX CONTRAST MEDICATION: Performed by: SURGERY

## 2022-09-13 PROCEDURE — 85018 HEMOGLOBIN: CPT

## 2022-09-13 PROCEDURE — 85025 COMPLETE CBC W/AUTO DIFF WBC: CPT

## 2022-09-13 PROCEDURE — 51798 US URINE CAPACITY MEASURE: CPT

## 2022-09-13 PROCEDURE — 2580000003 HC RX 258

## 2022-09-13 PROCEDURE — 99222 1ST HOSP IP/OBS MODERATE 55: CPT

## 2022-09-13 PROCEDURE — 93005 ELECTROCARDIOGRAM TRACING: CPT | Performed by: NURSE PRACTITIONER

## 2022-09-13 PROCEDURE — 80076 HEPATIC FUNCTION PANEL: CPT

## 2022-09-13 PROCEDURE — 2140000000 HC CCU INTERMEDIATE R&B

## 2022-09-13 RX ORDER — SIMETHICONE 80 MG
80 TABLET,CHEWABLE ORAL 4 TIMES DAILY PRN
Status: DISCONTINUED | OUTPATIENT
Start: 2022-09-13 | End: 2022-09-16 | Stop reason: HOSPADM

## 2022-09-13 RX ORDER — SODIUM CHLORIDE, SODIUM LACTATE, POTASSIUM CHLORIDE, CALCIUM CHLORIDE 600; 310; 30; 20 MG/100ML; MG/100ML; MG/100ML; MG/100ML
INJECTION, SOLUTION INTRAVENOUS CONTINUOUS
Status: DISCONTINUED | OUTPATIENT
Start: 2022-09-13 | End: 2022-09-14

## 2022-09-13 RX ORDER — CARVEDILOL 6.25 MG/1
6.25 TABLET ORAL 2 TIMES DAILY WITH MEALS
Status: DISCONTINUED | OUTPATIENT
Start: 2022-09-13 | End: 2022-09-14

## 2022-09-13 RX ORDER — CARVEDILOL 25 MG/1
25 TABLET ORAL 2 TIMES DAILY WITH MEALS
Status: DISCONTINUED | OUTPATIENT
Start: 2022-09-13 | End: 2022-09-14

## 2022-09-13 RX ORDER — IRBESARTAN 75 MG/1
75 TABLET ORAL NIGHTLY
Status: DISCONTINUED | OUTPATIENT
Start: 2022-09-13 | End: 2022-09-16

## 2022-09-13 RX ORDER — HYOSCYAMINE SULFATE 0.125 MG
125 TABLET,DISINTEGRATING ORAL EVERY 4 HOURS
Status: DISCONTINUED | OUTPATIENT
Start: 2022-09-13 | End: 2022-09-15

## 2022-09-13 RX ADMIN — ENOXAPARIN SODIUM 40 MG: 100 INJECTION SUBCUTANEOUS at 08:11

## 2022-09-13 RX ADMIN — MORPHINE SULFATE 2 MG: 2 INJECTION, SOLUTION INTRAMUSCULAR; INTRAVENOUS at 20:32

## 2022-09-13 RX ADMIN — ONDANSETRON 4 MG: 2 INJECTION INTRAMUSCULAR; INTRAVENOUS at 17:01

## 2022-09-13 RX ADMIN — BARIUM SULFATE 40 ML: 0.6 SUSPENSION ORAL at 09:52

## 2022-09-13 RX ADMIN — MORPHINE SULFATE 4 MG: 4 INJECTION, SOLUTION INTRAMUSCULAR; INTRAVENOUS at 04:17

## 2022-09-13 RX ADMIN — SODIUM CHLORIDE, PRESERVATIVE FREE 10 ML: 5 INJECTION INTRAVENOUS at 20:33

## 2022-09-13 RX ADMIN — KETOROLAC TROMETHAMINE 15 MG: 30 INJECTION, SOLUTION INTRAMUSCULAR; INTRAVENOUS at 08:28

## 2022-09-13 RX ADMIN — SODIUM CHLORIDE: 9 INJECTION, SOLUTION INTRAVENOUS at 04:15

## 2022-09-13 RX ADMIN — METOCLOPRAMIDE 10 MG: 5 INJECTION, SOLUTION INTRAMUSCULAR; INTRAVENOUS at 06:16

## 2022-09-13 RX ADMIN — MORPHINE SULFATE 4 MG: 4 INJECTION, SOLUTION INTRAMUSCULAR; INTRAVENOUS at 08:10

## 2022-09-13 RX ADMIN — SODIUM CHLORIDE, POTASSIUM CHLORIDE, SODIUM LACTATE AND CALCIUM CHLORIDE: 600; 310; 30; 20 INJECTION, SOLUTION INTRAVENOUS at 13:36

## 2022-09-13 RX ADMIN — IRBESARTAN 75 MG: 75 TABLET ORAL at 20:33

## 2022-09-13 RX ADMIN — KETOROLAC TROMETHAMINE 15 MG: 30 INJECTION, SOLUTION INTRAMUSCULAR; INTRAVENOUS at 14:49

## 2022-09-13 RX ADMIN — ONDANSETRON 4 MG: 2 INJECTION INTRAMUSCULAR; INTRAVENOUS at 04:17

## 2022-09-13 RX ADMIN — HYOSCYAMINE SULFATE 125 MCG: 0.12 TABLET, ORALLY DISINTEGRATING ORAL at 04:44

## 2022-09-13 RX ADMIN — KETOROLAC TROMETHAMINE 15 MG: 30 INJECTION, SOLUTION INTRAMUSCULAR; INTRAVENOUS at 20:31

## 2022-09-13 RX ADMIN — KETOROLAC TROMETHAMINE 15 MG: 30 INJECTION, SOLUTION INTRAMUSCULAR; INTRAVENOUS at 02:58

## 2022-09-13 RX ADMIN — MORPHINE SULFATE 4 MG: 4 INJECTION, SOLUTION INTRAMUSCULAR; INTRAVENOUS at 06:07

## 2022-09-13 RX ADMIN — HYOSCYAMINE SULFATE 125 MCG: 0.12 TABLET, ORALLY DISINTEGRATING ORAL at 17:01

## 2022-09-13 RX ADMIN — PROMETHAZINE HYDROCHLORIDE 25 MG: 25 SUPPOSITORY RECTAL at 08:34

## 2022-09-13 RX ADMIN — DIATRIZOATE MEGLUMINE AND DIATRIZOATE SODIUM 30 ML: 660; 100 LIQUID ORAL; RECTAL at 09:52

## 2022-09-13 RX ADMIN — HYOSCYAMINE SULFATE 125 MCG: 0.12 TABLET, ORALLY DISINTEGRATING ORAL at 08:27

## 2022-09-13 RX ADMIN — ONDANSETRON 4 MG: 2 INJECTION INTRAMUSCULAR; INTRAVENOUS at 10:25

## 2022-09-13 RX ADMIN — HYOSCYAMINE SULFATE 125 MCG: 0.12 TABLET, ORALLY DISINTEGRATING ORAL at 20:31

## 2022-09-13 RX ADMIN — SODIUM CHLORIDE, POTASSIUM CHLORIDE, SODIUM LACTATE AND CALCIUM CHLORIDE: 600; 310; 30; 20 INJECTION, SOLUTION INTRAVENOUS at 19:32

## 2022-09-13 RX ADMIN — ENOXAPARIN SODIUM 40 MG: 100 INJECTION SUBCUTANEOUS at 20:31

## 2022-09-13 RX ADMIN — MORPHINE SULFATE 4 MG: 4 INJECTION, SOLUTION INTRAMUSCULAR; INTRAVENOUS at 18:48

## 2022-09-13 RX ADMIN — HYOSCYAMINE SULFATE 125 MCG: 0.12 TABLET, ORALLY DISINTEGRATING ORAL at 12:55

## 2022-09-13 RX ADMIN — SODIUM CHLORIDE: 9 INJECTION, SOLUTION INTRAVENOUS at 12:31

## 2022-09-13 RX ADMIN — MORPHINE SULFATE 4 MG: 4 INJECTION, SOLUTION INTRAMUSCULAR; INTRAVENOUS at 12:07

## 2022-09-13 ASSESSMENT — PAIN DESCRIPTION - DESCRIPTORS
DESCRIPTORS: SHARP;STABBING
DESCRIPTORS: ACHING;CRAMPING
DESCRIPTORS: THROBBING;STABBING
DESCRIPTORS: SHARP;SHOOTING;STABBING
DESCRIPTORS: STABBING;THROBBING
DESCRIPTORS: SHARP;STABBING;DISCOMFORT
DESCRIPTORS: ACHING;CRAMPING;STABBING
DESCRIPTORS: SHARP
DESCRIPTORS: STABBING;THROBBING
DESCRIPTORS: SHOOTING

## 2022-09-13 ASSESSMENT — PAIN DESCRIPTION - FREQUENCY
FREQUENCY: CONTINUOUS

## 2022-09-13 ASSESSMENT — PAIN SCALES - GENERAL
PAINLEVEL_OUTOF10: 9
PAINLEVEL_OUTOF10: 9
PAINLEVEL_OUTOF10: 8
PAINLEVEL_OUTOF10: 8
PAINLEVEL_OUTOF10: 7
PAINLEVEL_OUTOF10: 9
PAINLEVEL_OUTOF10: 8
PAINLEVEL_OUTOF10: 9
PAINLEVEL_OUTOF10: 7
PAINLEVEL_OUTOF10: 8
PAINLEVEL_OUTOF10: 7
PAINLEVEL_OUTOF10: 8
PAINLEVEL_OUTOF10: 7

## 2022-09-13 ASSESSMENT — PAIN DESCRIPTION - LOCATION
LOCATION: ABDOMEN
LOCATION: ABDOMEN
LOCATION: CHEST;ABDOMEN
LOCATION: ABDOMEN
LOCATION: ABDOMEN;CHEST
LOCATION: ABDOMEN
LOCATION: ABDOMEN;BACK;CHEST
LOCATION: ABDOMEN;CHEST

## 2022-09-13 ASSESSMENT — PAIN DESCRIPTION - ORIENTATION
ORIENTATION: MID
ORIENTATION: MID;LOWER
ORIENTATION: MID
ORIENTATION: MID;LOWER
ORIENTATION: MID;LOWER
ORIENTATION: MID

## 2022-09-13 ASSESSMENT — PAIN - FUNCTIONAL ASSESSMENT
PAIN_FUNCTIONAL_ASSESSMENT: ACTIVITIES ARE NOT PREVENTED
PAIN_FUNCTIONAL_ASSESSMENT: PREVENTS OR INTERFERES SOME ACTIVE ACTIVITIES AND ADLS
PAIN_FUNCTIONAL_ASSESSMENT: ACTIVITIES ARE NOT PREVENTED
PAIN_FUNCTIONAL_ASSESSMENT: PREVENTS OR INTERFERES SOME ACTIVE ACTIVITIES AND ADLS
PAIN_FUNCTIONAL_ASSESSMENT: PREVENTS OR INTERFERES WITH MANY ACTIVE NOT PASSIVE ACTIVITIES
PAIN_FUNCTIONAL_ASSESSMENT: PREVENTS OR INTERFERES SOME ACTIVE ACTIVITIES AND ADLS

## 2022-09-13 ASSESSMENT — PAIN DESCRIPTION - PAIN TYPE
TYPE: ACUTE PAIN;SURGICAL PAIN

## 2022-09-13 NOTE — CONSULTS
Hospitalist Consult Note        Patient:  Shaq Minor  YOB: 1975  Date of Service: 9/13/2022  MRN: [id number]   Acct:  [de-identified]   Primary Care Physician: Donald Hughes MD    Chief Complaint:  Elective Diallo-en-Y bypass surgery  Reason for consult  hypotension    Date of Service: Pt seen/examined in consultation on 9/13/2022     History Of Present Illness:      Minor [age] y.o. male who we are asked to see/evaluate by Roni House MD for medical management of hypotensive episodes. Per nursing and chart review, patient was has had two separate episodes of walking to the bathroom and experiencing sudden lightheadedness, felt chilled but was sweating, and turning pale. He states he felt like he was going to pass out both times. Pt denies symptoms at rest. He states his symptoms recur with standing and activity. He denies chest pain, shortness of breath, dizziness, vision changes, palpitations. He reports his last meal was Sunday 9/11, and was admitted on Monday morning 9/12 and has been IVF. Patient underwent an elective diallo-en-y bypass surgery with Dr. Tracie Casillas yesterday without any intraoperative or immediate post-op complications. Assessment and Plan:-  S/P Diallo-en-y bypass:    Managed per primary, Dr. Tracie Casillas. Pt had procedure yesterday 9/12 with EBL 10 cc.- tolerated procedure well. Hgb 10.4 today after recheck. Continue to monitor CBC. Suspected orthostatic hypotension:    Pt endorsing 2 episodes of lightheadedness, diaphoresis with standing and ambulating. Pt afebrile, intermittently tachycardic, non-toxic appearing. Noted to be on anticholinergic medications, IV pain medication, and receiving IVF 125cc/hr. STAT EKG shows sinus tachycardia, trop negative. Hgb 10.4, down from 11.4 earlier this morning . BNP elevated, however no recent prior labs to compare. Pt appears euvolemic. Will increase IVF to 170cc/hr for this interval of time of decreased PO intake. ECHO ordered, CBC with diff, TSH, UA ordered to further investigate. Orthostatic Bps Q shift. Continue to hold BB. Telemetry. Cardiology consulted by primary. HFrEF, with ICD:    Noted per chart review. Last ECHO 11/13/2020 reveals EF 30-35% with mild mitral regurgitation. Pt appears euvolemic today. Continue to hold BB, diuretics at this time. Telemetry. Daily weights, I&Os. Repeat ECHO ordered for #2 today. T2DM:   Glucose 157. Pt's home meds have been held by primary- on Low Dose SSI, POCT glucose checks and hypoglycemia protocol in place as ordered by primary. Recommend resuming home Lantus at reduced dose and adding med dose SSI. Goal blood glucose while inpatient 140-180. Continue to monitor with daily BMP. Past Medical History:        Diagnosis Date    CHF (congestive heart failure) (HCC)     Constipation     Diabetes mellitus ([facility] [address]     GERD (gastroesophageal reflux disease)     Hypercholesteremia     S/P ICD (internal cardiac defibrillator) procedure: 1/15/2015: Medtronic Single Chamber 1/15/2015    1/15/2015: Medtronic Single Chamber. Dr. Heidi Kennedy    Sleep apnea 06/09/2017    Milta Becky applied 8/22/14 8/29/2014    returned prior to ICD insertion 1/15       Past Surgical History:        Procedure Laterality Date    CARDIAC CATHETERIZATION  9/2014    [address]  2014    [address]      EGD  2022    Dr Beth Bean     UPPER GASTROINTESTINAL ENDOSCOPY N/A 6/27/2022    EGD BIOPSY performed by Hamida Vargas MD at [address] [place] [address]  2004       Home Medications:   No current facility-administered medications on file prior to encounter.      Current Outpatient Medications on File Prior to Encounter   Medication Sig Dispense Refill    Pediatric Multivitamins-Iron (FLINTSTONES PLUS IRON PO) Take 1 tablet by mouth daily      Fexofenadine HCl (MUCINEX ALLERGY PO) Take 1 tablet by mouth daily      gabapentin (NEURONTIN) 100 MG capsule TAKE 1 CAPSULE BY MOUTH 3 TIMES A DAY (Patient taking differently: 100 mg 3 times daily as needed.) 270 capsule 1    insulin glargine (LANTUS SOLOSTAR) 100 UNIT/ML injection pen Inject 30 Units into the skin nightly (Patient taking differently: Inject 30 Units into the skin nightly Took 15U 9/11/22 2200) 10 pen 3    metFORMIN (GLUCOPHAGE-XR) 500 MG extended release tablet TAKE TWO TABLETS BY MOUTH TWICE A  tablet 2    ASPIRIN LOW DOSE 81 MG EC tablet TAKE 1 TABLET BY MOUTH ONE TIME A DAY 90 tablet 1    rosuvastatin (CRESTOR) 10 MG tablet TAKE 1 TABLET BY MOUTH ONE TIME A DAY 90 tablet 3    blood glucose test strips (PRODIGY NO CODING BLOOD GLUC) strip USE TO CHECK BLOOD SUGAR FOUR TIMES A  strip 3    Prodigy Twist Top Lancets 28G MISC USE TO TEST FOUR TIMES A  each 3    irbesartan (AVAPRO) 75 MG tablet TAKE 1 TABLET BY MOUTH ONE TIME A DAY IN THE EVENING 90 tablet 3    carvedilol (COREG) 6.25 MG tablet TAKE 1 TABLET BY MOUTH 2 TIMES A DAY WITH MEALS 180 tablet 3    OZEMPIC, 1 MG/DOSE, 4 MG/3ML SOPN INJECT 1MG UNDER THE SKIN ONCE A WEEK 24 mL 3    insulin lispro, 1 Unit Dial, (HUMALOG KWIKPEN) 100 UNIT/ML SOPN INJECT 14 UNITS WITH MEALS PLUS SLIDING SCALE --151-200 3 UNITS, 201-250 5 UNITS, 251-300 8 UNITS, 301-350 10 UNITS, 351-400 12 UNITS, OVER 400 15 UNITS AND CONTACT PHYSICIAN 135 mL 3    carvedilol (COREG) 25 MG tablet TAKE 1 TABLET BY MOUTH 2 TIMES A DAY WITH MEALS 180 tablet 3    spironolactone (ALDACTONE) 25 MG tablet TAKE 1 TABLET BY MOUTH ONE TIME A DAY 90 tablet 3    CPAP Machine MISC by Does not apply route Ramp pressure: 4.0 cm H2O  Treatment pressure: 15.0 cm H2O 1 each 0    Blood Glucose Monitoring Suppl (PRODIGY AUTOCODE BLOOD GLUCOSE) MAUREEN Use to check blood sugar 4 times daily 1 Device 0    Insulin Pen Needle (PEN NEEDLES 31GX5/16\") 31G X 8 MM MISC Use to inject insulins and Ozempic 400 each 3    fluticasone (FLONASE) 50 MCG/ACT nasal spray 2 sprays by Nasal route daily 1 Bottle 2    loratadine (CLARITIN) 10 MG tablet Take 10 mg by mouth daily      Respiratory Therapy Supplies (ADULT MASK) MISC Please do mask desensitization and/or provide mask of patient's choice. 1 each 0    acetaminophen (TYLENOL) 325 MG tablet Take 650 mg by mouth every 8 hours as needed for Pain         Allergies:    Levaquin [levofloxacin in d5w]    Social History:    reports that he has never smoked. He has never used smokeless tobacco. He reports that he does not drink alcohol and does not use drugs. Family History:       Problem Relation Age of Onset    Arthritis Mother     Hypertension Father     Heart Disease Maternal Uncle     Heart Disease Maternal Grandfather     Asthma Maternal Grandfather     Diabetes Maternal Grandfather     Arthritis Maternal Grandfather     Heart Disease Paternal Uncle     Heart Disease Maternal Grandmother     Stroke Maternal Grandmother     Asthma Brother     Colon Cancer Neg Hx     Colon Polyps Neg Hx     Esophageal Cancer Neg Hx        Diet:  BARIATRIC DIET; Bariatric Clear Liquid    Review of systems:   Pertinent positives as noted in the HPI. All other systems reviewed and negative. PHYSICAL EXAM:  BP (!) 109/56   Pulse 76   Temp 97.8 °F (36.6 °C) (Oral)   Resp 18   Ht 6' 3\" (1.905 m)   Wt (!) 369 lb 2 oz (167.4 kg)   SpO2 96%   BMI 46.14 kg/m²   General appearance: Chronically ill appearing. No apparent distress, appears stated age and cooperative. HEENT: Oral mucosa appears dry. Normal cephalic, atraumatic without obvious deformity. Pupils equal, round, and reactive to light. Extra ocular muscles intact. Conjunctivae/corneas clear. Neck: Supple, with full range of motion. Trachea midline. Respiratory:  Normal respiratory effort. Clear to auscultation, bilaterally without Rales/Wheezes/Rhonchi. Cardiovascular: Regular rate and rhythm with normal S1/S2 without murmurs, rubs or gallops.   Abdomen: Soft, generalized tenderness to palpation, non-distended with normal bowel sounds. Dressing  and abdominal binder in place   Musculoskeletal:  No clubbing, cyanosis or edema bilaterally. Skin: Pt appears pale. Skin color, texture, turgor normal.  No rashes or lesions. Neurologic:  Neurovascularly intact without any focal sensory/motor deficits. Cranial nerves: II-XII intact, grossly non-focal.  Psychiatric: Alert and oriented, thought content appropriate, normal insight  Peripheral Pulses: +2 palpable, equal bilaterally     Labs:   Recent Labs     09/13/22  0548   HGB 11.4*   HCT 36.3*     Recent Labs     09/12/22  1159 09/13/22  0548   NA  --  138   K 4.3 4.6   CL  --  105   CO2  --  22*   BUN  --  18   CREATININE  --  0.9   CALCIUM  --  8.5     No results for input(s): AST, ALT, BILIDIR, BILITOT, ALKPHOS in the last 72 hours. No results for input(s): INR in the last 72 hours. No results for input(s): Sheng Abner in the last 72 hours. Urinalysis:    Lab Results   Component Value Date/Time    NITRU Negative 08/23/2019 10:48 AM    WBCUA NONE SEEN 12/12/2016 09:40 AM    BACTERIA NONE 12/12/2016 09:40 AM    RBCUA 0-2 12/12/2016 09:40 AM    BLOODU Trace-intact 08/23/2019 10:48 AM    BLOODU NEGATIVE 08/14/2019 04:20 PM    GLUCOSEU Negative 08/23/2019 10:48 AM       Radiology:   [place] [facility]GI   Final Result      No evidence of obstruction or leak following gastric bypass. Final report electronically signed by Dr Margie Orozco on 9/13/2022 11:00 AM        XR CHEST (2 VW)    Result Date: 8/19/2022  PROCEDURE: XR CHEST (2 VW) CLINICAL INFORMATION: Preoperative examination. COMPARISON: Chest x-ray 4/19/2021. TECHNIQUE: PA and lateral views of the chest performed. FINDINGS: Lines/tubes: A left chest permanent pacemaker is stable. Heart/mediastinum: Mild cardiomegaly is stable. The pulmonary vascularity is unremarkable. Lungs: Low lung volumes are present.  No focal consolidation, pleural effusion, or pneumothorax is observed. Bones: The visualized skeletal structures appear intact. Low lung volumes. No acute intrathoracic process. **This report has been created using voice recognition software. It may contain minor errors which are inherent in voice recognition technology. ** Final report electronically signed by Dr Alise Goodson on 8/19/2022 11:59 AM        EKG:  Sinus tachycardia with occasional PACs.        Jonh [address]    Electronically signed by Lizzie Chavira PA-C on 9/13/2022 at 12:44 PM

## 2022-09-13 NOTE — ANESTHESIA POSTPROCEDURE EVALUATION
Department of Anesthesiology  Postprocedure Note    Patient: Melody Griffith  MRN: [id number]  YOB: 1975  Date of evaluation: 9/13/2022      Procedure Summary     Date: 09/12/22 Room / Location: [facility] [place]  Tucker Gill    Anesthesia Start: 1677 Anesthesia Stop: 6608    Procedure: Robotic Ronaldo-en-Y Gastric Bypass (Abdomen) Diagnosis:       Class 3 severe obesity with body mass index (BMI) of 45.0 to 49.9 in adult, unspecified obesity type, unspecified whether serious comorbidity present (HCC)      (Class 3 severe obesity with body mass index (BMI) of 45.0 to 49.9 in adult, unspecified obesity type, unspecified whether serious comorbidity present ([facility] [E66.01, Z68.42])    Surgeons: Claudell Ram, MD Responsible Provider: Star Chisholm MD    Anesthesia Type: General ASA Status: 3          Anesthesia Type: General    Jarod Phase I: Jarod Score: 8    Jarod Phase II:        Anesthesia Post Evaluation    Patient location during evaluation: PACU  Patient participation: complete - patient participated  Level of consciousness: awake  Airway patency: patent  Nausea & Vomiting: no vomiting and no nausea  Complications: no  Cardiovascular status: hemodynamically stable  Respiratory status: acceptable  Hydration status: stable  Comments: Delayed entry

## 2022-09-13 NOTE — CONSULTS
[facility]'s  Consult    Patient's Name/Date of Birth: Marilynn Amado / 1975 (60 y.o.)    Date: September 13, 2022     Referring Provider: Ezio Avalos MD    CHIEF COMPLAINT: S/P gastric bypass surgery      HPI: This is a pleasant 52 y.o. male with a past history severe non-ischemic cardiomyopathy (EF 30-35%-2020) s/p AICD placement, DM, HTN. The patient states he developed severe cardiomyopathy after he developed a viral infection from living in the [facility]. He had AICD placement in 2015. He follows with Dr. Viola Mckeon. Patient is POD 1 gastric bypass surgery. He states that today he developed some bloating and pressure. He stood at bedside with nursing staff and and became pale, diaphoretic, and said that he felt like he was going to pass out. He was helped back to bed. His symptoms improved. He states that he was able to ambulate after this episode without any further symptoms. Later, he got up to use the bathroom. He asked for privacy so he was assisted by his wife. She reports that while attempting to void, he began slurring his words. She stated that he turned grey and became cool and clammy and went unresponsive briefly. Nursing was called in and vitals were obtained. Manual pressure in the 90's and tachycardic in the 120's. Patient denied ever having symptoms like this before. Cardiology was consulted to follow for syncopal episode in patient with extensive cardiac history.               Echo: Results for orders placed during the hospital encounter of 11/13/20    ECHO Complete 2D W Doppler W Color    Narrative  Transthoracic Echocardiography Report (TTE)    Demographics    Patient Name  [facility]       Gender             Male  Anthony Dillon    MR #          [id number]      Race                   Ethnicity    Account #     [de-identified]      Room Number        0025    Accession     [id number]     Date of Study      11/14/2020  Number    Date of Birth 1975     Referring          Keila Eduardo MD  Physician          [facility]    Age           39 year(s)     Sonographer        ARVIND Bro, RDCS,  RDMS, RVT    Interpreting       Juliet Lopez MD  Physician    Procedure    Type of Study    TTE procedure:ECHOCARDIOGRAM COMPLETE 2D W DOPPLER W COLOR. Procedure Date  Date: 11/14/2020 Start: 08:19 AM    Study Location: Bedside  Technical Quality: Limited visualization due to body habitus. Indications:AICD Discharge. Additional Medical History:AICD discharge, congestive heart failure,  cardiomyopathy, morbid obesity, hypertenison, sleep apnea, AICD    Patient Status: Routine    Height: 75 inches Weight: 399.01 pounds BSA: 2.94 m^2 BMI: 49.87 kg/m^2    BP: 131/88 mmHg    Conclusions    Summary  Technically difficult study due to poor acoustic windows. Left ventricular size is normal and systolic function is moderate to  severely reduced. Ejection fraction was estimated at 30-35%. LV wall  thickness is within normal limits. The left atrium is Mildly dilated. Mild mitral regurgitation is present. Signature    ----------------------------------------------------------------  Electronically signed by Juliet Lopez MD (Interpreting  physician) on 11/14/2020 at 01:24 PM  ----------------------------------------------------------------    Findings    Mitral Valve  The mitral valve was not well visualized . Mild mitral regurgitation is present. Aortic Valve  The aortic valve leaflets were not well visualized. DOPPLER: Transaortic velocity was within the normal range with no evidence  of aortic stenosis. There was no evidence of aortic regurgitation. Tricuspid Valve  Tricuspid valve was not well visualized. Mild tricuspid regurgitation visualized. Pulmonic Valve  The pulmonic valve was not well visualized . Left Atrium  The left atrium is Mildly dilated.     Left Ventricle  Left ventricular size is normal and systolic function is moderate to  severely reduced. Ejection fraction was estimated at 30-35%. LV wall  thickness is within normal limits. Right Atrium  Right atrial size was normal.    Right Ventricle  The right ventricular size was normal with normal systolic function and  wall thickness. Pacer Wire visualized in right ventricle. Pericardial Effusion  The pericardium was normal in appearance with no evidence of a pericardial  effusion. Pleural Effusion  No evidence of pleural effusion. Aorta / Great Vessels  -Aortic root dimension within normal limits.  -The Pulmonary artery is within normal limits. -IVC size is within normal limits with normal respiratory phasic changes.     M-Mode/2D Measurements & Calculations    LV Diastolic   LV Systolic Dimension:    AV Cusp Separation: 1.5 cmLA  Dimension: 6.7 5.6 cm                    Dimension: 4.6 cmAO Root  cm             LV Volume Diastolic:      Dimension: 2.7 cmLA Area: 25.9  LV FS:16.4 %   168.1 ml                  cm^2  LV PW          LV Volume Systolic: 557.8  Diastolic: 1.1 ml  cm             LV EDV/LV EDV Index:  Septum         168.1 ml/57 m^2LV ESV/LV  RV Diastolic Dimension: 3.3 cm  Diastolic: 0.7 ESV Index: 353.5 ml/39  cm             m^2                       LA/Aorta: 1.7  EF Calculated: 32.2 %     Ascending Aorta: 3 cm  LA volume/Index: 85.5 ml /29m^2  LV Length: 9.8 cm  LV Area  Diastolic:  63.4 cm^2  LV Area  Systolic: 65.6  cm^2    Doppler Measurements & Calculations    MV Peak E-Wave: 109 cm/s AV Peak Velocity: 125  LVOT Peak Velocity: 77.1  cm/s                   cm/s  MV Peak Gradient: 4.75   AV Peak Gradient: 6.25 LVOT Peak Gradient: 2 mmHg  mmHg                     mmHg  TV Peak E-Wave: 82.6 cm/s  MV Deceleration Time:  127 msec                                        TV Peak Gradient: 2.73  mmHg  IVRT: 63 msec          TR Velocity:266 cm/s  TR Gradient:28.3 mmHg  PV Peak Velocity: 63 cm/s  AV DVI (Vmax):0.62     PV Peak Gradient: 1.59  MR Velocity: 352 Units  0-4 Units SubCUTAneous TID  Edward Posada MD        insulin lispro (HUMALOG) injection vial 0-4 Units  0-4 Units SubCUTAneous Nightly Edward Poasda MD        ketorolac (TORADOL) injection 15 mg  15 mg IntraVENous Q6H Edward Posada MD   15 mg at 09/13/22 3416    sodium chloride flush 0.9 % injection 5-40 mL  5-40 mL IntraVENous 2 times per day Edward Posada MD        sodium chloride flush 0.9 % injection 5-40 mL  5-40 mL IntraVENous PRN Edward Posada MD        0.9 % sodium chloride infusion   IntraVENous PRN Edward Posada MD        morphine (PF) injection 2 mg  2 mg IntraVENous Q2H PRN Edward Posada MD   2 mg at 09/12/22 2033    Or    morphine injection 4 mg  4 mg IntraVENous Q2H PRN Edward Posada MD   4 mg at 09/13/22 1207    ondansetron (ZOFRAN) injection 4 mg  4 mg IntraVENous Q6H Edward Posada MD   4 mg at 09/13/22 1025    [START ON 9/15/2022] scopolamine (TRANSDERM-SCOP) transdermal patch 1 patch  1 patch TransDERmal Q72H Edward Posada MD        promethazine (PHENERGAN) suppository 25 mg  25 mg Rectal Q6H PRN Edward Posada MD   25 mg at 09/13/22 0834    metoclopramide (REGLAN) injection 10 mg  10 mg IntraVENous Q6H PRN Edward Posada MD   10 mg at 09/13/22 0616    enoxaparin (LOVENOX) injection 40 mg  40 mg SubCUTAneous 2 times per day Edward Posada MD   40 mg at 09/13/22 0811    glucose chewable tablet 16 g  4 tablet Oral PRN Edward Posada MD        dextrose bolus 10% 125 mL  125 mL IntraVENous PRN Edward Posada MD        Or    dextrose bolus 10% 250 mL  250 mL IntraVENous PRN Edward Posada MD        glucagon (rDNA) injection 1 mg  1 mg SubCUTAneous PRN Edward Posada MD        dextrose 10 % infusion   IntraVENous Continuous PRN Edward oPsada MD         Prior to Admission medications    Medication Sig Start Date End Date Taking?  Authorizing Provider   Pediatric Multivitamins-Iron (FLINTSTONES PLUS IRON PO) Take 1 tablet by mouth daily   Yes Historical Provider, MD   Fexofenadine HCl (MUCINEX ALLERGY PO) Take 1 tablet by mouth daily   Yes Historical Provider, MD   omeprazole (PRILOSEC) 40 MG delayed release capsule Take 1 capsule by mouth daily 8/29/22   BART Richards   Docusate Sodium 100 MG TABS Take 100 mg by mouth 2 times daily Take as needed to prevent constipation  Patient not taking: Reported on 9/2/2022 9/12/22   BART Richards   enoxaparin (LOVENOX) 40 MG/0.4ML Inject 0.4 mLs into the skin daily  Patient not taking: Reported on 9/2/2022 9/12/22   BART Richards   metoclopramide (REGLAN) 10 MG tablet Take 1 tablet by mouth every 6 hours as needed (nausea/vomiting) 9/12/22 9/26/22  BART Richards   ondansetron (ZOFRAN) 4 MG tablet Take 1 tablet by mouth every 4 hours as needed for Nausea or Vomiting  Patient not taking: Reported on 9/2/2022 9/12/22 9/26/22  BART Richards   furosemide (LASIX) 20 MG tablet TAKE ONE TABLET BY MOUTH EVERY EVENING  Patient taking differently: Take 20 mg by mouth daily 8/22/22   JAOCB Rogers CNP   furosemide (LASIX) 40 MG tablet TAKE ONE TABLET BY MOUTH EVERY MORNING 8/22/22   JACOB Rogers CNP   gabapentin (NEURONTIN) 100 MG capsule TAKE 1 CAPSULE BY MOUTH 3 TIMES A DAY  Patient taking differently: 100 mg 3 times daily as needed.  4/27/22 10/24/22  JACOB March CNP   insulin glargine (LANTUS SOLOSTAR) 100 UNIT/ML injection pen Inject 30 Units into the skin nightly  Patient taking differently: Inject 30 Units into the skin nightly Took 15U 9/11/22 2200 4/7/22   Taryn Joel MD   metFORMIN (GLUCOPHAGE-XR) 500 MG extended release tablet TAKE TWO TABLETS BY MOUTH TWICE A DAY 4/5/22   Taryn Joel MD   ASPIRIN LOW DOSE 81 MG EC tablet TAKE 1 TABLET BY MOUTH ONE TIME A DAY 4/5/22   Taryn Joel MD   rosuvastatin (CRESTOR) 10 MG tablet TAKE 1 TABLET BY MOUTH ONE TIME A DAY 3/21/22   Taryn Joel MD   blood glucose test strips (PRODIGY NO CODING BLOOD GLUC) strip USE TO CHECK BLOOD SUGAR FOUR TIMES A DAY 2/14/22   MD Sanjiv Garcia Twist Top Lancets 28G MISC USE TO TEST FOUR TIMES A DAY 2/14/22   Dashawn Cramer MD   irbesartan (AVAPRO) 75 MG tablet TAKE 1 TABLET BY MOUTH ONE TIME A DAY IN THE EVENING 1/20/22   Migue Mota MD   carvedilol (COREG) 6.25 MG tablet TAKE 1 TABLET BY MOUTH 2 TIMES A DAY WITH MEALS 12/28/21   Migue Mota MD   OZEMPIC, 1 MG/DOSE, 4 MG/3ML SOPN INJECT 1MG UNDER THE SKIN ONCE A WEEK 12/27/21   Dashawn Cramer MD   insulin lispro, 1 Unit Dial, (HUMALOG KWIKPEN) 100 UNIT/ML SOPN INJECT 14 UNITS WITH MEALS PLUS SLIDING SCALE --151-200 3 UNITS, 201-250 5 UNITS, 251-300 8 UNITS, 301-350 10 UNITS, 351-400 12 UNITS, OVER 400 15 UNITS AND CONTACT PHYSICIAN 12/27/21   JACOB Cook - CNP   carvedilol (COREG) 25 MG tablet TAKE 1 TABLET BY MOUTH 2 TIMES A DAY WITH MEALS 10/22/21   Rosemarie Khoury PA-C   spironolactone (ALDACTONE) 25 MG tablet TAKE 1 TABLET BY MOUTH ONE TIME A DAY 10/21/21   Adrien El MD   CPAP Machine MISC by Does not apply route Ramp pressure: 4.0 cm H2O  Treatment pressure: 15.0 cm H2O 5/17/21   Dashawn Cramer MD   Blood Glucose Monitoring Suppl (PRODIGY AUTOCODE BLOOD GLUCOSE) MAUREEN Use to check blood sugar 4 times daily 4/4/20   Dashawn Cramer MD   Insulin Pen Needle (PEN NEEDLES 31GX5/16\") 31G X 8 MM MISC Use to inject insulins and Ozempic 4/4/20   Dashawn Cramer MD   fluticasone [facility]) 50 MCG/ACT nasal spray 2 sprays by Nasal route daily 2/24/20   Dashawn Cramer MD   loratadine (CLARITIN) 10 MG tablet Take 10 mg by mouth daily    Historical Provider, MD   Respiratory Therapy Supplies (ADULT MASK) MISC Please do mask desensitization and/or provide mask of patient's choice.  5/11/17   Yousif Jiménez MD   acetaminophen (TYLENOL) 325 MG tablet Take 650 mg by mouth every 8 hours as needed for Pain    Historical Provider, MD   Scheduled Meds: hyoscyamine  125 mcg Oral Q4H    insulin lispro  0-4 Units SubCUTAneous TID WC    insulin lispro  0-4 Units SubCUTAneous Nightly    ketorolac  15 mg IntraVENous Q6H    sodium chloride flush  5-40 mL IntraVENous 2 times per day    ondansetron  4 mg IntraVENous Q6H    [START ON 9/15/2022] scopolamine  1 patch TransDERmal Q72H    enoxaparin  40 mg SubCUTAneous 2 times per day     Continuous Infusions:   lactated ringers 170 mL/hr at 09/13/22 1336    sodium chloride      dextrose       PRN Meds:.diatrizoate meglumine-sodium, simethicone, sodium chloride flush, sodium chloride, morphine **OR** morphine, promethazine, metoclopramide, glucose, dextrose bolus **OR** dextrose bolus, glucagon (rDNA), dextrose    Allergies   Allergen Reactions    Levaquin [Levofloxacin In D5w] Itching     Family History   Problem Relation Age of Onset    Arthritis Mother     Hypertension Father     Heart Disease Maternal Uncle     Heart Disease Maternal Grandfather     Asthma Maternal Grandfather     Diabetes Maternal Grandfather     Arthritis Maternal Grandfather     Heart Disease Paternal Uncle     Heart Disease Maternal Grandmother     Stroke Maternal Grandmother     Asthma Brother     Colon Cancer Neg Hx     Colon Polyps Neg Hx     Esophageal Cancer Neg Hx      Social History     Socioeconomic History    Marital status:      Spouse name: Not on file    Number of children: 1    Years of education: Not on file    Highest education level: Not on file   Occupational History     Employer: [employer]   Tobacco Use    Smoking status: Never    Smokeless tobacco: Never   Vaping Use    Vaping Use: Never used   Substance and Sexual Activity    Alcohol use: Never    Drug use: No    Sexual activity: Yes     Partners: Female   Other Topics Concern    Not on file   Social History Narrative    Not on file     Social Determinants of Health     Financial Resource Strain: Low Risk     Difficulty of Paying Living Expenses: Not hard at all Food Insecurity: No Food Insecurity    Worried About Running Out of Food in the Last Year: Never true    Ran Out of Food in the Last Year: Never true   Transportation Needs: Not on file   Physical Activity: Not on file   Stress: Not on file   Social Connections: Not on file   Intimate Partner Violence: Not on file   Housing Stability: Not on file     ROS:   Constitutional: Denies any recent wt change. Eyes:  Denies any blurring or double vision, no glaucoma  Ears/Nose/Mouth/Throat:  Denies any chronic sinus/rhinitis, bleeding gums  Cardiovascular:  As described above. Respiratory:  Denies any frequent cough, wheezing or coughing up blood  Genitourinary:  Denies difficulty with urination and kidney stones  Gastrointestinal:  Post op bloating. Denies nausea and vomiting  Musculoskeletal:  Denies any joint pain, back pain, or difficulty walking  Integumentary:  Denies any rash  Neurological:  No numbness or tingling  Endocrine:  Denies any polydipsia. Hematologic/Lymphatic:  Denies any hemorrhage or lymphatic drainage problems. Labs:  CBC:   Recent Labs     09/13/22  0548 09/13/22  1236   WBC  --  16.3*   HGB 11.4* 10.4*   HCT 36.3* 33.4*   MCV  --  83.7   PLT  --  241     BMP:   Recent Labs     09/12/22  1159 09/13/22  0548   NA  --  138   K 4.3 4.6   CL  --  105   CO2  --  22*   BUN  --  18   CREATININE  --  0.9     Accucheck Glucoses:   Recent Labs     09/12/22  1225 09/12/22  1738 09/12/22  2017 09/13/22  0605 09/13/22  1053   POCGLU 88 165* 161* 164* 202*     Cardiac Enzymes: No results for input(s): CKTOTAL, CKMB, CKMBINDEX, TROPONINI in the last 72 hours. PT/INR: No results for input(s): PROTIME, INR in the last 72 hours. APTT: No results for input(s): APTT in the last 72 hours.   Liver Profile:  Lab Results   Component Value Date/Time    AST 46 09/13/2022 12:36 PM    ALT 58 09/13/2022 12:36 PM    BILIDIR <0.2 09/13/2022 12:36 PM    BILITOT 0.3 09/13/2022 12:36 PM    ALKPHOS 48 09/13/2022 12:36 PM Lab Results   Component Value Date/Time    CHOL 102 05/12/2022 10:30 AM    HDL 25 05/12/2022 10:30 AM    TRIG 187 05/12/2022 10:30 AM     TSH:   Lab Results   Component Value Date/Time    TSH 2.900 03/05/2022 07:29 AM     UA:   Lab Results   Component Value Date/Time    COLORU Yellow 08/23/2019 10:48 AM    COLORU YELLOW 08/14/2019 04:20 PM    PHUR 6.0 08/14/2019 04:20 PM    WBCUA NONE SEEN 12/12/2016 09:40 AM    RBCUA 0-2 12/12/2016 09:40 AM    YEAST NONE SEEN 12/12/2016 09:40 AM    BACTERIA NONE 12/12/2016 09:40 AM    LEUKOCYTESUR NEGATIVE 08/14/2019 04:20 PM    UROBILINOGEN 0.20 08/23/2019 10:48 AM    BILIRUBINUR Negative 08/23/2019 10:48 AM    BLOODU Trace-intact 08/23/2019 10:48 AM    BLOODU NEGATIVE 08/14/2019 04:20 PM    GLUCOSEU Negative 08/23/2019 10:48 AM         Physical Exam:  Vitals:    09/13/22 1330   BP: 111/76   Pulse: (!) 113   Resp: 18   Temp: 98.1 °F (36.7 °C)   SpO2: 100%      Intake/Output Summary (Last 24 hours) at 9/13/2022 1438  Last data filed at 9/13/2022 1430  Gross per 24 hour   Intake 3158.63 ml   Output 620 ml   Net 2538.63 ml      General:  No acute distress  Neck: Supple, no JVD, no carotid bruits  Heart: Regular rhythm normal S1 and S2, no rubs, murmurs or gallops  Lungs: Diminished bilaterally  Abdomen: rare bowel sounds, tender due to surgery non-distended  Extremities:No cyanosis. Bilateral pitting edema +1  Neurologic: alert and oriented x 3, motor and sensory intact, moving all extremities  Skin: Pallor. Cool, clammy. Psych: AO x 3, no depression/sharlene, no pressured speech, normal affect  Lymph: No obvious LAD      Assessment:  Syncope - history consistent with orthostasis  Non-ischemic severe cardiomyopathy- EF 30-35% (2020)  AICD  Decreased urine output-bladder scan shows 230 ml. No urinary output since 0400. NIDDM-on low dose SS  HLD  H&H stable 10.4/33.4    Findings consistent with orthostasis in the setting of CHF - NICM. No obvious CHF on exam.  Check CXR.   Follow orthostatics when able. Gentle hydration. Monitor UOP. He has developed LILLIAN and likely is pre-renal, bladder scan negative. No orthopnea. BUN/Cr ratio elevated. Will follow. Further recommendations based on results and clinical course. Plan:  Obtain TTE  Recheck BMP  Monitor CBC, may need transfusions post-op  Consider IV Albumin 25%   CXR  Orthostatic blood pressures and if positive, check TID till negative.   TSH  Strict I&O  Monitor electrolytes   Keep K+>4.0 Mag>2.0    Electronically signed by Laney Cerrato MD on 9/13/22 at 4:38 PM EDT  Interventional Cardiology - [facility]

## 2022-09-13 NOTE — CARE COORDINATION
9/13/22, 9:07 AM EDT  DISCHARGE PLANNING EVALUATION:    Dasha Sanches       Admitted: 9/12/2022/ Yevgeniy day: 0   Location: [facility]- Reason for admit: Class 3 severe obesity with body mass index (BMI) of 45.0 to 49.9 in adult, unspecified obesity type, unspecified whether serious comorbidity present ([facility] [address] [E66.01, Z68.42]  Morbid obesity ([facility] [address] [E66.01]   PMH:  has a past medical history of CHF (congestive heart failure) ([facility] [address] Constipation, Diabetes mellitus ([facility] [address] GERD (gastroesophageal reflux disease), Hypercholesteremia, S/P ICD (internal cardiac defibrillator) procedure: 1/15/2015: Medtronic Single Chamber, Sleep apnea, and Zoll lifevest applied 8/22/14. Procedure: 9/12 Robotic Ronaldo-en-Y Gastric Bypass  9/13 [place] UGI: ordered  Barriers to Discharge:  Admit as outpatient in a bed after Ronaldo-en-Y surgery. Ice chips. Abdominal binder. Progressive ambulation. IVF. Scheduled Toradol & Zofran. PRN Oscimin, Reglan, Morphine, & Phenergan. Complaints of pain and pressure this morning. PCP: Cely Costa MD   %  Patient's Healthcare Decision Maker: Named in Scanned ACP Document    Patient Goals/Plan/Treatment Preferences: Spoke with Katherine Daly and his wife. They live at home together. He has a CPAP. Denies other needs. Patient informed of order for [facility] post-op home health nursing. Specially trained nurses from [facility] will evaluate you and provide additional program education. Updated [facility] about orders and tentative discharge plan for 9/14/22  Transportation/Food Security/Housekeeping Addressed:  No issues identified.

## 2022-09-13 NOTE — PLAN OF CARE
Problem: Discharge Planning  Goal: Discharge to home or other facility with appropriate resources  Outcome: Progressing  Flowsheets  Taken 9/13/2022 1757  Discharge to home or other facility with appropriate resources:   Arrange for needed discharge resources and transportation as appropriate   Identify barriers to discharge with patient and caregiver  Taken 9/13/2022 1330  Discharge to home or other facility with appropriate resources: Identify barriers to discharge with patient and caregiver  Note: Cardiology consulted for syncope episode     Problem: ABCDS Injury Assessment  Goal: Absence of physical injury  Recent Flowsheet Documentation  Taken 9/13/2022 0800 by Tito Lackey RN  Absence of Physical Injury: Implement safety measures based on patient assessment     Problem: Safety - Adult  Goal: Free from fall injury  Recent Flowsheet Documentation  Taken 9/13/2022 0800 by Tito Lackey RN  Free From Fall Injury: Instruct family/caregiver on patient safety    Care plan reviewed with patient. Patient verbalizes understanding of the care plan and contributed to goal setting.

## 2022-09-13 NOTE — PROGRESS NOTES
[employer] Medtronic Dual ICD Optivol  Pt of Nallu    Battery 9.8 years    Optivol WNL    Episodes  8/21/22- 5 beats VT rate 188

## 2022-09-13 NOTE — PROGRESS NOTES
Patient complained gas pain worse and felt like someone was sitting on his chest, rated pain 8/10. Patient given morphine prn for pain and ambulated 15min after administration. Patient was able to belch while ambulating. Upon return to chair patient complained of feeling lightheaded and like he was going to pass out, diaphoretic and pale. Patient helped to chair and assessed. /85, pulse 68. Call placed to Dr. Natalya Pulido and updated on patient condition. Note new orders for EKG and telemetry. Telemetry placed on patient and patient wanted to stand by chair. While patient was standing he marched in place for a min to help with gas pain and began to feel lightheaded and like he was going to pass out again. Patient assisted to chair and assessed. Pulse 133 on telemetry. SpO2 96%. Will continue to monitor and update Dr. Natalya Pulido.

## 2022-09-13 NOTE — PROGRESS NOTES
Pharmacy Medication History Note      List of current medications patient is taking is complete. Source of information: patient, dispense report    Changes made to medication list:    Medications added/doses adjusted:  Fexofenadine - changed from daily to PRN    Other notes (ex. Recent course of antibiotics, Coumadin dosing):  Patient is taking 60 mg Lasix daily (40 mg + 20 mg)  Patient is taking 31.25 mg Coreg BID (25 mg + 6.25 mg)  Denies use of other OTC or herbal medications.       Allergies reviewed      Electronically signed by Dereck Gomez on 9/13/2022 at 3:18 PM

## 2022-09-13 NOTE — PLAN OF CARE
Problem: Discharge Planning  Goal: Discharge to home or other facility with appropriate resources  9/13/2022 0010 by Pancho Ko RN  Outcome: Progressing  Flowsheets (Taken 9/13/2022 0009)  Discharge to home or other facility with appropriate resources:   Identify barriers to discharge with patient and caregiver   Arrange for needed discharge resources and transportation as appropriate   Identify discharge learning needs (meds, wound care, etc)   Refer to discharge planning if patient needs post-hospital services based on physician order or complex needs related to functional status, cognitive ability or social support system     Problem: Pain  Goal: Verbalizes/displays adequate comfort level or baseline comfort level  9/13/2022 0010 by Panhco Ko RN  Outcome: Progressing  Flowsheets (Taken 9/13/2022 0010)  Verbalizes/displays adequate comfort level or baseline comfort level:   Encourage patient to monitor pain and request assistance   Assess pain using appropriate pain scale   Administer analgesics based on type and severity of pain and evaluate response   Implement non-pharmacological measures as appropriate and evaluate response     Problem: ABCDS Injury Assessment  Goal: Absence of physical injury  9/13/2022 0010 by Pancho Ko RN  Outcome: Progressing  Flowsheets (Taken 9/13/2022 0010)  Absence of Physical Injury: Implement safety measures based on patient assessment     Problem: Safety - Adult  Goal: Free from fall injury  9/13/2022 0010 by Pancho Ko RN  Outcome: Progressing  Flowsheets (Taken 9/13/2022 0010)  Free From Fall Injury: Instruct family/caregiver on patient safety     Problem: Musculoskeletal - Adult  Goal: Return mobility to safest level of function  Outcome: Progressing  Flowsheets (Taken 9/13/2022 0010)  Return Mobility to Safest Level of Function:   Assess patient stability and activity tolerance for standing, transferring and ambulating with or without assistive devices Assist with transfers and ambulation using safe patient handling equipment as needed   Ensure adequate protection for wounds/incisions during mobilization   Obtain physical therapy/occupational therapy consults as needed     Problem: Musculoskeletal - Adult  Goal: Maintain proper alignment of affected body part  Outcome: Progressing  Flowsheets (Taken 9/13/2022 0010)  Maintain proper alignment of affected body part: Support and protect limb and body alignment per provider's orders     Problem: Musculoskeletal - Adult  Goal: Return ADL status to a safe level of function  Outcome: Progressing  Flowsheets (Taken 9/13/2022 0010)  Return ADL Status to a Safe Level of Function:   Administer medication as ordered   Assess activities of daily living deficits and provide assistive devices as needed   Obtain physical therapy/occupational therapy consults as needed   Assist and instruct patient to increase activity and self care as tolerated     Problem: Gastrointestinal - Adult  Goal: Minimal or absence of nausea and vomiting  Outcome: Progressing  Flowsheets (Taken 9/13/2022 0010)  Minimal or absence of nausea and vomiting:   Administer IV fluids as ordered to ensure adequate hydration   Maintain NPO status until nausea and vomiting are resolved   Nasogastric tube to low intermittent suction as ordered   Administer ordered antiemetic medications as needed   Provide nonpharmacologic comfort measures as appropriate   Advance diet as tolerated, if ordered     Problem: Gastrointestinal - Adult  Goal: Maintains or returns to baseline bowel function  Outcome: Progressing  Flowsheets (Taken 9/13/2022 0010)  Maintains or returns to baseline bowel function: Assess bowel function     Problem: Infection - Adult  Goal: Absence of infection at discharge  Outcome: Progressing  Flowsheets (Taken 9/13/2022 0010)  Absence of infection at discharge:   Assess and monitor for signs and symptoms of infection   Monitor lab/diagnostic results Administer medications as ordered     Problem: Infection - Adult  Goal: Absence of infection during hospitalization  Outcome: Progressing  Flowsheets (Taken 9/13/2022 0010)  Absence of infection during hospitalization:   Assess and monitor for signs and symptoms of infection   Monitor lab/diagnostic results   Administer medications as ordered    Care plan reviewed with patient. Patient verbalizes understanding of the plan of care and contribute to goal setting.

## 2022-09-13 NOTE — PROGRESS NOTES
Annetta Loyola ambulated to into the bathroom then he got light headed and felt like he as going to pass ou. Wife pulled call light. /48 manual cuff  RR 18. He was cold and diaphoretic. He was talking to me and was saying he felt a little better. Transferred  to chair and then to bed. BP 98/58 -  116 - 18. O2 saturation is 95%. EKG here. Then at 11:50 /80 - 109- 18 - O2 saturation 92 on Room air. O2 applied at 2 liters per nasal cannula. Resting quietly at present.

## 2022-09-13 NOTE — PROGRESS NOTES
Allison Beck - Dr. Torsten Matrines [facility]  Postoperative Progress Note    Pt Name: Kiara Castellanos  Medical Record Number: [id number]  Date of Birth 1975   Today's Date: 9/13/2022    ASSESSMENT   POD # 1 Status post robotic diallo-en-y gastric bypass  Morbid obesity (BMI 46)  Syncope postoperative  Tachycardia  History of CHF  AICD  Diabetes Mellitus    has a past medical history of CHF (congestive heart failure) ([facility] [address] Constipation, Diabetes mellitus ([facility] [address] GERD (gastroesophageal reflux disease), Hypercholesteremia, S/P ICD (internal cardiac defibrillator) procedure: 1/15/2015: [employer] Single Chamber, Sleep apnea, and Zoll lifevest applied 8/22/14. PLAN   Upper GI completed this morning. No evidence of leak or obstruction. Start sugar free clear liquids. . Strict I&Os. Bladder scan - states he has not urinated in 8 hours  IV fluids  Pain & nausea control  Bedrest for now  DVT prophylaxis  Cardiology and medicine consultation. Discussed case with hospitalist. Appreciate assistance. Labs this morning looked okay. Repeating CBC, BMP, trop, and BNP per medicine. Patient had episode early this morning of lightheadedness/dizziness. EKG performed and showed sinus tachycardia. Upon rounds patient was stating he felt a little better but had not urinated. Assisted patient up to bathroom with wife. Patient did not feel dizzy initially. Wife present in bathroom while trying to urinate and pulled bathroom cord. When presenting back to room patient was slumped over on the toilet not responding. Pale, diaphoretic. Patient did come to after a couple of minutes with sternal rub and painful stimuli. Fluids increased, stat EKG ordered and patient was able to then get himself back to chair and then transported back to bed. No neurological deficits. Placed consult for hospitalist stat and cardiology to follow. Blood pressure hypotensive 64-786H systolic and HR 22-217O.  Patient being transferred to  for further cardiac monitoring. SUBJECTIVE   Patient was stable overnight. Was ambulating last night well and sleep fairly well. Had worsening epigastric discomfort/pressure this morning with gas pain. Chart reviewed. Updated by nursing staff. EKG sinus tachycardia this morning. Patient pale and still feels weak and fatigued. Denies chest discomfort or dyspnea. Nausea. No vomiting. (+) belching, but no flatus or BM. Only had sips during assessment. Pain controlled with analgesia. Has not urinated much. Patient has expected surgical discomfort but no acute generalized pain. CURRENT MEDICATIONS   Scheduled Meds:   hyoscyamine  125 mcg Oral Q4H    insulin lispro  0-4 Units SubCUTAneous TID WC    insulin lispro  0-4 Units SubCUTAneous Nightly    ketorolac  15 mg IntraVENous Q6H    sodium chloride flush  5-40 mL IntraVENous 2 times per day    ondansetron  4 mg IntraVENous Q6H    [START ON 9/15/2022] scopolamine  1 patch TransDERmal Q72H    enoxaparin  40 mg SubCUTAneous 2 times per day     Continuous Infusions:   lactated ringers 170 mL/hr at 22 1336    sodium chloride      dextrose       PRN Meds:.diatrizoate meglumine-sodium, simethicone, sodium chloride flush, sodium chloride, morphine **OR** morphine, promethazine, metoclopramide, glucose, dextrose bolus **OR** dextrose bolus, glucagon (rDNA), dextrose  OBJECTIVE   CURRENT VITALS:  height is 6' 3\" (1.905 m) and weight is 369 lb 2 oz (167.4 kg) (abnormal). His oral temperature is 98.1 °F (36.7 °C). His blood pressure is 111/76 and his pulse is 113 (abnormal). His respiration is 18 and oxygen saturation is 100%.    Temperature Range (24h):Temp: 98.1 °F (36.7 °C) Temp  Av.1 °F (36.7 °C)  Min: 97.5 °F (36.4 °C)  Max: 98.7 °F (37.1 °C)  BP Range (29A): Systolic (25LRV), FUQ:735 , Min:101 , LVK:014     Diastolic (72YEO), UNR:67, Min:56, Max:93    Pulse Range (24h): Pulse  Av.7  Min: 65  Max: 113  Respiration Range (24h): Resp  Av.7  Min: 9  Max: 20  Current Pulse Ox (24h):  SpO2: 100 %  Pulse Ox Range (24h):  SpO2  Av.5 %  Min: 92 %  Max: 100 %  Oxygen Amount and Delivery: O2 Flow Rate (L/min): 2 L/min  Incentive Spirometry Tx:            GENERAL: pale, lethargic, overall generalized weakness   LUNGS: diminished bases but no crackles or wheezing  HEART: intermittent tachycardia with regular rhythm  ABDOMEN: distended, soft, incisional tenderness, bowel sounds hypo, no peritoneal signs  INCISION: healing well, no significant drainage, no significant erythema  EXTREMITY: no cyanosis, clubbing. Bilateral lower leg edema. In: 3158.6 [P.O.:240; I.V.:2918.6]  Out: 620 [Urine:600]  Date 22 0000 - 22   Shift 3838-5740 1161-0196 6459-8050 24 Hour Total   INTAKE   P.O. 0 240  240   I. V.(mL/kg/hr) 647.9(0.5)   647. 9   Shift Total(mL/kg) 647. 9(3.9) 240(1.4)  887. 9(5.3)   OUTPUT   Urine(mL/kg/hr) 600(0.4)   600   Shift Total(mL/kg) 600(3.6)   600(3.6)   Weight (kg) 167.4 167.4 167.4 167.4     LABS     Recent Labs     22  1159 22  0548 22  1236   WBC  --   --  16.3*   HGB  --  11.4* 10.4*   HCT  --  36.3* 33.4*   PLT  --   --  241   NA  --  138  --    K 4.3 4.6  --    CL  --  105  --    CO2  --  22*  --    BUN  --  18  --    CREATININE  --  0.9  --    CALCIUM  --  8.5  --       No results for input(s): PTT, INR in the last 72 hours. Invalid input(s): PT  Recent Labs     22  1236   AST 46*   ALT 58   BILITOT 0.3   BILIDIR <0.2     Recent Labs     22  1236   TROPONINT < 0.010     RADIOLOGY     PROCEDURE: FL UGI       CLINICAL INFORMATION: 66-year-old male who underwent a Ronaldo-en-Y gastric bypass yesterday. TECHNIQUE: A preliminary abdominal radiograph was obtained. Following the oral administration of Gastrografin and thin barium, the anatomy of the distal esophagus, stomach and jejunum were evaluated in a limited upper GI examination. 9 images were    obtained. Total fluoroscopy time 0.7 minutes. COMPARISON: None       FINDINGS: Preliminary radiograph demonstrates a nonobstructive bowel gas pattern. There is surgical suture material in the left upper abdomen. Osseous structures are unremarkable. There is no stenosis or large hiatal hernia in the distal esophagus. There are surgical changes in the stomach of gastric bypass. There is normal transit of contrast through the stomach into the jejunum. There is no extravasation of contrast to suggest a    leak. Proximal small bowel loops are normal..           Impression       No evidence of obstruction or leak following gastric bypass. Final report electronically signed by Dr Rafia Davis on 9/13/2022 11:00 AM       Electronically signed by JACOB Sexton CNP on 9/13/2022 at 3:31 PM     Patient seen and examined independently by me earlier this AM. Above discussed and I agree with Jasvir Eduardo CNP. See my additional comments below for updated orders and plan. Labs, cultures, and radiographs where available were reviewed. I discussed patient concerns with the patient's nurse and instructions were given. Please see our orders for the updated patient care plan. -- Clinically, patient looks good early this morning. Patient had syncopal episode throughout the day. Notes reviewed. Upper GI looks good. IV fluid hydration. Cardiology consultation. Transfer to more acute floor with telemetry. Hospitalist consultation. Serial abdominal examinations. Nausea and pain control. Bladder scan as needed. Neurovascular checks. A.m. labs.     Electronically signed by Whit Platt MD on 9/13/22 at 5:09 PM EDT

## 2022-09-14 PROBLEM — R33.9 URINARY RETENTION: Status: ACTIVE | Noted: 2022-09-14

## 2022-09-14 PROBLEM — T83.9XXA DIFFICULT FOLEY CATHETER PLACEMENT (HCC): Status: ACTIVE | Noted: 2022-09-14

## 2022-09-14 LAB
ABO: NORMAL
ANION GAP SERPL CALCULATED.3IONS-SCNC: 8 MEQ/L (ref 8–16)
ANTIBODY SCREEN: NORMAL
BASOPHILS # BLD: 0.3 %
BASOPHILS ABSOLUTE: 0 THOU/MM3 (ref 0–0.1)
BUN BLDV-MCNC: 33 MG/DL (ref 7–22)
CALCIUM SERPL-MCNC: 8.3 MG/DL (ref 8.5–10.5)
CHLORIDE BLD-SCNC: 104 MEQ/L (ref 98–111)
CO2: 25 MEQ/L (ref 23–33)
CREAT SERPL-MCNC: 1.6 MG/DL (ref 0.4–1.2)
EOSINOPHIL # BLD: 0.5 %
EOSINOPHILS ABSOLUTE: 0.1 THOU/MM3 (ref 0–0.4)
ERYTHROCYTE [DISTWIDTH] IN BLOOD BY AUTOMATED COUNT: 16.5 % (ref 11.5–14.5)
ERYTHROCYTE [DISTWIDTH] IN BLOOD BY AUTOMATED COUNT: 52.1 FL (ref 35–45)
FERRITIN: 137 NG/ML (ref 22–322)
GFR SERPL CREATININE-BSD FRML MDRD: 47 ML/MIN/1.73M2
GLUCOSE BLD-MCNC: 106 MG/DL (ref 70–108)
GLUCOSE BLD-MCNC: 110 MG/DL (ref 70–108)
GLUCOSE BLD-MCNC: 130 MG/DL (ref 70–108)
GLUCOSE BLD-MCNC: 134 MG/DL (ref 70–108)
GLUCOSE BLD-MCNC: 136 MG/DL (ref 70–108)
HCT VFR BLD CALC: 22.4 % (ref 42–52)
HCT VFR BLD CALC: 25 % (ref 42–52)
HCT VFR BLD CALC: 25.1 % (ref 42–52)
HCT VFR BLD CALC: 26.1 % (ref 42–52)
HEMOGLOBIN: 7 GM/DL (ref 14–18)
HEMOGLOBIN: 7.7 GM/DL (ref 14–18)
HEMOGLOBIN: 8.1 GM/DL (ref 14–18)
HEMOGLOBIN: 8.4 GM/DL (ref 14–18)
IMMATURE GRANS (ABS): 0.09 THOU/MM3 (ref 0–0.07)
IMMATURE GRANULOCYTES: 0.6 %
LV EF: 33 %
LVEF MODALITY: NORMAL
LYMPHOCYTES # BLD: 21.3 %
LYMPHOCYTES ABSOLUTE: 3 THOU/MM3 (ref 1–4.8)
MCH RBC QN AUTO: 26.6 PG (ref 26–33)
MCHC RBC AUTO-ENTMCNC: 30.8 GM/DL (ref 32.2–35.5)
MCV RBC AUTO: 86.2 FL (ref 80–94)
MONOCYTES # BLD: 10.3 %
MONOCYTES ABSOLUTE: 1.5 THOU/MM3 (ref 0.4–1.3)
NUCLEATED RED BLOOD CELLS: 0 /100 WBC
PLATELET # BLD: 186 THOU/MM3 (ref 130–400)
PMV BLD AUTO: 9.9 FL (ref 9.4–12.4)
POTASSIUM SERPL-SCNC: 4.2 MEQ/L (ref 3.5–5.2)
RBC # BLD: 2.9 MILL/MM3 (ref 4.7–6.1)
RH FACTOR: NORMAL
SEG NEUTROPHILS: 67 %
SEGMENTED NEUTROPHILS ABSOLUTE COUNT: 9.5 THOU/MM3 (ref 1.8–7.7)
SODIUM BLD-SCNC: 137 MEQ/L (ref 135–145)
WBC # BLD: 14.2 THOU/MM3 (ref 4.8–10.8)

## 2022-09-14 PROCEDURE — 99223 1ST HOSP IP/OBS HIGH 75: CPT | Performed by: STUDENT IN AN ORGANIZED HEALTH CARE EDUCATION/TRAINING PROGRAM

## 2022-09-14 PROCEDURE — 2140000000 HC CCU INTERMEDIATE R&B

## 2022-09-14 PROCEDURE — 82728 ASSAY OF FERRITIN: CPT

## 2022-09-14 PROCEDURE — 2580000003 HC RX 258: Performed by: NURSE PRACTITIONER

## 2022-09-14 PROCEDURE — A4216 STERILE WATER/SALINE, 10 ML: HCPCS | Performed by: NURSE PRACTITIONER

## 2022-09-14 PROCEDURE — P9047 ALBUMIN (HUMAN), 25%, 50ML: HCPCS | Performed by: NURSE PRACTITIONER

## 2022-09-14 PROCEDURE — P9016 RBC LEUKOCYTES REDUCED: HCPCS

## 2022-09-14 PROCEDURE — 6360000002 HC RX W HCPCS: Performed by: SURGERY

## 2022-09-14 PROCEDURE — 86850 RBC ANTIBODY SCREEN: CPT

## 2022-09-14 PROCEDURE — 86901 BLOOD TYPING SEROLOGIC RH(D): CPT

## 2022-09-14 PROCEDURE — 6370000000 HC RX 637 (ALT 250 FOR IP): Performed by: NURSE PRACTITIONER

## 2022-09-14 PROCEDURE — 6360000002 HC RX W HCPCS: Performed by: NURSE PRACTITIONER

## 2022-09-14 PROCEDURE — 99221 1ST HOSP IP/OBS SF/LOW 40: CPT | Performed by: UROLOGY

## 2022-09-14 PROCEDURE — 85014 HEMATOCRIT: CPT

## 2022-09-14 PROCEDURE — 86900 BLOOD TYPING SEROLOGIC ABO: CPT

## 2022-09-14 PROCEDURE — 93306 TTE W/DOPPLER COMPLETE: CPT

## 2022-09-14 PROCEDURE — 2580000003 HC RX 258

## 2022-09-14 PROCEDURE — 2580000003 HC RX 258: Performed by: SURGERY

## 2022-09-14 PROCEDURE — 80048 BASIC METABOLIC PNL TOTAL CA: CPT

## 2022-09-14 PROCEDURE — 86923 COMPATIBILITY TEST ELECTRIC: CPT

## 2022-09-14 PROCEDURE — 99232 SBSQ HOSP IP/OBS MODERATE 35: CPT | Performed by: PHYSICIAN ASSISTANT

## 2022-09-14 PROCEDURE — C9113 INJ PANTOPRAZOLE SODIUM, VIA: HCPCS | Performed by: NURSE PRACTITIONER

## 2022-09-14 PROCEDURE — 85025 COMPLETE CBC W/AUTO DIFF WBC: CPT

## 2022-09-14 PROCEDURE — 36415 COLL VENOUS BLD VENIPUNCTURE: CPT

## 2022-09-14 PROCEDURE — 85018 HEMOGLOBIN: CPT

## 2022-09-14 PROCEDURE — 82948 REAGENT STRIP/BLOOD GLUCOSE: CPT

## 2022-09-14 PROCEDURE — 83540 ASSAY OF IRON: CPT

## 2022-09-14 PROCEDURE — 36430 TRANSFUSION BLD/BLD COMPNT: CPT

## 2022-09-14 PROCEDURE — 93005 ELECTROCARDIOGRAM TRACING: CPT

## 2022-09-14 PROCEDURE — 6370000000 HC RX 637 (ALT 250 FOR IP): Performed by: PHYSICIAN ASSISTANT

## 2022-09-14 PROCEDURE — 83550 IRON BINDING TEST: CPT

## 2022-09-14 RX ORDER — TAMSULOSIN HYDROCHLORIDE 0.4 MG/1
0.4 CAPSULE ORAL DAILY
Status: DISCONTINUED | OUTPATIENT
Start: 2022-09-14 | End: 2022-09-14

## 2022-09-14 RX ORDER — PANTOPRAZOLE SODIUM 40 MG/1
40 TABLET, DELAYED RELEASE ORAL
Status: DISCONTINUED | OUTPATIENT
Start: 2022-09-15 | End: 2022-09-14

## 2022-09-14 RX ORDER — LIDOCAINE HYDROCHLORIDE 20 MG/ML
JELLY TOPICAL PRN
Status: DISCONTINUED | OUTPATIENT
Start: 2022-09-14 | End: 2022-09-14

## 2022-09-14 RX ORDER — ALBUMIN (HUMAN) 12.5 G/50ML
25 SOLUTION INTRAVENOUS EVERY 6 HOURS
Status: DISCONTINUED | OUTPATIENT
Start: 2022-09-14 | End: 2022-09-15

## 2022-09-14 RX ORDER — CALCIUM CARBONATE 200(500)MG
500 TABLET,CHEWABLE ORAL 3 TIMES DAILY PRN
Status: DISCONTINUED | OUTPATIENT
Start: 2022-09-14 | End: 2022-09-14

## 2022-09-14 RX ORDER — LIDOCAINE 4 G/G
2 PATCH TOPICAL DAILY PRN
Status: DISCONTINUED | OUTPATIENT
Start: 2022-09-14 | End: 2022-09-16 | Stop reason: HOSPADM

## 2022-09-14 RX ORDER — SODIUM CHLORIDE 9 MG/ML
INJECTION, SOLUTION INTRAVENOUS PRN
Status: DISCONTINUED | OUTPATIENT
Start: 2022-09-14 | End: 2022-09-16 | Stop reason: HOSPADM

## 2022-09-14 RX ORDER — METOPROLOL SUCCINATE 25 MG/1
25 TABLET, EXTENDED RELEASE ORAL DAILY
Status: DISCONTINUED | OUTPATIENT
Start: 2022-09-14 | End: 2022-09-15

## 2022-09-14 RX ADMIN — MORPHINE SULFATE 4 MG: 4 INJECTION, SOLUTION INTRAMUSCULAR; INTRAVENOUS at 18:29

## 2022-09-14 RX ADMIN — MORPHINE SULFATE 4 MG: 4 INJECTION, SOLUTION INTRAMUSCULAR; INTRAVENOUS at 16:24

## 2022-09-14 RX ADMIN — MORPHINE SULFATE 4 MG: 4 INJECTION, SOLUTION INTRAMUSCULAR; INTRAVENOUS at 14:21

## 2022-09-14 RX ADMIN — ALBUMIN (HUMAN) 25 G: 0.25 INJECTION, SOLUTION INTRAVENOUS at 08:25

## 2022-09-14 RX ADMIN — SODIUM CHLORIDE, PRESERVATIVE FREE 40 MG: 5 INJECTION INTRAVENOUS at 17:18

## 2022-09-14 RX ADMIN — HYOSCYAMINE SULFATE 125 MCG: 0.12 TABLET, ORALLY DISINTEGRATING ORAL at 19:52

## 2022-09-14 RX ADMIN — ONDANSETRON 4 MG: 2 INJECTION INTRAMUSCULAR; INTRAVENOUS at 21:33

## 2022-09-14 RX ADMIN — HYOSCYAMINE SULFATE 125 MCG: 0.12 TABLET, ORALLY DISINTEGRATING ORAL at 01:42

## 2022-09-14 RX ADMIN — ONDANSETRON 4 MG: 2 INJECTION INTRAMUSCULAR; INTRAVENOUS at 15:31

## 2022-09-14 RX ADMIN — ONDANSETRON 4 MG: 2 INJECTION INTRAMUSCULAR; INTRAVENOUS at 00:35

## 2022-09-14 RX ADMIN — MORPHINE SULFATE 4 MG: 4 INJECTION, SOLUTION INTRAMUSCULAR; INTRAVENOUS at 09:29

## 2022-09-14 RX ADMIN — ALBUMIN (HUMAN) 25 G: 0.25 INJECTION, SOLUTION INTRAVENOUS at 19:52

## 2022-09-14 RX ADMIN — HYOSCYAMINE SULFATE 125 MCG: 0.12 TABLET, ORALLY DISINTEGRATING ORAL at 17:18

## 2022-09-14 RX ADMIN — METOPROLOL SUCCINATE 25 MG: 25 TABLET, EXTENDED RELEASE ORAL at 17:18

## 2022-09-14 RX ADMIN — HYOSCYAMINE SULFATE 125 MCG: 0.12 TABLET, ORALLY DISINTEGRATING ORAL at 10:02

## 2022-09-14 RX ADMIN — HYOSCYAMINE SULFATE 125 MCG: 0.12 TABLET, ORALLY DISINTEGRATING ORAL at 05:37

## 2022-09-14 RX ADMIN — KETOROLAC TROMETHAMINE 15 MG: 30 INJECTION, SOLUTION INTRAMUSCULAR; INTRAVENOUS at 03:26

## 2022-09-14 RX ADMIN — ONDANSETRON 4 MG: 2 INJECTION INTRAMUSCULAR; INTRAVENOUS at 10:02

## 2022-09-14 RX ADMIN — MORPHINE SULFATE 4 MG: 4 INJECTION, SOLUTION INTRAMUSCULAR; INTRAVENOUS at 00:36

## 2022-09-14 RX ADMIN — HYOSCYAMINE SULFATE 125 MCG: 0.12 TABLET, ORALLY DISINTEGRATING ORAL at 12:41

## 2022-09-14 RX ADMIN — SODIUM CHLORIDE, PRESERVATIVE FREE 10 ML: 5 INJECTION INTRAVENOUS at 19:53

## 2022-09-14 RX ADMIN — SODIUM CHLORIDE, POTASSIUM CHLORIDE, SODIUM LACTATE AND CALCIUM CHLORIDE: 600; 310; 30; 20 INJECTION, SOLUTION INTRAVENOUS at 07:33

## 2022-09-14 RX ADMIN — MORPHINE SULFATE 4 MG: 4 INJECTION, SOLUTION INTRAMUSCULAR; INTRAVENOUS at 20:43

## 2022-09-14 RX ADMIN — MORPHINE SULFATE 4 MG: 4 INJECTION, SOLUTION INTRAMUSCULAR; INTRAVENOUS at 12:02

## 2022-09-14 RX ADMIN — ALBUMIN (HUMAN) 25 G: 0.25 INJECTION, SOLUTION INTRAVENOUS at 14:34

## 2022-09-14 RX ADMIN — SODIUM CHLORIDE, POTASSIUM CHLORIDE, SODIUM LACTATE AND CALCIUM CHLORIDE: 600; 310; 30; 20 INJECTION, SOLUTION INTRAVENOUS at 01:50

## 2022-09-14 ASSESSMENT — PAIN - FUNCTIONAL ASSESSMENT: PAIN_FUNCTIONAL_ASSESSMENT: PREVENTS OR INTERFERES SOME ACTIVE ACTIVITIES AND ADLS

## 2022-09-14 ASSESSMENT — PAIN DESCRIPTION - DESCRIPTORS
DESCRIPTORS: ACHING
DESCRIPTORS: DULL;ACHING
DESCRIPTORS: ACHING
DESCRIPTORS: ACHING;CRAMPING

## 2022-09-14 ASSESSMENT — PAIN SCALES - GENERAL
PAINLEVEL_OUTOF10: 5
PAINLEVEL_OUTOF10: 7
PAINLEVEL_OUTOF10: 8
PAINLEVEL_OUTOF10: 7
PAINLEVEL_OUTOF10: 4
PAINLEVEL_OUTOF10: 7
PAINLEVEL_OUTOF10: 9
PAINLEVEL_OUTOF10: 3
PAINLEVEL_OUTOF10: 8

## 2022-09-14 ASSESSMENT — PAIN DESCRIPTION - ORIENTATION
ORIENTATION: MID
ORIENTATION: MID
ORIENTATION: MID;LOWER
ORIENTATION: MID

## 2022-09-14 ASSESSMENT — PAIN DESCRIPTION - LOCATION
LOCATION: ABDOMEN;BACK;INCISION
LOCATION: ABDOMEN;BACK
LOCATION: ABDOMEN;INCISION;BACK
LOCATION: BACK;ABDOMEN;INCISION
LOCATION: ABDOMEN;INCISION
LOCATION: ABDOMEN;INCISION
LOCATION: ABDOMEN;BACK

## 2022-09-14 ASSESSMENT — PAIN DESCRIPTION - PAIN TYPE: TYPE: SURGICAL PAIN;CHRONIC PAIN

## 2022-09-14 ASSESSMENT — PAIN DESCRIPTION - FREQUENCY: FREQUENCY: CONTINUOUS

## 2022-09-14 NOTE — PROGRESS NOTES
Cardiology Progress Note      Patient:  Melody Griffith  YOB: 1975  MRN: [id number]   Acct: [de-identified]  Admit Date:  9/12/2022  Primary Cardiologist: Nora Chen MD    Note per dr Jefe Jaramillo: S/P gastric bypass surgery        HPI: This is a pleasant 52 y.o. male with a past history severe non-ischemic cardiomyopathy (EF 30-35%-2020) s/p AICD placement, DM, HTN. The patient states he developed severe cardiomyopathy after he developed a viral infection from living in the [facility]. He had AICD placement in 2015. He follows with Dr. Savanna Burgess. Patient is POD 1 gastric bypass surgery. He states that today he developed some bloating and pressure. He stood at bedside with nursing staff and and became pale, diaphoretic, and said that he felt like he was going to pass out. He was helped back to bed. His symptoms improved. He states that he was able to ambulate after this episode without any further symptoms. Later, he got up to use the bathroom. He asked for privacy so he was assisted by his wife. She reports that while attempting to void, he began slurring his words. She stated that he turned grey and became cool and clammy and went unresponsive briefly. Nursing was called in and vitals were obtained. Manual pressure in the 90's and tachycardic in the 120's. Patient denied ever having symptoms like this before. Cardiology was consulted to follow for syncopal episode in patient with extensive cardiac history\"    Subjective (Events in last 24 hours): pt awake and alert. NAD. Feeling fatigued today. No cp or sob.   No edema or orthopnea  Getting his 2nd unit PRBC today  On RA      Objective:   BP (!) 109/54   Pulse (!) 105   Temp 98.3 °F (36.8 °C) (Oral)   Resp 17   Ht 6' 3\" (1.905 m)   Wt (!) 434 lb 8.4 oz (197.1 kg)   SpO2 100%   BMI 54.31 kg/m²        TELEMETRY: s.t.115-120    Physical Exam:  General Appearance: alert and oriented to person, place and time, in no acute distress  Cardiovascular: tachy, regular rhythm, normal S1 and S2, no murmurs, rubs, clicks, or gallops, distal pulses intact, no carotid bruits, no JVD  Pulmonary/Chest: clear to auscultation bilaterally- no wheezes, rales or rhonchi, normal air movement, no respiratory distress  Abdomen: soft, non-tender, non-distended, normal bowel sounds, no masses Extremities: no cyanosis, clubbing or edema, pulse   Skin: warm and dry  Head: normocephalic and atraumatic  Eyes: pupils equal, round, and reactive to light  Neck: supple and non-tender without mass, no thyromegaly   Neurological: alert, oriented, normal speech, no focal findings or movement disorder noted    Medications:    albumin human  25 g IntraVENous Q6H    tamsulosin  0.4 mg Oral Daily    hyoscyamine  125 mcg Oral Q4H    carvedilol  25 mg Oral BID WC    carvedilol  6.25 mg Oral BID WC    irbesartan  75 mg Oral Nightly    insulin lispro  0-4 Units SubCUTAneous TID WC    insulin lispro  0-4 Units SubCUTAneous Nightly    sodium chloride flush  5-40 mL IntraVENous 2 times per day    ondansetron  4 mg IntraVENous Q6H    [START ON 9/15/2022] scopolamine  1 patch TransDERmal Q72H      sodium chloride      sodium chloride      dextrose       sodium chloride, , PRN  lidocaine, , PRN  diatrizoate meglumine-sodium, 30 mL, ONCE PRN  simethicone, 80 mg, 4x Daily PRN  sodium chloride flush, 5-40 mL, PRN  sodium chloride, , PRN  morphine, 2 mg, Q2H PRN   Or  morphine, 4 mg, Q2H PRN  promethazine, 25 mg, Q6H PRN  metoclopramide, 10 mg, Q6H PRN  glucose, 4 tablet, PRN  dextrose bolus, 125 mL, PRN   Or  dextrose bolus, 250 mL, PRN  glucagon (rDNA), 1 mg, PRN  dextrose, , Continuous PRN          Lab Data:    Cardiac Enzymes:  No results for input(s): CKTOTAL, CKMB, CKMBINDEX, TROPONINI in the last 72 hours.     CBC:   Lab Results   Component Value Date/Time    WBC 14.2 09/14/2022 03:56 AM    RBC 2.90 09/14/2022 03:56 AM    HGB 7.7 09/14/2022 03:56 AM    HCT 25.0 09/14/2022 03:56 AM     09/14/2022 03:56 AM       CMP:    Lab Results   Component Value Date/Time     09/14/2022 03:56 AM    K 4.2 09/14/2022 03:56 AM    K 4.6 09/13/2022 05:48 AM     09/14/2022 03:56 AM    CO2 25 09/14/2022 03:56 AM    BUN 33 09/14/2022 03:56 AM    CREATININE 1.6 09/14/2022 03:56 AM    LABGLOM 47 09/14/2022 03:56 AM    GLUCOSE 134 09/14/2022 03:56 AM    GLUCOSE 258 12/16/2017 08:25 AM    CALCIUM 8.3 09/14/2022 03:56 AM       Hepatic Function Panel:    Lab Results   Component Value Date/Time    ALKPHOS 48 09/13/2022 12:36 PM    ALT 58 09/13/2022 12:36 PM    AST 46 09/13/2022 12:36 PM    PROT 6.4 09/13/2022 12:36 PM    BILITOT 0.3 09/13/2022 12:36 PM    BILIDIR <0.2 09/13/2022 12:36 PM    LABALBU 4.2 09/13/2022 12:36 PM       Magnesium:    Lab Results   Component Value Date/Time    MG 1.9 03/26/2021 12:07 PM       PT/INR:    Lab Results   Component Value Date/Time    INR 1.17 03/05/2022 07:29 AM       HgBA1c:    Lab Results   Component Value Date/Time    LABA1C 6.5 05/12/2022 10:30 AM       FLP:    Lab Results   Component Value Date/Time    TRIG 187 05/12/2022 10:30 AM    HDL 25 05/12/2022 10:30 AM    LDLCALC 40 05/12/2022 10:30 AM       TSH:    Lab Results   Component Value Date/Time    TSH 2.470 09/13/2022 02:38 PM         Assessment:    S/p robotic diallo-en-y gastric bypass 9/12/22  Syncope - hx consistent with orthostasis  hypotension  NICMP - ef 30-35 per TTE 11/2020  Hx AICD 2015  Patent coronaries per cath 2014  Hx HTN  HLP  DM  Morbid obesity  MIKE - on CPAP  LILLIAN - attending following  Urinary retention - urology following  Anemia - s/p transfusion - Attending following      Plan:    TTE pending  Daily I/o and weights  2gm sodium diet  Stop coreg  Start toprol xl 25 daily with parameters  Hold ace/arb due to hypotension/LILLIAN  Monitor for fluid overload/chf  Will follow       Electronically signed by Adilia Jensen PA-C on 9/14/2022 at 11:30 AM

## 2022-09-14 NOTE — CARE COORDINATION
9/14/22, 7:57 AM EDT    DISCHARGE ON GOING [address] day: 1  Location: -26/026-A Reason for admit: Class 3 severe obesity with body mass index (BMI) of 45.0 to 49.9 in adult, unspecified obesity type, unspecified whether serious comorbidity present ([facility] [address] [E66.01, Z68.42]  Morbid obesity ([facility] [address] [E66.01]  S/P bariatric surgery [Z98.84]   Procedure:   9/12 Robotic Ronaldo-en-Y Gastric Bypass by Dr. Constantine Eldridge. 9/13 FL UGI: No evidence of obstruction or leak following gastric bypass. 9/13 CXR: 1. Cardiomegaly status post pacemaker placement. 2. Possible small bilateral pleural effusions and basilar and lateral view. 3. Mild thoracic spondylosis. Barriers to Discharge: Transferred from [address] [place] [address] to  for low BP and dizziness. Surgery, Hospitalist, Cardiology following. Consulting Urology for ramírez placement. POD #2. Sinus tachycardia 102-113. BP 98/47. O2 on and off at 2L, sats 98%. WBC 14.2, Hgb 7.7. Albumin iv q6hr, Oscimin q4hr, Zofran iv q6hr, Scopolamine patch q72hr. Plan to transfuse blood today. PCP: Taryn Joel MD  Readmission Risk Score: 13.8%  Patient Goals/Plan/Treatment Preferences: Plans home with wife. Has CPAP at home. Planning home with [facility] at discharge.

## 2022-09-14 NOTE — PLAN OF CARE
Problem: Pain  Goal: Verbalizes/displays adequate comfort level or baseline comfort level  Outcome: Progressing  Flowsheets (Taken 9/13/2022 2015)  Verbalizes/displays adequate comfort level or baseline comfort level: Encourage patient to monitor pain and request assistance     Problem: ABCDS Injury Assessment  Goal: Absence of physical injury  Outcome: Progressing     Problem: Safety - Adult  Goal: Free from fall injury  Outcome: Progressing     Problem: Musculoskeletal - Adult  Goal: Return mobility to safest level of function  Outcome: Progressing  Flowsheets  Taken 9/13/2022 2015 by Jennifer Remy RN  Return Mobility to Safest Level of Function:   Assess patient stability and activity tolerance for standing, transferring and ambulating with or without assistive devices   Assist with transfers and ambulation using safe patient handling equipment as needed  Taken 9/13/2022 1330 by Kayla Gonzalez RN  Return Mobility to Safest Level of Function: Assess patient stability and activity tolerance for standing, transferring and ambulating with or without assistive devices

## 2022-09-14 NOTE — PROGRESS NOTES
Hospitalist Progress Note    Patient:  Nickie Nolen    YOB: 1975  Unit/Bed:3B-26/026-A  MRN: [id number]    Acct: [de-identified]   PCP: Doe Cox MD    Date of Admission: 9/12/2022      Assessment/Plan:    Postop hypotensive episode. Elective Ronaldo-en-Y on 9/12. Coreg was switched to Toprol-XL. Started on albumin every 6 hours until orthostatics are negative. Acute normocytic anemia. Every 8 H&H we will get FOBT. Iron panel. Transfuse to keep hemoglobin above 8. Receiving 2 unit of PRBC. HFrEF with AICD. Repeat echo reveals stable EF 30 to 35%. Patient looks compensated. Transfuse blood products and albumin with caution given the EF. Sinus tachycardia. Coreg was switched to Toprol-XL given the hypotension. Urinary retention. Likely due to immobility s/p Ronaldo-en-Y. Last bladder scan greater than 500 mL. Patient was a started on Flomax as per urology. Mild resistance with Louis likely BPH. LILLIAN. Likely prerenal complicated by ischemic ATN from anemia. If creatinine does not improve we Will get renal ultrasound to look for structure of kidney and hydronephrosis given the urinary retention. Sleep apnea. CPAP at night        Expected discharge date:  9/15    Disposition:   [x] Home  [] TCU  [] Rehab  [] Psych  [] SNF  [] [place]  [] Other-    ===================================================================      Chief Complaint: Elective bariatric surgery    Hospital Course: 22-year-old male with past medical history of viral myocarditis leading to dilated nonischemic cardiomyopathy s/p AICD placement, history of morbid obesity, MIKE, A. fib, sinus tachycardia on Coreg came to the hospital for elective Ronaldo-en-Y surgical procedure. Internal medicine was consulted for medical management. Patient underwent procedure with Dr. Marlee Killian on 9/12 without any intraoperative or immediate postop complications. After surgery patient had hypotensive episode.   While going to the restroom he became dizzy and lightheaded. As per chart review he also became diaphoretic and pale. Patient orthostatic vitals were positive. Cardiology was consulted given the history of nonischemic cardiomyopathy. 24 hours  Patient was seen and examined  No acute issues overnight  Patient denied any abdominal pain nausea, vomiting. Patient switched to Toprol-XL receiving blood to keep hemoglobin above 8 as per cardiology. ROS: reviewed complete ROS unchanged unless otherwise stated in hospital course/subjective portion. Medications:  Reviewed    Infusion Medications    sodium chloride      sodium chloride      dextrose       Scheduled Medications    albumin human  25 g IntraVENous Q6H    tamsulosin  0.4 mg Oral Daily    hyoscyamine  125 mcg Oral Q4H    [Held by provider] irbesartan  75 mg Oral Nightly    insulin lispro  0-4 Units SubCUTAneous TID WC    insulin lispro  0-4 Units SubCUTAneous Nightly    sodium chloride flush  5-40 mL IntraVENous 2 times per day    ondansetron  4 mg IntraVENous Q6H    [START ON 9/15/2022] scopolamine  1 patch TransDERmal Q72H     PRN Meds: sodium chloride, lidocaine, diatrizoate meglumine-sodium, simethicone, sodium chloride flush, sodium chloride, morphine **OR** morphine, promethazine, metoclopramide, glucose, dextrose bolus **OR** dextrose bolus, glucagon (rDNA), dextrose        Intake/Output Summary (Last 24 hours) at 9/14/2022 1344  Last data filed at 9/14/2022 1158  Gross per 24 hour   Intake 1840 ml   Output 1300 ml   Net 540 ml       Exam:  /65   Pulse (!) 114   Temp 98.6 °F (37 °C) (Oral)   Resp 18   Ht 6' 3\" (1.905 m)   Wt (!) 434 lb 8.4 oz (197.1 kg)   SpO2 96%   BMI 54.31 kg/m²     General: No distress, appears stated age. Morbidly obese. Eyes:  PERRL. Conjunctivae/corneas clear. HENT: Head normal appearing. Nares normal. Oral mucosa moist.  Hearing intact. Neck: Supple, with full range of motion. Trachea midline.   No gross JVD appreciated. Respiratory:  Normal effort. Clear to auscultation, without rales or wheezes or rhonchi. Cardiovascular: Normal rate, regular rhythm with normal S1/S2 without murmurs. No lower extremity edema. Abdomen: Soft, non-tender, non-distended with normal bowel sounds. Surgical site intact  Musculoskeletal: No joint swelling or tenderness. Normal tone. No abnormal movements. Skin: Warm and dry. No rashes or lesions. Neurologic:  No focal sensory/motor deficits in the upper or lower extremities. Cranial nerves:  grossly non-focal 2-12. Psychiatric: Alert and oriented, normal insight and thought content. Capillary Refill: Brisk,< 3 seconds. Peripheral Pulses: +2 palpable, equal bilaterally. Labs:   Recent Labs     09/13/22  1236 09/13/22  1721 09/14/22  0356 09/14/22  1119   WBC 16.3*  --  14.2*  --    HGB 10.4* 9.5* 7.7* 7.0*   HCT 33.4* 30.7* 25.0* 22.4*     --  186  --      Recent Labs     09/13/22  0548 09/13/22  1438 09/14/22  0356    136 137   K 4.6 4.4 4.2    104 104   CO2 22* 23 25   BUN 18 25* 33*   CREATININE 0.9 1.6* 1.6*   CALCIUM 8.5 8.5 8.3*     Recent Labs     09/13/22  1236   AST 46*   ALT 58   BILIDIR <0.2   BILITOT 0.3   ALKPHOS 48     No results for input(s): INR in the last 72 hours. No results for input(s): Sun Parisian in the last 72 hours. No results for input(s): PROCAL in the last 72 hours. Lab Results   Component Value Date/Time    NITRU NEGATIVE 09/13/2022 06:40 PM    [address] NONE 09/13/2022 06:40 PM    BACTERIA NONE SEEN 09/13/2022 06:40 PM    RBCUA 0-2 09/13/2022 06:40 PM    BLOODU NEGATIVE 09/13/2022 06:40 PM    SPECGRAV 1.025 09/13/2022 06:40 PM    GLUCOSEU Negative 08/23/2019 10:48 AM       Radiology (48 hours):  XR CHEST (2 VW)    Result Date: 9/13/2022  1. Cardiomegaly status post pacemaker placement. 2. Possible small bilateral pleural effusions and basilar and lateral view. 3. Mild thoracic spondylosis. Danny Stark  **This report has

## 2022-09-14 NOTE — PROCEDURES
A Bladder scan was performed at 2339  . The residual amount was measured to be 369 ML. Report of results was given to RN.
Bladder scan was completed by Mari Martinez at 1150. The residual amount of 252ml was resulted to [employer].
Bladder scan was completed by Yasmin Polo at 1420.  The residual amount of 230ml was resulted to [facility]
EKG completed and given to [facility].
EKG handed to Royer Mann.
24-Aug-2020 05:54

## 2022-09-14 NOTE — PROGRESS NOTES
Patient recovering from gastric bypass, has related hypotension and syncope, and low HgB. Patient to receive albumin and blood product. BP is 75/50 manually, temp 97.4, RR 16, P 98. Patient has abdominal pain at incision sites. Belly is soft and nondistended, bowel sounds present and diminished. Heart sounds regular, diminished. Lung sounds clear and diminished. Pt is alert  x3, arm drift negative, PERRLA nonreactive, likely due to opioid use currently. Capillary bed refill <3 seconds, hand  strong and equal, push pull strong and equal, pedal pulses weak and equal bilaterally. Skin is intact, extremities warm, dry and pink. No pitting edema. Pain is 7 to 8. Urology called for ramírez consult d/t urinary retention.

## 2022-09-14 NOTE — PROGRESS NOTES
Patient had just received morphine for increased pain. Finished first infusion of blood product, another is ordered. Soft blood pressures, gradually improving. Current is 109/63. Temp is 98.4, pulse is 114, RR 19, O2 is 100. Bowel sounds are active in all four quadrants, belly is soft, nontender. Patient is alert x3, had a lunchtime blood sugar of 130, and continues to be compliant with SCD use. Had a urine output of 900 mL in ramírez, and continues to drink 2 oz of water q15 mins when awake. Patient refused bath d/t wife bathing him. Flomax had been ordered per urology, but has been held until more reassuring blood pressure pattern. Any ambulation is contraindicated until patient's syncope and orthostatic hypotension resolves.

## 2022-09-14 NOTE — PROGRESS NOTES
Patient in bed resting. Has finished second round of blood product. Complained of pain at 7-8, administered morphine 4Mg IV push with nurse Noe Scott and instructor Vitaly Early present. Patient tolerated well; will reassess before I leave floor for day. 1500- gave SBAR to [employer]. Patient is resting in bed, pain improved.

## 2022-09-14 NOTE — PROGRESS NOTES
Patient in bed, SCDs on bilaterally, blood product being hung by nurse Ivana Sports. Louis has been placed successfully by urologist, patient resting more comfortably. Wife present. Call light in reach, bed locked. Meds are current. Will reevaluate pain at next hourly round.

## 2022-09-14 NOTE — PROGRESS NOTES
liquids. Denies chest discomfort or dyspnea. (+) belching, but no flatus or BM. Urine output still low. Had to straight cath patient. Pain controlled with analgesia. Incisions look good. Abdomen obese and rounded but soft. CURRENT MEDICATIONS   Scheduled Meds:   albumin human  25 g IntraVENous Q6H    tamsulosin  0.4 mg Oral Daily    hyoscyamine  125 mcg Oral Q4H    carvedilol  25 mg Oral BID     carvedilol  6.25 mg Oral BID     irbesartan  75 mg Oral Nightly    insulin lispro  0-4 Units SubCUTAneous TID     insulin lispro  0-4 Units SubCUTAneous Nightly    sodium chloride flush  5-40 mL IntraVENous 2 times per day    ondansetron  4 mg IntraVENous Q6H    [START ON 9/15/2022] scopolamine  1 patch TransDERmal Q72H     Continuous Infusions:   sodium chloride      sodium chloride      dextrose       PRN Meds:.sodium chloride, lidocaine, diatrizoate meglumine-sodium, simethicone, sodium chloride flush, sodium chloride, morphine **OR** morphine, promethazine, metoclopramide, glucose, dextrose bolus **OR** dextrose bolus, glucagon (rDNA), dextrose  OBJECTIVE   CURRENT VITALS:  height is 6' 3\" (1.905 m) and weight is 434 lb 8.4 oz (197.1 kg) (abnormal). His oral temperature is 98.3 °F (36.8 °C). His blood pressure is 91/47 (abnormal) and his pulse is 103 (abnormal). His respiration is 17 and oxygen saturation is 100%.    Temperature Range (24h):Temp: 98.3 °F (36.8 °C) Temp  Av.2 °F (36.8 °C)  Min: 97.8 °F (36.6 °C)  Max: 98.4 °F (36.9 °C)  BP Range (90X): Systolic (92UTT), IRO:960 , Min:83 , YZA:315     Diastolic (71BDP), TXZ:80, Min:41, Max:85    Pulse Range (24h): Pulse  Av.2  Min: 76  Max: 113  Respiration Range (24h): Resp  Av.7  Min: 17  Max: 19  Current Pulse Ox (24h):  SpO2: 100 %  Pulse Ox Range (24h):  SpO2  Av.9 %  Min: 96 %  Max: 100 %  Oxygen Amount and Delivery: O2 Flow Rate (L/min): 2 L/min  Incentive Spirometry Tx:            GENERAL: fatigued, overall generalized weakness, no acute suture material in the left upper abdomen. Osseous structures are unremarkable. There is no stenosis or large hiatal hernia in the distal esophagus. There are surgical changes in the stomach of gastric bypass. There is normal transit of contrast through the stomach into the jejunum. There is no extravasation of contrast to suggest a    leak. Proximal small bowel loops are normal..           Impression       No evidence of obstruction or leak following gastric bypass. Final report electronically signed by Dr Randall Black on 9/13/2022 11:00 AM     Electronically signed by JACOB Sousa CNP on 9/14/2022 at 10:34 AM     Patient seen and examined independently by me early this AM. Above discussed and I agree with Stella Martinez CNP. See my additional comments below for updated orders and plan. Labs, cultures, and radiographs where available were reviewed. I discussed patient concerns with the patient's nurse and instructions were given. Please see our orders for the updated patient care plan. -Transfusing RBC today. Most likely oozing from a staple line. Stop Lovenox and Toradol. Echocardiogram today. Cardiology following. Urology for Louis placement. Pain and nausea control. Upper GI yesterday looks good. SCDs only at this time. If hemoglobin continues to drop then CT abdomen/pelvis.   Electronically signed by Devin Nassar MD on 9/14/22 at 2:21 PM EDT

## 2022-09-14 NOTE — CONSULTS
[place] [id number]  Dept: [phone]  Loc: [phone]  Visit Date: 7/28/2022    Urology Consult Note    Reason for Consult:  unable to place ramírez catheter  Requesting Physician:  Kena Franco    History Obtained From:  patient, electronic medical record    Chief Complaint: Elective Héctor-en-Y bypass surgery    HISTORY OF PRESENT ILLNESS:                The patient is a 52 y.o. male with significant past medical history of see below who presented to have weight loss procedure with Dr Perla Nair. He reports 2 episodes with lightheadedness, diaphoresis with standing and ambulating. Medicine managing. Urolgoy consulted for difficult ramírez insertion and urinary retention. Bladder scan last night >500ml. Was able to straight cath per nurse night before, but unable to pass catheter last night  Denies problems voiding prior  Did not have ramírez catheter for surgery  Reports having problems voiding after surgery     Past Medical History:        Diagnosis Date    CHF (congestive heart failure) ([facility] [address]     Constipation     Diabetes mellitus ([facility] [address]     GERD (gastroesophageal reflux disease)     Hypercholesteremia     S/P ICD (internal cardiac defibrillator) procedure: 1/15/2015: Medtronic Single Chamber 1/15/2015    1/15/2015: Medtronic Single Chamber.  Dr. Dalton Dao    Sleep apnea 06/09/2017    Romilda Ast applied 8/22/14 8/29/2014    returned prior to ICD insertion 1/15     Past Surgical History:        Procedure Laterality Date    CARDIAC CATHETERIZATION  9/2014    [facility]  2014    [address]      EGD  2022    Dr Shiva Bean     HÉCTOR-EN-Y GASTRIC BYPASS N/A 9/12/2022    Robotic Héctor-en-Y Gastric Bypass performed by Dimple Lomeli MD at [address] 6/27/2022    EGD BIOPSY performed by Alhaji Tobias MD at [facility]    VASECTOMY  2004     Allergies:  Levaquin [levofloxacin in d5w]  Social History:  Social History     Socioeconomic History    Marital status:      Spouse name: Not on file    Number of children: 1    Years of education: Not on file    Highest education level: Not on file   Occupational History     Employer: [employer]   Tobacco Use    Smoking status: Never    Smokeless tobacco: Never   Vaping Use    Vaping Use: Never used   Substance and Sexual Activity    Alcohol use: Never    Drug use: No    Sexual activity: Yes     Partners: Female   Other Topics Concern    Not on file   Social History Narrative    Not on file     Social Determinants of Health     Financial Resource Strain: Low Risk     Difficulty of Paying Living Expenses: Not hard at all   Food Insecurity: No Food Insecurity    Worried About Running Out of Food in the Last Year: Never true    Ran Out of Food in the Last Year: Never true   Transportation Needs: Not on file   Physical Activity: Not on file   Stress: Not on file   Social Connections: Not on file   Intimate Partner Violence: Not on file   Housing Stability: Not on file     Family History:       Problem Relation Age of Onset    Arthritis Mother     Hypertension Father     Heart Disease Maternal Uncle     Heart Disease Maternal Grandfather     Asthma Maternal Grandfather     Diabetes Maternal Grandfather     Arthritis Maternal Grandfather     Heart Disease Paternal Uncle     Heart Disease Maternal Grandmother     Stroke Maternal Grandmother     Asthma Brother     Colon Cancer Neg Hx     Colon Polyps Neg Hx     Esophageal Cancer Neg Hx        ROS:  Constitutional: Negative for chills, fatigue, fever, or weight loss. Eyes: Denies reported visual changes. ENT: Denies headache, difficulty swallowing, earache, and nosebleeds. Cardiovascular: Negative for chest pain, palpitations, tachycardia or edema. Respiratory: Denies cough or SOB.   GI:++mif low abd pain  : see HPI. Musculoskeletal: Patient denies low back pain or painful or reduced range of ROM. Neurological: The patient denies any symptoms of neurological impairment or TIA. Psychiatric: Denies anxiety or depression. Skin: Denies rash or lesions. All remaining ROS negative. PHYSICAL EXAM:  VITALS:  BP (!) 83/46   Pulse (!) 103   Temp 98.3 °F (36.8 °C) (Oral)   Resp 17   Ht 6' 3\" (1.905 m)   Wt (!) 434 lb 8.4 oz (197.1 kg)   SpO2 100%   BMI 54.31 kg/m² . Nursing note and vitals reviewed. Constitutional: Alert and oriented times x3, no acute distress, and cooperative to examination with appropriate mood and affect. HEENT:   Head:         Normocephalic and atraumatic. Mouth/Throat:          Mucous membranes are normal.   Eyes:         EOM are normal. No scleral icterus. Nose:    The external appearance of the nose is normal  Ears: The ears appear normal to external inspection. Cardiovascular:       Normal rate, regular rhythm. Pulmonary/Chest:  Normal respiratory rate and rhthym. No use of accessory muscles. Lungs clear bilaterally. Abdominal:          Soft. No tenderness. Abd binder in place           Genitalia:    Normal uncircumcised penis  Urethral meatus is normal in size and location  Scrotal contents normal to inspection and palpation   Normal testes palpated bilaterally  Able to insert 16fr coude draining yellow urine  Some resistance below gland, able to pass with gentle pressure  Musculoskeletal:    Normal range of motion. He exhibits no edema or tenderness of lower extremities. Extremities:    No cyanosis, clubbing, or edema present. Neurological:    Alert and oriented.      DATA:  CBC:   Lab Results   Component Value Date/Time    WBC 14.2 09/14/2022 03:56 AM    RBC 2.90 09/14/2022 03:56 AM    HGB 7.7 09/14/2022 03:56 AM    HCT 25.0 09/14/2022 03:56 AM    MCV 86.2 09/14/2022 03:56 AM    MCH 26.6 09/14/2022 03:56 AM    MCHC 30.8 09/14/2022 03:56 AM    RDW 15.7 03/15/2017 06:30 PM     09/14/2022 03:56 AM    MPV 9.9 09/14/2022 03:56 AM     BMP:    Lab Results   Component Value Date/Time     09/14/2022 03:56 AM    K 4.2 09/14/2022 03:56 AM    K 4.6 09/13/2022 05:48 AM     09/14/2022 03:56 AM    CO2 25 09/14/2022 03:56 AM    BUN 33 09/14/2022 03:56 AM    CREATININE 1.6 09/14/2022 03:56 AM    CALCIUM 8.3 09/14/2022 03:56 AM    LABGLOM 47 09/14/2022 03:56 AM    GLUCOSE 134 09/14/2022 03:56 AM    GLUCOSE 258 12/16/2017 08:25 AM     BUN/Creatinine:    Lab Results   Component Value Date/Time    BUN 33 09/14/2022 03:56 AM    CREATININE 1.6 09/14/2022 03:56 AM     Magnesium:    Lab Results   Component Value Date/Time    MG 1.9 03/26/2021 12:07 PM     Phosphorus:    Lab Results   Component Value Date/Time    PHOS 4.1 11/14/2020 04:30 AM     PT/INR:    Lab Results   Component Value Date/Time    INR 1.17 03/05/2022 07:29 AM     U/A:    Lab Results   Component Value Date/Time    COLORU YELLOW 09/13/2022 06:40 PM    PHUR 6.0 09/13/2022 06:40 PM    LABCAST 0-4 HYALINE 09/13/2022 06:40 PM    LABCAST NONE SEEN 09/13/2022 06:40 PM    [address] NONE 09/13/2022 06:40 PM    RBCUA 0-2 09/13/2022 06:40 PM    YEAST NONE SEEN 09/13/2022 06:40 PM    BACTERIA NONE SEEN 09/13/2022 06:40 PM    SPECGRAV 1.025 09/13/2022 06:40 PM    LEUKOCYTESUR NEGATIVE 09/13/2022 06:40 PM    UROBILINOGEN 0.2 09/13/2022 06:40 PM    BILIRUBINUR NEGATIVE 09/13/2022 06:40 PM    BLOODU NEGATIVE 09/13/2022 06:40 PM    GLUCOSEU Negative 08/23/2019 10:48 AM       IMPRESSION/Plan:    Can do void trial day of discharge  Start Flomax - can hold for low  BP  Our office will coordinate OV in 4-6 wks with PVR, possible cystoscopy in office    Urinary retention - bladder scan >500ml, nursing unable to pass ramírez catheter. Able to pass 16 fr coude, mild resistance at glands  BPH - will start Flomax, possible chronic retention. Ua neg for infection  LILLIAN - CRT 1.6, likely due to urinary retention.   Consider renal ultrasound if CRT does not pvaithra    Urology signing off, please call if questions    Thank you for including us in the care of JACOB Delarosa - CNP, JACOB  09/14/22 10:07 AM  Urology

## 2022-09-15 ENCOUNTER — APPOINTMENT (OUTPATIENT)
Dept: CT IMAGING | Age: 47
DRG: 619 | End: 2022-09-15
Attending: SURGERY
Payer: COMMERCIAL

## 2022-09-15 LAB
ANION GAP SERPL CALCULATED.3IONS-SCNC: 11 MEQ/L (ref 8–16)
BUN BLDV-MCNC: 14 MG/DL (ref 7–22)
CALCIUM SERPL-MCNC: 8.4 MG/DL (ref 8.5–10.5)
CHLORIDE BLD-SCNC: 106 MEQ/L (ref 98–111)
CO2: 23 MEQ/L (ref 23–33)
CREAT SERPL-MCNC: 0.7 MG/DL (ref 0.4–1.2)
ERYTHROCYTE [DISTWIDTH] IN BLOOD BY AUTOMATED COUNT: 16.7 % (ref 11.5–14.5)
ERYTHROCYTE [DISTWIDTH] IN BLOOD BY AUTOMATED COUNT: 51.9 FL (ref 35–45)
GFR SERPL CREATININE-BSD FRML MDRD: > 90 ML/MIN/1.73M2
GLUCOSE BLD-MCNC: 103 MG/DL (ref 70–108)
GLUCOSE BLD-MCNC: 119 MG/DL (ref 70–108)
GLUCOSE BLD-MCNC: 119 MG/DL (ref 70–108)
GLUCOSE BLD-MCNC: 120 MG/DL (ref 70–108)
GLUCOSE BLD-MCNC: 129 MG/DL (ref 70–108)
HCT VFR BLD CALC: 24.8 % (ref 42–52)
HCT VFR BLD CALC: 26.6 % (ref 42–52)
HCT VFR BLD CALC: 26.9 % (ref 42–52)
HEMOGLOBIN: 7.8 GM/DL (ref 14–18)
HEMOGLOBIN: 8.4 GM/DL (ref 14–18)
HEMOGLOBIN: 8.6 GM/DL (ref 14–18)
IRON SATURATION: 12 % (ref 20–50)
IRON: 29 UG/DL (ref 65–195)
MCH RBC QN AUTO: 27.3 PG (ref 26–33)
MCHC RBC AUTO-ENTMCNC: 31.5 GM/DL (ref 32.2–35.5)
MCV RBC AUTO: 86.7 FL (ref 80–94)
PLATELET # BLD: 142 THOU/MM3 (ref 130–400)
PMV BLD AUTO: 10.2 FL (ref 9.4–12.4)
POTASSIUM SERPL-SCNC: 4.5 MEQ/L (ref 3.5–5.2)
RBC # BLD: 2.86 MILL/MM3 (ref 4.7–6.1)
SODIUM BLD-SCNC: 140 MEQ/L (ref 135–145)
TOTAL IRON BINDING CAPACITY: 245 UG/DL (ref 171–450)
WBC # BLD: 9.7 THOU/MM3 (ref 4.8–10.8)

## 2022-09-15 PROCEDURE — 82948 REAGENT STRIP/BLOOD GLUCOSE: CPT

## 2022-09-15 PROCEDURE — 2580000003 HC RX 258: Performed by: NURSE PRACTITIONER

## 2022-09-15 PROCEDURE — 6360000002 HC RX W HCPCS: Performed by: NURSE PRACTITIONER

## 2022-09-15 PROCEDURE — 85018 HEMOGLOBIN: CPT

## 2022-09-15 PROCEDURE — 6370000000 HC RX 637 (ALT 250 FOR IP): Performed by: PHYSICIAN ASSISTANT

## 2022-09-15 PROCEDURE — 36415 COLL VENOUS BLD VENIPUNCTURE: CPT

## 2022-09-15 PROCEDURE — 85014 HEMATOCRIT: CPT

## 2022-09-15 PROCEDURE — 74176 CT ABD & PELVIS W/O CONTRAST: CPT

## 2022-09-15 PROCEDURE — P9047 ALBUMIN (HUMAN), 25%, 50ML: HCPCS | Performed by: NURSE PRACTITIONER

## 2022-09-15 PROCEDURE — 2140000000 HC CCU INTERMEDIATE R&B

## 2022-09-15 PROCEDURE — 2580000003 HC RX 258: Performed by: SURGERY

## 2022-09-15 PROCEDURE — 80048 BASIC METABOLIC PNL TOTAL CA: CPT

## 2022-09-15 PROCEDURE — 99024 POSTOP FOLLOW-UP VISIT: CPT | Performed by: NURSE PRACTITIONER

## 2022-09-15 PROCEDURE — 99232 SBSQ HOSP IP/OBS MODERATE 35: CPT | Performed by: PHYSICIAN ASSISTANT

## 2022-09-15 PROCEDURE — 36430 TRANSFUSION BLD/BLD COMPNT: CPT

## 2022-09-15 PROCEDURE — 6370000000 HC RX 637 (ALT 250 FOR IP): Performed by: SURGERY

## 2022-09-15 PROCEDURE — C9113 INJ PANTOPRAZOLE SODIUM, VIA: HCPCS | Performed by: NURSE PRACTITIONER

## 2022-09-15 PROCEDURE — 6370000000 HC RX 637 (ALT 250 FOR IP): Performed by: NURSE PRACTITIONER

## 2022-09-15 PROCEDURE — 6360000002 HC RX W HCPCS: Performed by: SURGERY

## 2022-09-15 PROCEDURE — A4216 STERILE WATER/SALINE, 10 ML: HCPCS | Performed by: NURSE PRACTITIONER

## 2022-09-15 PROCEDURE — 85027 COMPLETE CBC AUTOMATED: CPT

## 2022-09-15 PROCEDURE — 99223 1ST HOSP IP/OBS HIGH 75: CPT | Performed by: STUDENT IN AN ORGANIZED HEALTH CARE EDUCATION/TRAINING PROGRAM

## 2022-09-15 PROCEDURE — APPSS45 APP SPLIT SHARED TIME 31-45 MINUTES: Performed by: NURSE PRACTITIONER

## 2022-09-15 RX ORDER — OXYCODONE HYDROCHLORIDE 5 MG/1
5 TABLET ORAL EVERY 4 HOURS PRN
Status: DISCONTINUED | OUTPATIENT
Start: 2022-09-15 | End: 2022-09-16 | Stop reason: HOSPADM

## 2022-09-15 RX ORDER — MUSCLE RUB CREAM 100; 150 MG/G; MG/G
CREAM TOPICAL PRN
Status: DISCONTINUED | OUTPATIENT
Start: 2022-09-15 | End: 2022-09-16 | Stop reason: HOSPADM

## 2022-09-15 RX ORDER — CYCLOBENZAPRINE HCL 10 MG
10 TABLET ORAL EVERY 8 HOURS PRN
Status: DISCONTINUED | OUTPATIENT
Start: 2022-09-15 | End: 2022-09-16 | Stop reason: HOSPADM

## 2022-09-15 RX ORDER — SODIUM CHLORIDE 9 MG/ML
INJECTION, SOLUTION INTRAVENOUS PRN
Status: DISCONTINUED | OUTPATIENT
Start: 2022-09-15 | End: 2022-09-16 | Stop reason: HOSPADM

## 2022-09-15 RX ORDER — FLUTICASONE PROPIONATE 50 MCG
2 SPRAY, SUSPENSION (ML) NASAL DAILY
Status: DISCONTINUED | OUTPATIENT
Start: 2022-09-15 | End: 2022-09-16 | Stop reason: HOSPADM

## 2022-09-15 RX ORDER — HYOSCYAMINE SULFATE 0.125 MG
125 TABLET,DISINTEGRATING ORAL EVERY 4 HOURS PRN
Status: DISCONTINUED | OUTPATIENT
Start: 2022-09-15 | End: 2022-09-16 | Stop reason: HOSPADM

## 2022-09-15 RX ORDER — METOPROLOL SUCCINATE 50 MG/1
50 TABLET, EXTENDED RELEASE ORAL DAILY
Status: DISCONTINUED | OUTPATIENT
Start: 2022-09-16 | End: 2022-09-16

## 2022-09-15 RX ORDER — FUROSEMIDE 20 MG/1
20 TABLET ORAL ONCE
Status: COMPLETED | OUTPATIENT
Start: 2022-09-15 | End: 2022-09-15

## 2022-09-15 RX ORDER — SPIRONOLACTONE 25 MG/1
25 TABLET ORAL DAILY
Status: DISCONTINUED | OUTPATIENT
Start: 2022-09-15 | End: 2022-09-16 | Stop reason: HOSPADM

## 2022-09-15 RX ADMIN — MORPHINE SULFATE 4 MG: 4 INJECTION, SOLUTION INTRAMUSCULAR; INTRAVENOUS at 20:50

## 2022-09-15 RX ADMIN — SODIUM CHLORIDE, PRESERVATIVE FREE 40 MG: 5 INJECTION INTRAVENOUS at 16:25

## 2022-09-15 RX ADMIN — OXYCODONE 5 MG: 5 TABLET ORAL at 17:12

## 2022-09-15 RX ADMIN — SPIRONOLACTONE 25 MG: 25 TABLET ORAL at 11:33

## 2022-09-15 RX ADMIN — FLUTICASONE PROPIONATE 2 SPRAY: 50 SPRAY, METERED NASAL at 10:43

## 2022-09-15 RX ADMIN — METOPROLOL SUCCINATE 25 MG: 25 TABLET, EXTENDED RELEASE ORAL at 09:17

## 2022-09-15 RX ADMIN — SODIUM CHLORIDE, PRESERVATIVE FREE 10 ML: 5 INJECTION INTRAVENOUS at 20:49

## 2022-09-15 RX ADMIN — ONDANSETRON 4 MG: 2 INJECTION INTRAMUSCULAR; INTRAVENOUS at 09:20

## 2022-09-15 RX ADMIN — OXYCODONE 5 MG: 5 TABLET ORAL at 13:20

## 2022-09-15 RX ADMIN — MORPHINE SULFATE 4 MG: 4 INJECTION, SOLUTION INTRAMUSCULAR; INTRAVENOUS at 04:17

## 2022-09-15 RX ADMIN — ALBUMIN (HUMAN) 25 G: 0.25 INJECTION, SOLUTION INTRAVENOUS at 04:28

## 2022-09-15 RX ADMIN — FUROSEMIDE 20 MG: 20 TABLET ORAL at 11:33

## 2022-09-15 RX ADMIN — SODIUM CHLORIDE, PRESERVATIVE FREE 10 ML: 5 INJECTION INTRAVENOUS at 08:12

## 2022-09-15 RX ADMIN — CYCLOBENZAPRINE 10 MG: 10 TABLET, FILM COATED ORAL at 11:33

## 2022-09-15 RX ADMIN — SODIUM CHLORIDE, PRESERVATIVE FREE 40 MG: 5 INJECTION INTRAVENOUS at 05:35

## 2022-09-15 RX ADMIN — ONDANSETRON 4 MG: 2 INJECTION INTRAMUSCULAR; INTRAVENOUS at 04:16

## 2022-09-15 RX ADMIN — HYOSCYAMINE SULFATE 125 MCG: 0.12 TABLET, ORALLY DISINTEGRATING ORAL at 09:13

## 2022-09-15 RX ADMIN — MENTHOL, METHYL SALICYLATE: 10; 15 CREAM TOPICAL at 20:49

## 2022-09-15 RX ADMIN — HYOSCYAMINE SULFATE 125 MCG: 0.12 TABLET, ORALLY DISINTEGRATING ORAL at 01:19

## 2022-09-15 RX ADMIN — HYOSCYAMINE SULFATE 125 MCG: 0.12 TABLET, ORALLY DISINTEGRATING ORAL at 04:16

## 2022-09-15 RX ADMIN — ONDANSETRON 4 MG: 2 INJECTION INTRAMUSCULAR; INTRAVENOUS at 16:25

## 2022-09-15 RX ADMIN — MORPHINE SULFATE 4 MG: 4 INJECTION, SOLUTION INTRAMUSCULAR; INTRAVENOUS at 18:20

## 2022-09-15 RX ADMIN — MENTHOL, METHYL SALICYLATE: 10; 15 CREAM TOPICAL at 17:30

## 2022-09-15 ASSESSMENT — PAIN DESCRIPTION - LOCATION
LOCATION_2: BACK
LOCATION: BACK
LOCATION: ABDOMEN;BACK
LOCATION_2: ABDOMEN
LOCATION: BACK
LOCATION: ABDOMEN
LOCATION: BACK;INCISION
LOCATION: BACK
LOCATION: ABDOMEN;BACK
LOCATION: BACK

## 2022-09-15 ASSESSMENT — PAIN DESCRIPTION - ORIENTATION
ORIENTATION: MID
ORIENTATION_2: MID;LOWER
ORIENTATION: MID
ORIENTATION: LOWER
ORIENTATION: MID
ORIENTATION: MID
ORIENTATION: LOWER;MID
ORIENTATION: LOWER
ORIENTATION_2: MID
ORIENTATION: MID

## 2022-09-15 ASSESSMENT — PAIN DESCRIPTION - ONSET
ONSET: GRADUAL
ONSET: GRADUAL
ONSET: ON-GOING
ONSET: ON-GOING

## 2022-09-15 ASSESSMENT — PAIN - FUNCTIONAL ASSESSMENT
PAIN_FUNCTIONAL_ASSESSMENT: ACTIVITIES ARE NOT PREVENTED
PAIN_FUNCTIONAL_ASSESSMENT: PREVENTS OR INTERFERES SOME ACTIVE ACTIVITIES AND ADLS
PAIN_FUNCTIONAL_ASSESSMENT_SITE2: PREVENTS OR INTERFERES SOME ACTIVE ACTIVITIES AND ADLS
PAIN_FUNCTIONAL_ASSESSMENT: ACTIVITIES ARE NOT PREVENTED

## 2022-09-15 ASSESSMENT — PAIN DESCRIPTION - DESCRIPTORS
DESCRIPTORS: ACHING
DESCRIPTORS: SPASM
DESCRIPTORS: DISCOMFORT
DESCRIPTORS: ACHING
DESCRIPTORS_2: ACHING
DESCRIPTORS: DISCOMFORT
DESCRIPTORS: DISCOMFORT
DESCRIPTORS: SPASM;SHOOTING;ACHING
DESCRIPTORS: ACHING;DISCOMFORT
DESCRIPTORS: SPASM
DESCRIPTORS: DISCOMFORT;DULL
DESCRIPTORS: ACHING
DESCRIPTORS: SPASM;DULL
DESCRIPTORS_2: ACHING

## 2022-09-15 ASSESSMENT — PAIN DESCRIPTION - FREQUENCY
FREQUENCY: CONTINUOUS

## 2022-09-15 ASSESSMENT — PAIN SCALES - GENERAL
PAINLEVEL_OUTOF10: 7
PAINLEVEL_OUTOF10: 9
PAINLEVEL_OUTOF10: 4
PAINLEVEL_OUTOF10: 7
PAINLEVEL_OUTOF10: 8
PAINLEVEL_OUTOF10: 6
PAINLEVEL_OUTOF10: 6
PAINLEVEL_OUTOF10: 8
PAINLEVEL_OUTOF10: 8
PAINLEVEL_OUTOF10: 6
PAINLEVEL_OUTOF10: 5
PAINLEVEL_OUTOF10: 8
PAINLEVEL_OUTOF10: 5
PAINLEVEL_OUTOF10: 7
PAINLEVEL_OUTOF10: 7
PAINLEVEL_OUTOF10: 6
PAINLEVEL_OUTOF10: 6
PAINLEVEL_OUTOF10: 7

## 2022-09-15 ASSESSMENT — PAIN DESCRIPTION - PAIN TYPE
TYPE: ACUTE PAIN
TYPE: CHRONIC PAIN

## 2022-09-15 ASSESSMENT — PAIN DESCRIPTION - INTENSITY
RATING_2: 8
RATING_2: 7

## 2022-09-15 NOTE — PROGRESS NOTES
Cardiology Progress Note      Patient:  Marilynn Amado  YOB: 1975  MRN: [id number]   Acct: [de-identified]  Admit Date:  9/12/2022  Primary Cardiologist: Robe Kunz MD    Note per dr Twyla Multani: S/P gastric bypass surgery        HPI: This is a pleasant 52 y.o. male with a past history severe non-ischemic cardiomyopathy (EF 30-35%-2020) s/p AICD placement, DM, HTN. The patient states he developed severe cardiomyopathy after he developed a viral infection from living in the [facility]. He had AICD placement in 2015. He follows with Dr. Viola Mckeon. Patient is POD 1 gastric bypass surgery. He states that today he developed some bloating and pressure. He stood at bedside with nursing staff and and became pale, diaphoretic, and said that he felt like he was going to pass out. He was helped back to bed. His symptoms improved. He states that he was able to ambulate after this episode without any further symptoms. Later, he got up to use the bathroom. He asked for privacy so he was assisted by his wife. She reports that while attempting to void, he began slurring his words. She stated that he turned grey and became cool and clammy and went unresponsive briefly. Nursing was called in and vitals were obtained. Manual pressure in the 90's and tachycardic in the 120's. Patient denied ever having symptoms like this before. Cardiology was consulted to follow for syncopal episode in patient with extensive cardiac history\"    Subjective (Events in last 24 hours):   Pt awake and alert. NAD. No cp or sob. Feeling much better today.   No edema or orthopnea  Getting transfusion now  Bp better today, hr better  On RA    Objective:   /61   Pulse 100   Temp 98.4 °F (36.9 °C) (Oral)   Resp 20   Ht 6' 3\" (1.905 m)   Wt (!) 383 lb 4.8 oz (173.9 kg)   SpO2 94%   BMI 47.91 kg/m²        TELEMETRY: nsr -     Physical Exam:  General Appearance: alert and oriented to person, place and time, in no acute distress  Cardiovascular: regular rate, regular rhythm, normal S1 and S2, no murmurs, rubs, clicks, or gallops, distal pulses intact, no carotid bruits, no JVD  Pulmonary/Chest: clear to auscultation bilaterally- no wheezes, rales or rhonchi, normal air movement, no respiratory distress  Abdomen: soft, non-tender, non-distended, normal bowel sounds, no masses Extremities: no cyanosis, clubbing or edema, pulse   Skin: warm and dry  Head: normocephalic and atraumatic  Eyes: pupils equal, round, and reactive to light  Neck: supple and non-tender without mass, no thyromegaly   Neurological: alert, oriented, normal speech, no focal findings or movement disorder noted    Medications:    albumin human  25 g IntraVENous Q6H    metoprolol succinate  25 mg Oral Daily    pantoprazole (PROTONIX) 40 mg injection  40 mg IntraVENous Q12H    hyoscyamine  125 mcg Oral Q4H    [Held by provider] irbesartan  75 mg Oral Nightly    insulin lispro  0-4 Units SubCUTAneous TID WC    insulin lispro  0-4 Units SubCUTAneous Nightly    sodium chloride flush  5-40 mL IntraVENous 2 times per day    ondansetron  4 mg IntraVENous Q6H    scopolamine  1 patch TransDERmal Q72H      sodium chloride      sodium chloride      sodium chloride      dextrose       sodium chloride, , PRN  sodium chloride, , PRN  lidocaine, 2 patch, Daily PRN  diatrizoate meglumine-sodium, 30 mL, ONCE PRN  simethicone, 80 mg, 4x Daily PRN  sodium chloride flush, 5-40 mL, PRN  sodium chloride, , PRN  morphine, 2 mg, Q2H PRN   Or  morphine, 4 mg, Q2H PRN  promethazine, 25 mg, Q6H PRN  metoclopramide, 10 mg, Q6H PRN  glucose, 4 tablet, PRN  dextrose bolus, 125 mL, PRN   Or  dextrose bolus, 250 mL, PRN  glucagon (rDNA), 1 mg, PRN  dextrose, , Continuous PRN        Lab Data:  TTE 9/14/22  Summary   Limited evaluation. Moderate to severely dilated left ventricular size and severely reduced   systolic function.    There were no regional wall motion abnormalities. Wall thickness was within normal limits. Ejection fraction was estimated at 30-35%. IVC size is within normal limits with normal respiratory phasic changes. Signature      ----------------------------------------------------------------   Electronically signed by Hang Ribeiro MD (Interpreting   physician) on 09/14/2022 at 01:12 PM    Cardiac Enzymes:  No results for input(s): CKTOTAL, CKMB, CKMBINDEX, TROPONINI in the last 72 hours.     CBC:   Lab Results   Component Value Date/Time    WBC 9.7 09/15/2022 03:51 AM    RBC 2.86 09/15/2022 03:51 AM    HGB 7.8 09/15/2022 03:51 AM    HCT 24.8 09/15/2022 03:51 AM     09/15/2022 03:51 AM       CMP:    Lab Results   Component Value Date/Time     09/15/2022 03:51 AM    K 4.5 09/15/2022 03:51 AM    K 4.6 09/13/2022 05:48 AM     09/15/2022 03:51 AM    CO2 23 09/15/2022 03:51 AM    BUN 14 09/15/2022 03:51 AM    CREATININE 0.7 09/15/2022 03:51 AM    LABGLOM >90 09/15/2022 03:51 AM    GLUCOSE 120 09/15/2022 03:51 AM    GLUCOSE 258 12/16/2017 08:25 AM    CALCIUM 8.4 09/15/2022 03:51 AM       Hepatic Function Panel:    Lab Results   Component Value Date/Time    ALKPHOS 48 09/13/2022 12:36 PM    ALT 58 09/13/2022 12:36 PM    AST 46 09/13/2022 12:36 PM    PROT 6.4 09/13/2022 12:36 PM    BILITOT 0.3 09/13/2022 12:36 PM    BILIDIR <0.2 09/13/2022 12:36 PM    LABALBU 4.2 09/13/2022 12:36 PM       Magnesium:    Lab Results   Component Value Date/Time    MG 1.9 03/26/2021 12:07 PM       PT/INR:    Lab Results   Component Value Date/Time    INR 1.17 03/05/2022 07:29 AM       HgBA1c:    Lab Results   Component Value Date/Time    LABA1C 6.5 05/12/2022 10:30 AM       FLP:    Lab Results   Component Value Date/Time    TRIG 187 05/12/2022 10:30 AM    HDL 25 05/12/2022 10:30 AM    LDLCALC 40 05/12/2022 10:30 AM       TSH:    Lab Results   Component Value Date/Time    TSH 2.470 09/13/2022 02:38 PM         Assessment:    S/p robotic diallo-en-y gastric bypass 9/12/22  Postoperative hematoma abutting the the surgical line measuring approximately 13.6 x 10.3 x 9.4 cm  Syncope - hx consistent with orthostasis  Hypotension - improving  NICMP - ef 30-35 per TTE 11/2020, ef 30-35 per TTE 9/14/22  Hx AICD 2015  Patent coronaries per cath 2014  Hx HTN  HLP  DM  Morbid obesity  MIKE - on CPAP  LILLIAN - attending following - improved  Urinary retention - urology following  Anemia - s/p transfusion - Attending following      Plan:    Daily I/o and weights  2 liter fluid restriction, 2gm sodium diet  Cont BB, increase toprol xl 50 daily  Hold ace/arb due to hypotension/LILLIAN - consider restarting in next few days depending on bp and kidneys  Monitor for fluid overload/chf  Stop albumin today  Restart home aldactone  Give lasix 20mg po x1 today  ? restart home lasix tomorrow  Will follow       Electronically signed by Karina Garcias PA-C on 9/15/2022 at 9:18 AM

## 2022-09-15 NOTE — PROGRESS NOTES
Patient lying in bed semi-velázquez's with eyes open. Patient is alert and oriented x4. His wife is sitting next to him in the recliner visiting. Call light within reach. Bed in lowest position. Call light and bedside table within reach.  Jared PALMA/

## 2022-09-15 NOTE — CARE COORDINATION
9/15/22, 1:22 PM EDT    DISCHARGE ON GOING [address] day: 2  Location: -[facility] Reason for admit: Class 3 severe obesity with body mass index (BMI) of 45.0 to 49.9 in adult, unspecified obesity type, unspecified whether serious comorbidity present ([facility] [address] [E66.01, Z68.42]  Morbid obesity ([facility] [address] [E66.01]  S/P bariatric surgery [Z98.84]   Procedure:   9/12 Robotic Ronaldo-en-Y Gastric Bypass by Dr. Hardik Waddell. 9/13 [place] UGI: No evidence of obstruction or leak following gastric bypass. 9/13 CXR: 1. Cardiomegaly status post pacemaker placement. 2. Possible small bilateral pleural effusions and basilar and lateral view. 3. Mild thoracic spondylosis. 9/14 Echo: EF 30-35%, Moderate to severely dilated left ventricle and severely reduced systolic function. 9/15 CT abd/pelvis: Hyperdense collection adjacent to the stomach concerning for a hematoma. There is also small amount of free fluid within the pelvis. Barriers to Discharge: Surgery, Hospitalist, Cardiology and Urology following. POD #3. Sinus tachycardia continues, . Orthostatics: lying: /62, ; sitting: /59, ; standing: /58, . Room air, sats 93-95%. Hgb 7.8 this am, transfused a unit of blood, Hgb up to 8.4. Oscimin q4hr prn, diabetes management, Lidocaine patch prn, Zofran iv q6hr, Scopolamine patch q72hr. Pain control. Protonix iv q12hr. Aldactone daily. Toprol XL daily. Per Cardiology, plan to possibly restart home Lasix tomorrow. PCP: Em Sheppard MD  Readmission Risk Score: 13.4%  Patient Goals/Plan/Treatment Preferences: Plans home with wife. Has CPAP at home. Planning home with [facility] at discharge.

## 2022-09-15 NOTE — PLAN OF CARE
Problem: Discharge Planning  Goal: Discharge to home or other facility with appropriate resources  9/15/2022 0157 by Aide Sahni RN  Outcome: Progressing  Flowsheets (Taken 9/15/2022 0157)  Discharge to home or other facility with appropriate resources: Identify barriers to discharge with patient and caregiver     Problem: Pain  Goal: Verbalizes/displays adequate comfort level or baseline comfort level  9/15/2022 0157 by Aide Sahni RN  Outcome: Progressing  Flowsheets  Taken 9/15/2022 0157 by Aide Sahni RN  Verbalizes/displays adequate comfort level or baseline comfort level:   Encourage patient to monitor pain and request assistance   Assess pain using appropriate pain scale  Taken 9/14/2022 1421 by Francie Blanca RN  Verbalizes/displays adequate comfort level or baseline comfort level: Encourage patient to monitor pain and request assistance  Note: Ongoing assessment & interventions provided throughout shift. Reminded patient to report any pain, pressure, or shortness of breath to the nurse. Pain medications provided per physician's orders.       Problem: ABCDS Injury Assessment  Goal: Absence of physical injury  Outcome: Progressing  Flowsheets  Taken 9/15/2022 0157 by Aide Sahni RN  Absence of Physical Injury: Implement safety measures based on patient assessment  Taken 9/14/2022 1339 by Francie Blanca RN  Absence of Physical Injury: Implement safety measures based on patient assessment     Problem: Safety - Adult  Goal: Free from fall injury  9/15/2022 0157 by Aide Sahni RN  Outcome: Progressing  Flowsheets  Taken 9/15/2022 0157 by Aide Sahni RN  Free From Fall Injury:   Instruct family/caregiver on patient safety   Based on caregiver fall risk screen, instruct family/caregiver to ask for assistance with transferring infant if caregiver noted to have fall risk factors  Taken 9/14/2022 1339 by Francie Blanca RN  Free From Fall Injury: Instruct family/caregiver on patient safety  Note: Bed locked & in low and symptoms of infection   Monitor lab/diagnostic results     Problem: Infection - Adult  Goal: Absence of infection during hospitalization  Outcome: Progressing  Flowsheets (Taken 9/15/2022 0157)  Absence of infection during hospitalization:   Assess and monitor for signs and symptoms of infection   Monitor lab/diagnostic results     Problem: Chronic Conditions and Co-morbidities  Goal: Patient's chronic conditions and co-morbidity symptoms are monitored and maintained or improved  Outcome: Progressing  Flowsheets (Taken 9/15/2022 0157)  Care Plan - Patient's Chronic Conditions and Co-Morbidity Symptoms are Monitored and Maintained or Improved: Monitor and assess patient's chronic conditions and comorbid symptoms for stability, deterioration, or improvement     Problem: Cardiovascular - Adult  Goal: Maintains optimal cardiac output and hemodynamic stability  9/15/2022 0157 by Tamara Sagastume RN  Outcome: Progressing  Flowsheets (Taken 9/15/2022 0157)  Maintains optimal cardiac output and hemodynamic stability:   Monitor blood pressure and heart rate   Monitor urine output and notify Licensed Independent Practitioner for values outside of normal range  Note: Ongoing assessment & interventions provided throughout shift. Patient on continuous telemetry monitoring, heart tones, vitals & pulses checked at least 3 times per shift & PRN. Vitals within acceptable limits. Peripheral pulses palpable. Problem: Cardiovascular - Adult  Goal: Absence of cardiac dysrhythmias or at baseline  Outcome: Progressing  Flowsheets (Taken 9/15/2022 0157)  Absence of cardiac dysrhythmias or at baseline:   Monitor cardiac rate and rhythm   Assess for signs of decreased cardiac output  Care plan reviewed with patient. Patient verbalizes understanding of the care plan and contributed to goal setting.

## 2022-09-15 NOTE — PROGRESS NOTES
Patient sitting up in chair and messaging friends and family. Family member present at his side. SBAR given to primary RN Christy. Call light within reach. Tray table within reach.  Jared PALMA/

## 2022-09-15 NOTE — PROGRESS NOTES
[facility] - Dr. Corrine Chavez  Postoperative Progress Note    Pt Name: Emerson Estrada  Medical Record Number: [id number]  Date of Birth 1975   Today's Date: 9/15/2022    ASSESSMENT   POD # 3 Status post robotic diallo-en-y gastric bypass  Postoperative hematoma abutting the the surgical line measuring approximately 13.6 x 10.3 x 9.4 cm  Morbid obesity (BMI 46)  Syncope postoperative - likely from volume depletion  Tachycardia  History of CHF  AICD  Diabetes Mellitus   Acute blood loss anemia secondary to gastric bypass s/p 4 units PRBCs  LILLIAN - resolved  Orthostatic hypotension - improving   Chronic back pain    has a past medical history of CHF (congestive heart failure) ([address] Constipation, Diabetes mellitus ([address] GERD (gastroesophageal reflux disease), Hypercholesteremia, S/P ICD (internal cardiac defibrillator) procedure: 1/15/2015: [employer] Single Chamber, Sleep apnea, and Zoll lifevest applied 8/22/14. PLAN   NPO at this time. On sugar free clear liquids prior to CT. Strict I&Os. CT abdomen this morning for evaluation. Demonstrates postoperative hematoma abutting the the surgical line. Measuring approximately 13.6 x 10.3 x 9.4 cm. No blood products noted down in the pelvis. Small amount of free fluid. Keep ramírez in place for now. Appreciate urology. Pain & nausea control  Orthostatic BPs better. Up to chair today as tolerated. DVT prophylaxis - keep on SCDs. Lovenox on hold due to bleeding post op. Cardiology and hospitalist following. Repeat ECHO. WBC and creat better today. Hemoglobin 8.1 last night. 7.8 this morning and getting 1 more unit PRBC. Had 4 units in total then. Albumin still every 6 hours   Patient looks much better clinically today. Was able to stand at the side of the bed without issues. CT imaging reviewed with patient. At this time bleeding looks to have stopped.  Will monitor hemoglobins but if okay with cardiology possibly return to [facility] today/tomorrow. No surgical intervention at this time. SUBJECTIVE   Patient stable on 3B. VS reviewed. No fevers. Chart reviewed. Updated by nursing staff. Feels better and clinically looks better. No nausea or vomiting. Tolerating clear liquids. Denies chest discomfort or dyspnea. (+) belching, flatus, but no BM. Urine output much better. Louis in place. Only taking pain medication for back pain. Incisions look good. Abdomen obese and rounded but soft. CURRENT MEDICATIONS   Scheduled Meds:   albumin human  25 g IntraVENous Q6H    metoprolol succinate  25 mg Oral Daily    pantoprazole (PROTONIX) 40 mg injection  40 mg IntraVENous Q12H    hyoscyamine  125 mcg Oral Q4H    [Held by provider] irbesartan  75 mg Oral Nightly    insulin lispro  0-4 Units SubCUTAneous TID WC    insulin lispro  0-4 Units SubCUTAneous Nightly    sodium chloride flush  5-40 mL IntraVENous 2 times per day    ondansetron  4 mg IntraVENous Q6H    scopolamine  1 patch TransDERmal Q72H     Continuous Infusions:   sodium chloride      sodium chloride      sodium chloride      dextrose       PRN Meds:.sodium chloride, sodium chloride, lidocaine, diatrizoate meglumine-sodium, simethicone, sodium chloride flush, sodium chloride, morphine **OR** morphine, promethazine, metoclopramide, glucose, dextrose bolus **OR** dextrose bolus, glucagon (rDNA), dextrose  OBJECTIVE   CURRENT VITALS:  height is 6' 3\" (1.905 m) and weight is 383 lb 4.8 oz (173.9 kg) (abnormal). His oral temperature is 98.4 °F (36.9 °C). His blood pressure is 115/64 and his pulse is 104 (abnormal). His respiration is 20 and oxygen saturation is 95%.    Temperature Range (24h):Temp: 98.4 °F (36.9 °C) Temp  Av.3 °F (36.8 °C)  Min: 97.4 °F (36.3 °C)  Max: 98.7 °F (37.1 °C)  BP Range (00Z): Systolic (90PXQ), ZMR:521 , Min:75 , XKQ:415     Diastolic (72QWT), UTV:70, Min:41, Max:69    Pulse Range (24h): Pulse  Av.9  Min: 98  Max: 117  Respiration Range (24h): Resp  Av.6  Min: 16  Max: 20  Current Pulse Ox (24h):  SpO2: 95 %  Pulse Ox Range (24h):  SpO2  Av.6 %  Min: 91 %  Max: 100 %  Oxygen Amount and Delivery: O2 Flow Rate (L/min): 1 L/min  Incentive Spirometry Tx:            GENERAL: looks better, no acute distress, cooperative  LUNGS: diminished bases but no crackles or wheezing  HEART: tachycardia with regular rhythm  ABDOMEN: softly distended, incisional tenderness, bowel sounds active, no peritoneal signs  INCISION: healing well, no significant drainage, no significant erythema  EXTREMITY: no cyanosis, clubbing. No edema. In: 2140 [P.O.:1200; I.V.:10; Blood:930]  Out: 3300 [Urine:3300]  Date 09/15/22 0000 - 09/15/22 2359   Shift 4897-7375 4280-3483 2361-5839 24 Hour Total   INTAKE   Shift Total(mL/kg)       OUTPUT   Urine(mL/kg/hr) 1600   1600   Shift Total(mL/kg) 1600(9.2)   1600(9.2)   Weight (kg) 173.9 173.9 173.9 173.9       LABS     Recent Labs     22  1236 22  1438 22  1721 22  0356 22  1119 22  1437 22  1908 09/15/22  0351   WBC 16.3*  --   --  14.2*  --   --   --  9.7   HGB 10.4*  --    < > 7.7*   < > 8.1* 8.4* 7.8*   HCT 33.4*  --    < > 25.0*   < > 25.1* 26.1* 24.8*     --   --  186  --   --   --  142   NA  --  136  --  137  --   --   --  140   K  --  4.4  --  4.2  --   --   --  4.5   CL  --  104  --  104  --   --   --  106   CO2  --  23  --  25  --   --   --  23   BUN  --  25*  --  33*  --   --   --  14   CREATININE  --  1.6*  --  1.6*  --   --   --  0.7   CALCIUM  --  8.5  --  8.3*  --   --   --  8.4*    < > = values in this interval not displayed. No results for input(s): PTT, INR in the last 72 hours. Invalid input(s): PT  Recent Labs     22  1236   AST 46*   ALT 58   BILITOT 0.3   BILIDIR <0.2       Recent Labs     22  1236   TROPONINT < 0.010       RADIOLOGY     PROCEDURE: CT ABDOMEN PELVIS WO CONTRAST       CLINICAL INFORMATION: 69-year-old male with acute blood loss anemia.  History of gastric bypass. Abdominal pain . COMPARISON: CT 8/14/2019. TECHNIQUE: Axial 5 mm CT images were obtained through the abdomen and pelvis. No contrast was given. Sagittal and coronal reconstructions were obtained. All CT scans at this facility use dose modulation, iterative reconstruction, and/or weight-based dosing when appropriate to reduce radiation dose to as low as reasonably achievable. FINDINGS:   There is a small left-sided and trace right-sided pleural effusion. There are calcified granulomas near the lung bases. The base of the heart is within acceptable limits. Lack of IV contrast limits evaluation of the abdominal organs. The spleen is enlarged measuring 15.8 cm in craniocaudal dimension. The gallbladder, pancreas and adrenal glands are within normal limits. There are postsurgical changes from gastric bypass Ronaldo-en-Y. There is a large collection of hyperdense material adjacent to the incision which may represent blood products. There is no small or large bowel obstruction. There is a hyperdense collection abutting the surgical changes measuring approximately 13.6 x 10.3 x 9.4 cm concerning for a hematoma. The urinary bladder is incompletely distended. The aorta is normal in caliber with no aneurysmal dilatation. The bones are intact. No acute fractures. Impression   Hyperdense collection adjacent to the stomach concerning for a hematoma. There is also small amount of free fluid within the pelvis. Case was discussed by Dr. Jeanne Segovia with [facility] at approximately 7:45 AM 9/15/2022 via telephone. **This report has been created using voice recognition software. It may contain minor errors which are inherent in voice recognition technology. **       Final report electronically signed by Dr Page Found on 9/15/2022 8:06 AM     Electronically signed by JACOB Esparza CNP on 9/15/2022 at 7:28 AM     Patient seen and examined independently by me earlier this AM. Above discussed and I agree with [facility]. See my additional comments below for updated orders and plan. Labs, cultures, and radiographs where available were reviewed. I discussed patient concerns with the patient's nurse and instructions were given. Please see our orders for the updated patient care plan. -CT scan ordered and reviewed. Most likely oozing from gastric remnant staple line. Hemoglobin appears to be stabilizing. Continue to stay off of Lovenox and Toradol. Okay for clear liquids. Cardiology following. Trend hemoglobin and transfuse as needed. Discussed pros and cons of going back to surgery to evacuate hematoma but more concern of possible oozing. If hemoglobin continues to trend down then we will explore surgically. Clinically, looks a lot better this morning. Continue Louis catheter for another 24 hours then most likely discharge tomorrow. Increase activity. Okay for Flexeril/oxycodone for chronic back pain.     Electronically signed by Ani Nino MD on 9/15/22 at 12:21 PM EDT

## 2022-09-15 NOTE — PROGRESS NOTES
09/15/22 1601   Encounter Summary   Encounter Overview/Reason  Initial Encounter   Service Provided For: Patient;Significant other   Referral/Consult From: [facility]   Support System Spouse; Family members;Friends/neighbors   Last Encounter  09/15/22   Complexity of Encounter Low   Begin Time 65   Spiritual/Emotional needs   Type Spiritual Support   Assessment/Intervention/Outcome   Assessment Calm;Coping; Hopeful   Intervention Discussed illness injury and its impact;Nurtured Hope;Prayer (assurance of)/Callender   Outcome Expressed feelings, needs, and concerns;Engaged in conversation;Coping      made an initial visit to introduce self, role and assess spiritual needs. Patient was sitting in his chair with his wife present. Both recollected his hospital stay, with anticipated discharge dates being delayed due to symptoms presenting. They are hopeful for a discharge by Saturday, and stated they have received numerous prayers from family and friends. Prayers are shared and spiritual needs are currently met. Spiritual care remains available.

## 2022-09-15 NOTE — PLAN OF CARE
Problem: Discharge Planning  Goal: Discharge to home or other facility with appropriate resources  9/15/2022 1242 by Dede Thompson RN  Outcome: Progressing  Flowsheets  Taken 9/15/2022 1242  Discharge to home or other facility with appropriate resources: Identify barriers to discharge with patient and caregiver  Taken 9/15/2022 0745  Discharge to home or other facility with appropriate resources: Identify barriers to discharge with patient and caregiver  Note: Possible transfer back to [address] [place] [address] if hemoglobin stabilized. Problem: Pain  Goal: Verbalizes/displays adequate comfort level or baseline comfort level  9/15/2022 1242 by Dede Thompson RN  Outcome: Progressing  Flowsheets (Taken 9/15/2022 1242)  Verbalizes/displays adequate comfort level or baseline comfort level:   Encourage patient to monitor pain and request assistance   Assess pain using appropriate pain scale  Note: Ongoing assessment & interventions provided throughout shift. Reminded patient to report any pain, pressure, or shortness of breath to the nurse. Pain medications provided per physician's orders. Problem: Safety - Adult  Goal: Free from fall injury  9/15/2022 1242 by Dede Thompson RN  Outcome: Progressing  Note: Bed locked & in low position, call light in reach, side-rails up x2, bed/chair alarm utilized, non-slip socks on when ambulating, reminded patient to use call light to call for assistance.        Problem: Gastrointestinal - Adult  Goal: Minimal or absence of nausea and vomiting  9/15/2022 1242 by Dede Thompson RN  Outcome: Progressing  Flowsheets (Taken 9/15/2022 0745)  Minimal or absence of nausea and vomiting: Administer IV fluids as ordered to ensure adequate hydration     Problem: Gastrointestinal - Adult  Goal: Maintains or returns to baseline bowel function  9/15/2022 1242 by Dede Thompson RN  Outcome: Progressing  Flowsheets  Taken 9/15/2022 1242  Maintains or returns to baseline bowel function: Encourage oral fluids to ensure adequate hydration  Taken 9/15/2022 0745  Maintains or returns to baseline bowel function: Assess bowel function  Note: No Bm since surgery. Problem: Cardiovascular - Adult  Goal: Maintains optimal cardiac output and hemodynamic stability  9/15/2022 1242 by Thad Nino RN  Outcome: Progressing  Flowsheets  Taken 9/15/2022 1242  Maintains optimal cardiac output and hemodynamic stability: Monitor blood pressure and heart rate  Taken 9/15/2022 0745  Maintains optimal cardiac output and hemodynamic stability: Monitor blood pressure and heart rate  Note: Negative orthostatics today. 1 unit of blood administered. Care plan reviewed with patient. Patient verbalizes understanding of the care plan and contributed to goal setting.

## 2022-09-15 NOTE — PROGRESS NOTES
Hospitalist Progress Note    Patient:  Laila Perkins    YOB: 1975  Unit/Bed:3B-26/026-A  MRN: [id number]    Acct: [de-identified]   PCP: Harish Pisano MD    Date of Admission: 9/12/2022      Assessment/Plan:    Postop hypotensive episode secondary to acute blood loss. CT abdomen hematoma at surgical site  Elective Ronaldo-en-Y on 9/12. Receiving albumin. Repeat orthostatic improved. consider stopping albumin. Acute normocytic anemia. Secondary to post op bleeding. Monitor and replace. Transfuse to keep hemoglobin above 8. Receiving 4 unit of PRBC. HFrEF with AICD. Repeat echo reveals stable EF 30 to 35%. Patient looks compensated. Transfuse blood products and albumin with caution given the EF. Resumed Aldactone and lasix as per cardiology. Sinus tachycardia. Toprol xl. Stable. Urinary retention likely due to BPH. Likely due to immobility s/p Ronaldo-en-Y. Last bladder scan greater than 500 mL. Mild resistance with Louis likely BPH. Voiding trial before discharge. LILLIAN. Resolved. Multifactorial Likely prerenal complicated by ischemic ATN from anemia and post obstructive from urinary retention. Sleep apnea. CPAP at night  Full code, PT/OT, EPC for DVT prophylaxis. Expected discharge date:  9/15    Disposition:   [x] Home  [] TCU  [] Rehab  [] Psych  [] SNF  [] [facility]  [] Other-    ===================================================================      Chief Complaint: Elective bariatric surgery    Hospital Course: 77-year-old male with past medical history of viral myocarditis leading to dilated nonischemic cardiomyopathy s/p AICD placement, history of morbid obesity, MIKE, A. fib, sinus tachycardia on Coreg came to the hospital for elective Ronaldo-en-Y surgical procedure. Internal medicine was consulted for medical management. Patient underwent procedure with Dr. Isis Santos on 9/12 without any intraoperative or immediate postop complications.   After surgery <0. 2   BILITOT 0.3   ALKPHOS 48       No results for input(s): INR in the last 72 hours. No results for input(s): Corey Pears in the last 72 hours. No results for input(s): PROCAL in the last 72 hours. Lab Results   Component Value Date/Time    NITRU NEGATIVE 09/13/2022 06:40 PM    [address] NONE 09/13/2022 06:40 PM    BACTERIA NONE SEEN 09/13/2022 06:40 PM    RBCUA 0-2 09/13/2022 06:40 PM    BLOODU NEGATIVE 09/13/2022 06:40 PM    SPECGRAV 1.025 09/13/2022 06:40 PM    GLUCOSEU Negative 08/23/2019 10:48 AM       Radiology (48 hours):  XR CHEST (2 VW)    Result Date: 9/13/2022  1. Cardiomegaly status post pacemaker placement. 2. Possible small bilateral pleural effusions and basilar and lateral view. 3. Mild thoracic spondylosis. Cat Bloodgood **This report has been created using voice recognition software. It may contain minor errors which are inherent in voice recognition technology. ** Final report electronically signed by DR Kelsey Rodriguez on 9/13/2022 4:51 PM    [place] [facility]    Result Date: 9/13/2022  No evidence of obstruction or leak following gastric bypass. Final report electronically signed by Dr Emeterio Mars on 9/13/2022 11:00 AM       DVT prophylaxis:    [] Lovenox  [x] SCDs  [] SQ Heparin  [] Encourage ambulation   [] Already on Anticoagulation       Diet: BARIATRIC DIET;  Bariatric Clear Liquid  Code Status: Full Code  Tele: reviewed   IVF: albumin    Electronically signed by Obie Cerrato MD on 9/15/2022 at 11:25 AM

## 2022-09-16 VITALS
OXYGEN SATURATION: 96 % | BODY MASS INDEX: 39.17 KG/M2 | SYSTOLIC BLOOD PRESSURE: 119 MMHG | RESPIRATION RATE: 16 BRPM | HEIGHT: 75 IN | TEMPERATURE: 97.8 F | HEART RATE: 100 BPM | WEIGHT: 315 LBS | DIASTOLIC BLOOD PRESSURE: 61 MMHG

## 2022-09-16 LAB
ANION GAP SERPL CALCULATED.3IONS-SCNC: 12 MEQ/L (ref 8–16)
BUN BLDV-MCNC: 11 MG/DL (ref 7–22)
CALCIUM SERPL-MCNC: 8.8 MG/DL (ref 8.5–10.5)
CHLORIDE BLD-SCNC: 104 MEQ/L (ref 98–111)
CO2: 24 MEQ/L (ref 23–33)
CREAT SERPL-MCNC: 0.8 MG/DL (ref 0.4–1.2)
EKG ATRIAL RATE: 113 BPM
EKG P AXIS: 42 DEGREES
EKG P-R INTERVAL: 190 MS
EKG Q-T INTERVAL: 336 MS
EKG QRS DURATION: 102 MS
EKG QTC CALCULATION (BAZETT): 460 MS
EKG R AXIS: -9 DEGREES
EKG T AXIS: 16 DEGREES
EKG VENTRICULAR RATE: 113 BPM
ERYTHROCYTE [DISTWIDTH] IN BLOOD BY AUTOMATED COUNT: 16.4 % (ref 11.5–14.5)
ERYTHROCYTE [DISTWIDTH] IN BLOOD BY AUTOMATED COUNT: 50.8 FL (ref 35–45)
GFR SERPL CREATININE-BSD FRML MDRD: > 90 ML/MIN/1.73M2
GLUCOSE BLD-MCNC: 119 MG/DL (ref 70–108)
GLUCOSE BLD-MCNC: 120 MG/DL (ref 70–108)
GLUCOSE BLD-MCNC: 123 MG/DL (ref 70–108)
HCT VFR BLD CALC: 26.8 % (ref 42–52)
HCT VFR BLD CALC: 27.7 % (ref 42–52)
HEMOGLOBIN: 8.6 GM/DL (ref 14–18)
HEMOGLOBIN: 8.8 GM/DL (ref 14–18)
MCH RBC QN AUTO: 27.7 PG (ref 26–33)
MCHC RBC AUTO-ENTMCNC: 32.1 GM/DL (ref 32.2–35.5)
MCV RBC AUTO: 86.5 FL (ref 80–94)
PLATELET # BLD: 148 THOU/MM3 (ref 130–400)
PMV BLD AUTO: 9.6 FL (ref 9.4–12.4)
POTASSIUM SERPL-SCNC: 4.2 MEQ/L (ref 3.5–5.2)
RBC # BLD: 3.1 MILL/MM3 (ref 4.7–6.1)
SODIUM BLD-SCNC: 140 MEQ/L (ref 135–145)
WBC # BLD: 9.3 THOU/MM3 (ref 4.8–10.8)

## 2022-09-16 PROCEDURE — 82948 REAGENT STRIP/BLOOD GLUCOSE: CPT

## 2022-09-16 PROCEDURE — 85027 COMPLETE CBC AUTOMATED: CPT

## 2022-09-16 PROCEDURE — A4216 STERILE WATER/SALINE, 10 ML: HCPCS | Performed by: NURSE PRACTITIONER

## 2022-09-16 PROCEDURE — 6370000000 HC RX 637 (ALT 250 FOR IP): Performed by: PHYSICIAN ASSISTANT

## 2022-09-16 PROCEDURE — 80048 BASIC METABOLIC PNL TOTAL CA: CPT

## 2022-09-16 PROCEDURE — 85018 HEMOGLOBIN: CPT

## 2022-09-16 PROCEDURE — 6370000000 HC RX 637 (ALT 250 FOR IP): Performed by: NURSE PRACTITIONER

## 2022-09-16 PROCEDURE — 36415 COLL VENOUS BLD VENIPUNCTURE: CPT

## 2022-09-16 PROCEDURE — 85014 HEMATOCRIT: CPT

## 2022-09-16 PROCEDURE — 6370000000 HC RX 637 (ALT 250 FOR IP): Performed by: STUDENT IN AN ORGANIZED HEALTH CARE EDUCATION/TRAINING PROGRAM

## 2022-09-16 PROCEDURE — 99024 POSTOP FOLLOW-UP VISIT: CPT | Performed by: NURSE PRACTITIONER

## 2022-09-16 PROCEDURE — 6360000002 HC RX W HCPCS: Performed by: SURGERY

## 2022-09-16 PROCEDURE — 2580000003 HC RX 258: Performed by: NURSE PRACTITIONER

## 2022-09-16 PROCEDURE — 93010 ELECTROCARDIOGRAM REPORT: CPT | Performed by: INTERNAL MEDICINE

## 2022-09-16 PROCEDURE — 99222 1ST HOSP IP/OBS MODERATE 55: CPT | Performed by: STUDENT IN AN ORGANIZED HEALTH CARE EDUCATION/TRAINING PROGRAM

## 2022-09-16 PROCEDURE — 6360000002 HC RX W HCPCS: Performed by: NURSE PRACTITIONER

## 2022-09-16 PROCEDURE — C9113 INJ PANTOPRAZOLE SODIUM, VIA: HCPCS | Performed by: NURSE PRACTITIONER

## 2022-09-16 PROCEDURE — 2580000003 HC RX 258: Performed by: SURGERY

## 2022-09-16 RX ORDER — ONDANSETRON 2 MG/ML
4 INJECTION INTRAMUSCULAR; INTRAVENOUS EVERY 6 HOURS PRN
Status: DISCONTINUED | OUTPATIENT
Start: 2022-09-16 | End: 2022-09-16 | Stop reason: HOSPADM

## 2022-09-16 RX ORDER — METOPROLOL SUCCINATE 25 MG/1
75 TABLET, EXTENDED RELEASE ORAL DAILY
Qty: 30 TABLET | Refills: 3 | Status: SHIPPED | OUTPATIENT
Start: 2022-09-16 | End: 2022-10-06 | Stop reason: SDUPTHER

## 2022-09-16 RX ORDER — INSULIN LISPRO 100 [IU]/ML
INJECTION, SOLUTION INTRAVENOUS; SUBCUTANEOUS
Qty: 135 ML | Refills: 3
Start: 2022-09-16

## 2022-09-16 RX ORDER — HYOSCYAMINE SULFATE 0.125 MG
125 TABLET,DISINTEGRATING ORAL EVERY 4 HOURS PRN
Qty: 30 TABLET | Refills: 0 | Status: SHIPPED | OUTPATIENT
Start: 2022-09-16 | End: 2022-09-27

## 2022-09-16 RX ORDER — FUROSEMIDE 40 MG/1
40 TABLET ORAL DAILY
Status: DISCONTINUED | OUTPATIENT
Start: 2022-09-16 | End: 2022-09-16 | Stop reason: HOSPADM

## 2022-09-16 RX ORDER — OXYCODONE HYDROCHLORIDE 5 MG/1
5 TABLET ORAL EVERY 6 HOURS PRN
Qty: 25 TABLET | Refills: 0 | Status: SHIPPED | OUTPATIENT
Start: 2022-09-16 | End: 2022-09-23

## 2022-09-16 RX ADMIN — ONDANSETRON 4 MG: 2 INJECTION INTRAMUSCULAR; INTRAVENOUS at 05:58

## 2022-09-16 RX ADMIN — FLUTICASONE PROPIONATE 2 SPRAY: 50 SPRAY, METERED NASAL at 09:23

## 2022-09-16 RX ADMIN — MORPHINE SULFATE 2 MG: 2 INJECTION, SOLUTION INTRAMUSCULAR; INTRAVENOUS at 08:27

## 2022-09-16 RX ADMIN — SODIUM CHLORIDE, PRESERVATIVE FREE 10 ML: 5 INJECTION INTRAVENOUS at 09:15

## 2022-09-16 RX ADMIN — FUROSEMIDE 40 MG: 40 TABLET ORAL at 10:18

## 2022-09-16 RX ADMIN — SPIRONOLACTONE 25 MG: 25 TABLET ORAL at 09:21

## 2022-09-16 RX ADMIN — SODIUM CHLORIDE, PRESERVATIVE FREE 40 MG: 5 INJECTION INTRAVENOUS at 05:17

## 2022-09-16 RX ADMIN — CYCLOBENZAPRINE 10 MG: 10 TABLET, FILM COATED ORAL at 05:58

## 2022-09-16 RX ADMIN — METOPROLOL SUCCINATE 75 MG: 25 TABLET, FILM COATED, EXTENDED RELEASE ORAL at 09:22

## 2022-09-16 RX ADMIN — OXYCODONE 5 MG: 5 TABLET ORAL at 12:26

## 2022-09-16 ASSESSMENT — PAIN SCALES - GENERAL
PAINLEVEL_OUTOF10: 6
PAINLEVEL_OUTOF10: 8
PAINLEVEL_OUTOF10: 5
PAINLEVEL_OUTOF10: 8

## 2022-09-16 ASSESSMENT — PAIN DESCRIPTION - DESCRIPTORS
DESCRIPTORS: ACHING
DESCRIPTORS: ACHING;DISCOMFORT
DESCRIPTORS: THROBBING

## 2022-09-16 ASSESSMENT — PAIN DESCRIPTION - LOCATION
LOCATION: CHEST;INCISION;BACK
LOCATION: BACK
LOCATION: ABDOMEN

## 2022-09-16 ASSESSMENT — PAIN DESCRIPTION - ORIENTATION
ORIENTATION: MID
ORIENTATION: MID;LOWER
ORIENTATION: MID

## 2022-09-16 ASSESSMENT — PAIN - FUNCTIONAL ASSESSMENT
PAIN_FUNCTIONAL_ASSESSMENT: PREVENTS OR INTERFERES SOME ACTIVE ACTIVITIES AND ADLS
PAIN_FUNCTIONAL_ASSESSMENT: PREVENTS OR INTERFERES SOME ACTIVE ACTIVITIES AND ADLS

## 2022-09-16 ASSESSMENT — PAIN DESCRIPTION - FREQUENCY: FREQUENCY: CONTINUOUS

## 2022-09-16 ASSESSMENT — PAIN DESCRIPTION - ONSET: ONSET: GRADUAL

## 2022-09-16 ASSESSMENT — PAIN DESCRIPTION - PAIN TYPE: TYPE: CHRONIC PAIN

## 2022-09-16 NOTE — PROGRESS NOTES
Discussed discharge summary with patient. Instructed patient about medications & follow up appointments. Instructed patient to call PCP on Monday to make appointment due to the office being closed. Patient denies any additional questions. Patient was discharged with all belongings. No distress noted. Patient discharged to home with [facility] home health. Taken down to the vehicle by this RN per wheelchair. Meds to Beds delivered medications.

## 2022-09-16 NOTE — PLAN OF CARE
Problem: Discharge Planning  Goal: Discharge to home or other facility with appropriate resources  9/16/2022 0210 by Erasmo Hannah RN  Outcome: Progressing  Flowsheets (Taken 9/16/2022 0210)  Discharge to home or other facility with appropriate resources:   Identify barriers to discharge with patient and caregiver   Arrange for needed discharge resources and transportation as appropriate     Problem: Pain  Goal: Verbalizes/displays adequate comfort level or baseline comfort level  9/16/2022 0210 by Erasmo Hannah RN  Outcome: Progressing  Flowsheets (Taken 9/16/2022 0210)  Verbalizes/displays adequate comfort level or baseline comfort level:   Encourage patient to monitor pain and request assistance   Assess pain using appropriate pain scale     Problem: ABCDS Injury Assessment  Goal: Absence of physical injury  Outcome: Progressing  Flowsheets (Taken 9/16/2022 0210)  Absence of Physical Injury: Implement safety measures based on patient assessment     Problem: Safety - Adult  Goal: Free from fall injury  9/16/2022 0210 by Erasmo Hannah RN  Outcome: Progressing  Flowsheets (Taken 9/16/2022 0210)  Free From Fall Injury:   Instruct family/caregiver on patient safety   Based on caregiver fall risk screen, instruct family/caregiver to ask for assistance with transferring infant if caregiver noted to have fall risk factors     Problem: Musculoskeletal - Adult  Goal: Return mobility to safest level of function  Outcome: Progressing  Flowsheets (Taken 9/16/2022 0210)  Return Mobility to Safest Level of Function:   Assess patient stability and activity tolerance for standing, transferring and ambulating with or without assistive devices   Assist with transfers and ambulation using safe patient handling equipment as needed     Problem: Musculoskeletal - Adult  Goal: Maintain proper alignment of affected body part  Outcome: Progressing  Flowsheets (Taken 9/16/2022 0210)  Maintain proper alignment of affected body part: Support and protect limb and body alignment per provider's orders   Instruct and reinforce with patient and family use of appropriate assistive device and precautions (e.g. spinal or hip dislocation precautions)     Problem: Musculoskeletal - Adult  Goal: Return ADL status to a safe level of function  Outcome: Progressing  Flowsheets (Taken 9/16/2022 0210)  Return ADL Status to a Safe Level of Function:   Assess activities of daily living deficits and provide assistive devices as needed   Administer medication as ordered     Problem: Gastrointestinal - Adult  Goal: Minimal or absence of nausea and vomiting  9/16/2022 0210 by Harsha Montes RN  Outcome: Progressing  Flowsheets (Taken 9/16/2022 0210)  Minimal or absence of nausea and vomiting:   Administer IV fluids as ordered to ensure adequate hydration   Nasogastric tube to low intermittent suction as ordered     Problem: Gastrointestinal - Adult  Goal: Maintains or returns to baseline bowel function  9/16/2022 0210 by Harsha Montes RN  Outcome: Progressing  Flowsheets (Taken 9/16/2022 0210)  Maintains or returns to baseline bowel function:   Assess bowel function   Administer IV fluids as ordered to ensure adequate hydration     Problem: Infection - Adult  Goal: Absence of infection at discharge  Outcome: Progressing  Flowsheets (Taken 9/16/2022 0210)  Absence of infection at discharge:   Assess and monitor for signs and symptoms of infection   Monitor lab/diagnostic results     Problem: Infection - Adult  Goal: Absence of infection during hospitalization  Outcome: Progressing  Flowsheets (Taken 9/16/2022 0210)  Absence of infection during hospitalization:   Assess and monitor for signs and symptoms of infection   Monitor lab/diagnostic results     Problem: Chronic Conditions and Co-morbidities  Goal: Patient's chronic conditions and co-morbidity symptoms are monitored and maintained or improved  Outcome: Progressing  Flowsheets (Taken 9/16/2022 0210)  Care Plan - Patient's Chronic Conditions and Co-Morbidity Symptoms are Monitored and Maintained or Improved:   Monitor and assess patient's chronic conditions and comorbid symptoms for stability, deterioration, or improvement   Collaborate with multidisciplinary team to address chronic and comorbid conditions and prevent exacerbation or deterioration     Problem: Cardiovascular - Adult  Goal: Maintains optimal cardiac output and hemodynamic stability  9/16/2022 0210 by Vern Flower RN  Outcome: Progressing  Flowsheets (Taken 9/16/2022 0210)  Maintains optimal cardiac output and hemodynamic stability:   Monitor blood pressure and heart rate   Monitor urine output and notify Licensed Independent Practitioner for values outside of normal range     Problem: Cardiovascular - Adult  Goal: Absence of cardiac dysrhythmias or at baseline  Outcome: Progressing  Flowsheets (Taken 9/16/2022 0210)  Absence of cardiac dysrhythmias or at baseline:   Monitor cardiac rate and rhythm   Assess for signs of decreased cardiac output  Care plan reviewed with patient. Patient verbalizes understanding of the care plan and contributed to goal setting.

## 2022-09-16 NOTE — PROGRESS NOTES
[facility] - Dr. Bird Chavez  Postoperative Progress Note    Pt Name: Darren Jameson  Medical Record Number: [id number]  Date of Birth 1975   Today's Date: 9/16/2022    ASSESSMENT   POD # 4 Status post robotic diallo-en-y gastric bypass  Postoperative hematoma abutting the the surgical line measuring approximately 13.6 x 10.3 x 9.4 cm  Morbid obesity (BMI 46)  Syncope postoperative - likely from volume depletion  Tachycardia - resolved  History of CHF  AICD  Diabetes Mellitus   Acute blood loss anemia secondary to gastric bypass s/p 4 units PRBCs - stable  LILLIAN - resolved  Orthostatic hypotension - resolved  Chronic back pain    has a past medical history of CHF (congestive heart failure) ([address] Constipation, Diabetes mellitus ([address] GERD (gastroesophageal reflux disease), Hypercholesteremia, S/P ICD (internal cardiac defibrillator) procedure: 1/15/2015: [employer] Single Chamber, Sleep apnea, and Zoll lifevest applied 8/22/14. PLAN   Sugar free clears with protein shakes BID. Strict I&Os. CT abdomen demonstrated postoperative hematoma abutting the the surgical line. Measuring approximately 13.6 x 10.3 x 9.4 cm. Remove ramírez for void trial  Pain & nausea control  Up ad geovanny now   DVT prophylaxis - keep on SCDs. Lovenox on hold due to bleeding post op. Cardiology and hospitalist following. Appreciate assistance. Labs reivewed. WBC better. Hemoglobin stable this morning at 8.6. Repeat at 1500. Looks really good this morning. Up moving and blood pressures have been stable. Okay with transfer to [address] [place] [address] if okay with cardiology. If labs good today then possible discharge later today/tonight. Follow up in 1 week. SUBJECTIVE   Patient stable on 3B. VS reviewed. No fevers. Chart reviewed. No nausea or vomiting. Tolerating clear liquids. Denies chest discomfort or dyspnea. (+) belching, flatus, but no BM. Urine output much better. Ramírez in place.  Only taking pain medication for back pain. Incisions look good. Abdomen obese and rounded but soft. Up moving without lightheadedness or dizziness. Getting ramírez out this morning. CURRENT MEDICATIONS   Scheduled Meds:   metoprolol succinate  50 mg Oral Daily    fluticasone  2 spray Each Nostril Daily    spironolactone  25 mg Oral Daily    pantoprazole (PROTONIX) 40 mg injection  40 mg IntraVENous Q12H    [Held by provider] irbesartan  75 mg Oral Nightly    insulin lispro  0-4 Units SubCUTAneous TID WC    insulin lispro  0-4 Units SubCUTAneous Nightly    sodium chloride flush  5-40 mL IntraVENous 2 times per day    scopolamine  1 patch TransDERmal Q72H     Continuous Infusions:   sodium chloride      sodium chloride      sodium chloride      dextrose       PRN Meds:.ondansetron, sodium chloride, muscle rub, hyoscyamine, cyclobenzaprine, oxyCODONE, sodium chloride, lidocaine, diatrizoate meglumine-sodium, simethicone, sodium chloride flush, sodium chloride, morphine **OR** morphine, promethazine, metoclopramide, glucose, dextrose bolus **OR** dextrose bolus, glucagon (rDNA), dextrose  OBJECTIVE   CURRENT VITALS:  height is 6' 3\" (1.905 m) and weight is 383 lb 11.2 oz (174 kg) (abnormal). His oral temperature is 97.5 °F (36.4 °C). His blood pressure is 125/70 and his pulse is 94. His respiration is 18 and oxygen saturation is 96%. Temperature Range (24h):Temp: 97.5 °F (36.4 °C) Temp  Av.3 °F (36.8 °C)  Min: 97.5 °F (36.4 °C)  Max: 98.7 °F (37.1 °C)  BP Range (24F): Systolic (40LVT), BCU:021 , Min:106 , TJK:487     Diastolic (92TGS), AIB:04, Min:61, Max:72    Pulse Range (24h): Pulse  Av.4  Min: 94  Max: 108  Respiration Range (24h): Resp  Av.4  Min: 18  Max: 24  Current Pulse Ox (24h):  SpO2: 96 %  Pulse Ox Range (24h):  SpO2  Av.8 %  Min: 93 %  Max: 96 %  Oxygen Amount and Delivery: O2 Flow Rate (L/min): 1 L/min  Incentive Spirometry Tx:        Achieved Volume (mL): 1000 mL    GENERAL: looks better, no acute distress, cooperative  LUNGS: diminished bases but no crackles or wheezing  HEART: tachycardia with regular rhythm  ABDOMEN: soft, non-distended, incisional tenderness, bowel sounds active, no peritoneal signs, ecchymosis   INCISION: healing well, no significant drainage, no significant erythema  EXTREMITY: no cyanosis, clubbing. No edema. In: 9[address] [P.O.:560; Blood:310]  Out: [address]     Recent Labs     09/14/22  0356 09/14/22  1119 09/15/22  0351 09/15/22  1213 09/15/22  1917 09/16/22  0530   WBC 14.2*  --  9.7  --   --  9.3   HGB 7.7*   < > 7.8* 8.4* 8.6* 8.6*   HCT 25.0*   < > 24.8* 26.9* 26.6* 26.8*     --  142  --   --  148     --  140  --   --  140   K 4.2  --  4.5  --   --  4.2     --  106  --   --  104   CO2 25  --  23  --   --  24   BUN 33*  --  14  --   --  11   CREATININE 1.6*  --  0.7  --   --  0.8   CALCIUM 8.3*  --  8.4*  --   --  8.8    < > = values in this interval not displayed. No results for input(s): PTT, INR in the last 72 hours. Invalid input(s): PT  Recent Labs     09/13/22  1236   AST 46*   ALT 58   BILITOT 0.3   BILIDIR <0.2       Recent Labs     09/13/22  1236   TROPONINT < 0.010       RADIOLOGY     PROCEDURE: CT ABDOMEN PELVIS WO CONTRAST       CLINICAL INFORMATION: 42-year-old male with acute blood loss anemia. History of gastric bypass. Abdominal pain . COMPARISON: CT 8/14/2019. TECHNIQUE: Axial 5 mm CT images were obtained through the abdomen and pelvis. No contrast was given. Sagittal and coronal reconstructions were obtained. All CT scans at this facility use dose modulation, iterative reconstruction, and/or weight-based dosing when appropriate to reduce radiation dose to as low as reasonably achievable. FINDINGS:   There is a small left-sided and trace right-sided pleural effusion. There are calcified granulomas near the lung bases. The base of the heart is within acceptable limits.        Lack of IV contrast limits evaluation of the abdominal organs. The spleen is enlarged measuring 15.8 cm in craniocaudal dimension. The gallbladder, pancreas and adrenal glands are within normal limits. There are postsurgical changes from gastric bypass Ronaldo-en-Y. There is a large collection of hyperdense material adjacent to the incision which may represent blood products. There is no small or large bowel obstruction. There is a hyperdense collection abutting the surgical changes measuring approximately 13.6 x 10.3 x 9.4 cm concerning for a hematoma. The urinary bladder is incompletely distended. The aorta is normal in caliber with no aneurysmal dilatation. The bones are intact. No acute fractures. Impression   Hyperdense collection adjacent to the stomach concerning for a hematoma. There is also small amount of free fluid within the pelvis. Case was discussed by Dr. David Becerra with Gomez Bello at approximately 7:45 AM 9/15/2022 via telephone. **This report has been created using voice recognition software. It may contain minor errors which are inherent in voice recognition technology. **       Final report electronically signed by Dr Cinda Early on 9/15/2022 8:06 AM     Electronically signed by JACOB Nguyen CNP on 9/16/2022 at 7:40 AM     Patient seen and examined independently by me earlier this AM. Above discussed and I agree with Gomez Bello CNP. See my additional comments below for updated orders and plan. Labs, cultures, and radiographs where available were reviewed. I discussed patient concerns with the patient's nurse and instructions were given. Please see our orders for the updated patient care plan. -Hemoglobin appears to be stabilizing. No signs of continued active bleeding. Lightheaded and dizziness has resolved. Increasing activity and ambulation much better. Tolerating clear liquids. Okay to start protein shakes.   Tachycardia has resolved. Kidney function returned back to normal.  Urine output adequate. Remove Louis catheter. Chronic back pain improved. Hopefully home in the next 24-48 hours.     Electronically signed by Joss Streeter MD on 9/16/22 at 9:27 AM EDT

## 2022-09-16 NOTE — DISCHARGE INSTRUCTIONS
adequate fluid intake and no results with Colace and MiraLAX, may use magnesium citrate 1 bottle as needed. Poor over ice and sip at slowly over several hours. WOUND/DRESSING INSTRUCTIONS:  Always ensure you and your care giver clean hands before and after caring for the wound. [x] Allow steri-strips to fall off on their own. [x] Ice operative site for 20 minutes 4 times a day as needed. [x] May wash over incision in shower, but do not soak in a bath. ABDOMINAL/LAPAROSCOPIC SURGERY  [x]You are encouraged to get up and move around as this helps with circulation, prevents blood clots from forming and speeds up the healing process. Call the office if you develop pain or swelling in your legs. Do not massage sore muscles in the legs. [x]Breath deeply and cough from time to time. This helps to clear your lungs and helps prevent pneumonia. [x]Supporting your incision with a pillow or your hand helps to minimize discomfort and pain. [x]Laparoscopic patients may develop shoulder pain in the first 48 hours from the gas used during the procedure a heating pad may help alleviate this discomfort. FOLLOW-UP CARE. SPECIFICALLY WATCH FOR:   Fever over 101 degrees by mouth   Increased redness, warmth, hardness at operative site. Blood soaked dressing (small amounts of oozing may be normal.)   Increased or progressive drainage from the surgical area   Inability to urinate or blood in the urine   Pain not relieved by the medications ordered   Persistent nausea and/or vomiting, unable to retain fluids. Pain or swelling in your legs. Shortness of breath. Call the office if you develop any of the above symptoms. FOLLOW-UP APPOINTMENT   [x]1 week: in weight management   []2 weeks:    []Other    Call my office if you have any problem that concerns you [phone]. After hours, you can reach the answering service via the office phone number.  IF YOU NEED IMMEDIATE ATTENTION, GO TO THE EMERGENCY ROOM AND YOUR DOCTOR WILL BE CONTACTED. Prepared by Dawit Pereira CNP for  Nyasia Obando MD  [phone] [facility], [address]  Electronically signed 9/16/2022 at 2:13 PM       F/u with Dr Timothy Olivier office in 2 weeks. You may receive a survey regarding the care you received during your visit. Your input is valuable to us. We encourage you to complete and return your survey. We hope you will choose us in the future for your healthcare needs.     Continue:  Continue current medications  Daily weights and record  Fluid restriction of 2 Liters per day  Limit sodium in diet to around 5325-7701 mg/day  Monitor BP  Activity as tolerated     Call the [facility] for any of the following symptoms: [phone]  Weight gain of 2-3 pounds in 1 day or 5 pounds in 1 week  Increased shortness of breath  Shortness of breath while laying down  Cough  Chest pain  Swelling in feet, ankles or legs  Tenderness or bloating in the abdomen  Fatigue   Decreased appetite or nausea   Confusion

## 2022-09-16 NOTE — PROGRESS NOTES
Hospitalist Progress Note    Patient:  Delmis Kerns    YOB: 1975  Unit/Bed:3B-26/026-A  MRN: [id number]    Acct: [de-identified]   PCP: Ankit Kimball MD    Date of Admission: 9/12/2022      Assessment/Plan:    Postop hypotensive episode secondary to acute blood loss. Resolved. CT abdomen hematoma at surgical site  Elective Ronaldo-en-Y on 9/12. Acute normocytic anemia. Improving- Secondary to post op bleeding. Monitor and replace. Transfuse to keep hemoglobin above 8. Receiving 4 unit of PRBC. HFrEF with AICD. Repeat echo reveals stable EF 30 to 35%. Patient looks compensated. Transfuse blood products and albumin with caution given the EF. Aldactone and lasix as per cardiology. Sinus tachycardia. Toprol xl. Stable. Urinary retention likely due to BPH. Likely due to immobility s/p Ronaldo-en-Y. Last bladder scan greater than 500 mL. Mild resistance with Louis likely BPH. Voiding trial before discharge. LILLIAN. Resolved. Sleep apnea. CPAP at night  Full code, PT/OT, EPC for DVT prophylaxis. Expected discharge date:  9/15    Disposition:   [x] Home  [] TCU  [] Rehab  [] Psych  [] SNF  [] [place]  [] Other-    ===================================================================      Chief Complaint: Elective bariatric surgery    Hospital Course: 80-year-old male with past medical history of viral myocarditis leading to dilated nonischemic cardiomyopathy s/p AICD placement, history of morbid obesity, MIKE, A. fib, sinus tachycardia on Coreg came to the hospital for elective Ronaldo-en-Y surgical procedure. Internal medicine was consulted for medical management. Patient underwent procedure with Dr. Kevin Hudson on 9/12 without any intraoperative or immediate postop complications. After surgery patient had hypotensive episode. While going to the restroom he became dizzy and lightheaded. As per chart review he also became diaphoretic and pale.   Patient orthostatic vitals were positive. Cardiology was consulted given the history of nonischemic cardiomyopathy. 24 hours  Patient was seen and examined  No acute issues overnight  Patient denied any abdominal pain nausea, vomiting. Patient switched to Toprol-XL receiving blood to keep hemoglobin above 8 as per cardiology. 9/15  Patient was seen and examined  No acute issues overnight   Patient denied any SOB or abdominal pain  Feels good compare to yesterday. Blood pressure improved, mildly tachycardiac. Saturating well on room air    CT 9/15  Hyperdense collection adjacent to the stomach concerning for a hematoma. There is also small amount of free fluid within the pelvis. 9/16  Patient seen and examined  No acute issues overnight   Labs and vitals reviewed, mild tachycardiac   Hemoglobin stable         ROS: reviewed complete ROS unchanged unless otherwise stated in hospital course/subjective portion.        Medications:  Reviewed    Infusion Medications    sodium chloride      sodium chloride      sodium chloride      dextrose       Scheduled Medications    metoprolol succinate  75 mg Oral Daily    furosemide  40 mg Oral Daily    fluticasone  2 spray Each Nostril Daily    spironolactone  25 mg Oral Daily    pantoprazole (PROTONIX) 40 mg injection  40 mg IntraVENous Q12H    insulin lispro  0-4 Units SubCUTAneous TID WC    insulin lispro  0-4 Units SubCUTAneous Nightly    sodium chloride flush  5-40 mL IntraVENous 2 times per day    scopolamine  1 patch TransDERmal Q72H     PRN Meds: ondansetron, sodium chloride, muscle rub, hyoscyamine, cyclobenzaprine, oxyCODONE, sodium chloride, lidocaine, diatrizoate meglumine-sodium, simethicone, sodium chloride flush, sodium chloride, morphine **OR** morphine, promethazine, metoclopramide, glucose, dextrose bolus **OR** dextrose bolus, glucagon (rDNA), dextrose        Intake/Output Summary (Last 24 hours) at 9/16/2022 1334  Last data filed at 9/16/2022 1221  Gross per 24 hour   Intake 10 ml   Output 1560 ml   Net -1550 ml         Exam:  BP (!) 107/56   Pulse 99   Temp 98.6 °F (37 °C) (Oral)   Resp 19   Ht 6' 3\" (1.905 m)   Wt (!) 383 lb 11.2 oz (174 kg)   SpO2 93%   BMI 47.96 kg/m²     General: No distress, appears stated age. Morbidly obese. Eyes:  PERRL. Conjunctivae/corneas clear. HENT: Head normal appearing. Nares normal. Oral mucosa moist.  Hearing intact. Neck: Supple, with full range of motion. Trachea midline. No gross JVD appreciated. Respiratory:  Normal effort. Clear to auscultation, without rales or wheezes or rhonchi. Cardiovascular: Normal rate, regular rhythm with normal S1/S2 without murmurs. No lower extremity edema. Abdomen: Soft, non-tender, non-distended with normal bowel sounds. Surgical site intact  Musculoskeletal: No joint swelling or tenderness. Normal tone. No abnormal movements. Skin: Warm and dry. No rashes or lesions. Neurologic:  No focal sensory/motor deficits in the upper or lower extremities. Cranial nerves:  grossly non-focal 2-12. Psychiatric: Alert and oriented, normal insight and thought content. Capillary Refill: Brisk,< 3 seconds. Peripheral Pulses: +2 palpable, equal bilaterally. Labs:   Recent Labs     09/14/22  0356 09/14/22  1119 09/15/22  0351 09/15/22  1213 09/15/22  1917 09/16/22  0530   WBC 14.2*  --  9.7  --   --  9.3   HGB 7.7*   < > 7.8* 8.4* 8.6* 8.6*   HCT 25.0*   < > 24.8* 26.9* 26.6* 26.8*     --  142  --   --  148    < > = values in this interval not displayed. Recent Labs     09/14/22  0356 09/15/22  0351 09/16/22  0530    140 140   K 4.2 4.5 4.2    106 104   CO2 25 23 24   BUN 33* 14 11   CREATININE 1.6* 0.7 0.8   CALCIUM 8.3* 8.4* 8.8       No results for input(s): AST, ALT, BILIDIR, BILITOT, ALKPHOS in the last 72 hours. No results for input(s): INR in the last 72 hours. No results for input(s): Urszula Height in the last 72 hours.   No results for input(s): PROCAL in the last 72 hours. Lab Results   Component Value Date/Time    NITRU NEGATIVE 09/13/2022 06:40 PM    [address] NONE 09/13/2022 06:40 PM    BACTERIA NONE SEEN 09/13/2022 06:40 PM    RBCUA 0-2 09/13/2022 06:40 PM    BLOODU NEGATIVE 09/13/2022 06:40 PM    SPECGRAV 1.025 09/13/2022 06:40 PM    GLUCOSEU Negative 08/23/2019 10:48 AM       Radiology (48 hours):  XR CHEST (2 VW)    Result Date: 9/13/2022  1. Cardiomegaly status post pacemaker placement. 2. Possible small bilateral pleural effusions and basilar and lateral view. 3. Mild thoracic spondylosis. Cat Bloodgood **This report has been created using voice recognition software. It may contain minor errors which are inherent in voice recognition technology. ** Final report electronically signed by DR Kelsey Rodriguez on 9/13/2022 4:51 PM    [place] UGI    Result Date: 9/13/2022  No evidence of obstruction or leak following gastric bypass. Final report electronically signed by Dr Emeterio Mars on 9/13/2022 11:00 AM       DVT prophylaxis:    [] Lovenox  [x] SCDs  [] SQ Heparin  [] Encourage ambulation   [] Already on Anticoagulation       Diet: BARIATRIC DIET; Bariatric Clear Liquid; No carbonated drinks!   ADULT ORAL NUTRITION SUPPLEMENT; Breakfast, Lunch; Standard High Calorie/High Protein Oral Supplement  Code Status: Full Code  Tele: reviewed   IVF: none     Electronically signed by Obie Cerrato MD on 9/16/2022 at 1:34 PM

## 2022-09-16 NOTE — PROGRESS NOTES
Cardiology Progress Note      Patient:  Aviva Clemons  YOB: 1975  MRN: [id number]   Acct: [de-identified]  Admit Date:  9/12/2022  Primary Cardiologist: Tiffanie Crawford MD  Note per dr Alem Mckeon: S/P gastric bypass surgery        HPI: This is a pleasant 52 y.o. male with a past history severe non-ischemic cardiomyopathy (EF 30-35%-2020) s/p AICD placement, DM, HTN. The patient states he developed severe cardiomyopathy after he developed a viral infection from living in the [facility]. He had AICD placement in 2015. He follows with Dr. Marla Watkins. Patient is POD 1 gastric bypass surgery. He states that today he developed some bloating and pressure. He stood at bedside with nursing staff and and became pale, diaphoretic, and said that he felt like he was going to pass out. He was helped back to bed. His symptoms improved. He states that he was able to ambulate after this episode without any further symptoms. Later, he got up to use the bathroom. He asked for privacy so he was assisted by his wife. She reports that while attempting to void, he began slurring his words. She stated that he turned grey and became cool and clammy and went unresponsive briefly. Nursing was called in and vitals were obtained. Manual pressure in the 90's and tachycardic in the 120's. Patient denied ever having symptoms like this before. Cardiology was consulted to follow for syncopal episode in patient with extensive cardiac history\"       Subjective (Events in last 24 hours):   Pt awake, alert. NAD. Denies cp, sob, swelling. He is feeling good. Ready to go home. We will increase metoprolol to 75 mg daily. Restart lasix 40 mg daily. He will monitor HR nad BP at home and weight/swelling. He understands the plans and will make sure to monitor and f/u as scheduled.      Objective:   /61   Pulse (!) 111   Temp 97.9 °F (36.6 °C) (Oral)   Resp 18   Ht 6' 3\" (1.905 m)   Wt (!) 383 lb 11.2 oz (174 kg)   SpO2 94%   BMI 47.96 kg/m²      vss  TELEMETRY: sinus/sinus tachy.      Physical Exam:  General Appearance: alert and oriented to person, place and time, in no acute distress  Cardiovascular: borderline tachy rate, regular rhythm, normal S1 and S2, no murmurs, rubs, clicks, or gallops, distal pulses intact, no carotid bruits, no JVD  Pulmonary/Chest: clear to auscultation bilaterally- no wheezes, rales or rhonchi, normal air movement, no respiratory distress  Abdomen: soft, non-tender, non-distended, normal bowel sounds, no masses   Extremities: no cyanosis, clubbing, mild bilat LE edema, pulse   Skin: warm and dry, no rash or erythema  Neurological: alert, oriented, normal speech, no focal findings or movement disorder noted    Medications:    metoprolol succinate  50 mg Oral Daily    fluticasone  2 spray Each Nostril Daily    spironolactone  25 mg Oral Daily    pantoprazole (PROTONIX) 40 mg injection  40 mg IntraVENous Q12H    [Held by provider] irbesartan  75 mg Oral Nightly    insulin lispro  0-4 Units SubCUTAneous TID WC    insulin lispro  0-4 Units SubCUTAneous Nightly    sodium chloride flush  5-40 mL IntraVENous 2 times per day    scopolamine  1 patch TransDERmal Q72H      sodium chloride      sodium chloride      sodium chloride      dextrose       ondansetron, 4 mg, Q6H PRN  sodium chloride, , PRN  muscle rub, , PRN  hyoscyamine, 125 mcg, Q4H PRN  cyclobenzaprine, 10 mg, Q8H PRN  oxyCODONE, 5 mg, Q4H PRN  sodium chloride, , PRN  lidocaine, 2 patch, Daily PRN  diatrizoate meglumine-sodium, 30 mL, ONCE PRN  simethicone, 80 mg, 4x Daily PRN  sodium chloride flush, 5-40 mL, PRN  sodium chloride, , PRN  morphine, 2 mg, Q2H PRN   Or  morphine, 4 mg, Q2H PRN  promethazine, 25 mg, Q6H PRN  metoclopramide, 10 mg, Q6H PRN  glucose, 4 tablet, PRN  dextrose bolus, 125 mL, PRN   Or  dextrose bolus, 250 mL, PRN  glucagon (rDNA), 1 mg, PRN  dextrose, , Continuous PRN        Diagnostics:  EKG:     Echo: Conclusions      Summary   Limited evaluation. Moderate to severely dilated left ventricular size and severely reduced   systolic function. There were no regional wall motion abnormalities. Wall thickness was within normal limits. Ejection fraction was estimated at 30-35%. IVC size is within normal limits with normal respiratory phasic changes. Signature      ----------------------------------------------------------------   Electronically signed by Poonam Latif MD (Interpreting   physician) on 09/14/2022  Stress:     Left Heart Cath:     Lab Data:    Cardiac Enzymes:  No results for input(s): CKTOTAL, CKMB, CKMBINDEX, TROPONINI in the last 72 hours.     CBC:   Lab Results   Component Value Date/Time    WBC 9.3 09/16/2022 05:30 AM    RBC 3.10 09/16/2022 05:30 AM    HGB 8.6 09/16/2022 05:30 AM    HCT 26.8 09/16/2022 05:30 AM     09/16/2022 05:30 AM       CMP:    Lab Results   Component Value Date/Time     09/16/2022 05:30 AM    K 4.2 09/16/2022 05:30 AM    K 4.6 09/13/2022 05:48 AM     09/16/2022 05:30 AM    CO2 24 09/16/2022 05:30 AM    BUN 11 09/16/2022 05:30 AM    CREATININE 0.8 09/16/2022 05:30 AM    LABGLOM >90 09/16/2022 05:30 AM    GLUCOSE 123 09/16/2022 05:30 AM    GLUCOSE 258 12/16/2017 08:25 AM    CALCIUM 8.8 09/16/2022 05:30 AM       Hepatic Function Panel:    Lab Results   Component Value Date/Time    ALKPHOS 48 09/13/2022 12:36 PM    ALT 58 09/13/2022 12:36 PM    AST 46 09/13/2022 12:36 PM    PROT 6.4 09/13/2022 12:36 PM    BILITOT 0.3 09/13/2022 12:36 PM    BILIDIR <0.2 09/13/2022 12:36 PM    LABALBU 4.2 09/13/2022 12:36 PM       Magnesium:    Lab Results   Component Value Date/Time    MG 1.9 03/26/2021 12:07 PM       PT/INR:    Lab Results   Component Value Date/Time    INR 1.17 03/05/2022 07:29 AM       HgBA1c:    Lab Results   Component Value Date/Time    LABA1C 6.5 05/12/2022 10:30 AM       FLP:    Lab Results   Component Value Date/Time    TRIG 187 05/12/2022 10:30 AM    HDL 25 05/12/2022 10:30 AM    LDLCALC 40 05/12/2022 10:30 AM       TSH:    Lab Results   Component Value Date/Time    TSH 2.470 09/13/2022 02:38 PM         Assessment:  S/p robotic diallo-en-y gastric bypass 9/12/22  Postoperative hematoma abutting the the surgical line measuring approximately 13.6 x 10.3 x 9.4 cm  Syncope - hx consistent with orthostasis  Hypotension - improving  NICMP - ef 30-35 per TTE 11/2020, ef 30-35 per TTE 9/14/22  Hx AICD 2015  Patent coronaries per cath 2014  Hx HTN  HLP  DM  Morbid obesity  MIKE - on CPAP  LILLIAN - attending following - improved  Urinary retention - urology following  Anemia - s/p transfusion - Attending following   sinus tachy-increase  metoprolol. This is his baseline per patient. Plan:  Daily weights  2 g sodium restriction daily  2 L fluid restriction daily  Keep K>4, Mg>2    CHF Guidelines  BB-yes. Toprol 50 mg daily  ACE/ARB/Entresto- No, lower BP. Titrating BB for HR. Diuretics-yes. Restart lasix 40 mg daily. Aldactone-yes. 25 daily  Jardiance/Farxiga-no. Depends on insurance. Holder Farhad yet    Cardiology will see PRN with changes as above. Call with any further questions. F/u with Dr Gatica Age office in 2 weeks.    Electronically signed by Kamini Da Silva PA-C on 9/16/2022 at 8:53 AM

## 2022-09-16 NOTE — CARE COORDINATION
9/16/22, 1:15 PM EDT    DISCHARGE ON GOING [address] day: 3  Location: -[facility] Reason for admit: Class 3 severe obesity with body mass index (BMI) of 45.0 to 49.9 in adult, unspecified obesity type, unspecified whether serious comorbidity present ([facility] [address] [E66.01, Z68.42]  Morbid obesity ([facility] [address] [E66.01]  S/P bariatric surgery [Z98.84]   Procedure:   9/12 Robotic Ronaldo-en-Y Gastric Bypass by Dr. Landon Morataya. 9/13 [place] UGI: No evidence of obstruction or leak following gastric bypass. 9/13 CXR: 1. Cardiomegaly status post pacemaker placement. 2. Possible small bilateral pleural effusions and basilar and lateral view. 3. Mild thoracic spondylosis. 9/14 Echo: EF 30-35%, Moderate to severely dilated left ventricle and severely reduced systolic function. 9/15 CT abd/pelvis: Hyperdense collection adjacent to the stomach concerning for a hematoma. There is also small amount of free fluid within the pelvis. Barriers to Discharge: Surgery, Hospitalist and Cardiology following. Louis removed today and pt was able to urinate. POD #4. Hgb 8.6. Possible discharge later today or tomorrow. PCP: Bernadine Gitelman, MD  Readmission Risk Score: 12.6%  Patient Goals/Plan/Treatment Preferences: Spoke with Mp Zhao and his wife. They are planning home together. They are questioning whether he still needs HH. Message to [facility] with Surgery, they prefer [facility] still see pt at home. Pt and wife agreeable. Phone call and message left at [facility] notifying of planned discharge later today or tomorrow. 9/16/22, 1:20 PM EDT    Patient goals/plan/ treatment preferences discussed by  and . Patient goals/plan/ treatment preferences reviewed with patient/ family. Patient/ family verbalize understanding of discharge plan and are in agreement with goal/plan/treatment preferences. Understanding was demonstrated using the teach back method.   AVS provided by RN at time of discharge, which includes all necessary medical information pertaining to the patients current course of illness, treatment, post-discharge goals of care, and treatment preferences. Services At/After Discharge: [facility] (they are aware).

## 2022-09-19 ENCOUNTER — OFFICE VISIT (OUTPATIENT)
Dept: CARDIOLOGY CLINIC | Age: 47
End: 2022-09-19
Payer: COMMERCIAL

## 2022-09-19 ENCOUNTER — CARE COORDINATION (OUTPATIENT)
Dept: OTHER | Facility: CLINIC | Age: 47
End: 2022-09-19

## 2022-09-19 ENCOUNTER — TELEPHONE (OUTPATIENT)
Dept: BARIATRICS/WEIGHT MGMT | Age: 47
End: 2022-09-19

## 2022-09-19 VITALS
HEIGHT: 75 IN | WEIGHT: 315 LBS | HEART RATE: 97 BPM | SYSTOLIC BLOOD PRESSURE: 112 MMHG | BODY MASS INDEX: 39.17 KG/M2 | DIASTOLIC BLOOD PRESSURE: 69 MMHG

## 2022-09-19 DIAGNOSIS — I42.8 NONISCHEMIC CARDIOMYOPATHY (HCC): Primary | ICD-10-CM

## 2022-09-19 PROCEDURE — 99214 OFFICE O/P EST MOD 30 MIN: CPT | Performed by: INTERNAL MEDICINE

## 2022-09-19 NOTE — CARE COORDINATION
Care Transitions Outreach Attempt    Call within 2 business days of discharge: Yes   Attempted to reach patient for transitions of care follow up. HIPAA compliant voicemail message left with request for return call at patient's convenience. Will continue to follow. Patient: Shaq Minor Patient : 1975 MRN: [id number]    Last Discharge [facility]       Date Complaint Diagnosis Description Type Department Provider    22  S/P gastric bypass . .. Admission (Discharged) Jose Miguel Cardoso MD              Was this an external facility discharge? No Discharge Facility: [address] [place] [address] [facility]    Noted following upcoming appointments from discharge chart review:   [facility] follow up appointment(s):   Future Appointments   Date Time Provider Link [place]   2022  3:45 PM Donald Hughes MD [facility] [place]   2022 11:45 AM Roni House MD N [facility] [place]   2022  8:30 AM BART Deleon N [facility] [place]   2022  9:00 AM Deirdre Barcenas RD, LD N [facility] [place]   2023  3:15 PM Josh Meeks [facility] [place]   8/10/2023 11:30 AM SCHEDULE, ALFONSO PACER NURSE N [facility] [place]   2023 11:45 AM Kenenth Allred MD N [facility] [place]     Non-[facility] follow up appointment(s): Will assess with successful outreach    ANTONIA Sutherland RN  Associate Care Manager  Phone: [phone]  Email: [email or web address]

## 2022-09-19 NOTE — TELEPHONE ENCOUNTER
PO check in call -   Fluids-9-18 intake -77oz  9-19 fluids-36oz so far. Any nausea or vomiting - a little nausea but I took a pill and it went away. Urine look-no problems -color did lighten up yesterday -a little darker to day but expect to lighten up later today. BM-none since surgery - he is passing gas-not a problem-taking miralax today nonBM yet- constipation has always been an issue for me. He can use rectal suppository or Mag Citrate    He has mag citrate -has used that before. I told him if he doesn't have a BM by 7pm tonight-start using the Mag Citrate-Noe voiced understanding. No other complaints from patient other than [address] home health \"has been bugging me to schedule a visit and I see my heart dr today, my PCP on Wednesday and Dr. Omkar Ramos on Friday. My incisions look fine - not sure why she's bugging me\". I explained we order hh visit for all pts for continuity of care from discharge to PO Week 1 visit. He can refuse the visit-  will send us a fax and that's that. Noe voiced understanding. Call our office with any questions.

## 2022-09-19 NOTE — PROGRESS NOTES
[address] [facility] [address] [place] [address]  Dept: [phone]  Dept Fax: [phone]  Loc: [phone]    Visit Date: 9/19/2022    Mr. Ria Gamble is a 52 y.o. male  who presented for:  Chief Complaint   Patient presents with    Follow-Up from [facility] f/u       HPI:   53 yo M c hx of CHF s/p ICD 1/2015, nonischemic CMP, HTN, is here for a follow up. Had bariatric surgery on 9/12/22. He has blood loss, Hb went from 13 to 8.6. He had a syncopal episode. Cath in the past showed patent coronaries. Current Outpatient Medications:     metoprolol succinate (TOPROL XL) 25 MG extended release tablet, Take 3 tablets by mouth daily, Disp: 30 tablet, Rfl: 3    insulin lispro, 1 Unit Dial, (HUMALOG KWIKPEN) 100 UNIT/ML SOPN, ONLY SLIDING SCALE AS NEEDED--151-200 3 UNITS, 201-250 5 UNITS, 251-300 8 UNITS, 301-350 10 UNITS, 351-400 12 UNITS, OVER 400 15 UNITS AND CONTACT PHYSICIAN, Disp: 135 mL, Rfl: 3    hyoscyamine (OSCIMIN) 125 MCG TBDP dispersible tablet, Take 1 tablet by mouth every 4 hours as needed (epigastric cramping/spasms), Disp: 30 tablet, Rfl: 0    oxyCODONE (ROXICODONE) 5 MG immediate release tablet, Take 1 tablet by mouth every 6 hours as needed for Pain for up to 7 days. , Disp: 25 tablet, Rfl: 0    Pediatric Multivitamins-Iron (FLINTSTONES PLUS IRON PO), Take 1 tablet by mouth daily, Disp: , Rfl:     Fexofenadine HCl (MUCINEX ALLERGY PO), Take 1 tablet by mouth daily as needed (allergies), Disp: , Rfl:     omeprazole (PRILOSEC) 40 MG delayed release capsule, Take 1 capsule by mouth daily, Disp: 90 capsule, Rfl: 1    Docusate Sodium 100 MG TABS, Take 100 mg by mouth 2 times daily Take as needed to prevent constipation, Disp: 30 tablet, Rfl: 1    metoclopramide (REGLAN) 10 MG tablet, Take 1 tablet by mouth every 6 hours as needed (nausea/vomiting), Disp: 30 tablet, Rfl: 0    ondansetron (ZOFRAN) 4 MG tablet, Patient returned call.  Please call back.  Thank you   Take 1 tablet by mouth every 4 hours as needed for Nausea or Vomiting, Disp: 30 tablet, Rfl: 0    furosemide (LASIX) 40 MG tablet, TAKE ONE TABLET BY MOUTH EVERY MORNING, Disp: 90 tablet, Rfl: 3    gabapentin (NEURONTIN) 100 MG capsule, TAKE 1 CAPSULE BY MOUTH 3 TIMES A DAY (Patient taking differently: 100 mg 3 times daily as needed.), Disp: 270 capsule, Rfl: 1    ASPIRIN LOW DOSE 81 MG EC tablet, TAKE 1 TABLET BY MOUTH ONE TIME A DAY, Disp: 90 tablet, Rfl: 1    rosuvastatin (CRESTOR) 10 MG tablet, TAKE 1 TABLET BY MOUTH ONE TIME A DAY, Disp: 90 tablet, Rfl: 3    blood glucose test strips (PRODIGY NO CODING BLOOD GLUC) strip, USE TO CHECK BLOOD SUGAR FOUR TIMES A DAY, Disp: 400 strip, Rfl: 3    Prodigy Twist Top Lancets 28G MISC, USE TO TEST FOUR TIMES A DAY, Disp: 400 each, Rfl: 3    spironolactone (ALDACTONE) 25 MG tablet, TAKE 1 TABLET BY MOUTH ONE TIME A DAY, Disp: 90 tablet, Rfl: 3    CPAP Machine MISC, by Does not apply route Ramp pressure: 4.0 cm H2O Treatment pressure: 15.0 cm H2O, Disp: 1 each, Rfl: 0    Blood Glucose Monitoring Suppl (PRODIGY AUTOCODE BLOOD GLUCOSE) MAUREEN, Use to check blood sugar 4 times daily, Disp: 1 Device, Rfl: 0    Insulin Pen Needle (PEN NEEDLES 31GX5/16\") 31G X 8 MM MISC, Use to inject insulins and Ozempic, Disp: 400 each, Rfl: 3    fluticasone (FLONASE) 50 MCG/ACT nasal spray, 2 sprays by Nasal route daily, Disp: 1 Bottle, Rfl: 2    loratadine (CLARITIN) 10 MG tablet, Take 10 mg by mouth daily, Disp: , Rfl:     Respiratory Therapy Supplies (ADULT MASK) MISC, Please do mask desensitization and/or provide mask of patient's choice. , Disp: 1 each, Rfl: 0    acetaminophen (TYLENOL) 325 MG tablet, Take 650 mg by mouth every 8 hours as needed for Pain, Disp: , Rfl:     Past Medical History  Alessandra Daniel  has a past medical history of CHF (congestive heart failure) ([address] Constipation, Diabetes mellitus ([address] GERD (gastroesophageal reflux disease), Hypercholesteremia, S/P ICD (internal cardiac defibrillator) procedure: 1/15/2015: Medtronic Single Chamber, Sleep apnea, and Zoll lifevest applied 8/22/14. Social History  Timothy Teixeira  reports that he has never smoked. He has never used smokeless tobacco. He reports that he does not drink alcohol and does not use drugs. Family History  Timothy Teixeira family history includes Arthritis in his maternal grandfather and mother; Asthma in his brother and maternal grandfather; Diabetes in his maternal grandfather; Heart Disease in his maternal grandfather, maternal grandmother, maternal uncle, and paternal uncle; Hypertension in his father; Stroke in his maternal grandmother. Past Surgical History   Past Surgical History:   Procedure Laterality Date    CARDIAC CATHETERIZATION  9/2014    [facility]    CARDIAC DEFIBRILLATOR PLACEMENT  2014    CARDIAC DEFIBRILLATOR PLACEMENT      CARDIAC DEFIBRILLATOR PLACEMENT      EGD  2022    Dr Maria Elena Sibley      Lasix     HÉCTOR-EN-Y GASTRIC BYPASS N/A 9/12/2022    Robotic Héctor-en-Y Gastric Bypass performed by Parker Lawler MD at [place] [address]. 6/27/2022    EGD BIOPSY performed by Valentina Nava MD at [address] [place] [address]  2004       Subjective:     REVIEW OF SYSTEMS  Constitutional: denies sweats, chills and fever  HENT: denies  congestion, sinus pressure, sneezing and sore throat. Eyes: denies  pain, discharge, redness and itching. Respiratory: denies apnea, cough  Gastrointestinal: denies blood in stool, constipation, diarrhea   Endocrine: denies cold intolerance, heat intolerance, polydipsia. Genitourinary: denies dysuria, enuresis, flank pain and hematuria. Musculoskeletal: denies arthralgias, joint swelling and neck pain. Neurological: denies numbness and headaches. Psychiatric/Behavioral: denies agitation, confusion, decreased concentration and dysphoric mood    All others reviewed and are negative.    Objective:     /69   Pulse 97   Ht 6' 3\" (1.905 m)   Wt (!) 374 lb 6.4 oz (169.8 kg)   BMI 46.80 kg/m²     Wt Readings from Last 3 Encounters:   09/19/22 (!) 374 lb 6.4 oz (169.8 kg)   09/16/22 (!) 383 lb 11.2 oz (174 kg)   09/02/22 (!) 378 lb 9.6 oz (171.7 kg)     BP Readings from Last 3 Encounters:   09/19/22 112/69   09/16/22 119/61   09/02/22 128/70       PHYSICAL EXAM  Constitutional: Oriented to person, place, and time. Appears well-developed and well-nourished. HENT:   Head: Normocephalic and atraumatic. Eyes: EOM are normal. Pupils are equal, round, and reactive to light. Neck: Normal range of motion. Neck supple. No JVD present. Cardiovascular: Normal rate , normal heart sounds and intact distal pulses. Pulmonary/Chest: Effort normal and breath sounds normal. No respiratory distress. No wheezes. No rales. Abdominal: Soft. Bowel sounds are normal. No distension. There is no tenderness. Musculoskeletal: Normal range of motion. No edema. Neurological: Alert and oriented to person, place, and time. No cranial nerve deficit. Coordination normal.   Skin: Skin is warm and dry. Psychiatric: Normal mood and affect.        No results found for: CKTOTAL, CKMB, CKMBINDEX    Lab Results   Component Value Date/Time    WBC 9.3 09/16/2022 05:30 AM    RBC 3.10 09/16/2022 05:30 AM    HGB 8.8 09/16/2022 01:31 PM    HCT 27.7 09/16/2022 01:31 PM    MCV 86.5 09/16/2022 05:30 AM    MCH 27.7 09/16/2022 05:30 AM    MCHC 32.1 09/16/2022 05:30 AM    RDW 15.7 03/15/2017 06:30 PM     09/16/2022 05:30 AM    MPV 9.6 09/16/2022 05:30 AM       Lab Results   Component Value Date/Time     09/16/2022 05:30 AM    K 4.2 09/16/2022 05:30 AM    K 4.6 09/13/2022 05:48 AM     09/16/2022 05:30 AM    CO2 24 09/16/2022 05:30 AM    BUN 11 09/16/2022 05:30 AM    LABALBU 4.2 09/13/2022 12:36 PM    CREATININE 0.8 09/16/2022 05:30 AM    CALCIUM 8.8 09/16/2022 05:30 AM    LABGLOM >90 09/16/2022 05:30 AM    GLUCOSE 123 09/16/2022 05:30 AM    GLUCOSE 258 12/16/2017 08:25 AM       Lab Results   Component Value Date/Time    ALKPHOS 48 09/13/2022 12:36 PM    ALT 58 09/13/2022 12:36 PM    AST 46 09/13/2022 12:36 PM    PROT 6.4 09/13/2022 12:36 PM    BILITOT 0.3 09/13/2022 12:36 PM    BILIDIR <0.2 09/13/2022 12:36 PM    LABALBU 4.2 09/13/2022 12:36 PM       Lab Results   Component Value Date/Time    MG 1.9 03/26/2021 12:07 PM       Lab Results   Component Value Date    INR 1.17 (H) 03/05/2022    INR 1.09 04/09/2021    INR 1.08 11/13/2020         Lab Results   Component Value Date/Time    LABA1C 6.5 05/12/2022 10:30 AM       Lab Results   Component Value Date/Time    TRIG 187 05/12/2022 10:30 AM    HDL 25 05/12/2022 10:30 AM    LDLCALC 40 05/12/2022 10:30 AM       Lab Results   Component Value Date/Time    TSH 2.470 09/13/2022 02:38 PM         Testing Reviewed:      I haveindividually reviewed the below cardiac tests    EKG:    ECHO: Results for orders placed during the hospital encounter of 11/13/20    ECHO Complete 2D W Doppler W Color    Narrative  Transthoracic Echocardiography Report (TTE)    Demographics    Patient Name  [facility]       Gender             Male  Erven Serve    MR #          [id number]      Race                   Ethnicity    Account #     [de-identified]      Room Number        0025    Accession     [id number]     Date of Study      11/14/2020  Number    Date of Birth 1975     Referring          Rojelio Alvarez MD  Physician          [facility]    Age           39 year(s)     Sonographer        ARVIND Wiley, RDCS,  RDMS, RVT    Interpreting       Arian Moctezuma MD  Physician    Procedure    Type of Study    TTE procedure:ECHOCARDIOGRAM COMPLETE 2D W DOPPLER W COLOR. Procedure Date  Date: 11/14/2020 Start: 08:19 AM    Study Location: Bedside  Technical Quality: Limited visualization due to body habitus. Indications:AICD Discharge.     Additional Medical History:AICD discharge, congestive heart failure,  cardiomyopathy, morbid obesity, hypertenison, sleep apnea, AICD    Patient Status: Routine    Height: 75 inches Weight: 399.01 pounds BSA: 2.94 m^2 BMI: 49.87 kg/m^2    BP: 131/88 mmHg    Conclusions    Summary  Technically difficult study due to poor acoustic windows. Left ventricular size is normal and systolic function is moderate to  severely reduced. Ejection fraction was estimated at 30-35%. LV wall  thickness is within normal limits. The left atrium is Mildly dilated. Mild mitral regurgitation is present. Signature    ----------------------------------------------------------------  Electronically signed by Maximus Hutchinson MD (Interpreting  physician) on 11/14/2020 at 01:24 PM  ----------------------------------------------------------------    Findings    Mitral Valve  The mitral valve was not well visualized . Mild mitral regurgitation is present. Aortic Valve  The aortic valve leaflets were not well visualized. DOPPLER: Transaortic velocity was within the normal range with no evidence  of aortic stenosis. There was no evidence of aortic regurgitation. Tricuspid Valve  Tricuspid valve was not well visualized. Mild tricuspid regurgitation visualized. Pulmonic Valve  The pulmonic valve was not well visualized . Left Atrium  The left atrium is Mildly dilated. Left Ventricle  Left ventricular size is normal and systolic function is moderate to  severely reduced. Ejection fraction was estimated at 30-35%. LV wall  thickness is within normal limits. Right Atrium  Right atrial size was normal.    Right Ventricle  The right ventricular size was normal with normal systolic function and  wall thickness. Pacer Wire visualized in right ventricle. Pericardial Effusion  The pericardium was normal in appearance with no evidence of a pericardial  effusion. Pleural Effusion  No evidence of pleural effusion. Aorta / Great Vessels  -Aortic root dimension within normal limits.  -The Pulmonary artery is within normal limits.   -IVC size is within normal limits with normal respiratory phasic changes. M-Mode/2D Measurements & Calculations    LV Diastolic   LV Systolic Dimension:    AV Cusp Separation: 1.5 cmLA  Dimension: 6.7 5.6 cm                    Dimension: 4.6 cmAO Root  cm             LV Volume Diastolic:      Dimension: 2.7 cmLA Area: 25.9  LV FS:16.4 %   168.1 ml                  cm^2  LV PW          LV Volume Systolic: 130.1  Diastolic: 1.1 ml  cm             LV EDV/LV EDV Index:  Septum         168.1 ml/57 m^2LV ESV/LV  RV Diastolic Dimension: 3.3 cm  Diastolic: 0.7 ESV Index: 080.5 ml/39  cm             m^2                       LA/Aorta: 1.7  EF Calculated: 32.2 %     Ascending Aorta: 3 cm  LA volume/Index: 85.5 ml /29m^2  LV Length: 9.8 cm  LV Area  Diastolic:  85.7 cm^2  LV Area  Systolic: 87.3  cm^2    Doppler Measurements & Calculations    MV Peak E-Wave: 109 cm/s AV Peak Velocity: 125  LVOT Peak Velocity: 77.1  cm/s                   cm/s  MV Peak Gradient: 4.75   AV Peak Gradient: 6.25 LVOT Peak Gradient: 2 mmHg  mmHg                     mmHg  TV Peak E-Wave: 82.6 cm/s  MV Deceleration Time:  127 msec                                        TV Peak Gradient: 2.73  mmHg  IVRT: 63 msec          TR Velocity:266 cm/s  TR Gradient:28.3 mmHg  PV Peak Velocity: 63 cm/s  AV DVI (Vmax):0.62     PV Peak Gradient: 1.59  MR Velocity: 352 cm/s                           mmHg    [id number]      STRESS:    CATH:    Assessment/Plan       Diagnosis Orders   1. Nonischemic cardiomyopathy (HCC)              Nonischemic cardiomyopathy with EF 30%  Recent bariatric surgery on 9/12/22  ICD discharge for ventricular tachycardia 11/2020  Diabetes  Morbid obesity  Hypertension  MIKE on CPAP     BMI is 48. Advised weight loss  Had his antihypertensive meds on hold Cardizem, Lasix, Aldactone.   On metoprolol  Continue monitoring heart failure symptoms and blood pressure at home  Continue rest of medications  The patient is asked to make an attempt to improve diet and exercise patterns to aid in medical management of this problem. Advised more plant based nutrition/meditarrean diet   Advised patient to call office or seek immediate medical attention if there is any new onset of  any chest pain, sob, palpitations, lightheadedness, dizziness, orthopnea, PND or pedal edema. All medication side effects were discussed in details. Thank youfor allowing me to participate in the care of this patient. Please do not hesitate to contact me for any further questions. Return in about 1 year (around 9/19/2023), or if symptoms worsen or fail to improve, for Review testing, Regular follow up.        Electronically signed by Kenneth Allred MD [facility]  9/19/2022 at 3:46 PM EDT

## 2022-09-20 ENCOUNTER — CARE COORDINATION (OUTPATIENT)
Dept: OTHER | Facility: CLINIC | Age: 47
End: 2022-09-20

## 2022-09-20 NOTE — DISCHARGE SUMMARY
Rachel Head [address] [facility]       Pt Name: Nickie Nolen  MRN: [id number]  [place]: 1975  Primary Care Physician: Doe Cox MD    Admit date:  2022 11:01 AM      Discharge date:  2022  3:32 PM    Admitting Diagnosis:   Morbid obesity  BMI 46  Cardiomyopathy     Discharge Diagnosis:   Status post robotic diallo-en-y gastric bypass  Postoperative hematoma abutting the the surgical line measuring approximately 13.6 x 10.3 x 9.4 cm  Morbid obesity (BMI 46)  Syncope postoperative - likely from volume depletion  Tachycardia - resolved  History of CHF  AICD  Diabetes Mellitus   Acute blood loss anemia secondary to gastric bypass s/p 4 units PRBCs - stable  LILLIAN - resolved  Orthostatic hypotension - resolved  Chronic back pain    Admitting Service: General Surgery, Howie Cornejo MD.    Consultants:  cardiology and hospitalist    History and Physical:  Nickie Nolen (:  1975)      ASSESSMENT:  1.  Morbid obesity (BMI 49)  2. Sleep apnea  3. Chronic lower back pain  4. CHF  5. Hypercholesterolemia  6. Diabetes mellitus  7. Hypertension  8. GERD  9. Atrial fibrillation  10. AICD  11. Vitamin D deficiency  12. Iron deficiency  13. Chronic pain left knee     PLAN:  1. Discussion again about the pros and cons of weight loss surgery. The risks benefits and alternatives to laparoscopic adjustable band, gastric sleeve and gastric Diallo-en-Y bypass were discussed in detail. The pros and cons of robotic assisted, laparoscopic and open techniques were discussed. 2.  Behavior modification discussed again in regards to dietary habits. 3.  Nutritional education occurred during visit. Follow up with dietitian for further evaluation. Continue to follow recommendations as directed. 4.  Options for medical management of morbid obesity again discussed.   5.  Improvement in fitness/exercise discussed with patient improve with exercise/fitness. Doing more walking daily. He states he has not been able to lose and of adequate excess body weight with medical management only and is wishing to proceed with Ronaldo-en-Y gastric bypass for further weight loss. Review of Systems   Constitutional: Negative for activity change, appetite change, chills, diaphoresis, fatigue, fever and unexpected weight change. HENT: Negative for congestion, dental problem, drooling, ear discharge, ear pain, facial swelling, hearing loss, mouth sores, nosebleeds, postnasal drip, rhinorrhea, sinus pressure, sneezing, sore throat, tinnitus, trouble swallowing and voice change. Eyes: Negative for photophobia, pain, discharge, redness, itching and visual disturbance. Respiratory: Negative for apnea, cough, choking, chest tightness, shortness of breath, wheezing and stridor. Cardiovascular: Negative for chest pain, palpitations and leg swelling. Gastrointestinal: Positive for constipation. Negative for abdominal distention, abdominal pain, anal bleeding, blood in stool, diarrhea, nausea, rectal pain and vomiting. Endocrine: Negative. Genitourinary: Negative for decreased urine volume, difficulty urinating, dysuria, enuresis, flank pain, frequency, genital sores, hematuria, penile discharge, penile pain, penile swelling, scrotal swelling, testicular pain and urgency. Musculoskeletal: Negative for arthralgias, back pain, gait problem, joint swelling, myalgias, neck pain and neck stiffness. Skin: Negative for color change, pallor, rash and wound. Allergic/Immunologic: Negative. Neurological: Negative for dizziness, tremors, seizures, syncope, facial asymmetry, speech difficulty, weakness, light-headedness, numbness and headaches. Hematological: Negative for adenopathy. Does not bruise/bleed easily.    Psychiatric/Behavioral: Negative for agitation, behavioral problems, confusion, decreased concentration, dysphoric mood, hallucinations, self-injury, sleep disturbance and suicidal ideas. The patient is not nervous/anxious and is not hyperactive. Past Medical History           Past Medical History:   Diagnosis Date    CHF (congestive heart failure) ([facility])      Constipation      Diabetes mellitus ([address]      GERD (gastroesophageal reflux disease)      Hypercholesteremia      S/P ICD (internal cardiac defibrillator) procedure: 1/15/2015: Medtronic Single Chamber 1/15/2015     1/15/2015: Medtronic Single Chamber.  Dr. Gil Me    Sleep apnea 06/09/2017     Kevin Hanley applied 8/22/14 8/29/2014     returned prior to ICD insertion 1/15            Past Surgical History             Past Surgical History:   Procedure Laterality Date    CARDIAC CATHETERIZATION   9/2014     [address] [place] [address]    CARDIAC DEFIBRILLATOR PLACEMENT   2014    CARDIAC DEFIBRILLATOR [facility]        EGD   2022     Dr Dmitri Bean     UPPER GASTROINTESTINAL ENDOSCOPY N/A 6/27/2022     EGD BIOPSY performed by Rosalva Valerio MD at [address] [place] [address]   2004            Current Facility-Administered Medications               Current Outpatient Medications   Medication Sig Dispense Refill    ferrous sulfate (IRON 325) 325 (65 Fe) MG tablet Take 325 mg by mouth daily (with breakfast)        gabapentin (NEURONTIN) 100 MG capsule TAKE 1 CAPSULE BY MOUTH 3 TIMES A  capsule 1    insulin glargine (LANTUS SOLOSTAR) 100 UNIT/ML injection pen Inject 30 Units into the skin nightly 10 pen 3    metFORMIN (GLUCOPHAGE-XR) 500 MG extended release tablet TAKE TWO TABLETS BY MOUTH TWICE A  tablet 2    ASPIRIN LOW DOSE 81 MG EC tablet TAKE 1 TABLET BY MOUTH ONE TIME A DAY 90 tablet 1    rosuvastatin (CRESTOR) 10 MG tablet TAKE 1 TABLET BY MOUTH ONE TIME A DAY 90 tablet 3    blood glucose test strips (PRODIGY NO CODING BLOOD GLUC) strip USE TO CHECK BLOOD SUGAR FOUR TIMES A  strip 3    Prodigy Twist Top Lancets 28G MISC USE TO TEST FOUR TIMES A  each 3    irbesartan (AVAPRO) 75 MG tablet TAKE 1 TABLET BY MOUTH ONE TIME A DAY IN THE EVENING 90 tablet 3    carvedilol (COREG) 6.25 MG tablet TAKE 1 TABLET BY MOUTH 2 TIMES A DAY WITH MEALS 180 tablet 3    OZEMPIC, 1 MG/DOSE, 4 MG/3ML SOPN INJECT 1MG UNDER THE SKIN ONCE A WEEK 24 mL 3    insulin lispro, 1 Unit Dial, (HUMALOG KWIKPEN) 100 UNIT/ML SOPN INJECT 14 UNITS WITH MEALS PLUS SLIDING SCALE --151-200 3 UNITS, 201-250 5 UNITS, 251-300 8 UNITS, 301-350 10 UNITS, 351-400 12 UNITS, OVER 400 15 UNITS AND CONTACT PHYSICIAN 135 mL 3    carvedilol (COREG) 25 MG tablet TAKE 1 TABLET BY MOUTH 2 TIMES A DAY WITH MEALS 180 tablet 3    spironolactone (ALDACTONE) 25 MG tablet TAKE 1 TABLET BY MOUTH ONE TIME A DAY 90 tablet 3    furosemide (LASIX) 20 MG tablet TAKE ONE TABLET BY MOUTH EVERY EVENING 90 tablet 3    furosemide (LASIX) 40 MG tablet TAKE ONE TABLET BY MOUTH EVERY MORNING 90 tablet 3    CPAP Machine MISC by Does not apply route Ramp pressure: 4.0 cm H2O  Treatment pressure: 15.0 cm H2O 1 each 0    Blood Glucose Monitoring Suppl (PRODIGY AUTOCODE BLOOD GLUCOSE) MAUREEN Use to check blood sugar 4 times daily 1 Device 0    Insulin Pen Needle (PEN NEEDLES 31GX5/16\") 31G X 8 MM MISC Use to inject insulins and Ozempic 400 each 3    omeprazole (PRILOSEC) 40 MG delayed release capsule Take 1 capsule by mouth daily 90 capsule 1    fluticasone (FLONASE) 50 MCG/ACT nasal spray 2 sprays by Nasal route daily 1 Bottle 2    loratadine (CLARITIN) 10 MG tablet Take 10 mg by mouth daily        Respiratory Therapy Supplies (ADULT MASK) MISC Please do mask desensitization and/or provide mask of patient's choice. 1 each 0    acetaminophen (TYLENOL) 325 MG tablet Take 650 mg by mouth every 8 hours as needed for Pain          No current facility-administered medications for this visit.                     Allergies   Allergen Reactions    Levaquin [Levofloxacin In D5w] Itching Family History             Family History   Problem Relation Age of Onset    Arthritis Mother      Hypertension Father      Heart Disease Maternal Uncle      Heart Disease Maternal Grandfather      Asthma Maternal Grandfather      Diabetes Maternal Grandfather      Arthritis Maternal Grandfather      Heart Disease Paternal Uncle      Heart Disease Maternal Grandmother      Stroke Maternal Grandmother      Asthma Brother      Colon Cancer Neg Hx      Colon Polyps Neg Hx      Esophageal Cancer Neg Hx              Social History                   Socioeconomic History    Marital status:        Spouse name: Not on file    Number of children: 1    Years of education: Not on file    Highest education level: Not on file   Occupational History       Employer: [employer]   Tobacco Use    Smoking status: Never Smoker    Smokeless tobacco: Never Used   Vaping Use    Vaping Use: Never used   Substance and Sexual Activity    Alcohol use: Never    Drug use: No    Sexual activity: Yes       Partners: Female   Other Topics Concern    Not on file   Social History Narrative    Not on file      Social Determinants of Health            Financial Resource Strain: Low Risk     Difficulty of Paying Living Expenses: Not hard at all   Food Insecurity: No Food Insecurity    Worried About Running Out of Food in the Last Year: Never true    [address] in the Last Year: Never true   Transportation Needs:     Lack of Transportation (Medical): Not on file    Lack of Transportation (Non-Medical):  Not on file   Physical Activity:     Days of Exercise per Week: Not on file    Minutes of Exercise per Session: Not on file   Stress:     Feeling of Stress : Not on file   Social Connections:     Frequency of Communication with Friends and Family: Not on file    Frequency of Social Gatherings with Friends and Family: Not on file    Attends Voodoo Services: Not on file    Active Member of Clubs or Organizations: Not on file Attends Club or Organization Meetings: Not on file    Marital Status: Not on file   Intimate Partner Violence:     Fear of Current or Ex-Partner: Not on file    Emotionally Abused: Not on file    Physically Abused: Not on file    Sexually Abused: Not on file   Housing Stability:     Unable to Pay for Housing in the Last Year: Not on file    Number of [place] in the Last Year: Not on file    Unstable Housing in the Last Year: Not on file         Vitals         Vitals:     07/08/22 0850   BP: 118/80   Site: Right Upper Arm   Position: Sitting   Cuff Size: Large Adult   Pulse: 84   Temp: 97.8 °F (36.6 °C)   TempSrc: Oral   Weight: (!) 385 lb 9.6 oz (174.9 kg)   Height: 6' 1.75\" (1.873 m)         Body mass index is 49.84 kg/m². Wt Readings from Last 3 Encounters:   07/08/22 (!) 385 lb 9.6 oz (174.9 kg)   07/07/22 (!) 387 lb (175.5 kg)   06/30/22 (!) 385 lb (174.6 kg)      Physical Exam  Vitals reviewed. Constitutional:       General: He is not in acute distress. Appearance: He is well-developed. He is not diaphoretic. HENT:      Head: Normocephalic and atraumatic. Right Ear: External ear normal.      Left Ear: External ear normal.      Nose: Nose normal.   Eyes:      General: No scleral icterus. Right eye: No discharge. Left eye: No discharge. Conjunctiva/sclera: Conjunctivae normal.   Cardiovascular:      Rate and Rhythm: Normal rate and regular rhythm. Heart sounds: Normal heart sounds. Pulmonary:      Effort: Pulmonary effort is normal. No respiratory distress. Breath sounds: Normal breath sounds. No wheezing or rales. Chest:      Chest wall: No tenderness. Abdominal:      General: Bowel sounds are normal. There is no distension. Palpations: Abdomen is soft. There is no mass. Tenderness: There is no abdominal tenderness. There is no guarding or rebound. Musculoskeletal:         General: No tenderness. Normal range of motion.       Cervical back: Normal range of motion and neck supple. Skin:     General: Skin is warm and dry. Coloration: Skin is not pale. Findings: No erythema or rash. Neurological:      Mental Status: He is alert and oriented to person, place, and time. Cranial Nerves: No cranial nerve deficit. Psychiatric:         Behavior: Behavior normal.         Thought Content:  Thought content normal.         Judgment: Judgment normal.                   Lab Results   Component Value Date     WBC 10.8 06/18/2022     HGB 12.8 (L) 06/18/2022     HCT 42.1 06/18/2022     MCV 84.2 06/18/2022      06/18/2022                Lab Results   Component Value Date      03/05/2022     K 4.3 03/05/2022      03/05/2022     CO2 28 03/05/2022                Lab Results   Component Value Date     CREATININE 0.7 03/05/2022                Lab Results   Component Value Date     ALT 17 03/05/2022     AST 14 03/05/2022     ALKPHOS 73 03/05/2022     BILITOT 0.3 03/05/2022                Lab Results   Component Value Date     LIPASE 38.5 03/03/2017               Patient Active Problem List   Diagnosis    CHF (congestive heart failure) (HCC) systolic chronic     Bronchitis, acute    Cardiomyopathy (HCC)-Nonischemic dilated low EF 25%, newly Dxed 08/2014- med RX- cath  mild CAD- repeat echo Nov 24, 2014- EF 25 to 30%- NEED the ICD    S/P cardiac cath- Angiographically patent coronaries-EF 20, EDP 28 mmhg- med rx    AICD (automatic cardioverter/defibrillator) present    Morbid obesity with BMI of 50.0-59.9, adult ([facility])    HTN (hypertension)    Bilateral leg edema- +1 B/L- RESOLVED    Cough due to ACE inhibitor    Hyponatremia    Hyperglycemia    Weight gain, claimed 18lbs in 10 days since discharge    Chronic systolic congestive heart failure (HCC)    MIKE on CPAP    Hx of AICD discharge    Sinus tachycardia    Chronic combined systolic and diastolic congestive heart failure ([address]    Nonischemic cardiomyopathy ([address]    A-fib ([address] OPERATIVE REPORT     PATIENT NAME: Melissa Eng                  :        1975  MED REC NO:   [id number]                           ROOM:  ACCOUNT NO:   [de-identified]                           ADMIT DATE: 2022  PROVIDER:     Jacky Burton M.D.     DATE OF PROCEDURE:  2022     SURGEON:  Jacky Burton MD     INDICATION:  The patient with history of morbid obesity and gastric  reflux. Future plan for gastric bypass surgery. Plan today for upper  endoscopy to evaluate. ASA CLASSIFICATION:  III. ESTIMATED BLOOD LOSS:  None. DESCRIPTION OF PROCEDURE:  The patient was brought to the GI lab. Consent was obtained. Risks involved with the procedure were explained  to the patient. Informed consent was obtained. The patient was  monitored during the procedure with pulse oximetry, blood pressure  monitoring, and oxygen by nasal cannula. Sedation by incremental doses  of IV propofol given by the Anesthesia Service to achieve monitored  anesthesia care. For ASA classification and medication given during the  procedure, please see Anesthesia note. PROCEDURE PERFORMED:  EGD with biopsy. DESCRIPTION OF PROCEDURE:  A standard video Olympus GIF- adult  upper scope advanced under direct vision from the oral cavity up to the  duodenum. Esophagus featured acid reflux, slightly thickened surface of  distal esophagus. No erosion or ulcer seen. No feature of Manzo's  esophagus seen. The GE junction was longer than expected 46 cm from the  incisors. Scope was advanced into the stomach. Retroflex examination  of the cardia revealed normal cardia with no evidence of hiatus hernia. Mild gastritis seen in the body. Biopsy obtained to evaluate. Antrum  showed erosive gastritis , biopsy obtained in different jars to  evaluate.   Scope advanced to duodenum, which appears normal.  I elected  to take a biopsy from the duodenum to rule out malabsorption in the  duodenum and in preparation for gastric bypass surgery. Scope was  withdrawn with no immediate complications. Message sent to Pathology to specially stain for H. pylori gastritis in  the body of the antrum in preparation for gastric bypass surgery. IMPRESSION:  1. Feature of mild acid reflux. No Manzo's esophagus seen. GE  junction is 46 cm from the incisors. 2.  No hiatus hernia seen. 3.  Gastritis in the body. Healing erosive gastritis seen in the  antrum. Biopsy obtained from both locations with special request for H.  pylori special stain. 4.  Biopsy obtain to rule out malabsorption and celiac disease. PLAN:  1. Followup with biopsy results in the GI clinic for evaluation. More  recommendation after reviewing the biopsy. 2.  I do not see any contraindication to prevent proceeding with gastric  bypass surgery for this patient. Meghan Courtney M.D. PATHOLOGY REPORT                       ATTN: Meghan Courtney                       REQ: Meghan Courtney     Copies To:   CARIN LEBLANC     Clinical Information: GERD WITHOUT ESOPHAGITIS, WEIGHT LOSS     FINAL DIAGNOSIS:   A. Duodenum, biopsy:             No pathologic abnormality. B.  Gastric antrum, biopsy:             No pathologic abnormality. C.  Gastric body, biopsy:             No pathologic abnormality. Specimen:   A) BIOPSY OF DUODENUM   B) BIOPSY OF GASTRIC ANTRUM, HEALING EROSIVE GASTRITIS   C) BIOPSY OF BODY OF STOMACH     Gross Examination:   The specimen consists of three parts, each received in formalin and   labeled with the patient's name, Alejandro Campbell. A - The container is labeled duodenum. The specimen consists of   multiple fragments of tissue measuring in aggregate 0.5 x 0.3 x 0.2 cm.    1 ns. B - The container is labeled biopsy of gastric antrum - healing erosive   gastritis. The specimen consists of two fragments of tissue measuring   in aggregate 0.3 x 0.1 x 0.1 cm.  1 ns.      C - The container is labeled biopsy of gastric body. The specimen   consists of a single 0.3 cm fragment of tissue. 1 ns. SMW:[id number]     Microscopic Examination:   A. Sections show benign duodenal mucosa with well-formed villi. Features of celiac sprue or other significant abnormality are not   identified. B. Sections show antral type gastric mucosa with no significant   pathologic abnormality. There is no evidence of intestinal metaplasia,   dysplasia, or malignancy. Helicobacter microorganisms are not   identified. C.  Sections show fundic type gastric mucosa with no significant   pathologic abnormality. There is no evidence of intestinal metaplasia,   dysplasia, or malignancy. Clementine Jesus M.D., F.C.A.P. An electronic signature was used to authenticate this note. --Allison Luke MD      ADDENDUM:  1. Schedule Alessandra Daniel for robotic possible open Ronaldo-en-Y gastric bypass; possible sleeve gastrectomy if unable to complete Ronaldo-en-Y gastric bypass. 2. He will undergo pre-operative clearance per anesthesia guidelines with risk factors listed under the past medical history diagnosis & problem list.  3. The risks, benefits and alternatives were discussed with Alessandra Fredy including non-operative management. The pros and cons of robotic, laparoscopic and open techniques were discussed. All questions answered. He understands and wishes to proceed with surgical intervention. 4. Restrictions discussed with Alessandra Daniel and he expresses understanding. 5. He is advised to call back directly if there are further questions/concerns, or if his symptoms worsen prior to surgery. Electronically signed by Allison Luke MD on 9/12/22 at 6:09 AM EDT     Procedures/Diagnostic Tests:    Operative Note        Patient: Brigid Demarco  YOB: 1975  MRN: [id number]     Date of Procedure: 9/12/2022     Pre-Op Diagnosis: 1. Morbid Obesity  2. BMI 46  3.  DM     Post-Op Diagnosis: Same       Procedure(s):  ROBOTIC RONALDO-EN-Y  GASTRIC BYPASS -antecolic and antigastric     Surgeon(s):  Lesa Fu MD     Assistant:   First Assistant: Agueda Larsen RN     Anesthesia: General/local     Estimated Blood Loss (mL): 10 ml     Complications: None     Specimens:   * No specimens in log *     Implants:  * No implants in log *      Drains: * No LDAs found *     Findings: none     INDICATIONS:  The patient is a 69-year-old male who is a morbidly  obese individual I had been seeing in the weight management program.   He was not able to lose enough adequate excess body weight with medical  management only and he wished to proceed with surgical intervention. He wishes to proceed with a robotic Ronaldo-en-Y gastric bypass. Both operative and  nonoperative intervention plans were discussed. Risks of surgery were  further discussed. Some of the risks included but were not limited to  bleeding, infection, the need for reoperation, severe chronic  postoperative pain or numbness, major vascular nerve injury,  cardiopulmonary complications, anesthetic complications, seroma or  hematoma formation, wound breakdown, trocar site herniation, anastomotic  leak, anastomotic stricture, anastomotic bleed, anastomotic ulcerations,  chronic pain, chronic nausea, and death. After all of the questions  were answered in their entirety and the patient was completely aware of  the current situation, he elected to proceed with the procedure. DESCRIPTION OF PROCEDURE:  After informed consent was signed and placed  on the chart, the patient was taken back to the operating room and  placed supine on the operating room table. General anesthesia was  induced. He tolerated this throughout the case. All pressure points  were padded. He was on preoperative antibiotics. Bilateral lower  extremity sequential compression devices were placed prior to incision.    His abdomen and pelvis were prepped and draped in the usual sterile  standard fashion. Timeout occurred prior to the operation which not  only identified the patient but also the planned procedure to be  performed. At the end of the time-out, there were no questions or  concerns. I began the operation by making a small transverse incision  just to the right of the umbilicus. Using low flow insufflation in the  Optiview, the abdomen was entered into without much difficulty. The  intraabdominal cavity was insufflated to a pressure of approximately 15  mmHg with CO2 gas. The patient tolerated the insufflation well. Four  separate 8 mm trocars were then placed in their standard location under  direct vision. 5 mm trocar was then placed in the far right lateral  upper abdominal region under direct vision. The liver retractor was  brought in and secured under direct vision. The patient was then placed  in a reverse Trendelenburg. Robot was brought in and docked. The  instruments were placed under direct vision. Once everything was  aligned and in order, I then unscrubbed them back to the console. I  began the operation first by evaluating the hiatus. There appeared to  be no significant hiatal hernia. At that time I focused on the lesser curvature of the stomach just adjacent to the  caudate lobe. Here, a small window was then made into the retrogastric  lesser sac space. The retrogastric tunnel was then created with gentle  blunt dissection up to the angle. There was excess scar in this region which was expected secondary to the patient previously having a adjustable gastric band inserted and then removed in the past.  After this was all mobilized, I then  came across at a right angle ensuring that the 32-[place]ub[place] Bougie was  pulled back far enough with a Covidien 45 purple stapling load. This  was just next to the caudate lobe. 32-[place] Bougie was then placed  back down to lie next to my staple line.   Three separate Covidien 60 mm  purple loads were then used to come along the 32-[place]namese Bougie up to the  angle there by the left sejal. This allowed me to completely separate  the remnant stomach from my new pouch. Pouch was nice and small and was  only about 30 mL. After the pouch was formed, I then identified the  ligament of [place]. This was then carried down about 50 cm. Care was  taken to ensure proximal and distal directions. Omentum was then  divided using the vessel sealer. The loop was then brought up to ensure  orientation to the pouch. This reached without too much difficulty at  all. The anterior wall of the stomach in the antimesenteric border of  the small bowel was then reapproximated with a Vicryl 3-0 suture. Enterotomy and the gastrostomy were then made on the antimesenteric  border of the small bowel and the anterior wall of the stomach with  monopolar scissors. Covidien 45 mm purple load was then brought through  the enterotomy and the gastrotomy to about 25-30 mm and then closed,  fired, and the anastomosis was then formed. 32-[place]namese Bougie was able  to easily flow down through this and into the Ronaldo limb. The anterior  gastric wall/enterotomy was then closed with a running 2-0 V-Loc x2. Reinforced with interrupted Vicryl sutures. Anastomosis appeared to be  widely patent. Adequate hemostasis. Good blood flow. This was  confirmed with Firefly. A small defect was then made there just to the  left of the pouch there on the small bowel with a monopolar scissor. This was fired across with a tan load transecting the small bowel. The  GJ anastomosis was then tested under irrigation and clamped distally on  the Ronaldo limb with air insufflation to 32-Vietnamese Bougie. This appeared  to demonstrate no air bubbles of any kind or any concern for leak. Irrigation was then suctioned out. The biliopancreatic limb was then  slightly mobilized at the mesentery with a vessel sealer. This was then  left there in the left upper quadrant.   The Ronaldo limb was then measured  down about 100 cm. Here, the jejunojejunal anastomosis was then  formed with a biliopancreatic limb end. This was done in a similar  fashion as to the [address] anastomosis. The antimesenteric borders of the  small bowel were reapproximated to one another with a Vicryl suture. Enterotomies made on the antimesenteric border. 60-mm tan load was then  brought in, closed, fired, and the anastomosis formed. 3-0 V-Loc in a  running fashion x2 was used to close the enterotomy. This was  reinforced again with the Vicryl suture. Anastomosis appeared to be  widely patent. Mesenteric defects were closed with 3-0 suture. It  should be noted that Firefly was used to ensure good blood flow to the  GJ and the JJ anastomosis. I then reevaluated the [address] anastomosis 1 last time. I did reinforce this on the right side with another 2 OV lock to ensure good closure. After mesenteric defects closed, the  anastomoses were both covered with VISTASEAL to ensure hemostasis and  also give some added support. No other abnormalities were identified in  the abdomen. Instruments at that time were then removed. Robot  undocked. I scrubbed back into the table. Large trocar site was then  closed at the fascial level with Vicryl suture and a Storz suture  passer. After completion of this, there were no pressure defect. Subcutaneous tissues were irrigated. Hemostasis adequate. Skin  reapproximated at all the incisional sites with 4-0 Vicryl in a  subcuticular fashion. Closed incisions were then cleaned, dried, and  Steri-Strips applied. The patient tolerated local anesthetic well. Less than 20 mL blood loss. Easily brought out of general anesthesia  and transferred to postanesthesia care unit in stable condition. Sponge, needle, and instrumentation count was correct at the end of the  procedure.     PROCEDURE: FL UGI       CLINICAL INFORMATION: 80-year-old male who underwent a Ronaldo-en-Y gastric bypass yesterday. TECHNIQUE: A preliminary abdominal radiograph was obtained. Following the oral administration of Gastrografin and thin barium, the anatomy of the distal esophagus, stomach and jejunum were evaluated in a limited upper GI examination. 9 images were    obtained. Total fluoroscopy time 0.7 minutes. COMPARISON: None       FINDINGS: Preliminary radiograph demonstrates a nonobstructive bowel gas pattern. There is surgical suture material in the left upper abdomen. Osseous structures are unremarkable. There is no stenosis or large hiatal hernia in the distal esophagus. There are surgical changes in the stomach of gastric bypass. There is normal transit of contrast through the stomach into the jejunum. There is no extravasation of contrast to suggest a    leak. Proximal small bowel loops are normal..           Impression       No evidence of obstruction or leak following gastric bypass. Final report electronically signed by Dr Mehran Albrecht on 9/13/2022 11:00 AM     PROCEDURE: CT ABDOMEN PELVIS WO CONTRAST       CLINICAL INFORMATION: 51-year-old male with acute blood loss anemia. History of gastric bypass. Abdominal pain . COMPARISON: CT 8/14/2019. TECHNIQUE: Axial 5 mm CT images were obtained through the abdomen and pelvis. No contrast was given. Sagittal and coronal reconstructions were obtained. All CT scans at this facility use dose modulation, iterative reconstruction, and/or weight-based dosing when appropriate to reduce radiation dose to as low as reasonably achievable. FINDINGS:   There is a small left-sided and trace right-sided pleural effusion. There are calcified granulomas near the lung bases. The base of the heart is within acceptable limits. Lack of IV contrast limits evaluation of the abdominal organs. The spleen is enlarged measuring 15.8 cm in craniocaudal dimension.        The gallbladder, pancreas and adrenal glands are within normal limits. There are postsurgical changes from gastric bypass Diallo-en-Y. There is a large collection of hyperdense material adjacent to the incision which may represent blood products. There is no small or large bowel obstruction. There is a hyperdense collection abutting the surgical changes measuring approximately 13.6 x 10.3 x 9.4 cm concerning for a hematoma. The urinary bladder is incompletely distended. The aorta is normal in caliber with no aneurysmal dilatation. The bones are intact. No acute fractures. Impression   Hyperdense collection adjacent to the stomach concerning for a hematoma. There is also small amount of free fluid within the pelvis. Case was discussed by Dr. Missy Paul with Yesi Sher at approximately 7:45 AM 9/15/2022 via telephone. **This report has been created using voice recognition software. It may contain minor errors which are inherent in voice recognition technology. **       Final report electronically signed by Dr Scarlett Cervantes on 9/15/2022 8:06 AM     Hospital Course: Mela Opitz is a 68-year-old male patient who presented to the weight management program for his (morbid) obesity, BMI 46. He decided to electively undergo robotic assisted diallo-en-y gastric bypass with Dr. Lennox Stack on 9/12/22. He was admitted to [facility] for postoperative care and was initially managed with bowel rest, analgesics for pain control, IV fluid hydration, GI and DVT prophylaxis. On 9/13/22 he passed his upper GI study and patient was advanced to sugar free clear liquids. POD#1 patient had syncopal episodes. In the morning hemoglobin did not drop but by the later morning hemoglobin had dropped from 11.4 to 7.7. Patient was transferred to  for hypotension, tachycardia, blood loss anemia and given 2 units PRBC with fluid resuscitation. Cardiology and medicine were consulted.  Patient's hemoglobin did stabilize but still below 8 POD#2 so he was given another 2 units. POD #3 CT scan was completed to look for bleeding. It demonstrated hematoma adjacent to surgical site but no signs of leak or active bleeding. Patient clinically improved after the 4th units of PRBC. He was able to tolerate increasing levels of activity and able to take clear liquids adequately. Patient was able to void on his own accord. Patient was discharged home on liquid diet until follow up appointment. Patient discharged with home health so nurses can follow the patient for medication compliance, wound healing and nutrition needs. Cardiology adjusted medications with 1 week follow up with Dr. Viola Mckeon. Discharge Condition: stable    Disposition: home    Labs:  Lab Results   Component Value Date    WBC 9.3 09/16/2022    HGB 8.8 (L) 09/16/2022    HCT 27.7 (L) 09/16/2022    MCV 86.5 09/16/2022     09/16/2022     Lab Results   Component Value Date/Time     09/16/2022 05:30 AM    K 4.2 09/16/2022 05:30 AM    K 4.6 09/13/2022 05:48 AM     09/16/2022 05:30 AM    CO2 24 09/16/2022 05:30 AM    BUN 11 09/16/2022 05:30 AM    CREATININE 0.8 09/16/2022 05:30 AM    GLUCOSE 123 09/16/2022 05:30 AM    GLUCOSE 258 12/16/2017 08:25 AM    CALCIUM 8.8 09/16/2022 05:30 AM        Wt Readings from Last 3 Encounters:   09/19/22 (!) 374 lb 6.4 oz (169.8 kg)   09/16/22 (!) 383 lb 11.2 oz (174 kg)   09/02/22 (!) 378 lb 9.6 oz (171.7 kg)     Discharge Medications:        Medication List        START taking these medications      hyoscyamine 125 MCG Tbdp dispersible tablet  Commonly known as: OSCIMIN  Take 1 tablet by mouth every 4 hours as needed (epigastric cramping/spasms)     metoprolol succinate 25 MG extended release tablet  Commonly known as: TOPROL XL  Take 3 tablets by mouth daily     oxyCODONE 5 MG immediate release tablet  Commonly known as: ROXICODONE  Take 1 tablet by mouth every 6 hours as needed for Pain for up to 7 days.             CHANGE how you take these medications furosemide 40 MG tablet  Commonly known as: LASIX  TAKE ONE TABLET BY MOUTH EVERY MORNING  What changed:   See the new instructions. Another medication with the same name was removed. Continue taking this medication, and follow the directions you see here. insulin lispro (1 Unit Dial) 100 UNIT/ML Sopn  Commonly known as: HumaLOG KwikPen  ONLY SLIDING SCALE AS NEEDED--151-200 3 UNITS, 201-250 5 UNITS, 251-300 8 UNITS, 301-350 10 UNITS, 351-400 12 UNITS, OVER 400 15 UNITS AND CONTACT PHYSICIAN  What changed: additional instructions            CONTINUE taking these medications      acetaminophen 325 MG tablet  Commonly known as: TYLENOL     Adult Mask Misc  Please do mask desensitization and/or provide mask of patient's choice.      Aspirin Low Dose 81 MG EC tablet  Generic drug: aspirin  TAKE 1 TABLET BY MOUTH ONE TIME A DAY     CPAP Machine Misc  by Does not apply route Ramp pressure: 4.0 cm H2O  Treatment pressure: 15.0 cm H2O     Docusate Sodium 100 MG Tabs  Take 100 mg by mouth 2 times daily Take as needed to prevent constipation     FLINTSTONES PLUS IRON PO     fluticasone 50 MCG/ACT nasal spray  Commonly known as: FLONASE  2 sprays by Nasal route daily     loratadine 10 MG tablet  Commonly known as: CLARITIN     metoclopramide 10 MG tablet  Commonly known as: REGLAN  Take 1 tablet by mouth every 6 hours as needed (nausea/vomiting)     MUCINEX ALLERGY PO     omeprazole 40 MG delayed release capsule  Commonly known as: PRILOSEC  Take 1 capsule by mouth daily     ondansetron 4 MG tablet  Commonly known as: Zofran  Take 1 tablet by mouth every 4 hours as needed for Nausea or Vomiting     PEN NEEDLES 31GX5/16\" 31G X 8 MM Misc  Use to inject insulins and Ozempic     Prodigy Autocode Blood Glucose Delicia  Use to check blood sugar 4 times daily     Prodigy No Coding Blood Gluc strip  Generic drug: blood glucose test strips  USE TO CHECK BLOOD SUGAR FOUR TIMES A DAY     Prodigy Twist Top Lancets 28G Misc  USE TO

## 2022-09-20 NOTE — CARE COORDINATION
of new CT episode    Advance Care Planning:   Does patient have an Advance Directive: decision maker updated. Ambulatory Care Manager contacted the patient by telephone to perform post hospital discharge assessment. Verified name and  with patient as identifiers. Provided introduction to self, and explanation of the ACM role. Patient states doing good since hospital discharge. Denies uncontrolled pain.  has been meeting and often exceeding ordered PO fluid intake.  has been getting 72 oz fluid in most days since discharge. Wife is keeping close track of intake.  has been drinking 2 types of protein shakes one with milk (ready made) and one with water (powdered). Denies nausea or vomiting. Patient reports was having issues with constipation but had large BM this morning. Has been taking Miralax and stool softener. Did not take Magnesium Citrate since home. Patient denies issues with urination.  has not needed Humalog since home; no BS >150. This AM . He is planning to discuss resuming Ozempic with PCP at follow up this week as he is worried about BS trending up post op. Planning to have labs tomorrow for Dr. Luciana Russo follow up later this week. Patient reports op sites healing well; still with bruising. Denies drainage, fever, or chills. Reports steri strips dry and intact. He had been using abdominal binder; no longer using. [facility] called him yesterday to come out for initial visit but he asked for services to be on hold for now as he has several office visits this week. He is aware to let [facility] know his decision by 5 days of discharge home. [facility] he works as  and is hoping to return to work next Tuesday- if permitted by Dr. Farrukh Graves. Denies immediate needs for ACM. Agreeable to follow up calls. ACM provided contact info for patient to reach out PRN. Discussed red flags and NAL this date.      ACM reviewed discharge instructions, medical action plan and red flags with patient who verbalized understanding. Patient given an opportunity to ask questions and does not have any further questions or concerns at this time. Were discharge instructions available to patient? Yes. Reviewed appropriate site of care based on symptoms and resources available to patient including: PCP  Specialist  Benefits related nurse triage line  [facility] 24/7. The patient agrees to contact the PCP office for questions related to their healthcare. Medication reconciliation was performed with patient, who verbalizes understanding of administration of home medications. Advised obtaining a 90-day supply of all daily and as-needed medications. Was patient discharged with a pulse oximeter? no    ACM provided contact information. Plan for follow-up call in 5-7 days based on severity of symptoms and risk factors. Plan for next call:  discuss post-op recovery and plan of care after follow up appts     Follow Up  Future Appointments   Date Time Provider Link L[place]   9/22/2022  3:45 PM Donald Hughes MD [facility] [address]   9/23/2022 11:45 AM Roni House MD N [facility] - [address] [place] [address]   9/27/2022  8:30 AM BART Deleon N [facility] - [address] [place] [address]   9/27/2022  9:00 AM Deirdre Barcenas RD, BASILIO N [facility] [place]   4/24/2023  3:15 PM Josh Meeks Med [facility] [address] [place] [address]   8/10/2023 11:30 AM ALFONSO EUCEDA PACER NURSE N [facility] [address]   9/11/2023 11:45 AM Kenneth Allred MD N [facility] - Calli Arrington, MSN RN  Associate Care Manager  Phone: [phone]  Email: [email or web address]

## 2022-09-21 ENCOUNTER — NURSE ONLY (OUTPATIENT)
Dept: LAB | Age: 47
End: 2022-09-21

## 2022-09-21 DIAGNOSIS — Z98.84 S/P BARIATRIC SURGERY: ICD-10-CM

## 2022-09-21 DIAGNOSIS — D62 ACUTE BLOOD LOSS AS CAUSE OF POSTOPERATIVE ANEMIA: ICD-10-CM

## 2022-09-21 DIAGNOSIS — N17.9 AKI (ACUTE KIDNEY INJURY) (HCC): ICD-10-CM

## 2022-09-21 LAB
ANION GAP SERPL CALCULATED.3IONS-SCNC: 11 MEQ/L (ref 8–16)
BUN BLDV-MCNC: 12 MG/DL (ref 7–22)
CALCIUM SERPL-MCNC: 9.8 MG/DL (ref 8.5–10.5)
CHLORIDE BLD-SCNC: 99 MEQ/L (ref 98–111)
CO2: 28 MEQ/L (ref 23–33)
CREAT SERPL-MCNC: 0.7 MG/DL (ref 0.4–1.2)
GFR SERPL CREATININE-BSD FRML MDRD: > 90 ML/MIN/1.73M2
GLUCOSE BLD-MCNC: 95 MG/DL (ref 70–108)
HCT VFR BLD CALC: 34 % (ref 42–52)
HEMOGLOBIN: 10.6 GM/DL (ref 14–18)
POTASSIUM SERPL-SCNC: 4 MEQ/L (ref 3.5–5.2)
SODIUM BLD-SCNC: 138 MEQ/L (ref 135–145)

## 2022-09-22 ENCOUNTER — OFFICE VISIT (OUTPATIENT)
Dept: FAMILY MEDICINE CLINIC | Age: 47
End: 2022-09-22
Payer: COMMERCIAL

## 2022-09-22 ENCOUNTER — TELEPHONE (OUTPATIENT)
Dept: SURGERY | Age: 47
End: 2022-09-22

## 2022-09-22 VITALS
BODY MASS INDEX: 45.5 KG/M2 | TEMPERATURE: 98.1 F | HEART RATE: 100 BPM | DIASTOLIC BLOOD PRESSURE: 70 MMHG | RESPIRATION RATE: 16 BRPM | WEIGHT: 315 LBS | OXYGEN SATURATION: 93 % | SYSTOLIC BLOOD PRESSURE: 118 MMHG

## 2022-09-22 DIAGNOSIS — I48.0 PAROXYSMAL ATRIAL FIBRILLATION (HCC): ICD-10-CM

## 2022-09-22 DIAGNOSIS — I50.42 CHRONIC COMBINED SYSTOLIC AND DIASTOLIC CONGESTIVE HEART FAILURE (HCC): ICD-10-CM

## 2022-09-22 DIAGNOSIS — E11.65 TYPE 2 DIABETES MELLITUS WITH HYPERGLYCEMIA, WITHOUT LONG-TERM CURRENT USE OF INSULIN (HCC): ICD-10-CM

## 2022-09-22 DIAGNOSIS — Z09 HOSPITAL DISCHARGE FOLLOW-UP: ICD-10-CM

## 2022-09-22 DIAGNOSIS — D64.9 POSTOPERATIVE ANEMIA: ICD-10-CM

## 2022-09-22 DIAGNOSIS — Z98.84 S/P GASTRIC BYPASS: Primary | ICD-10-CM

## 2022-09-22 PROCEDURE — 1111F DSCHRG MED/CURRENT MED MERGE: CPT | Performed by: FAMILY MEDICINE

## 2022-09-22 PROCEDURE — 90471 IMMUNIZATION ADMIN: CPT | Performed by: FAMILY MEDICINE

## 2022-09-22 PROCEDURE — 99496 TRANSJ CARE MGMT HIGH F2F 7D: CPT | Performed by: FAMILY MEDICINE

## 2022-09-22 PROCEDURE — 90674 CCIIV4 VAC NO PRSV 0.5 ML IM: CPT | Performed by: FAMILY MEDICINE

## 2022-09-22 NOTE — TELEPHONE ENCOUNTER
----- Message from JACOB Jung CNP sent at 9/22/2022  8:13 AM EDT -----  Can we just call him and let him know labs look much better. Hemoglobin improved.  He has an appointment with Renny Ann on friday for 1 week follow up.    ----- Message -----  From: Jayesh Carranza Incoming Lab Results From Soft  Sent: 9/21/2022   6:33 PM EDT  To: JACOB Jung CNP

## 2022-09-22 NOTE — PROGRESS NOTES
Immunizations Administered       Name Date Dose Route    Influenza, FLUCELVAX, (age 10 mo+), MDCK, PF, 0.5mL 9/22/2022 0.5 mL Intramuscular    Site: Deltoid- Right    Lot: [id number]    NDC: 31237-226-80

## 2022-09-23 ENCOUNTER — OFFICE VISIT (OUTPATIENT)
Dept: BARIATRICS/WEIGHT MGMT | Age: 47
End: 2022-09-23

## 2022-09-23 VITALS
HEIGHT: 74 IN | TEMPERATURE: 98.8 F | HEART RATE: 92 BPM | BODY MASS INDEX: 40.43 KG/M2 | SYSTOLIC BLOOD PRESSURE: 96 MMHG | DIASTOLIC BLOOD PRESSURE: 70 MMHG | WEIGHT: 315 LBS

## 2022-09-23 DIAGNOSIS — Z98.84 S/P GASTRIC BYPASS: Primary | ICD-10-CM

## 2022-09-23 PROCEDURE — 99024 POSTOP FOLLOW-UP VISIT: CPT | Performed by: SURGERY

## 2022-09-23 RX ORDER — PHENOL 1.4 %
2 AEROSOL, SPRAY (ML) MUCOUS MEMBRANE DAILY
COMMUNITY

## 2022-09-23 RX ORDER — LANOLIN ALCOHOL/MO/W.PET/CERES
1000 CREAM (GRAM) TOPICAL DAILY
COMMUNITY

## 2022-09-23 NOTE — PROGRESS NOTES
fluid intake between meals and get your protein in.  2.  Now is when you want to separate your liquids from solids. No liquids 30 minutes before your meals and no liquids 30 minutes after your meals. Water goal at least 64 oz per day  3. Continue to drink protein shakes between meals as can not get enough protein from food alone at this time. Goal is 60-80 grams protein per day. 4.  Continue taking bariatric vitamins as currently taking. Get  your six week post op lab work completed 5-7 days prior to next office visit.      Recommended Follow-Up:six week post op with PA and RD    Jessy Tsai RD, LD,   Dietitian- Weight Management [address] [place] [address]

## 2022-09-24 ASSESSMENT — ENCOUNTER SYMPTOMS
SINUS PRESSURE: 0
CONSTIPATION: 0
BACK PAIN: 0
NAUSEA: 0
ABDOMINAL DISTENTION: 0
BLOOD IN STOOL: 0
EYE PAIN: 0
TROUBLE SWALLOWING: 0
WHEEZING: 0
RHINORRHEA: 0
EYE DISCHARGE: 0
ABDOMINAL PAIN: 0
DIARRHEA: 0
PHOTOPHOBIA: 0
FACIAL SWELLING: 0
ALLERGIC/IMMUNOLOGIC NEGATIVE: 1
COLOR CHANGE: 0
STRIDOR: 0
CHEST TIGHTNESS: 0
VOMITING: 0
CHOKING: 0
VOICE CHANGE: 0
EYE REDNESS: 0
SORE THROAT: 0
EYE ITCHING: 0
SHORTNESS OF BREATH: 0
RECTAL PAIN: 0
APNEA: 0
ANAL BLEEDING: 0
COUGH: 0

## 2022-09-24 NOTE — PROGRESS NOTES
Post-Discharge Transitional Care  Follow Up      Aviva Clemons   YOB: 1975    Date of Office Visit:  9/22/2022  Date of Hospital Admission: 9/12/22  Date of Hospital Discharge: 9/16/22  Risk of hospital readmission (high >=14%. Medium >=10%) :Readmission Risk Score: 12.6      Care management risk score Rising risk (score 2-5) and Complex Care (Scores >=6): No Risk Score On File     Non face to face  following discharge, date last encounter closed (first attempt may have been earlier): 09/20/2022    Call initiated 2 business days of discharge: Yes    ASSESSMENT/PLAN:   S/P gastric bypass  Paroxysmal atrial fibrillation (HCC)  Chronic combined systolic and diastolic congestive heart failure (HCC)  Type 2 diabetes mellitus with hyperglycemia, without long-term current use of insulin (HCC)  -     Hemoglobin A1C; Future  -     Lipid Panel; Future  -     Comprehensive Metabolic Panel; Future  Postoperative anemia  Hospital discharge follow-up  -     NM DISCHARGE MEDS RECONCILED W/ CURRENT OUTPATIENT MED LIST      Medical Decision Making: high complexity  Return in 3 months (on 12/22/2022). On this date 9/22/2022 I have spent 40 minutes reviewing previous notes, test results and face to face with the patient discussing the diagnosis and importance of compliance with the treatment plan as well as documenting on the day of the visit. Subjective:   HPI:  Follow up of Hospital problems/diagnosis(es):    Diagnosis Orders   1. S/P gastric bypass        2. Paroxysmal atrial fibrillation (HCC)        3. Chronic combined systolic and diastolic congestive heart failure ([address]        4. Type 2 diabetes mellitus with hyperglycemia, without long-term current use of insulin (HCC)  Hemoglobin A1C    Lipid Panel    Comprehensive Metabolic Panel      5. Postoperative anemia        6.  Hospital discharge follow-up  NM DISCHARGE MEDS RECONCILED W/ CURRENT OUTPATIENT MED LIST            Inpatient course: Discharge summary reviewed- see chart. Interval history/Current status: stable, extensive ecchymosis surrounding lateral abdominal wall left worse than right as well as the suprapubic area, no palpable hematoma incisions are clear and dry    Patient Active Problem List   Diagnosis    CHF (congestive heart failure) ([facility]) systolic chronic     Bronchitis, acute    Cardiomyopathy (HCC)-Nonischemic dilated low EF 25%, newly Dxed 08/2014- med RX- cath  mild CAD- repeat echo Nov 24, 2014- EF 25 to 30%- NEED the ICD    S/P cardiac cath- Angiographically patent coronaries-EF 20, EDP 28 mmhg- med rx    AICD (automatic cardioverter/defibrillator) present    Morbid obesity with BMI of 45.0-49.9, adult ([facility])    HTN (hypertension)    Bilateral leg edema- +1 B/L- RESOLVED    Cough due to ACE inhibitor    Hyponatremia    Hyperglycemia    Weight gain, claimed 18lbs in 10 days since discharge    Chronic systolic congestive heart failure (HCC)    MIKE on CPAP    Hx of AICD discharge    Sinus tachycardia    Chronic combined systolic and diastolic congestive heart failure (HCC)    Nonischemic cardiomyopathy ([address]    A-fib ([address]    Diabetes mellitus ([address]    Morbid obesity ([address]    S/P bariatric surgery    Symptomatic hypotension    HFrEF (heart failure with reduced ejection fraction) ([facility])    S/P gastric bypass    Orthostatic dizziness    Urinary retention    Difficult Louis catheter placement ([address]       Medications listed as ordered at the time of discharge from hospital     Medication List            Accurate as of September 22, 2022 11:59 PM. If you have any questions, ask your nurse or doctor. CHANGE how you take these medications      gabapentin 100 MG capsule  Commonly known as: NEURONTIN  TAKE 1 CAPSULE BY MOUTH 3 TIMES A DAY  What changed: See the new instructions.             CONTINUE taking these medications      acetaminophen 325 MG tablet  Commonly known as: TYLENOL     Adult Mask Misc  Please do mask desensitization and/or provide mask of patient's choice. Aspirin Low Dose 81 MG EC tablet  Generic drug: aspirin  TAKE 1 TABLET BY MOUTH ONE TIME A DAY     CPAP Machine Misc  by Does not apply route Ramp pressure: 4.0 cm H2O  Treatment pressure: 15.0 cm H2O     Docusate Sodium 100 MG Tabs  Take 100 mg by mouth 2 times daily Take as needed to prevent constipation     FLINTSTONES PLUS IRON PO     fluticasone 50 MCG/ACT nasal spray  Commonly known as: FLONASE  2 sprays by Nasal route daily     furosemide 40 MG tablet  Commonly known as: LASIX  TAKE ONE TABLET BY MOUTH EVERY MORNING     hyoscyamine 125 MCG Tbdp dispersible tablet  Commonly known as: OSCIMIN  Take 1 tablet by mouth every 4 hours as needed (epigastric cramping/spasms)     insulin lispro (1 Unit Dial) 100 UNIT/ML Sopn  Commonly known as: HumaLOG KwikPen  ONLY SLIDING SCALE AS NEEDED--151-200 3 UNITS, 201-250 5 UNITS, 251-300 8 UNITS, 301-350 10 UNITS, 351-400 12 UNITS, OVER 400 15 UNITS AND CONTACT PHYSICIAN     loratadine 10 MG tablet  Commonly known as: CLARITIN     metoclopramide 10 MG tablet  Commonly known as: REGLAN  Take 1 tablet by mouth every 6 hours as needed (nausea/vomiting)     metoprolol succinate 25 MG extended release tablet  Commonly known as: TOPROL XL  Take 3 tablets by mouth daily     MUCINEX ALLERGY PO     omeprazole 40 MG delayed release capsule  Commonly known as: PRILOSEC  Take 1 capsule by mouth daily     ondansetron 4 MG tablet  Commonly known as: Zofran  Take 1 tablet by mouth every 4 hours as needed for Nausea or Vomiting     oxyCODONE 5 MG immediate release tablet  Commonly known as: ROXICODONE  Take 1 tablet by mouth every 6 hours as needed for Pain for up to 7 days.      PEN NEEDLES 31GX5/16\" 31G X 8 MM Misc  Use to inject insulins and Ozempic     Prodigy Autocode Blood Glucose Delicia  Use to check blood sugar 4 times daily     Prodigy No Coding Blood Gluc strip  Generic drug: blood glucose test strips  USE TO CHECK BLOOD SUGAR FOUR TIMES A DAY     Prodigy Twist Top Lancets 28G Misc  USE TO TEST FOUR TIMES A DAY     rosuvastatin 10 MG tablet  Commonly known as: CRESTOR  TAKE 1 TABLET BY MOUTH ONE TIME A DAY     spironolactone 25 MG tablet  Commonly known as: ALDACTONE  TAKE 1 TABLET BY MOUTH ONE TIME A DAY                Medications marked \"taking\" at this time  Outpatient Medications Marked as Taking for the 9/22/22 encounter (Office Visit) with Taryn Joel MD   Medication Sig Dispense Refill    metoprolol succinate (TOPROL XL) 25 MG extended release tablet Take 3 tablets by mouth daily 30 tablet 3    insulin lispro, 1 Unit Dial, (HUMALOG KWIKPEN) 100 UNIT/ML SOPN ONLY SLIDING SCALE AS NEEDED--151-200 3 UNITS, 201-250 5 UNITS, 251-300 8 UNITS, 301-350 10 UNITS, 351-400 12 UNITS, OVER 400 15 UNITS AND CONTACT PHYSICIAN 135 mL 3    hyoscyamine (OSCIMIN) 125 MCG TBDP dispersible tablet Take 1 tablet by mouth every 4 hours as needed (epigastric cramping/spasms) 30 tablet 0    Pediatric Multivitamins-Iron (FLINTSTONES PLUS IRON PO) Take 2 tablets by mouth daily      Fexofenadine HCl (MUCINEX ALLERGY PO) Take 1 tablet by mouth daily as needed (allergies)      omeprazole (PRILOSEC) 40 MG delayed release capsule Take 1 capsule by mouth daily 90 capsule 1    Docusate Sodium 100 MG TABS Take 100 mg by mouth 2 times daily Take as needed to prevent constipation 30 tablet 1    metoclopramide (REGLAN) 10 MG tablet Take 1 tablet by mouth every 6 hours as needed (nausea/vomiting) (Patient not taking: Reported on 9/23/2022) 30 tablet 0    ondansetron (ZOFRAN) 4 MG tablet Take 1 tablet by mouth every 4 hours as needed for Nausea or Vomiting (Patient not taking: Reported on 9/23/2022) 30 tablet 0    furosemide (LASIX) 40 MG tablet TAKE ONE TABLET BY MOUTH EVERY MORNING 90 tablet 3    gabapentin (NEURONTIN) 100 MG capsule TAKE 1 CAPSULE BY MOUTH 3 TIMES A DAY (Patient taking differently: 100 mg 3 times daily as needed.) 270 capsule 1    ASPIRIN LOW DOSE 81 MG EC tablet TAKE 1 TABLET BY MOUTH ONE TIME A DAY 90 tablet 1    rosuvastatin (CRESTOR) 10 MG tablet TAKE 1 TABLET BY MOUTH ONE TIME A DAY 90 tablet 3    blood glucose test strips (PRODIGY NO CODING BLOOD GLUC) strip USE TO CHECK BLOOD SUGAR FOUR TIMES A  strip 3    Prodigy Twist Top Lancets 28G MISC USE TO TEST FOUR TIMES A  each 3    spironolactone (ALDACTONE) 25 MG tablet TAKE 1 TABLET BY MOUTH ONE TIME A DAY 90 tablet 3    CPAP Machine MISC by Does not apply route Ramp pressure: 4.0 cm H2O  Treatment pressure: 15.0 cm H2O 1 each 0    Blood Glucose Monitoring Suppl (PRODIGY AUTOCODE BLOOD GLUCOSE) MAUREEN Use to check blood sugar 4 times daily 1 Device 0    Insulin Pen Needle (PEN NEEDLES 31GX5/16\") 31G X 8 MM MISC Use to inject insulins and Ozempic 400 each 3    fluticasone (FLONASE) 50 MCG/ACT nasal spray 2 sprays by Nasal route daily 1 Bottle 2    loratadine (CLARITIN) 10 MG tablet Take 10 mg by mouth daily      Respiratory Therapy Supplies (ADULT MASK) MISC Please do mask desensitization and/or provide mask of patient's choice. 1 each 0    acetaminophen (TYLENOL) 325 MG tablet Take 650 mg by mouth every 8 hours as needed for Pain          Medications patient taking as of now reconciled against medications ordered at time of hospital discharge: Yes    A comprehensive review of systems was negative except for what was noted in the HPI.     Objective:    /70   Pulse 100   Temp 98.1 °F (36.7 °C) (Oral)   Resp 16   Wt (!) 364 lb (165.1 kg)   SpO2 93%   BMI 45.50 kg/m²   General Appearance: alert and oriented to person, place and time, well developed and well- nourished, in no acute distress  Skin: warm and dry, no rash or erythema  Head: normocephalic and atraumatic  Eyes: pupils equal, round, and reactive to light, extraocular eye movements intact, conjunctivae normal  ENT: tympanic membrane, external ear and ear canal normal bilaterally, nose without deformity, nasal mucosa and turbinates normal without polyps  Neck: supple and non-tender without mass, no thyromegaly or thyroid nodules, no cervical lymphadenopathy  Pulmonary/Chest: clear to auscultation bilaterally- no wheezes, rales or rhonchi, normal air movement, no respiratory distress  Cardiovascular: normal rate, regular rhythm, normal S1 and S2, no murmurs, rubs, clicks, or gallops, distal pulses intact, no carotid bruits  Abdomen: soft, non-tender, non-distended, normal bowel sounds, no masses or organomegaly  Extremities: no cyanosis, clubbing or edema  Musculoskeletal: normal range of motion, no joint swelling, deformity or tenderness  Neurologic: reflexes normal and symmetric, no cranial nerve deficit, gait, coordination and speech normal    Requested Prescriptions      No prescriptions requested or ordered in this encounter     Orders Placed This Encounter   Procedures    Influenza, FLUCELVAX, (age 10 mo+), IM, Preservative Free, 0.5 mL    Hemoglobin A1C     Standing Status:   Future     Standing Expiration Date:   9/22/2023    Lipid Panel     Standing Status:   Future     Standing Expiration Date:   9/22/2023     Order Specific Question:   Is Patient Fasting?/# of Hours     Answer:   yes/12    Comprehensive Metabolic Panel     Standing Status:   Future     Standing Expiration Date:   9/22/2023    [place] DISCHARGE MEDS RECONCILED W/ CURRENT OUTPATIENT MED LIST       An electronic signature was used to authenticate this note.   --Law Eid MD

## 2022-09-24 NOTE — PROGRESS NOTES
Mickey Izquierdo (:  1975)     ASSESSMENT:  1.  Status post robotic Ronaldo-en-Y gastric bypass  2. Acute blood loss anemia  3. Cardiomyopathy    PLAN:  1. Hemoglobin stable. No signs of active bleeding. Asymptomatic. 2.  Incisional/wound care. 3.  Continue to stay well-hydrated. Getting over 64 ounces of fluid in daily. 4.  Continue with 3 protein shakes per day. 5.  Okay to advance to next stage of clear liquids. Planning to follow-up with dietitian beginning of the week. Continue to follow her recommendations as directed. 6.  PPI  7. Ambulating well. Continue to stay active and to become more mobile. 8.  Home medications as directed. 9.  Multivitamin, B complex and calcium. 10.  Restrictions discussed with patient in regards to dietary as well as mobility. All questions answered. 11.  Follow-up in office with me in 1 week  12. Signs and symptoms reviewed with patient that would be concerning and need him to return to office for re-evaluation. Patient states He will call if He has questions or concerns. SUBJECTIVE/OBJECTIVE:    Chief Complaint   Patient presents with    Bariatrics Post Op Follow Up     1 wk PO      HPI  80-year-old male who presents for 1 week follow-up status post robotic Ronaldo-en-Y gastric bypass. Date of surgery 2022. Postoperatively had a delayed bleed from the gastric remnant staple line. This was managed conservatively. 4 units packed RBC. Oozing stopped after Lovenox and Toradol stopped. Seen by cardiology. Tolerating clear liquids well. Getting at least 64 ounces of fluid in daily. Doing well with the 3 protein shakes. No nausea or vomiting. No significant GERD. Taking PPI. No lightheadedness or dizziness. No significant abdominal pain. Bruising on the lower abdomen. Incisions otherwise staying intact without drainage, bleeding or infection. Denies chest pain or shortness of breath. No issues with ambulation.   Overall, doing well since discharge from the hospital.    Review of Systems   Constitutional:  Negative for activity change, appetite change, chills, diaphoresis, fatigue, fever and unexpected weight change. HENT:  Negative for congestion, dental problem, drooling, ear discharge, ear pain, facial swelling, hearing loss, mouth sores, nosebleeds, postnasal drip, rhinorrhea, sinus pressure, sneezing, sore throat, tinnitus, trouble swallowing and voice change. Eyes:  Negative for photophobia, pain, discharge, redness, itching and visual disturbance. Respiratory:  Negative for apnea, cough, choking, chest tightness, shortness of breath, wheezing and stridor. Cardiovascular:  Negative for chest pain, palpitations and leg swelling. Gastrointestinal:  Negative for abdominal distention, abdominal pain, anal bleeding, blood in stool, constipation, diarrhea, nausea, rectal pain and vomiting. Endocrine: Negative. Genitourinary:  Negative for decreased urine volume, difficulty urinating, dysuria, enuresis, flank pain, frequency, genital sores, hematuria, penile discharge, penile pain, penile swelling, scrotal swelling, testicular pain and urgency. Musculoskeletal:  Negative for arthralgias, back pain, gait problem, joint swelling, myalgias, neck pain and neck stiffness. Skin:  Negative for color change, pallor, rash and wound. Allergic/Immunologic: Negative. Neurological:  Negative for dizziness, tremors, seizures, syncope, facial asymmetry, speech difficulty, weakness, light-headedness, numbness and headaches. Hematological:  Negative for adenopathy. Does not bruise/bleed easily. Psychiatric/Behavioral:  Negative for agitation, behavioral problems, confusion, decreased concentration, dysphoric mood, hallucinations, self-injury, sleep disturbance and suicidal ideas. The patient is not nervous/anxious and is not hyperactive.       Past Medical History:   Diagnosis Date    CHF (congestive heart failure) ([facility] [address] Constipation     Diabetes mellitus (HCC)     GERD (gastroesophageal reflux disease)     Hypercholesteremia     S/P ICD (internal cardiac defibrillator) procedure: 1/15/2015: Medtronic Single Chamber 1/15/2015    1/15/2015: Medtronic Single Chamber.  Dr. Jeffery Lopez    Sleep apnea 06/09/2017    Kevin Smithn applied 8/22/14 8/29/2014    returned prior to ICD insertion 1/15       Past Surgical History:   Procedure Laterality Date    CARDIAC CATHETERIZATION  9/2014    [address] [facility]  2014    [address]      EGD  2022    Dr Dmitri Bean     HÉCTOR-EN-Y GASTRIC BYPASS N/A 9/12/2022    Robotic Héctor-en-Y Gastric Bypass performed by Karl Flowers MD at [facility] 6/27/2022    EGD BIOPSY performed by Rosalva Valerio MD at [address] [place] [address]  2004       Current Outpatient Medications   Medication Sig Dispense Refill    vitamin B-12 (CYANOCOBALAMIN) 1000 MCG tablet Take 1,000 mcg by mouth daily      calcium carbonate 600 MG TABS tablet Take 2 tablets by mouth daily      metoprolol succinate (TOPROL XL) 25 MG extended release tablet Take 3 tablets by mouth daily 30 tablet 3    Pediatric Multivitamins-Iron (FLINTSTONES PLUS IRON PO) Take 2 tablets by mouth daily      omeprazole (PRILOSEC) 40 MG delayed release capsule Take 1 capsule by mouth daily 90 capsule 1    ASPIRIN LOW DOSE 81 MG EC tablet TAKE 1 TABLET BY MOUTH ONE TIME A DAY 90 tablet 1    rosuvastatin (CRESTOR) 10 MG tablet TAKE 1 TABLET BY MOUTH ONE TIME A DAY 90 tablet 3    Prodigy Twist Top Lancets 28G MISC USE TO TEST FOUR TIMES A  each 3    spironolactone (ALDACTONE) 25 MG tablet TAKE 1 TABLET BY MOUTH ONE TIME A DAY 90 tablet 3    Blood Glucose Monitoring Suppl (PRODIGY AUTOCODE BLOOD GLUCOSE) MAUREEN Use to check blood sugar 4 times daily 1 Device 0    loratadine (CLARITIN) 10 MG tablet Take 10 mg by mouth daily      Respiratory Therapy Supplies (ADULT MASK) MISC Please do mask desensitization and/or provide mask of patient's choice. 1 each 0    acetaminophen (TYLENOL) 325 MG tablet Take 650 mg by mouth every 8 hours as needed for Pain      insulin lispro, 1 Unit Dial, (HUMALOG KWIKPEN) 100 UNIT/ML SOPN ONLY SLIDING SCALE AS NEEDED--151-200 3 UNITS, 201-250 5 UNITS, 251-300 8 UNITS, 301-350 10 UNITS, 351-400 12 UNITS, OVER 400 15 UNITS AND CONTACT PHYSICIAN 135 mL 3    hyoscyamine (OSCIMIN) 125 MCG TBDP dispersible tablet Take 1 tablet by mouth every 4 hours as needed (epigastric cramping/spasms) 30 tablet 0    Fexofenadine HCl (MUCINEX ALLERGY PO) Take 1 tablet by mouth daily as needed (allergies)      Docusate Sodium 100 MG TABS Take 100 mg by mouth 2 times daily Take as needed to prevent constipation 30 tablet 1    metoclopramide (REGLAN) 10 MG tablet Take 1 tablet by mouth every 6 hours as needed (nausea/vomiting) (Patient not taking: Reported on 9/23/2022) 30 tablet 0    ondansetron (ZOFRAN) 4 MG tablet Take 1 tablet by mouth every 4 hours as needed for Nausea or Vomiting (Patient not taking: Reported on 9/23/2022) 30 tablet 0    furosemide (LASIX) 40 MG tablet TAKE ONE TABLET BY MOUTH EVERY MORNING 90 tablet 3    gabapentin (NEURONTIN) 100 MG capsule TAKE 1 CAPSULE BY MOUTH 3 TIMES A DAY (Patient taking differently: 100 mg 3 times daily as needed.) 270 capsule 1    blood glucose test strips (PRODIGY NO CODING BLOOD GLUC) strip USE TO CHECK BLOOD SUGAR FOUR TIMES A  strip 3    CPAP Machine MISC by Does not apply route Ramp pressure: 4.0 cm H2O  Treatment pressure: 15.0 cm H2O 1 each 0    Insulin Pen Needle (PEN NEEDLES 31GX5/16\") 31G X 8 MM MISC Use to inject insulins and Ozempic 400 each 3    fluticasone (FLONASE) 50 MCG/ACT nasal spray 2 sprays by Nasal route daily 1 Bottle 2     No current facility-administered medications for this visit.        Allergies   Allergen Reactions    Levaquin [Levofloxacin In D5w] Itching       Family History   Problem Relation Age of Onset    Arthritis Mother     Hypertension Father     Heart Disease Maternal Uncle     Heart Disease Maternal Grandfather     Asthma Maternal Grandfather     Diabetes Maternal Grandfather     Arthritis Maternal Grandfather     Heart Disease Paternal Uncle     Heart Disease Maternal Grandmother     Stroke Maternal Grandmother     Asthma Brother     Colon Cancer Neg Hx     Colon Polyps Neg Hx     Esophageal Cancer Neg Hx        Social History     Socioeconomic History    Marital status:      Spouse name: Not on file    Number of children: 1    Years of education: Not on file    Highest education level: Not on file   Occupational History     Employer: [employer]   Tobacco Use    Smoking status: Never    Smokeless tobacco: Never   Vaping Use    Vaping Use: Never used   Substance and Sexual Activity    Alcohol use: Never    Drug use: No    Sexual activity: Yes     Partners: Female   Other Topics Concern    Not on file   Social History Narrative    Not on file     Social Determinants of Health     Financial Resource Strain: Low Risk     Difficulty of Paying Living Expenses: Not hard at all   Food Insecurity: No Food Insecurity    Worried About Running Out of Food in the Last Year: Never true    Ran Out of Food in the Last Year: Never true   Transportation Needs: Not on file   Physical Activity: Not on file   Stress: Not on file   Social Connections: Not on file   Intimate Partner Violence: Not on file   Housing Stability: Not on file     Vitals:    09/23/22 1137   BP: 96/70   Site: Left Upper Arm   Position: Sitting   Cuff Size: Large Adult   Pulse: 92   Temp: 98.8 °F (37.1 °C)   TempSrc: Oral   Weight: (!) 358 lb (162.4 kg)   Height: 6' 1.75\" (1.873 m)     Body mass index is 46.28 kg/m².     Wt Readings from Last 3 Encounters:   09/23/22 (!) 358 lb (162.4 kg)   09/22/22 (!) 364 lb (165.1 kg)   09/19/22 (!) 374 lb 6.4 oz (169.8 kg)     Physical Exam  Vitals reviewed. Constitutional:       General: He is not in acute distress. Appearance: He is well-developed. He is not diaphoretic. HENT:      Head: Normocephalic and atraumatic. Right Ear: External ear normal.      Left Ear: External ear normal.      Nose: Nose normal.   Eyes:      General: No scleral icterus. Right eye: No discharge. Left eye: No discharge. Conjunctiva/sclera: Conjunctivae normal.   Cardiovascular:      Rate and Rhythm: Normal rate and regular rhythm. Heart sounds: Normal heart sounds. Pulmonary:      Effort: Pulmonary effort is normal. No respiratory distress. Breath sounds: Normal breath sounds. No wheezing or rales. Chest:      Chest wall: No tenderness. Abdominal:      General: Bowel sounds are normal. There is no distension. Palpations: Abdomen is soft. There is no mass. Tenderness: There is no abdominal tenderness. There is no guarding or rebound. Musculoskeletal:         General: No tenderness. Normal range of motion. Cervical back: Normal range of motion and neck supple. Skin:     General: Skin is warm and dry. Coloration: Skin is not pale. Findings: No erythema or rash. Neurological:      Mental Status: He is alert and oriented to person, place, and time. Cranial Nerves: No cranial nerve deficit. Psychiatric:         Behavior: Behavior normal.         Thought Content:  Thought content normal.         Judgment: Judgment normal.     Lab Results   Component Value Date    WBC 9.3 09/16/2022    HGB 10.6 (L) 09/21/2022    HCT 34.0 (L) 09/21/2022    MCV 86.5 09/16/2022     09/16/2022     Lab Results   Component Value Date     09/21/2022    K 4.0 09/21/2022    CL 99 09/21/2022    CO2 28 09/21/2022     Lab Results   Component Value Date    CREATININE 0.7 09/21/2022     Lab Results   Component Value Date    ALT 58 09/13/2022    AST 46 (H) 09/13/2022    ALKPHOS 48 09/13/2022    BILITOT 0.3 09/13/2022     Lab Results   Component Value Date    LIPASE 38.5 03/03/2017       Patient Active Problem List   Diagnosis    CHF (congestive heart failure) ([facility]) systolic chronic     Bronchitis, acute    Cardiomyopathy ([facility])-Nonischemic dilated low EF 25%, newly Dxed 08/2014- med RX- cath  mild CAD- repeat echo Nov 24, 2014- EF 25 to 30%- NEED the ICD    S/P cardiac cath- Angiographically patent coronaries-EF 20, EDP 28 mmhg- med rx    AICD (automatic cardioverter/defibrillator) present    Morbid obesity with BMI of 45.0-49.9, adult ([facility])    HTN (hypertension)    Bilateral leg edema- +1 B/L- RESOLVED    Cough due to ACE inhibitor    Hyponatremia    Hyperglycemia    Weight gain, claimed 18lbs in 10 days since discharge    Chronic systolic congestive heart failure ([facility])    MIKE on CPAP    Hx of AICD discharge    Sinus tachycardia    Chronic combined systolic and diastolic congestive heart failure ([facility])    Nonischemic cardiomyopathy ([address]    A-fib ([address]    Diabetes mellitus ([address]    Morbid obesity ([address]    S/P bariatric surgery    Symptomatic hypotension    HFrEF (heart failure with reduced ejection fraction) ([facility])    S/P gastric bypass    Orthostatic dizziness    Urinary retention    Difficult Louis catheter placement ([facility])     Narrative   PROCEDURE: FL UGI       CLINICAL INFORMATION: 42-year-old male who underwent a Ronaldo-en-Y gastric bypass yesterday. TECHNIQUE: A preliminary abdominal radiograph was obtained. Following the oral administration of Gastrografin and thin barium, the anatomy of the distal esophagus, stomach and jejunum were evaluated in a limited upper GI examination. 9 images were    obtained. Total fluoroscopy time 0.7 minutes. COMPARISON: None       FINDINGS: Preliminary radiograph demonstrates a nonobstructive bowel gas pattern. There is surgical suture material in the left upper abdomen. Osseous structures are unremarkable.        There is no stenosis or large hiatal hernia in the distal esophagus. There are surgical changes in the stomach of gastric bypass. There is normal transit of contrast through the stomach into the jejunum. There is no extravasation of contrast to suggest a    leak. Proximal small bowel loops are normal..           Impression       No evidence of obstruction or leak following gastric bypass. Final report electronically signed by Dr Shania Smith on 9/13/2022 11:00 AM     rative   PROCEDURE: CT ABDOMEN PELVIS WO CONTRAST       CLINICAL INFORMATION: 66-year-old male with acute blood loss anemia. History of gastric bypass. Abdominal pain . COMPARISON: CT 8/14/2019. TECHNIQUE: Axial 5 mm CT images were obtained through the abdomen and pelvis. No contrast was given. Sagittal and coronal reconstructions were obtained. All CT scans at this facility use dose modulation, iterative reconstruction, and/or weight-based dosing when appropriate to reduce radiation dose to as low as reasonably achievable. FINDINGS:   There is a small left-sided and trace right-sided pleural effusion. There are calcified granulomas near the lung bases. The base of the heart is within acceptable limits. Lack of IV contrast limits evaluation of the abdominal organs. The spleen is enlarged measuring 15.8 cm in craniocaudal dimension. The gallbladder, pancreas and adrenal glands are within normal limits. There are postsurgical changes from gastric bypass Ronaldo-en-Y. There is a large collection of hyperdense material adjacent to the incision which may represent blood products. There is no small or large bowel obstruction. There is a hyperdense collection abutting the surgical changes measuring approximately 13.6 x 10.3 x 9.4 cm concerning for a hematoma. The urinary bladder is incompletely distended. The aorta is normal in caliber with no aneurysmal dilatation. The bones are intact. No acute fractures.            Impression Hyperdense collection adjacent to the stomach concerning for a hematoma. There is also small amount of free fluid within the pelvis. Case was discussed by Dr. Leonardo Cash with Drew Adams at approximately 7:45 AM 9/15/2022 via telephone. **This report has been created using voice recognition software. It may contain minor errors which are inherent in voice recognition technology. **       Final report electronically signed by Dr Escobar Ulrich on 9/15/2022 8:06 AM     An electronic signature was used to authenticate this note.     --Tony Torres MD

## 2022-09-26 ENCOUNTER — CARE COORDINATION (OUTPATIENT)
Dept: OTHER | Facility: CLINIC | Age: 47
End: 2022-09-26

## 2022-09-26 NOTE — CARE COORDINATION
[facility] Transitions Follow Up Call    2022    Patient: Brigid Demarco  Patient : 1975   MRN: [id number]  Reason for Admission: Gastric bypass   Discharge Date: 22 RARS: Readmission Risk Score: 12.6         ACM attempted to reach patient for follow up call regarding post-op recovery. HIPAA compliant message left requesting a return phone call at patients convenience. Will continue to follow. Follow Up  Future Appointments   Date Time Provider Link Liriano   2022  8:30 AM BART Gray N SRPX WT MG [facility] - [address] [facility]   2022  9:00 AM Laveta Soulier, RD, LD N SRPX WT MG [facility] - [address] [place] [address]   2022 10:30 AM Theo Patel MD SR[facility] [address]   2023  3:15 PM CLINTON Rojas [facility] - [address] [place] [address]   8/10/2023 11:30 AM SCHEDULE, ALFONSO PACER NURSE N SRPX PACER [facility] - [address] [place] [address]   2023 11:45 AM Keyonna Garcia MD N SRPX Heart [facility] - ANTONIA Romano RN  Associate Care Manager  Phone: [phone]  Email: [email or web address]

## 2022-09-27 ENCOUNTER — OFFICE VISIT (OUTPATIENT)
Dept: BARIATRICS/WEIGHT MGMT | Age: 47
End: 2022-09-27

## 2022-09-27 VITALS
WEIGHT: 315 LBS | HEIGHT: 74 IN | BODY MASS INDEX: 40.43 KG/M2 | HEART RATE: 100 BPM | DIASTOLIC BLOOD PRESSURE: 70 MMHG | SYSTOLIC BLOOD PRESSURE: 122 MMHG | TEMPERATURE: 94.3 F

## 2022-09-27 DIAGNOSIS — Z79.4 TYPE 2 DIABETES MELLITUS WITHOUT COMPLICATION, WITH LONG-TERM CURRENT USE OF INSULIN (HCC): ICD-10-CM

## 2022-09-27 DIAGNOSIS — M54.50 CHRONIC BILATERAL LOW BACK PAIN, UNSPECIFIED WHETHER SCIATICA PRESENT: ICD-10-CM

## 2022-09-27 DIAGNOSIS — I50.9 CHRONIC CONGESTIVE HEART FAILURE, UNSPECIFIED HEART FAILURE TYPE (HCC): ICD-10-CM

## 2022-09-27 DIAGNOSIS — Z99.89 OSA ON CPAP: ICD-10-CM

## 2022-09-27 DIAGNOSIS — G89.29 CHRONIC BILATERAL LOW BACK PAIN, UNSPECIFIED WHETHER SCIATICA PRESENT: ICD-10-CM

## 2022-09-27 DIAGNOSIS — K91.2 POSTSURGICAL MALABSORPTION: ICD-10-CM

## 2022-09-27 DIAGNOSIS — E78.00 HYPERCHOLESTEREMIA: ICD-10-CM

## 2022-09-27 DIAGNOSIS — I10 HYPERTENSION, UNSPECIFIED TYPE: ICD-10-CM

## 2022-09-27 DIAGNOSIS — Z98.84 S/P BARIATRIC SURGERY: Primary | ICD-10-CM

## 2022-09-27 DIAGNOSIS — G47.33 OSA ON CPAP: ICD-10-CM

## 2022-09-27 DIAGNOSIS — E11.9 TYPE 2 DIABETES MELLITUS WITHOUT COMPLICATION, WITH LONG-TERM CURRENT USE OF INSULIN (HCC): ICD-10-CM

## 2022-09-27 DIAGNOSIS — Z98.84 S/P GASTRIC BYPASS: Primary | ICD-10-CM

## 2022-09-27 DIAGNOSIS — K21.9 GASTROESOPHAGEAL REFLUX DISEASE, UNSPECIFIED WHETHER ESOPHAGITIS PRESENT: ICD-10-CM

## 2022-09-27 DIAGNOSIS — Z95.810 AICD (AUTOMATIC CARDIOVERTER/DEFIBRILLATOR) PRESENT: ICD-10-CM

## 2022-09-27 DIAGNOSIS — Z13.21 SCREENING FOR MALNUTRITION: ICD-10-CM

## 2022-09-27 PROCEDURE — 99024 POSTOP FOLLOW-UP VISIT: CPT | Performed by: PHYSICIAN ASSISTANT

## 2022-09-27 NOTE — PROGRESS NOTES
[facility]  [id number]  [address] [facility]      Visit Date:  9/27/2022  Weight Management Postop Follow-up    HPI:      Tunde Michel is a 52 y.o. male who is here today for 2 weeks follow up since RYGB performed by Dr. Ирина Gamboa  on 9/12/22. Surgery was complicated with a delayed bleed from the gastric remnant staple line that required 4 units of packed RBC. Since discharged from the hospital he has done very well. Feeling good. Weight today 354#. Down 28# since surgery. Down 46# since starting with weight management. BMI 45 . Drinking 70-74 oz of fluid and 80-90 grams of protein daily. Drinking 2-3 protein shakes daily. Tolerating cottage cheese, applesauce, scrambled eggs. No carbonation. No problems with bowel movements. Continues to take Miralax/stool softener. No nausea. No emesis. No GERD/ Reflux-continues to take PPI. Denies CP/SOB. No Dizziness. No abdominal pain. No incisional discomfort. No sx of dehydration. No fever or chills. Off pain and nasuea meds. Taking and tolerating all vitamins- [place] with Iron 2 daily/ calcium 3xd/ B12. Wearing CPAP. Monitoring blood sugars and running 120-130. Off all DM medications since surgery. No insulin in the last 2 weeks. Following with PCP. Lasix cut to 40mg. Continues to follow CHF with Dr. Deepali Lazcano. Physical Activity:  walking 5 minutes every 1-2 hours. Current BMI: Body mass index is 45.76 kg/m².   Current Weight:   Wt Readings from Last 3 Encounters:   09/27/22 (!) 354 lb (160.6 kg)   09/23/22 (!) 358 lb (162.4 kg)   09/22/22 (!) 364 lb (165.1 kg)     Initial Body weight: 400  Pre-op Body Weight:382      Past Medical History:  Past Medical History:   Diagnosis Date    CHF (congestive heart failure) (HCC)     Constipation     Diabetes mellitus (HCC)     GERD (gastroesophageal reflux disease)     Hypercholesteremia     S/P ICD (internal cardiac defibrillator) procedure: 1/15/2015: Medtronic Single Chamber 1/15/2015    1/15/2015: Medtronic Single Chamber.  Dr. Heidi Kennedy    Sleep apnea 06/09/2017    Abelardota Becky applied 8/22/14 8/29/2014    returned prior to ICD insertion 1/15       Past Surgical History:  Past Surgical History:   Procedure Laterality Date    CARDIAC CATHETERIZATION  9/2014    [facility]    CARDIAC DEFIBRILLATOR PLACEMENT  2014    CARDIAC DEFIBRILLATOR [address]      EGD  2022    Dr Beth Bean     RONALDO-EN-Y GASTRIC BYPASS N/A 9/12/2022    Robotic Ronaldo-en-Y Gastric Bypass performed by Devin Nassar MD at [address] [facility] 6/27/2022    EGD BIOPSY performed by Hamida Vargas MD at [email or web address]    VASECTOMY  2004       Past Social History:  Social History     Socioeconomic History    Marital status:      Spouse name: Not on file    Number of children: 1    Years of education: Not on file    Highest education level: Not on file   Occupational History     Employer: [employer]   Tobacco Use    Smoking status: Never    Smokeless tobacco: Never   Vaping Use    Vaping Use: Never used   Substance and Sexual Activity    Alcohol use: Never    Drug use: No    Sexual activity: Yes     Partners: Female   Other Topics Concern    Not on file   Social History Narrative    Not on file     Social Determinants of Health     Financial Resource Strain: Low Risk     Difficulty of Paying Living Expenses: Not hard at all   Food Insecurity: No Food Insecurity    Worried About Running Out of Food in the Last Year: Never true    Ran Out of Food in the Last Year: Never true   Transportation Needs: Not on file   Physical Activity: Not on file   Stress: Not on file   Social Connections: Not on file   Intimate Partner Violence: Not on file   Housing Stability: Not on file        Medications:   Current Outpatient Medications   Medication Sig Dispense Refill    vitamin B-12 (CYANOCOBALAMIN) 1000 MCG tablet Take 1,000 mcg by mouth daily      calcium carbonate 600 MG TABS tablet Take 2 tablets by mouth daily      metoprolol succinate (TOPROL XL) 25 MG extended release tablet Take 3 tablets by mouth daily 30 tablet 3    Pediatric Multivitamins-Iron (FLINTSTONES PLUS IRON PO) Take 2 tablets by mouth daily      Fexofenadine HCl (MUCINEX ALLERGY PO) Take 1 tablet by mouth daily as needed (allergies)      omeprazole (PRILOSEC) 40 MG delayed release capsule Take 1 capsule by mouth daily 90 capsule 1    Docusate Sodium 100 MG TABS Take 100 mg by mouth 2 times daily Take as needed to prevent constipation 30 tablet 1    furosemide (LASIX) 40 MG tablet TAKE ONE TABLET BY MOUTH EVERY MORNING 90 tablet 3    gabapentin (NEURONTIN) 100 MG capsule TAKE 1 CAPSULE BY MOUTH 3 TIMES A DAY (Patient taking differently: 100 mg 3 times daily as needed.) 270 capsule 1    ASPIRIN LOW DOSE 81 MG EC tablet TAKE 1 TABLET BY MOUTH ONE TIME A DAY 90 tablet 1    rosuvastatin (CRESTOR) 10 MG tablet TAKE 1 TABLET BY MOUTH ONE TIME A DAY 90 tablet 3    spironolactone (ALDACTONE) 25 MG tablet TAKE 1 TABLET BY MOUTH ONE TIME A DAY 90 tablet 3    CPAP Machine MISC by Does not apply route Ramp pressure: 4.0 cm H2O  Treatment pressure: 15.0 cm H2O 1 each 0    fluticasone (FLONASE) 50 MCG/ACT nasal spray 2 sprays by Nasal route daily 1 Bottle 2    loratadine (CLARITIN) 10 MG tablet Take 10 mg by mouth daily      acetaminophen (TYLENOL) 325 MG tablet Take 650 mg by mouth every 8 hours as needed for Pain      insulin lispro, 1 Unit Dial, (HUMALOG KWIKPEN) 100 UNIT/ML SOPN ONLY SLIDING SCALE AS NEEDED--151-200 3 UNITS, 201-250 5 UNITS, 251-300 8 UNITS, 301-350 10 UNITS, 351-400 12 UNITS, OVER 400 15 UNITS AND CONTACT PHYSICIAN (Patient not taking: Reported on 9/27/2022) 135 mL 3    blood glucose test strips (PRODIGY NO CODING BLOOD GLUC) strip USE TO CHECK BLOOD SUGAR FOUR TIMES A DAY (Patient not taking: Reported on 9/27/2022) 400 strip 3 Prodigy Twist Top Lancets 28G MISC USE TO TEST FOUR TIMES A  each 3    Blood Glucose Monitoring Suppl (PRODIGY AUTOCODE BLOOD GLUCOSE) MAUREEN Use to check blood sugar 4 times daily (Patient not taking: Reported on 9/27/2022) 1 Device 0    Insulin Pen Needle (PEN NEEDLES 31GX5/16\") 31G X 8 MM MISC Use to inject insulins and Ozempic (Patient not taking: Reported on 9/27/2022) 400 each 3    Respiratory Therapy Supplies (ADULT MASK) MISC Please do mask desensitization and/or provide mask of patient's choice. 1 each 0     No current facility-administered medications for this visit. Allergies: Allergies   Allergen Reactions    Levaquin [Levofloxacin In D5w] Itching       Subjective:    Review of Systems:  Constitutional: Denies any fever, chills, fatigue. Wound: Denies any rash, skin color changes or wound problems. Resp: Denies any cough, shortness of breath. CV: Denies any chest pain, orthopnea or syncope. MS: Denies myalgias, arthralgias. GI: Denies any nausea, vomiting, diarrhea, constipation, melena, hematochezia. No incisional discomfort. : Denies any hematuria, hesitancy or dysuria. NEURO: Denies seizures, headache. Objective:    /70 (Site: Left Upper Arm, Position: Sitting, Cuff Size: Large Adult)   Pulse 100   Temp (!) 94.3 °F (34.6 °C) (Infrared)   Ht 6' 1.75\" (1.873 m)   Wt (!) 354 lb (160.6 kg)   BMI 45.76 kg/m²     Physical Examination:   Constitutional: Alert and oriented to person, place and time. Well-developed, well- nourished. Head: Normocephalic and atraumatic  Neck: Supple. Eyes: EOMI b/l. Conjunctivae normal.  No scleral icterus. Respiratory: Effort normal. No respiratory distress. Abd: Incisions are well healed. Non-tender. No sign of infection. Moderate bruising. Ext:  Movement x 4. No edema  Skin; Warm and dry, no visible rashes, lesions or ulcers.    Neuro: Cranial Nerves Grossly Intact; nml coordination    Labs:  CBC   Lab Results   Component Value Date/Time    WBC 9.3 09/16/2022 05:30 AM    RBC 3.10 09/16/2022 05:30 AM    HGB 10.6 09/21/2022 05:34 PM    HCT 34.0 09/21/2022 05:34 PM    MCV 86.5 09/16/2022 05:30 AM    MCH 27.7 09/16/2022 05:30 AM    MCHC 32.1 09/16/2022 05:30 AM    RDW 15.7 03/15/2017 06:30 PM     09/16/2022 05:30 AM    MPV 9.6 09/16/2022 05:30 AM    RBCMORP NORMAL 03/15/2017 06:30 PM    SEGSPCT 67.0 09/14/2022 03:56 AM    LABLYMP 21.3 09/14/2022 03:56 AM    MONOPCT 10.3 09/14/2022 03:56 AM    LABEOS 0.5 09/14/2022 03:56 AM    BASO 0.3 09/14/2022 03:56 AM    NRBC 0 09/14/2022 03:56 AM    ANISOCYTOSIS 1+ 03/15/2017 06:30 PM    SEGSABS 9.5 09/14/2022 03:56 AM    LYMPHSABS 3.0 09/14/2022 03:56 AM    MONOSABS 1.5 09/14/2022 03:56 AM    EOSABS 0.1 09/14/2022 03:56 AM    BASOSABS 0.0 09/14/2022 03:56 AM        BMP/CMP   Lab Results   Component Value Date/Time    GLUCOSE 95 09/21/2022 05:34 PM    GLUCOSE 258 12/16/2017 08:25 AM    CREATININE 0.7 09/21/2022 05:34 PM    BUN 12 09/21/2022 05:34 PM     09/21/2022 05:34 PM    K 4.0 09/21/2022 05:34 PM    K 4.6 09/13/2022 05:48 AM    CL 99 09/21/2022 05:34 PM    CO2 28 09/21/2022 05:34 PM    CALCIUM 9.8 09/21/2022 05:34 PM    AST 46 09/13/2022 12:36 PM    ALKPHOS 48 09/13/2022 12:36 PM    PROT 6.4 09/13/2022 12:36 PM    LABALBU 4.2 09/13/2022 12:36 PM    BILITOT 0.3 09/13/2022 12:36 PM    ALT 58 09/13/2022 12:36 PM        PREALBUMIN   Lab Results   Component Value Date/Time    PREALBUMIN 22.0 03/05/2022 07:29 AM        VITAMIN B12   Lab Results   Component Value Date/Time    ZNDPTPRA57 258 03/05/2022 07:29 AM        24 HOUR URINE CALCIUM   No results found for: URVOLMEAS, HOURSC, CALCIUMUR     VITAMIN D   Lab Results   Component Value Date/Time    VITD25 30 06/18/2022 08:28 AM        VITAMIN B1/ THIAMINE   Lab Results   Component Value Date/Time    VYXJ0GUPWNP 107 03/05/2022 07:30 AM        RBC FOLATE   Lab Results   Component Value Date/Time    FOLATE 5.5 03/05/2022 07:29 AM        LIPID SCREEN (FASTING)   Lab Results   Component Value Date/Time    CHOL 102 05/12/2022 10:30 AM    TRIG 187 05/12/2022 10:30 AM    HDL 25 05/12/2022 10:30 AM    LDLCALC 40 05/12/2022 10:30 AM   ,     HGA1C (T2DM ONLY)   Lab Results   Component Value Date/Time    LABA1C 6.5 05/12/2022 10:30 AM    AVGG 150 03/05/2022 07:30 AM        TSH   Lab Results   Component Value Date/Time    TSH 2.470 09/13/2022 02:38 PM        IRON   Lab Results   Component Value Date/Time    IRON 29 09/14/2022 03:56 AM        IONIZED CALCIUM   Lab Results   Component Value Date/Time    CAION 1.14 11/13/2020 10:08 PM         Assessment:       Diagnosis Orders   1. S/P gastric bypass  CBC with Auto Differential    Comprehensive Metabolic Panel    Prealbumin    Iron    Iron Binding Capacity    Ferritin      2. BMI 45.0-49.9, adult (HCC)  CBC with Auto Differential    Comprehensive Metabolic Panel    Prealbumin    Iron    Iron Binding Capacity    Ferritin      3. Postsurgical malabsorption  CBC with Auto Differential    Comprehensive Metabolic Panel    Prealbumin    Iron    Iron Binding Capacity    Ferritin      4. Screening for malnutrition  CBC with Auto Differential    Comprehensive Metabolic Panel    Prealbumin    Iron    Iron Binding Capacity    Ferritin      5. Chronic congestive heart failure, unspecified heart failure type ([address]        6. Chronic bilateral low back pain, unspecified whether sciatica present        7. Type 2 diabetes mellitus without complication, with long-term current use of insulin ([address]        8. AICD (automatic cardioverter/defibrillator) present        9. Hypertension, unspecified type        10. Paroxysmal atrial fibrillation (HCC)        11. MIKE on CPAP        12. Hypercholesteremia        13. Gastroesophageal reflux disease, unspecified whether esophagitis present          Plan:    Stay well hydrated. Drink a minimum of 64 oz of non-carbonated, non-caffeinated fluids daily. Nutritional education occurred during visit. Tolerating diet. Continue following with dietitian and follow their recommendations as directed. Continue  60-80  grams of protein each day. Signs and symptoms reviewed with patient that would be concerning and need her to return to office for re-evaluation. Patient will call if any questions or concerns arrise. No lifting, pushing, pulling over 20#  No abdominal exercises  Wear abdominal binder prn  Importance of physical activity discussed with patient. Increase physical activity as tolerated  Continue taking Multivitamin, Calcium and B12 as directed  Continue Omeprazole for at least 3 months post-op. Encouraged to attend support groups  Progress diet as tolerated per dietitian recommendations. 6 week labs ordered- to be drawn one week prior to next apt. Continue monitoring blood sugars and adjust insulin as needed  Continue following with Dr. Itzel Marquez as scheduled. Return in about 1 month (around 10/27/2022) for postop follow up. I spent over 20 minutes with the patient, with greater that 50% of that time spent on face counseling for nutrition and exercise.     Electronically signed by BART Tony on 9/27/2022 at 1:05 PM

## 2022-09-27 NOTE — PATIENT INSTRUCTIONS
Stay well hydrated. Drink a minimum of 64 oz of non-carbonated, non-caffeinated fluids daily. Nutritional education occurred during visit. Tolerating diet. Continue following with dietitian and follow their recommendations as directed. Continue  60-80  grams of protein each day. Signs and symptoms reviewed with patient that would be concerning and need her to return to office for re-evaluation. Patient will call if any questions or concerns arrise. No lifting, pushing, pulling over 20#  No abdominal exercises  Wear abdominal binder prn  Importance of physical activity discussed with patient. Increase physical activity as tolerated  Continue taking Multivitamin, Calcium and B12 as directed  Continue Omeprazole for at least 3 months post-op. Encouraged to attend support groups  Progress diet as tolerated per dietitian recommendations. 6 week labs ordered- to be drawn one week prior to next apt. Continue monitoring blood sugars and adjust insulin as needed  Continue following with Dr. David Phoenix as scheduled.

## 2022-10-03 ENCOUNTER — CARE COORDINATION (OUTPATIENT)
Dept: OTHER | Facility: CLINIC | Age: 47
End: 2022-10-03

## 2022-10-03 NOTE — CARE COORDINATION
[facility] Care Transitions Follow Up Call    Care Transition Nurse contacted the patient by telephone to follow up after admission on 2022. Verified name and  with patient as identifiers. Patient: Kinza Bruno  Patient : 1975   MRN: [id number]  Reason for Admission: Gastric bypass   Discharge Date: 22 RARS: Readmission Risk Score: 12.6      Needs to be reviewed by the provider   Additional needs identified to be addressed with provider: No  none             Method of communication with provider: none. Patient returned call to [facility] this date. States he has been doing well. Had PCP follow up and it was decided for patient to continue sliding scale Humalog; continue to hold Ozempic. Reports blood sugars 120-130 recently. He returned to work the end of last week; denies issues since return to work. [facility] offered to locate post gastric bypass support group for patient. He declined need as he states wife had gastric bypass 2021 and has been a great support for patient. Denies concerns or needs at this time. Has follow up appts scheduled. Denies need for further outreach from [facility]; signing off. Addressed changes since last contact:   discuss post-op recovery and plan of care after follow up appts  Discussed follow-up appointments. If no appointment was previously scheduled, appointment scheduling offered: No.   Is follow up appointment scheduled within 7 days of discharge? Yes.     Follow Up  Future Appointments   Date Time Provider Link Liriano   10/25/2022  1:30 PM [place]ippers, [place] N SRPX [facility] [place]   10/25/2022  2:00 PM Severiano Bustle, RD, LD N SRPX [facility] [place]   2022 10:30 AM MD DANILO Gupta [facility] [place]   2023  3:15 PM CLINTON Grimlado [facility] [place]   8/10/2023 11:30 AM SCHEDULE, ALFONSO PACER NURSE RANDALL CARRASCO PACER [facility] [place]   2023 11:45 AM Oksana Diaz MD N SRPX [facility] [place]     Non-[facility] follow up appointment(s): None    Care Transition Nurse reviewed red flags with patient and discussed any barriers to care and/or understanding of plan of care after discharge. Discussed appropriate site of care based on symptoms and resources available to patient including: PCP  Specialist  Benefits related nurse triage line. The patient agrees to contact the PCP office for questions related to their healthcare. Patients top risk factors for readmission: medical condition-patient with gastric bypass surgery <1 month ago  Interventions to address risk factors:  Reinforced importance of regular follow up appts    Care Transitions Subsequent and Final Call    Subsequent and Final Calls  Do you have any ongoing symptoms?: No  Have your medications changed?: No  Do you have any questions related to your medications?: No  Do you currently have any active services?: No  Do you have any needs or concerns that I can assist you with?: No  Identified Barriers: None  Care Transitions Interventions  Other Interventions:             Care Transition Nurse provided contact information for future needs. No further follow-up call indicated based on severity of symptoms and risk factors. Robb Scales MSN RN  Associate Care Manager  Phone: [phone]  Email: [email or web address]

## 2022-10-06 RX ORDER — METOPROLOL SUCCINATE 25 MG/1
75 TABLET, EXTENDED RELEASE ORAL DAILY
Qty: 270 TABLET | Refills: 1 | Status: SHIPPED | OUTPATIENT
Start: 2022-10-06

## 2022-10-17 ENCOUNTER — PROCEDURE VISIT (OUTPATIENT)
Dept: CARDIOLOGY CLINIC | Age: 47
End: 2022-10-17
Payer: COMMERCIAL

## 2022-10-17 DIAGNOSIS — I50.22 CHRONIC SYSTOLIC CONGESTIVE HEART FAILURE (HCC): Primary | ICD-10-CM

## 2022-10-17 PROCEDURE — G2066 INTER DEVC REMOTE 30D: HCPCS | Performed by: INTERNAL MEDICINE

## 2022-10-17 PROCEDURE — 93297 REM INTERROG DEV EVAL ICPMS: CPT | Performed by: INTERNAL MEDICINE

## 2022-10-19 ENCOUNTER — PATIENT MESSAGE (OUTPATIENT)
Dept: FAMILY MEDICINE CLINIC | Age: 47
End: 2022-10-19

## 2022-10-19 DIAGNOSIS — E11.65 TYPE 2 DIABETES MELLITUS WITH HYPERGLYCEMIA, WITHOUT LONG-TERM CURRENT USE OF INSULIN (HCC): Primary | ICD-10-CM

## 2022-10-19 NOTE — TELEPHONE ENCOUNTER
From: Edgar Kevin  To: Dr. Vazquez Divers: 10/19/2022 8:18 AM EDT  Subject: Diabetic program    I received a message that I need a urine microalbumin and I am getting labs done this weekend, could you possibly add that order?  Also, I received a flu shot when I was there last, they wanted to make sure that I received it for 2022, is that visible to the diabetic team?  Thank you

## 2022-10-22 ENCOUNTER — NURSE ONLY (OUTPATIENT)
Dept: LAB | Age: 47
End: 2022-10-22

## 2022-10-22 DIAGNOSIS — Z98.84 S/P GASTRIC BYPASS: ICD-10-CM

## 2022-10-22 DIAGNOSIS — E11.65 TYPE 2 DIABETES MELLITUS WITH HYPERGLYCEMIA, WITHOUT LONG-TERM CURRENT USE OF INSULIN (HCC): ICD-10-CM

## 2022-10-22 DIAGNOSIS — K91.2 POSTSURGICAL MALABSORPTION: ICD-10-CM

## 2022-10-22 DIAGNOSIS — Z13.21 SCREENING FOR MALNUTRITION: ICD-10-CM

## 2022-10-22 LAB
ALBUMIN SERPL-MCNC: 4.2 G/DL (ref 3.5–5.1)
ALP BLD-CCNC: 76 U/L (ref 38–126)
ALT SERPL-CCNC: 15 U/L (ref 11–66)
ANION GAP SERPL CALCULATED.3IONS-SCNC: 11 MEQ/L (ref 8–16)
AST SERPL-CCNC: 20 U/L (ref 5–40)
BASOPHILS # BLD: 0.5 %
BASOPHILS ABSOLUTE: 0 THOU/MM3 (ref 0–0.1)
BILIRUB SERPL-MCNC: 0.6 MG/DL (ref 0.3–1.2)
BUN BLDV-MCNC: 7 MG/DL (ref 7–22)
CALCIUM SERPL-MCNC: 9.7 MG/DL (ref 8.5–10.5)
CHLORIDE BLD-SCNC: 100 MEQ/L (ref 98–111)
CO2: 28 MEQ/L (ref 23–33)
CREAT SERPL-MCNC: 0.6 MG/DL (ref 0.4–1.2)
CREATININE, URINE: 141.8 MG/DL
EOSINOPHIL # BLD: 3.6 %
EOSINOPHILS ABSOLUTE: 0.3 THOU/MM3 (ref 0–0.4)
ERYTHROCYTE [DISTWIDTH] IN BLOOD BY AUTOMATED COUNT: 16.2 % (ref 11.5–14.5)
ERYTHROCYTE [DISTWIDTH] IN BLOOD BY AUTOMATED COUNT: 50.3 FL (ref 35–45)
FERRITIN: 440 NG/ML (ref 22–322)
GFR SERPL CREATININE-BSD FRML MDRD: > 60 ML/MIN/1.73M2
GLUCOSE BLD-MCNC: 80 MG/DL (ref 70–108)
HCT VFR BLD CALC: 42.7 % (ref 42–52)
HEMOGLOBIN: 12.9 GM/DL (ref 14–18)
IMMATURE GRANS (ABS): 0.05 THOU/MM3 (ref 0–0.07)
IMMATURE GRANULOCYTES: 0.6 %
IRON: 46 UG/DL (ref 65–195)
LYMPHOCYTES # BLD: 18.6 %
LYMPHOCYTES ABSOLUTE: 1.6 THOU/MM3 (ref 1–4.8)
MCH RBC QN AUTO: 25.7 PG (ref 26–33)
MCHC RBC AUTO-ENTMCNC: 30.2 GM/DL (ref 32.2–35.5)
MCV RBC AUTO: 85.2 FL (ref 80–94)
MICROALBUMIN UR-MCNC: < 1.2 MG/DL
MICROALBUMIN/CREAT UR-RTO: 8 MG/G (ref 0–30)
MONOCYTES # BLD: 6.5 %
MONOCYTES ABSOLUTE: 0.6 THOU/MM3 (ref 0.4–1.3)
NUCLEATED RED BLOOD CELLS: 0 /100 WBC
PLATELET # BLD: 221 THOU/MM3 (ref 130–400)
PMV BLD AUTO: 10.7 FL (ref 9.4–12.4)
POTASSIUM SERPL-SCNC: 3.9 MEQ/L (ref 3.5–5.2)
PREALBUMIN: 14.3 MG/DL (ref 20–40)
RBC # BLD: 5.01 MILL/MM3 (ref 4.7–6.1)
SEG NEUTROPHILS: 70.2 %
SEGMENTED NEUTROPHILS ABSOLUTE COUNT: 6.1 THOU/MM3 (ref 1.8–7.7)
SODIUM BLD-SCNC: 139 MEQ/L (ref 135–145)
TOTAL IRON BINDING CAPACITY: 241 UG/DL (ref 171–450)
TOTAL PROTEIN: 7.3 G/DL (ref 6.1–8)
WBC # BLD: 8.7 THOU/MM3 (ref 4.8–10.8)

## 2022-10-24 NOTE — PROGRESS NOTES
Patient is a 52 y.o. male seen for follow up MNT visit for six week post op. Vitals from current and previous visits:  Vitals 19/54/0189   SYSTOLIC 765   DIASTOLIC 76   Site Left Upper Arm   Position Sitting   Cuff Size Large Adult   Pulse 84   Temp 97.5   Resp    SpO2    Weight 335 lb   Height 6' 1.75\"   Body mass index 43.3 kg/m2   Pain Level         Recent Post Op Lab Work:  six week labs- Ferritin-440 (inflammatory?- will repeat in six weeks), Iron-46, glucose-80  Lab Results   Component Value Date/Time    VITD25 30 06/18/2022 08:28 AM       Date of Surgery: 9/12/22  Type of Surgery:   gastric sleeve      Initial Weight: 382 lbs at pre-op teaching class   Excess  Body Weight Pre-Op: 186  lbs     Weight Loss: 47 lbs. Patient's Target Weight =  196 lbs for a BMI of ~25  Percentage of Excess Body Weight Lost to date is :  25 %    How pleased are you with your current weight loss: Pt is pleased with weight loss. No longer on insulin- sliding scale only and hasn't had to use it. Had recent appointment with PCP      Are you experiencing any physical problems post surgery: No: - Stool softeners twice a day and Miralax once a day in morning- and bowels have been moving daily with this regimen. Pt considering stopping evening stool softener to see if bowels remain regular    Are you experiencing any dietary problems post surgery: No  Food Intolerances: Denies any food intolerances since last seen. How frequently are you eating? Three times a day   How long does it take you to finish a meal? Taking time to eat  How full do you feel after eating? Feels full when eats     Started baked fish, cottage cheese, egg whites. Applesauce    Protein intake: 60-80 grams/day   Patient taking protein supplement: Yes.   Brand of Supplement: Nectar Protein Powder with skim milk and Fairlife Nutrition Plan BID    Fluid intake: >64 oz/day- average 70-75 oz/day    Multivitamin/mineral intake:  [place] Chewable with Iron BID  = 36 mg iron/day and patient tolerates this well. Plan to try adding 18 mg ferrous fumarate tablet daily for continued low Iron level. If any difficulty bowels starting to move by adding Iron supplement advised pt to just stop taking the additional iron tablet     Calcium intake: Yes. 500 mg calcium citrate soft chew BID- pt plans to continue taking calcium soft chew    Other: B12 daily    Assessment  Pt tolerating stage appropriate foods. Continues to supplement with protein shakes to assist in meeting protein goal of ~ 60-80 grams daily      Plan  Plan/Recommendations: Pt educated on six week post op nutrition guidelines. Questions answered. See additional instructions below. -  Patient Instructions   1. Continue bariatric vitamins as you are currently taking. Try adding Terry brand Iron Fumarate 18 mg tablet once a day. Take this at a different time then your [place]'s (take the extra at nite time instead). Continue one calcium chew twice a day. Continue one B12 a day. 2. Goal remains  60-80 grams protein per day. Focus on choosing protein foods first at meals. Suggest continue one-two protein shakes daily to meet this recommendation. 3. Increase physical activity to include cardiac and strength training now that you no longer have weight restrictions  4. Water goal is 64 oz per day and make sure no liquids 30 minutes before meals and no liquids 30 minutes after meals  5. Take 20-30 minutes to eat meals and chew all foods well for best tolerance especially as you introduce new foods. get lab work done 5-7 days before next office visit.        Recommended Follow-Up: three month post op with PA and RD    Lc Aparicio, RD, LD,   Dietitian- Weight Management [address] [place] [address]

## 2022-10-25 ENCOUNTER — OFFICE VISIT (OUTPATIENT)
Dept: BARIATRICS/WEIGHT MGMT | Age: 47
End: 2022-10-25

## 2022-10-25 VITALS
WEIGHT: 315 LBS | HEIGHT: 74 IN | BODY MASS INDEX: 40.43 KG/M2 | TEMPERATURE: 97.5 F | DIASTOLIC BLOOD PRESSURE: 76 MMHG | SYSTOLIC BLOOD PRESSURE: 118 MMHG | HEART RATE: 84 BPM

## 2022-10-25 DIAGNOSIS — M54.50 CHRONIC BILATERAL LOW BACK PAIN, UNSPECIFIED WHETHER SCIATICA PRESENT: ICD-10-CM

## 2022-10-25 DIAGNOSIS — I50.9 CHRONIC CONGESTIVE HEART FAILURE, UNSPECIFIED HEART FAILURE TYPE (HCC): ICD-10-CM

## 2022-10-25 DIAGNOSIS — E61.1 LOW IRON: ICD-10-CM

## 2022-10-25 DIAGNOSIS — Z13.21 SCREENING FOR MALNUTRITION: ICD-10-CM

## 2022-10-25 DIAGNOSIS — K21.9 GASTROESOPHAGEAL REFLUX DISEASE, UNSPECIFIED WHETHER ESOPHAGITIS PRESENT: ICD-10-CM

## 2022-10-25 DIAGNOSIS — I10 HYPERTENSION, UNSPECIFIED TYPE: ICD-10-CM

## 2022-10-25 DIAGNOSIS — Z79.4 TYPE 2 DIABETES MELLITUS WITHOUT COMPLICATION, WITH LONG-TERM CURRENT USE OF INSULIN (HCC): ICD-10-CM

## 2022-10-25 DIAGNOSIS — E11.9 TYPE 2 DIABETES MELLITUS WITHOUT COMPLICATION, WITH LONG-TERM CURRENT USE OF INSULIN (HCC): ICD-10-CM

## 2022-10-25 DIAGNOSIS — G47.33 OSA ON CPAP: ICD-10-CM

## 2022-10-25 DIAGNOSIS — Z99.89 OSA ON CPAP: ICD-10-CM

## 2022-10-25 DIAGNOSIS — Z98.84 S/P BARIATRIC SURGERY: Primary | ICD-10-CM

## 2022-10-25 DIAGNOSIS — Z98.84 S/P GASTRIC BYPASS: Primary | ICD-10-CM

## 2022-10-25 DIAGNOSIS — G89.29 CHRONIC BILATERAL LOW BACK PAIN, UNSPECIFIED WHETHER SCIATICA PRESENT: ICD-10-CM

## 2022-10-25 DIAGNOSIS — K91.2 POSTSURGICAL MALABSORPTION: ICD-10-CM

## 2022-10-25 DIAGNOSIS — E78.00 HYPERCHOLESTEREMIA: ICD-10-CM

## 2022-10-25 DIAGNOSIS — Z95.810 AICD (AUTOMATIC CARDIOVERTER/DEFIBRILLATOR) PRESENT: ICD-10-CM

## 2022-10-25 PROCEDURE — 99024 POSTOP FOLLOW-UP VISIT: CPT | Performed by: PHYSICIAN ASSISTANT

## 2022-10-25 NOTE — PATIENT INSTRUCTIONS
Stay well hydrated. Drink a minimum of 64 oz of non-carbonated, non-caffeinated fluids daily. Nutritional education occurred during visit. Tolerating diet. Continue following with dietitian and follow their recommendations as directed. Continue  60-80  grams of protein each day. Signs and symptoms reviewed with patient that would be concerning and need her to return to office for re-evaluation. Patient will call if any questions or concerns arrise. Off all restrictions  Importance of physical activity discussed with patient. Increase physical activity as tolerated  Add strength training  Continue taking Multivitamin, Calcium and B12 as directed. Add additional Iron. Continue Omeprazole for at least 3 months post-op  Encouraged to attend support groups  Progress diet as tolerated per dietitian recommendations. 6 week labs reviewed with patient today- Low Iron 46- will monitor  12 week labs ordered- to be drawn one week prior to next apt. Follow up in 6 weeks with provider and dietitian  Continue monitoring blood sugars and adjust insulin as needed  Continue following with Dr. Paco Urias as scheduled.

## 2022-10-25 NOTE — PATIENT INSTRUCTIONS
1. Continue bariatric vitamins as you are currently taking. Try adding Terry brand Iron Fumarate 18 mg tablet once a day. Take this at a different time then your [place]'s (take the extra at nite time instead). Continue one calcium chew twice a day. Continue one B12 a day. 2. Goal remains  60-80 grams protein per day. Focus on choosing protein foods first at meals. Suggest continue one-two protein shakes daily to meet this recommendation. 3. Increase physical activity to include cardiac and strength training now that you no longer have weight restrictions  4. Water goal is 64 oz per day and make sure no liquids 30 minutes before meals and no liquids 30 minutes after meals  5. Take 20-30 minutes to eat meals and chew all foods well for best tolerance especially as you introduce new foods. get lab work done 5-7 days before next office visit.

## 2022-10-25 NOTE — PROGRESS NOTES
[facility] [employer] [employer]  [id number]  [address] [facility]      Visit Date:  10/25/2022  Weight Management Postop Follow-up    HPI:      Clayton Holt is a 52 y.o. male who is here today for 6 weeks follow up since RYGB performed by Dr. Jony Pace  on 9/12/22. Reports that he is doing well. Feeling good. Weight today 335#. Down 19# since last visit. Down 47# since surgery. BMI 43 . Drinking 70 oz of fluid and 60-80 grams of protein daily. Drinking 2 protein shakes daily. Eating 3 small meals daily. No carbonation. No sweets. Tolerating post-op diet. No problems with bowel movements- as long as taking stool softener/Miralax daily. No nausea. No emesis. No GERD/ Reflux-continues to take PPI. Denies CP/SOB. No Dizziness. No abdominal pain. No incisional discomfort. No sx of dehydration. No fever or chills. Taking and tolerating all vitamins- [place] with Iron 2 daily/ Calcium 2 daily/ B12. Wearing CPAP. Blood sugars running 80-90 fasting. Off all DM medications. No insulin since surgery. Continues to follow with Dr. Kulwinder Kumar for CHF. Continues lasix 40 mg. Labs reviewed and discussed with patient. Ferritin 440. Iron 46. Prealbumin 14.3. Will monitor. Physical Activity:  walking 6 days per week/elliptical bike 30 minutes 2 times per week. Current BMI: Body mass index is 42.72 kg/m².   Current Weight:   Wt Readings from Last 3 Encounters:   10/25/22 (!) 335 lb (152 kg)   09/27/22 (!) 354 lb (160.6 kg)   09/23/22 (!) 358 lb (162.4 kg)     Initial Body weight: 400  Pre-op Body Weight:382  Target weight: 196  EBW: 186  % of EBW lost: 25%      Past Medical History:  Past Medical History:   Diagnosis Date    CHF (congestive heart failure) (HCC)     Constipation     Diabetes mellitus (HCC)     GERD (gastroesophageal reflux disease)     Hypercholesteremia     S/P ICD (internal cardiac defibrillator) procedure: 1/15/2015: Medtronic Single Chamber 1/15/2015 1/15/2015: Medtronic Single Chamber.  Dr. Murcia Hands    Sleep apnea 06/09/2017    Arthgiovanna Geovanna applied 8/22/14 8/29/2014    returned prior to ICD insertion 1/15       Past Surgical History:  Past Surgical History:   Procedure Laterality Date    CARDIAC CATHETERIZATION  9/2014    [facility]    CARDIAC DEFIBRILLATOR PLACEMENT  2014    CARDIAC DEFIBRILLATOR Beronica Carrillo      EGD  2022    Dr Jagdish Quintanilla      Lasix     HÉCTOR-EN-Y GASTRIC BYPASS N/A 9/12/2022    Robotic Héctor-en-Y Gastric Bypass performed by Chico Zabala MD at [address] 6/27/2022    EGD BIOPSY performed by Wanda Genao MD at [facility]    VASECTOMY  2004       Past Social History:  Social History     Socioeconomic History    Marital status:      Spouse name: Not on file    Number of children: 1    Years of education: Not on file    Highest education level: Not on file   Occupational History     Employer: [employer]   Tobacco Use    Smoking status: Never    Smokeless tobacco: Never   Vaping Use    Vaping Use: Never used   Substance and Sexual Activity    Alcohol use: Never    Drug use: No    Sexual activity: Yes     Partners: Female   Other Topics Concern    Not on file   Social History Narrative    Not on file     Social Determinants of Health     Financial Resource Strain: Low Risk     Difficulty of Paying Living Expenses: Not hard at all   Food Insecurity: No Food Insecurity    Worried About Running Out of Food in the Last Year: Never true    Ran Out of Food in the Last Year: Never true   Transportation Needs: Not on file   Physical Activity: Not on file   Stress: Not on file   Social Connections: Not on file   Intimate Partner Violence: Not on file   Housing Stability: Not on file        Medications:   Current Outpatient Medications   Medication Sig Dispense Refill    metoprolol succinate (TOPROL XL) 25 MG extended release tablet Take 3 tablets by mouth daily 270 tablet 1    vitamin B-12 (CYANOCOBALAMIN) 1000 MCG tablet Take 1,000 mcg by mouth daily      calcium carbonate 600 MG TABS tablet Take 2 tablets by mouth daily      Pediatric Multivitamins-Iron (FLINTSTONES PLUS IRON PO) Take 2 tablets by mouth daily      Fexofenadine HCl (MUCINEX ALLERGY PO) Take 1 tablet by mouth daily as needed (allergies)      omeprazole (PRILOSEC) 40 MG delayed release capsule Take 1 capsule by mouth daily 90 capsule 1    Docusate Sodium 100 MG TABS Take 100 mg by mouth 2 times daily Take as needed to prevent constipation 30 tablet 1    furosemide (LASIX) 40 MG tablet TAKE ONE TABLET BY MOUTH EVERY MORNING 90 tablet 3    ASPIRIN LOW DOSE 81 MG EC tablet TAKE 1 TABLET BY MOUTH ONE TIME A DAY 90 tablet 1    rosuvastatin (CRESTOR) 10 MG tablet TAKE 1 TABLET BY MOUTH ONE TIME A DAY 90 tablet 3    blood glucose test strips (PRODIGY NO CODING BLOOD GLUC) strip USE TO CHECK BLOOD SUGAR FOUR TIMES A  strip 3    Prodigy Twist Top Lancets 28G MISC USE TO TEST FOUR TIMES A  each 3    spironolactone (ALDACTONE) 25 MG tablet TAKE 1 TABLET BY MOUTH ONE TIME A DAY 90 tablet 3    CPAP Machine MISC by Does not apply route Ramp pressure: 4.0 cm H2O  Treatment pressure: 15.0 cm H2O 1 each 0    Blood Glucose Monitoring Suppl (PRODIGY AUTOCODE BLOOD GLUCOSE) MAUREEN Use to check blood sugar 4 times daily 1 Device 0    fluticasone (FLONASE) 50 MCG/ACT nasal spray 2 sprays by Nasal route daily 1 Bottle 2    loratadine (CLARITIN) 10 MG tablet Take 10 mg by mouth daily      Respiratory Therapy Supplies (ADULT MASK) MISC Please do mask desensitization and/or provide mask of patient's choice.  1 each 0    acetaminophen (TYLENOL) 325 MG tablet Take 650 mg by mouth every 8 hours as needed for Pain      insulin lispro, 1 Unit Dial, (HUMALOG KWIKPEN) 100 UNIT/ML SOPN ONLY SLIDING SCALE AS NEEDED--151-200 3 UNITS, 201-250 5 UNITS, 251-300 8 UNITS, 301-350 10 UNITS, 351-400 12 UNITS, OVER 400 15 UNITS AND CONTACT PHYSICIAN (Patient not taking: No sig reported) 135 mL 3    gabapentin (NEURONTIN) 100 MG capsule TAKE 1 CAPSULE BY MOUTH 3 TIMES A DAY (Patient taking differently: 100 mg 3 times daily as needed.) 270 capsule 1    Insulin Pen Needle (PEN NEEDLES 31GX5/16\") 31G X 8 MM MISC Use to inject insulins and Ozempic (Patient not taking: No sig reported) 400 each 3     No current facility-administered medications for this visit. Allergies: Allergies   Allergen Reactions    Levaquin [Levofloxacin In D5w] Itching       Subjective:    Review of Systems:  Constitutional: Denies any fever, chills, fatigue. Wound: Denies any rash, skin color changes or wound problems. Resp: Denies any cough, shortness of breath. CV: Denies any chest pain, orthopnea or syncope. MS: Denies myalgias, arthralgias. GI: Denies any nausea, vomiting, diarrhea, constipation, melena, hematochezia. No incisional discomfort. : Denies any hematuria, hesitancy or dysuria. NEURO: Denies seizures, headache. Objective:    /76 (Site: Left Upper Arm, Position: Sitting, Cuff Size: Large Adult)   Pulse 84   Temp 97.5 °F (36.4 °C) (Infrared)   Ht 6' 2.25\" (1.886 m)   Wt (!) 335 lb (152 kg)   BMI 42.72 kg/m²     Physical Examination:   Constitutional: Alert and oriented to person, place and time. Well-developed, well- nourished. Head: Normocephalic and atraumatic  Neck: Supple. Eyes: EOMI b/l. Conjunctivae normal.  No scleral icterus. Respiratory: Effort normal. No respiratory distress. Abd: Well healed incisions. Non-tender. No sign of infection. Ext:  Movement x 4. No edema  Skin; Warm and dry, no visible rashes, lesions or ulcers.    Neuro: Cranial Nerves Grossly Intact; nml coordination      Labs:  CBC   Lab Results   Component Value Date/Time    WBC 8.7 10/22/2022 06:48 AM    RBC 5.01 10/22/2022 06:48 AM    HGB 12.9 10/22/2022 06:48 AM    HCT 42.7 10/22/2022 06:48 AM    MCV 85.2 10/22/2022 06:48 AM MCH 25.7 10/22/2022 06:48 AM    MCHC 30.2 10/22/2022 06:48 AM    RDW 15.7 03/15/2017 06:30 PM     10/22/2022 06:48 AM    MPV 10.7 10/22/2022 06:48 AM    RBCMORP NORMAL 03/15/2017 06:30 PM    SEGSPCT 70.2 10/22/2022 06:48 AM    LABLYMP 18.6 10/22/2022 06:48 AM    MONOPCT 6.5 10/22/2022 06:48 AM    LABEOS 3.6 10/22/2022 06:48 AM    BASO 0.5 10/22/2022 06:48 AM    NRBC 0 10/22/2022 06:48 AM    ANISOCYTOSIS 1+ 03/15/2017 06:30 PM    SEGSABS 6.1 10/22/2022 06:48 AM    LYMPHSABS 1.6 10/22/2022 06:48 AM    MONOSABS 0.6 10/22/2022 06:48 AM    EOSABS 0.3 10/22/2022 06:48 AM    BASOSABS 0.0 10/22/2022 06:48 AM        BMP/CMP   Lab Results   Component Value Date/Time    GLUCOSE 80 10/22/2022 06:48 AM    GLUCOSE 258 12/16/2017 08:25 AM    CREATININE 0.6 10/22/2022 06:48 AM    BUN 7 10/22/2022 06:48 AM     10/22/2022 06:48 AM    K 3.9 10/22/2022 06:48 AM    K 4.6 09/13/2022 05:48 AM     10/22/2022 06:48 AM    CO2 28 10/22/2022 06:48 AM    CALCIUM 9.7 10/22/2022 06:48 AM    AST 20 10/22/2022 06:48 AM    ALKPHOS 76 10/22/2022 06:48 AM    PROT 7.3 10/22/2022 06:48 AM    LABALBU 4.2 10/22/2022 06:48 AM    BILITOT 0.6 10/22/2022 06:48 AM    ALT 15 10/22/2022 06:48 AM        PREALBUMIN   Lab Results   Component Value Date/Time    PREALBUMIN 14.3 10/22/2022 06:48 AM        VITAMIN B12   Lab Results   Component Value Date/Time    ZUFNPBRD35 258 03/05/2022 07:29 AM        24 HOUR URINE CALCIUM   No results found for: URVOLMEAS, HOURSC, CALCIUMUR     VITAMIN D   Lab Results   Component Value Date/Time    VITD25 30 06/18/2022 08:28 AM        VITAMIN B1/ THIAMINE   Lab Results   Component Value Date/Time    IAJL4PORIWX 107 03/05/2022 07:30 AM        RBC FOLATE   Lab Results   Component Value Date/Time    FOLATE 5.5 03/05/2022 07:29 AM        LIPID SCREEN (FASTING)   Lab Results   Component Value Date/Time    CHOL 102 05/12/2022 10:30 AM    TRIG 187 05/12/2022 10:30 AM    HDL 25 05/12/2022 10:30 AM    LDLCALC 40 05/12/2022 10:30 AM   ,     HGA1C (T2DM ONLY)   Lab Results   Component Value Date/Time    LABA1C 6.5 05/12/2022 10:30 AM    AVGG 150 03/05/2022 07:30 AM        TSH   Lab Results   Component Value Date/Time    TSH 2.470 09/13/2022 02:38 PM        IRON   Lab Results   Component Value Date/Time    IRON 46 10/22/2022 06:48 AM        IONIZED CALCIUM   Lab Results   Component Value Date/Time    CAION 1.14 11/13/2020 10:08 PM         Assessment:       Diagnosis Orders   1. S/P gastric bypass  CBC with Auto Differential    Comprehensive Metabolic Panel    Prealbumin    Iron    Iron Binding Capacity    Ferritin      2. BMI 45.0-49.9, adult (HCC)  CBC with Auto Differential    Comprehensive Metabolic Panel    Prealbumin    Iron    Iron Binding Capacity    Ferritin      3. Postsurgical malabsorption  CBC with Auto Differential    Comprehensive Metabolic Panel    Prealbumin    Iron    Iron Binding Capacity    Ferritin      4. Screening for malnutrition  CBC with Auto Differential    Comprehensive Metabolic Panel    Prealbumin    Iron    Iron Binding Capacity    Ferritin      5. Chronic congestive heart failure, unspecified heart failure type ([address]        6. Chronic bilateral low back pain, unspecified whether sciatica present        7. Type 2 diabetes mellitus without complication, with long-term current use of insulin ([address]        8. AICD (automatic cardioverter/defibrillator) present        9. Hypertension, unspecified type        10. Paroxysmal atrial fibrillation (HCC)        11. MIKE on CPAP        12. Hypercholesteremia        13. Gastroesophageal reflux disease, unspecified whether esophagitis present          Plan:    Stay well hydrated. Drink a minimum of 64 oz of non-carbonated, non-caffeinated fluids daily. Nutritional education occurred during visit. Tolerating diet. Continue following with dietitian and follow their recommendations as directed. Continue  60-80  grams of protein each day.     Signs and symptoms reviewed with patient that would be concerning and need her to return to office for re-evaluation. Patient will call if any questions or concerns arrise. Off all restrictions  Importance of physical activity discussed with patient. Increase physical activity as tolerated  Add strength training  Continue taking Multivitamin, Calcium and B12 as directed. Add additional Iron. Continue Omeprazole for at least 3 months post-op  Encouraged to attend support groups  Progress diet as tolerated per dietitian recommendations. 6 week labs reviewed with patient today- Low Iron 46- will monitor  12 week labs ordered- to be drawn one week prior to next apt. Follow up in 6 weeks with provider and dietitian  Continue monitoring blood sugars and adjust insulin as needed  Continue following with Dr. Gopal Hunter as scheduled. Return in about 6 weeks (around 12/6/2022). I spent over 20 minutes with the patient, with greater that 50% of that time spent on face counseling for nutrition and exercise.     Electronically signed by BART Johnson on 10/25/2022 at 2:16 PM

## 2022-10-31 RX ORDER — GABAPENTIN 100 MG/1
CAPSULE ORAL
Qty: 270 CAPSULE | Refills: 1 | Status: SHIPPED | OUTPATIENT
Start: 2022-10-31 | End: 2023-04-29

## 2022-11-16 RX ORDER — ASPIRIN 81 MG/1
TABLET, COATED ORAL
Qty: 90 TABLET | Refills: 1 | Status: SHIPPED | OUTPATIENT
Start: 2022-11-16

## 2022-11-21 ENCOUNTER — PROCEDURE VISIT (OUTPATIENT)
Dept: CARDIOLOGY CLINIC | Age: 47
End: 2022-11-21
Payer: COMMERCIAL

## 2022-11-21 DIAGNOSIS — Z95.810 AICD (AUTOMATIC CARDIOVERTER/DEFIBRILLATOR) PRESENT: Primary | ICD-10-CM

## 2022-11-21 PROCEDURE — 93295 DEV INTERROG REMOTE 1/2/MLT: CPT | Performed by: INTERNAL MEDICINE

## 2022-11-21 PROCEDURE — 93296 REM INTERROG EVL PM/IDS: CPT | Performed by: INTERNAL MEDICINE

## 2022-11-21 NOTE — PROGRESS NOTES
[employer] Medtronic Dual ICD   Pt of [place]    Battery 9.6 years     Presenting rhythm AS VS    A Impedance 456  RV Impedance 361    RV shock 47  SVC shock 57    P wave sensing 3.5  R wave sensing 4.8    A Threshold 1.0 @ 0.40  A Amplitude 1.5 @ 0.40  RV Thresholds 1.25 @ 0.40  RV Amplitude 2.0 @ 0.40    A Paced <0.1%  V Paced <0.1%    Programmed Mode AAI <=> DDD     Afib [place] 0%    Episodes:   none    Optivol elevated. Trending up. Call to patient. No SOB, weight gain, swelling. Does have a sinus infection currently.

## 2022-12-10 ENCOUNTER — NURSE ONLY (OUTPATIENT)
Dept: LAB | Age: 47
End: 2022-12-10

## 2022-12-10 DIAGNOSIS — Z98.84 S/P GASTRIC BYPASS: ICD-10-CM

## 2022-12-10 DIAGNOSIS — K91.2 POSTSURGICAL MALABSORPTION: ICD-10-CM

## 2022-12-10 DIAGNOSIS — Z13.21 SCREENING FOR MALNUTRITION: ICD-10-CM

## 2022-12-10 DIAGNOSIS — E61.1 LOW IRON: ICD-10-CM

## 2022-12-10 LAB
ALBUMIN SERPL-MCNC: 3.8 G/DL (ref 3.5–5.1)
ALP BLD-CCNC: 90 U/L (ref 38–126)
ALT SERPL-CCNC: 9 U/L (ref 11–66)
ANION GAP SERPL CALCULATED.3IONS-SCNC: 8 MEQ/L (ref 8–16)
AST SERPL-CCNC: 14 U/L (ref 5–40)
BASOPHILS # BLD: 0.4 %
BASOPHILS ABSOLUTE: 0 THOU/MM3 (ref 0–0.1)
BILIRUB SERPL-MCNC: 0.4 MG/DL (ref 0.3–1.2)
BUN BLDV-MCNC: 7 MG/DL (ref 7–22)
CALCIUM SERPL-MCNC: 9.3 MG/DL (ref 8.5–10.5)
CHLORIDE BLD-SCNC: 102 MEQ/L (ref 98–111)
CO2: 30 MEQ/L (ref 23–33)
CREAT SERPL-MCNC: 0.7 MG/DL (ref 0.4–1.2)
EOSINOPHIL # BLD: 3.5 %
EOSINOPHILS ABSOLUTE: 0.3 THOU/MM3 (ref 0–0.4)
ERYTHROCYTE [DISTWIDTH] IN BLOOD BY AUTOMATED COUNT: 16.2 % (ref 11.5–14.5)
ERYTHROCYTE [DISTWIDTH] IN BLOOD BY AUTOMATED COUNT: 48.6 FL (ref 35–45)
FERRITIN: 212 NG/ML (ref 22–322)
GFR SERPL CREATININE-BSD FRML MDRD: > 60 ML/MIN/1.73M2
GLUCOSE BLD-MCNC: 89 MG/DL (ref 70–108)
HCT VFR BLD CALC: 45.1 % (ref 42–52)
HEMOGLOBIN: 13.6 GM/DL (ref 14–18)
IMMATURE GRANS (ABS): 0.03 THOU/MM3 (ref 0–0.07)
IMMATURE GRANULOCYTES: 0.4 %
IRON: 42 UG/DL (ref 65–195)
LYMPHOCYTES # BLD: 22 %
LYMPHOCYTES ABSOLUTE: 1.7 THOU/MM3 (ref 1–4.8)
MCH RBC QN AUTO: 25.1 PG (ref 26–33)
MCHC RBC AUTO-ENTMCNC: 30.2 GM/DL (ref 32.2–35.5)
MCV RBC AUTO: 83.4 FL (ref 80–94)
MONOCYTES # BLD: 7 %
MONOCYTES ABSOLUTE: 0.5 THOU/MM3 (ref 0.4–1.3)
NUCLEATED RED BLOOD CELLS: 0 /100 WBC
PLATELET # BLD: 213 THOU/MM3 (ref 130–400)
PMV BLD AUTO: 11.3 FL (ref 9.4–12.4)
POTASSIUM SERPL-SCNC: 4.4 MEQ/L (ref 3.5–5.2)
PREALBUMIN: 15 MG/DL (ref 20–40)
RBC # BLD: 5.41 MILL/MM3 (ref 4.7–6.1)
SEG NEUTROPHILS: 66.7 %
SEGMENTED NEUTROPHILS ABSOLUTE COUNT: 5.1 THOU/MM3 (ref 1.8–7.7)
SODIUM BLD-SCNC: 140 MEQ/L (ref 135–145)
TOTAL IRON BINDING CAPACITY: 258 UG/DL (ref 171–450)
TOTAL PROTEIN: 6.7 G/DL (ref 6.1–8)
VITAMIN D 25-HYDROXY: 31 NG/ML (ref 30–100)
WBC # BLD: 7.7 THOU/MM3 (ref 4.8–10.8)

## 2022-12-14 NOTE — PROGRESS NOTES
Patient is a 52 y.o. male seen for follow up MNT visit for three month post op. Vitals from current and previous visits:     Vitals 27/26/6773   SYSTOLIC 391   DIASTOLIC 80   Site Left Upper Arm   Position Sitting   Cuff Size Large Adult   Pulse 76   Temp 97.7   Resp    SpO2    Weight 318 lb 3.2 oz   Height 6' 2.25\"   Body mass index 40.58 kg/m2   Pain Level        Recent Post Op Lab Work:  Three month labs- Hgb-13.6, glucose-89, Vit D-31, Iron -42 (46), Ferritin- 212  Iron levels below normal but stable trend from previous lab work  Lab Results   Component Value Date/Time    VITD25 31 12/10/2022 07:25 AM       Date of Surgery: 9/12/22  Type of Surgery:   gastric sleeve      Initial Weight: 382 lbs at pre-op teaching class   Excess  Body Weight Pre-Op: 186  lbs     Weight Loss: 64 lbs. Patient's Target Weight =  196 lbs for a BMI of ~25  Percentage of Excess Body Weight Lost to date is :  34.4 %    How pleased are you with your current weight loss: Pt is pleased with weight loss. Less back pain or knee pain- off all DM medications      Are you experiencing any physical problems post surgery: No: - Stool softeners twice a day and Miralax once a day in morning  Are you experiencing any dietary problems post surgery: No  Food Intolerances: Denies any food intolerances since last seen. How frequently are you eating? Two-Three times a day - protein shake in morning  How long does it take you to finish a meal? Taking time to eat  How full do you feel after eating? Feels full when eats     Protein intake: 60-80 grams/day   Patient taking protein supplement: Yes. Brand of Supplement: Nectar Protein Powder with skim milk and Fairlife Nutrition Plan BID    Fluid intake: >64 oz/day- average 70-75 oz/day    Multivitamin/mineral intake:  [place] Chewable with Iron BID  = 36 mg iron/day and patient tolerates this well.   Does add  18 mg ferrous fumarate tablet about twice a week but continues to have constipation when does this. Therefore will try taking two [place] Chewable with Iron in morning and one additional [place] in evening. Calcium intake: Yes. 500 mg calcium citrate soft chew daily    Other: B12 daily    Assessment  Pt tolerating stage appropriate foods. Continues to supplement with protein shakes to assist in meeting protein goal of ~ 60-80 grams daily      Plan  Plan/Recommendations: Pt educated on three month post op nutrition guidelines. Questions answered. See additional instructions below. -  Patient Instructions   1. Continue bariatric vitamins as you are currently taking. Increase [place] Chewable with Iron two in am and add one in pm.  Continue one Calcium tablet once a day with food. 2. Goal continues to be 60-80 grams protein per day. Focus on choosing protein foods first at meals to remain full and maintain muscle mass while you continue to lose from body fat stores. 3. Regular physical activity is important if desire to continue weight loss efforts. Recommend regular cardiac activity and strength training 2-3 days per week. 4.  Water goal is 64 oz per day and make sure no liquids 30 minutes before meals and no liquids 30 minutes after meals  5. Take 20-30 minutes to eat meals and chew all foods well for best tolerance  get lab work done at least 5 -7 days  before next office visit.     Recommended Follow-Up: six month post op with BART Quick, EMILIANO, LD,   Dietitian- Weight Management [address] [place] [address]

## 2022-12-15 ENCOUNTER — OFFICE VISIT (OUTPATIENT)
Dept: BARIATRICS/WEIGHT MGMT | Age: 47
End: 2022-12-15

## 2022-12-15 VITALS
HEART RATE: 76 BPM | HEIGHT: 74 IN | WEIGHT: 315 LBS | BODY MASS INDEX: 40.43 KG/M2 | TEMPERATURE: 97.7 F | SYSTOLIC BLOOD PRESSURE: 136 MMHG | DIASTOLIC BLOOD PRESSURE: 80 MMHG

## 2022-12-15 DIAGNOSIS — Z98.84 S/P GASTRIC BYPASS: Primary | ICD-10-CM

## 2022-12-15 DIAGNOSIS — Z13.21 SCREENING FOR MALNUTRITION: ICD-10-CM

## 2022-12-15 DIAGNOSIS — M54.50 CHRONIC BILATERAL LOW BACK PAIN, UNSPECIFIED WHETHER SCIATICA PRESENT: ICD-10-CM

## 2022-12-15 DIAGNOSIS — Z98.84 S/P BARIATRIC SURGERY: Primary | ICD-10-CM

## 2022-12-15 DIAGNOSIS — Z99.89 OSA ON CPAP: ICD-10-CM

## 2022-12-15 DIAGNOSIS — E78.00 HYPERCHOLESTEREMIA: ICD-10-CM

## 2022-12-15 DIAGNOSIS — E11.9 TYPE 2 DIABETES MELLITUS WITHOUT COMPLICATION, WITH LONG-TERM CURRENT USE OF INSULIN (HCC): ICD-10-CM

## 2022-12-15 DIAGNOSIS — K91.2 POSTSURGICAL MALABSORPTION: ICD-10-CM

## 2022-12-15 DIAGNOSIS — G47.33 OSA ON CPAP: ICD-10-CM

## 2022-12-15 DIAGNOSIS — K21.9 GASTROESOPHAGEAL REFLUX DISEASE, UNSPECIFIED WHETHER ESOPHAGITIS PRESENT: ICD-10-CM

## 2022-12-15 DIAGNOSIS — Z79.4 TYPE 2 DIABETES MELLITUS WITHOUT COMPLICATION, WITH LONG-TERM CURRENT USE OF INSULIN (HCC): ICD-10-CM

## 2022-12-15 DIAGNOSIS — G89.29 CHRONIC BILATERAL LOW BACK PAIN, UNSPECIFIED WHETHER SCIATICA PRESENT: ICD-10-CM

## 2022-12-15 DIAGNOSIS — Z95.810 AICD (AUTOMATIC CARDIOVERTER/DEFIBRILLATOR) PRESENT: ICD-10-CM

## 2022-12-15 DIAGNOSIS — I10 HYPERTENSION, UNSPECIFIED TYPE: ICD-10-CM

## 2022-12-15 DIAGNOSIS — I50.9 CHRONIC CONGESTIVE HEART FAILURE, UNSPECIFIED HEART FAILURE TYPE (HCC): ICD-10-CM

## 2022-12-15 DIAGNOSIS — E61.1 LOW IRON: ICD-10-CM

## 2022-12-15 NOTE — PROGRESS NOTES
[facility]  [address]  [address] [facility]      Visit Date:  12/15/2022  Weight Management Postop Follow-up    HPI:      Anuj Barnhart is a 52 y.o. male who is here today for 3 month follow up since RYGB performed by Dr. Katherine Alonzo  on 9/12/22. Continues to do really well. Feeling great. Weight today 318#. Down 17# since last visit. Down 64# since surgery. BMI 40 . Drinking at least 70 oz of fluid and 60-80 grams of protein daily. Drinking 2 protein shakes daily. Eating 2 meals daily. No pop/ carbonation. No sweets. Tolerating post-op diet. No problems with bowel movements as long as taking stool softener/ Miralax. No nausea. No emesis. No GERD/ Reflux-continues to take PPI. Will try weaning off. Denies CP/SOB. No Dizziness. No abdominal pain. No incisional discomfort. No sx of dehydration. No fever or chills. Taking and tolerating all vitamins-[place] with iron 2 daily/ 18mg of iron twice per week-due to constipation. Labs reviewed. Iron stable 42. Hemoglobin slightly improved 13.6. Ferritin now within normal range at 212. Wearing CPAP. Blood sugars running 90. Has only taken insulin once since surgery. Following with Dr. Deandra Richardson for CHF. Continues Lasix 40mg. No issues with swelling, cough or shortness of breath. Plans to discuss decreasing Lasix to 20mg with Dr. Deandra Richardson. Physical Activity: elliptical bike 4 miles daily/ Walking. Current BMI: Body mass index is 40.58 kg/m².   Current Weight:   Wt Readings from Last 3 Encounters:   12/15/22 (!) 318 lb 3.2 oz (144.3 kg)   10/25/22 (!) 335 lb (152 kg)   09/27/22 (!) 354 lb (160.6 kg)     Initial Body weight: 400  Pre-op Body Weight:382  Target weight: 196  EBW: 186  % of EBW lost: 34%      Past Medical History:  Past Medical History:   Diagnosis Date    CHF (congestive heart failure) (HCC)     Constipation     Diabetes mellitus (HCC)     GERD (gastroesophageal reflux disease) Hypercholesteremia     S/P ICD (internal cardiac defibrillator) procedure: 1/15/2015: Medtronic Single Chamber 1/15/2015    1/15/2015: Medtronic Single Chamber.  Dr. Lito Ratliff    Sleep apnea 06/09/2017    Sheldon Nageotte applied 8/22/14 8/29/2014    returned prior to ICD insertion 1/15       Past Surgical History:  Past Surgical History:   Procedure Laterality Date    CARDIAC CATHETERIZATION  9/2014    [facility]    CARDIAC DEFIBRILLATOR PLACEMENT  2014    CARDIAC DEFIBRILLATOR [address]      EGD  2022    Dr Maria L Ricci      Lasix     HÉCTOR-EN-Y GASTRIC BYPASS N/A 9/12/2022    Robotic Héctor-en-Y Gastric Bypass performed by Fiorella Yoon MD at [place] [address] 6/27/2022    EGD BIOPSY performed by Tomás Lynne MD at [facility]    VASECTOMY  2004       Past Social History:  Social History     Socioeconomic History    Marital status:      Spouse name: Not on file    Number of children: 1    Years of education: Not on file    Highest education level: Not on file   Occupational History     Employer: [employer]   Tobacco Use    Smoking status: Never    Smokeless tobacco: Never   Vaping Use    Vaping Use: Never used   Substance and Sexual Activity    Alcohol use: Never    Drug use: No    Sexual activity: Yes     Partners: Female   Other Topics Concern    Not on file   Social History Narrative    Not on file     Social Determinants of Health     Financial Resource Strain: Low Risk     Difficulty of Paying Living Expenses: Not hard at all   Food Insecurity: No Food Insecurity    Worried About Running Out of Food in the Last Year: Never true    Ran Out of Food in the Last Year: Never true   Transportation Needs: Not on file   Physical Activity: Not on file   Stress: Not on file   Social Connections: Not on file   Intimate Partner Violence: Not on file   Housing Stability: Not on file        Medications:   Current Outpatient Medications   Medication Sig Dispense Refill    ASPIRIN LOW DOSE 81 MG EC tablet TAKE 1 TABLET BY MOUTH ONE TIME A DAY 90 tablet 1    gabapentin (NEURONTIN) 100 MG capsule TAKE 1 CAPSULE BY MOUTH 3 TIMES A  capsule 1    metoprolol succinate (TOPROL XL) 25 MG extended release tablet Take 3 tablets by mouth daily 270 tablet 1    vitamin B-12 (CYANOCOBALAMIN) 1000 MCG tablet Take 1,000 mcg by mouth daily      calcium carbonate 600 MG TABS tablet Take 2 tablets by mouth daily      insulin lispro, 1 Unit Dial, (HUMALOG KWIKPEN) 100 UNIT/ML SOPN ONLY SLIDING SCALE AS NEEDED--151-200 3 UNITS, 201-250 5 UNITS, 251-300 8 UNITS, 301-350 10 UNITS, 351-400 12 UNITS, OVER 400 15 UNITS AND CONTACT PHYSICIAN 135 mL 3    Pediatric Multivitamins-Iron (FLINTSTONES PLUS IRON PO) Take 2 tablets by mouth daily      Fexofenadine HCl (MUCINEX ALLERGY PO) Take 1 tablet by mouth daily as needed (allergies)      omeprazole (PRILOSEC) 40 MG delayed release capsule Take 1 capsule by mouth daily 90 capsule 1    Docusate Sodium 100 MG TABS Take 100 mg by mouth 2 times daily Take as needed to prevent constipation 30 tablet 1    furosemide (LASIX) 40 MG tablet TAKE ONE TABLET BY MOUTH EVERY MORNING 90 tablet 3    rosuvastatin (CRESTOR) 10 MG tablet TAKE 1 TABLET BY MOUTH ONE TIME A DAY 90 tablet 3    blood glucose test strips (PRODIGY NO CODING BLOOD GLUC) strip USE TO CHECK BLOOD SUGAR FOUR TIMES A  strip 3    Prodigy Twist Top Lancets 28G MISC USE TO TEST FOUR TIMES A  each 3    spironolactone (ALDACTONE) 25 MG tablet TAKE 1 TABLET BY MOUTH ONE TIME A DAY 90 tablet 3    CPAP Machine MISC by Does not apply route Ramp pressure: 4.0 cm H2O  Treatment pressure: 15.0 cm H2O 1 each 0    Blood Glucose Monitoring Suppl (PRODIGY AUTOCODE BLOOD GLUCOSE) MAUREEN Use to check blood sugar 4 times daily 1 Device 0    Insulin Pen Needle (PEN NEEDLES 31GX5/16\") 31G X 8 MM MISC Use to inject insulins and Ozempic 400 each 3    fluticasone (FLONASE) 50 MCG/ACT nasal spray 2 sprays by Nasal route daily 1 Bottle 2    loratadine (CLARITIN) 10 MG tablet Take 10 mg by mouth daily      Respiratory Therapy Supplies (ADULT MASK) MISC Please do mask desensitization and/or provide mask of patient's choice. 1 each 0    acetaminophen (TYLENOL) 325 MG tablet Take 650 mg by mouth every 8 hours as needed for Pain       No current facility-administered medications for this visit. Allergies: Allergies   Allergen Reactions    Levaquin [Levofloxacin In D5w] Itching       Subjective:    Review of Systems:  Constitutional: Denies any fever, chills, fatigue. Wound: Denies any rash, skin color changes or wound problems. Resp: Denies any cough, shortness of breath. CV: Denies any chest pain, orthopnea or syncope. MS: Denies myalgias, arthralgias. GI: Denies any nausea, vomiting, diarrhea, (+) constipation, melena, hematochezia. No incisional discomfort. : Denies any hematuria, hesitancy or dysuria. NEURO: Denies seizures, headache. Objective:    /80 (Site: Left Upper Arm, Position: Sitting, Cuff Size: Large Adult)   Pulse 76   Temp 97.7 °F (36.5 °C) (Infrared)   Ht 6' 2.25\" (1.886 m)   Wt (!) 318 lb 3.2 oz (144.3 kg)   BMI 40.58 kg/m²     Physical Examination:   Constitutional: Alert and oriented to person, place and time. Well-developed, well- nourished. Head: Normocephalic and atraumatic  Neck: Supple. Eyes: EOMI b/l. Conjunctivae normal.  No scleral icterus. Respiratory: Effort normal. No respiratory distress. Abd: Benign  Ext:  Movement x 4. No edema  Skin; Warm and dry, no visible rashes, lesions or ulcers.    Neuro: Cranial Nerves Grossly Intact; nml coordination        Labs:  CBC   Lab Results   Component Value Date/Time    WBC 7.7 12/10/2022 07:25 AM    RBC 5.41 12/10/2022 07:25 AM    HGB 13.6 12/10/2022 07:25 AM    HCT 45.1 12/10/2022 07:25 AM    MCV 83.4 12/10/2022 07:25 AM    MCH 25.1 12/10/2022 07:25 AM    [facility] 30.2 12/10/2022 07:25 AM    RDW 15.7 03/15/2017 06:30 PM     12/10/2022 07:25 AM    MPV 11.3 12/10/2022 07:25 AM    RBCMORP NORMAL 03/15/2017 06:30 PM    SEGSPCT 66.7 12/10/2022 07:25 AM    LABLYMP 22.0 12/10/2022 07:25 AM    MONOPCT 7.0 12/10/2022 07:25 AM    LABEOS 3.5 12/10/2022 07:25 AM    BASO 0.4 12/10/2022 07:25 AM    NRBC 0 12/10/2022 07:25 AM    ANISOCYTOSIS 1+ 03/15/2017 06:30 PM    SEGSABS 5.1 12/10/2022 07:25 AM    LYMPHSABS 1.7 12/10/2022 07:25 AM    MONOSABS 0.5 12/10/2022 07:25 AM    EOSABS 0.3 12/10/2022 07:25 AM    BASOSABS 0.0 12/10/2022 07:25 AM        BMP/CMP   Lab Results   Component Value Date/Time    GLUCOSE 89 12/10/2022 07:25 AM    GLUCOSE 258 12/16/2017 08:25 AM    CREATININE 0.7 12/10/2022 07:25 AM    BUN 7 12/10/2022 07:25 AM     12/10/2022 07:25 AM    K 4.4 12/10/2022 07:25 AM    K 4.6 09/13/2022 05:48 AM     12/10/2022 07:25 AM    CO2 30 12/10/2022 07:25 AM    CALCIUM 9.3 12/10/2022 07:25 AM    AST 14 12/10/2022 07:25 AM    ALKPHOS 90 12/10/2022 07:25 AM    PROT 6.7 12/10/2022 07:25 AM    LABALBU 3.8 12/10/2022 07:25 AM    BILITOT 0.4 12/10/2022 07:25 AM    ALT 9 12/10/2022 07:25 AM        PREALBUMIN   Lab Results   Component Value Date/Time    PREALBUMIN 15.0 12/10/2022 07:25 AM        VITAMIN B12   Lab Results   Component Value Date/Time    BBDKPEKD83 258 03/05/2022 07:29 AM        24 HOUR URINE CALCIUM   No results found for: URVOLMEAS, HOURSC, CALCIUMUR     VITAMIN D   Lab Results   Component Value Date/Time    VITD25 31 12/10/2022 07:25 AM        VITAMIN B1/ THIAMINE   Lab Results   Component Value Date/Time    [id number] 107 03/05/2022 07:30 AM        RBC FOLATE   Lab Results   Component Value Date/Time    FOLATE 5.5 03/05/2022 07:29 AM        LIPID SCREEN (FASTING)   Lab Results   Component Value Date/Time    CHOL 102 05/12/2022 10:30 AM    TRIG 187 05/12/2022 10:30 AM    HDL 25 05/12/2022 10:30 AM    LDLCALC 40 05/12/2022 10:30 AM   ,     HGA1C (T2DM ONLY)   Lab Results   Component Value Date/Time    LABA1C 6.5 05/12/2022 10:30 AM    AVGG 150 03/05/2022 07:30 AM        TSH   Lab Results   Component Value Date/Time    TSH 2.470 09/13/2022 02:38 PM        IRON   Lab Results   Component Value Date/Time    IRON 42 12/10/2022 07:25 AM        IONIZED CALCIUM   Lab Results   Component Value Date/Time    CAION 1.14 11/13/2020 10:08 PM         Assessment:       Diagnosis Orders   1. S/P gastric bypass  CBC with Auto Differential    Comprehensive Metabolic Panel    Prealbumin    Iron    Iron Binding Capacity    Ferritin      2. BMI 45.0-49.9, adult (HCC)  CBC with Auto Differential    Comprehensive Metabolic Panel    Prealbumin    Iron    Iron Binding Capacity    Ferritin      3. Postsurgical malabsorption  CBC with Auto Differential    Comprehensive Metabolic Panel    Prealbumin    Iron    Iron Binding Capacity    Ferritin      4. Screening for malnutrition  CBC with Auto Differential    Comprehensive Metabolic Panel    Prealbumin    Iron    Iron Binding Capacity    Ferritin      5. Chronic congestive heart failure, unspecified heart failure type ([facility] [address]        6. Chronic bilateral low back pain, unspecified whether sciatica present        7. Type 2 diabetes mellitus without complication, with long-term current use of insulin ([facility] [address]        8. AICD (automatic cardioverter/defibrillator) present        9. Hypertension, unspecified type        10. Paroxysmal atrial fibrillation (HCC)        11. MIKE on CPAP        12. Hypercholesteremia        13. Gastroesophageal reflux disease, unspecified whether esophagitis present          Plan:    Stay well hydrated. Drink a minimum of 64 oz of non-carbonated, non-caffeinated fluids daily. Nutritional education occurred during visit. Tolerating diet. Continue following with dietitian and follow their recommendations as directed. Continue  60-80 grams of protein each day. Continue to track.    Signs and symptoms reviewed with patient that would be concerning and need her to return to office for re-evaluation. Patient will call if any questions or concerns arrise. Importance of physical activity discussed with patient. Continue physical activity and add strength training- encouraged to make apt with Sabiha  Continue taking [place] with Iron- will increase to 3 daily for 54mg Iron, Calcium and B12 as directed  Try weaning off Omeprazole- take every other day for 2 weeks and stop if able. Encouraged to attend support groups  3 month labs reviewed with patient today  6 month labs ordered- to be drawn one week prior to next apt. Continue monitoring blood sugars and adjust insulin as needed  Continue following with Dr. Ramandeep Biggs as scheduled. No issues with swelling, cough or shortness of breath. Plans to discuss decreasing Lasix to 20mg with Dr. Ramandeep Biggs. Encouraged to push protein to 80 grams daily  Return in about 3 months (around 3/15/2023) for postop follow up. I spent over 20 minutes with the patient, with greater that 50% of that time spent on face counseling for nutrition and exercise.     Electronically signed by BART Simental on 12/15/2022 at 2:12 PM

## 2022-12-15 NOTE — PATIENT INSTRUCTIONS
Stay well hydrated. Drink a minimum of 64 oz of non-carbonated, non-caffeinated fluids daily. Nutritional education occurred during visit. Tolerating diet. Continue following with dietitian and follow their recommendations as directed. Continue  60-80 grams of protein each day. Continue to track. Signs and symptoms reviewed with patient that would be concerning and need her to return to office for re-evaluation. Patient will call if any questions or concerns arrise. Importance of physical activity discussed with patient. Continue physical activity and add strength training- encouraged to make apt with Sabiha  Continue taking Multivitamin, Iron, Calcium and B12 as directed  Try weaning off Omeprazole- take every other day for 2 weeks and stop if able. Encouraged to attend support groups  3 month labs reviewed with patient today  6 month labs ordered- to be drawn one week prior to next apt. Continue monitoring blood sugars and adjust insulin as needed  Continue following with Dr. Terrell Mulligan as scheduled. No issues with swelling, cough or shortness of breath. Plans to discuss decreasing Lasix to 20mg with Dr. Terrell Mulligan.    Encouraged to push protein to 80 grams daily

## 2022-12-15 NOTE — PATIENT INSTRUCTIONS
1. Continue bariatric vitamins as you are currently taking. Increase [place] Chewable with Iron two in am and add one in pm.  Continue one Calcium tablet once a day with food. 2. Goal continues to be 60-80 grams protein per day. Focus on choosing protein foods first at meals to remain full and maintain muscle mass while you continue to lose from body fat stores. 3. Regular physical activity is important if desire to continue weight loss efforts. Recommend regular cardiac activity and strength training 2-3 days per week. 4.  Water goal is 64 oz per day and make sure no liquids 30 minutes before meals and no liquids 30 minutes after meals  5. Take 20-30 minutes to eat meals and chew all foods well for best tolerance  get lab work done at least 5 -7 days  before next office visit.

## 2022-12-27 ENCOUNTER — PROCEDURE VISIT (OUTPATIENT)
Dept: CARDIOLOGY CLINIC | Age: 47
End: 2022-12-27

## 2022-12-27 DIAGNOSIS — I50.22 CHRONIC SYSTOLIC CONGESTIVE HEART FAILURE (HCC): Primary | ICD-10-CM

## 2022-12-27 NOTE — PROGRESS NOTES
Dr boss pt  Medtronic careBiomeasure optivol remote  Battery 9.5 yrs remaining  No episodes  Optivol off the chart   Pt had gastric bypass sx recently    He denies any sob and no edema

## 2022-12-28 ENCOUNTER — OFFICE VISIT (OUTPATIENT)
Dept: FAMILY MEDICINE CLINIC | Age: 47
End: 2022-12-28
Payer: COMMERCIAL

## 2022-12-28 VITALS
BODY MASS INDEX: 42.66 KG/M2 | HEART RATE: 69 BPM | SYSTOLIC BLOOD PRESSURE: 114 MMHG | OXYGEN SATURATION: 96 % | HEIGHT: 72 IN | DIASTOLIC BLOOD PRESSURE: 72 MMHG | RESPIRATION RATE: 16 BRPM | WEIGHT: 315 LBS | TEMPERATURE: 98.4 F

## 2022-12-28 DIAGNOSIS — H61.23 BILATERAL IMPACTED CERUMEN: ICD-10-CM

## 2022-12-28 DIAGNOSIS — J01.90 ACUTE NON-RECURRENT SINUSITIS, UNSPECIFIED LOCATION: Primary | ICD-10-CM

## 2022-12-28 PROCEDURE — 69210 REMOVE IMPACTED EAR WAX UNI: CPT | Performed by: FAMILY MEDICINE

## 2022-12-28 PROCEDURE — 3074F SYST BP LT 130 MM HG: CPT | Performed by: FAMILY MEDICINE

## 2022-12-28 PROCEDURE — 99213 OFFICE O/P EST LOW 20 MIN: CPT | Performed by: FAMILY MEDICINE

## 2022-12-28 PROCEDURE — 3078F DIAST BP <80 MM HG: CPT | Performed by: FAMILY MEDICINE

## 2022-12-28 RX ORDER — DOXYCYCLINE HYCLATE 100 MG
100 TABLET ORAL 2 TIMES DAILY
Qty: 20 TABLET | Refills: 0 | Status: SHIPPED | OUTPATIENT
Start: 2022-12-28 | End: 2023-01-07

## 2022-12-28 SDOH — ECONOMIC STABILITY: FOOD INSECURITY: WITHIN THE PAST 12 MONTHS, YOU WORRIED THAT YOUR FOOD WOULD RUN OUT BEFORE YOU GOT MONEY TO BUY MORE.: NEVER TRUE

## 2022-12-28 SDOH — ECONOMIC STABILITY: FOOD INSECURITY: WITHIN THE PAST 12 MONTHS, THE FOOD YOU BOUGHT JUST DIDN'T LAST AND YOU DIDN'T HAVE MONEY TO GET MORE.: NEVER TRUE

## 2022-12-28 ASSESSMENT — SOCIAL DETERMINANTS OF HEALTH (SDOH): HOW HARD IS IT FOR YOU TO PAY FOR THE VERY BASICS LIKE FOOD, HOUSING, MEDICAL CARE, AND HEATING?: NOT HARD AT ALL

## 2023-01-02 ASSESSMENT — ENCOUNTER SYMPTOMS
EYE PAIN: 0
DIARRHEA: 0
SINUS PRESSURE: 1
NAUSEA: 0
ABDOMINAL DISTENTION: 0
RHINORRHEA: 1
SINUS PAIN: 1
SORE THROAT: 0
CONSTIPATION: 0
ABDOMINAL PAIN: 0
COUGH: 0
SHORTNESS OF BREATH: 0

## 2023-01-02 NOTE — PROGRESS NOTES
[address] [place] [facility] [place] [address]  Dept: [phone]  Dept Fax: [phone]  Loc: [phone]  PROGRESS NOTE      VisitDate: 12/28/2022    Bernadette Sheppard is a 52 y.o. male who presents today for:     Chief Complaint   Patient presents with    Ear Fullness    Sinus Problem     Nasal Congestion x 2-3 days         Subjective:  HPI  Comes in with increasing sinus pain pressure postnasal and anterior nasal drainage. Also feels ears are plugged up. Review of Systems   Constitutional:  Negative for appetite change and fever. HENT:  Positive for postnasal drip, rhinorrhea, sinus pressure and sinus pain. Negative for congestion, ear pain and sore throat. Eyes:  Negative for pain and visual disturbance. Respiratory:  Negative for cough and shortness of breath. Cardiovascular:  Negative for chest pain. Gastrointestinal:  Negative for abdominal distention, abdominal pain, constipation, diarrhea and nausea. Genitourinary:  Negative for dysuria, frequency and urgency. Musculoskeletal:  Negative for arthralgias. Skin:  Negative for rash. Neurological:  Negative for dizziness. Past Medical History:   Diagnosis Date    CHF (congestive heart failure) ([facility])     Constipation     Diabetes mellitus ([facility] [address]     GERD (gastroesophageal reflux disease)     Hypercholesteremia     S/P ICD (internal cardiac defibrillator) procedure: 1/15/2015: Medtronic Single Chamber 1/15/2015    1/15/2015: Medtronic Single Chamber.  Dr. Lizz Liang    Sleep apnea 06/09/2017    Jossy Davila applied 8/22/14 8/29/2014    returned prior to ICD insertion 1/15      Past Surgical History:   Procedure Laterality Date    CARDIAC CATHETERIZATION  9/2014    [facility]  2014    [address]      EGD  2022    Dr Fran Bean     HÉCTOR-EN-Y GASTRIC BYPASS N/A 9/12/2022    Robotic Ronaldo-en-Y Gastric Bypass performed by Ani Nino MD at [facility] 6/27/2022    EGD BIOPSY performed by Pato Iniguez MD at [address] [facility]    VASECTOMY  2004     Family History   Problem Relation Age of Onset    Arthritis Mother     Hypertension Father     Heart Disease Maternal Uncle     Heart Disease Maternal Grandfather     Asthma Maternal Grandfather     Diabetes Maternal Grandfather     Arthritis Maternal Grandfather     Heart Disease Paternal Uncle     Heart Disease Maternal Grandmother     Stroke Maternal Grandmother     Asthma Brother     Colon Cancer Neg Hx     Colon Polyps Neg Hx     Esophageal Cancer Neg Hx      Social History     Tobacco Use    Smoking status: Never    Smokeless tobacco: Never   Substance Use Topics    Alcohol use: Never      Current Outpatient Medications   Medication Sig Dispense Refill    doxycycline hyclate (VIBRA-TABS) 100 MG tablet Take 1 tablet by mouth 2 times daily for 10 days 20 tablet 0    ASPIRIN LOW DOSE 81 MG EC tablet TAKE 1 TABLET BY MOUTH ONE TIME A DAY 90 tablet 1    gabapentin (NEURONTIN) 100 MG capsule TAKE 1 CAPSULE BY MOUTH 3 TIMES A  capsule 1    metoprolol succinate (TOPROL XL) 25 MG extended release tablet Take 3 tablets by mouth daily 270 tablet 1    calcium carbonate 600 MG TABS tablet Take 2 tablets by mouth daily      insulin lispro, 1 Unit Dial, (HUMALOG KWIKPEN) 100 UNIT/ML SOPN ONLY SLIDING SCALE AS NEEDED--151-200 3 UNITS, 201-250 5 UNITS, 251-300 8 UNITS, 301-350 10 UNITS, 351-400 12 UNITS, OVER 400 15 UNITS AND CONTACT PHYSICIAN 135 mL 3    Pediatric Multivitamins-Iron (FLINTSTONES PLUS IRON PO) Take 2 tablets by mouth daily      Fexofenadine HCl (MUCINEX ALLERGY PO) Take 1 tablet by mouth daily as needed (allergies)      omeprazole (PRILOSEC) 40 MG delayed release capsule Take 1 capsule by mouth daily 90 capsule 1    Docusate Sodium 100 MG TABS Take 100 mg by mouth 2 times daily Take as needed to prevent constipation 30 tablet 1    furosemide (LASIX) 40 MG tablet TAKE ONE TABLET BY MOUTH EVERY MORNING 90 tablet 3    rosuvastatin (CRESTOR) 10 MG tablet TAKE 1 TABLET BY MOUTH ONE TIME A DAY 90 tablet 3    blood glucose test strips (PRODIGY NO CODING BLOOD GLUC) strip USE TO CHECK BLOOD SUGAR FOUR TIMES A  strip 3    Prodigy Twist Top Lancets 28G MISC USE TO TEST FOUR TIMES A  each 3    CPAP Machine MISC by Does not apply route Ramp pressure: 4.0 cm H2O  Treatment pressure: 15.0 cm H2O 1 each 0    Blood Glucose Monitoring Suppl (PRODIGY AUTOCODE BLOOD GLUCOSE) MAUREEN Use to check blood sugar 4 times daily 1 Device 0    Insulin Pen Needle (PEN NEEDLES 31GX5/16\") 31G X 8 MM MISC Use to inject insulins and Ozempic 400 each 3    fluticasone (FLONASE) 50 MCG/ACT nasal spray 2 sprays by Nasal route daily 1 Bottle 2    loratadine (CLARITIN) 10 MG tablet Take 10 mg by mouth daily      Respiratory Therapy Supplies (ADULT MASK) MISC Please do mask desensitization and/or provide mask of patient's choice. 1 each 0    acetaminophen (TYLENOL) 325 MG tablet Take 650 mg by mouth every 8 hours as needed for Pain      vitamin B-12 (CYANOCOBALAMIN) 1000 MCG tablet Take 1,000 mcg by mouth daily (Patient not taking: Reported on 12/28/2022)       No current facility-administered medications for this visit.      Allergies   Allergen Reactions    Levaquin [Levofloxacin In D5w] Itching     Health Maintenance   Topic Date Due    Diabetic foot exam  Never done    HIV screen  Never done    Hepatitis C screen  Never done    Hepatitis B vaccine (1 of 3 - Risk 3-dose series) Never done    Diabetic retinal exam  04/24/2022    A1C test (Diabetic or Prediabetic)  05/12/2023    Lipids  05/12/2023    Depression Screen  06/16/2023    Diabetic Alb to Cr ratio (uACR) test  10/22/2023    GFR test (Diabetes, CKD 3-4, OR last GFR 15-59)  12/10/2023    Colorectal Cancer Screen  01/14/2025    DTaP/Tdap/Td vaccine (2 - Td or Tdap) 08/17/2030    Flu vaccine  Completed    Pneumococcal 0-64 years Vaccine  Completed    COVID-19 Vaccine  Completed    Hepatitis A vaccine  Aged Out    Hib vaccine  Aged Out    Meningococcal (ACWY) vaccine  Aged Out         Objective:     Physical Exam  Constitutional:       General: He is not in acute distress. Appearance: He is well-developed. He is not diaphoretic. HENT:      Head: Normocephalic and atraumatic. Comments: Sinus tenderness, posterior nasal drainage mild pharyngeal erythema, external auditory canals impacted with cerumen bilaterally, unable to remove manually ears were then irrigated with complete resolution  Eyes:      Conjunctiva/sclera: Conjunctivae normal.   Neck:      Vascular: No JVD. Cardiovascular:      Rate and Rhythm: Normal rate and regular rhythm. Heart sounds: Normal heart sounds. Pulmonary:      Effort: Pulmonary effort is normal.      Breath sounds: Normal breath sounds. No wheezing or rales. Musculoskeletal:         General: No tenderness. Skin:     General: Skin is warm and dry. Coloration: Skin is not pale. Neurological:      Mental Status: He is alert and oriented to person, place, and time. /72   Pulse 69   Temp 98.4 °F (36.9 °C) (Infrared)   Resp 16   Ht 6' (1.829 m)   Wt (!) 317 lb (143.8 kg)   SpO2 96%   BMI 42.99 kg/m²       Impression/Plan:  1. Acute non-recurrent sinusitis, unspecified location    2. Bilateral impacted cerumen      Requested Prescriptions     Signed Prescriptions Disp Refills    doxycycline hyclate (VIBRA-TABS) 100 MG tablet 20 tablet 0     Sig: Take 1 tablet by mouth 2 times daily for 10 days     Orders Placed This Encounter   Procedures    55577 - PA REMOVE IMPACTED EAR WAX       Patient giveneducational materials - see patient instructions. Discussed use, benefit, and side effects of prescribed medications. All patient questions answered. Pt voiced understanding. Reviewed health maintenance. Patient agreedwith treatment plan. Follow up as directed. **This report has been created using voice recognition software. It may contain minor errorswhich are inherent in voice recognition technology. **       Electronically signed by Ilsa Navarro MD on 1/2/2023 at 9:26 AM

## 2023-01-11 ENCOUNTER — TELEPHONE (OUTPATIENT)
Dept: PHARMACY | Facility: CLINIC | Age: 48
End: 2023-01-11

## 2023-01-11 NOTE — TELEPHONE ENCOUNTER
2023 Annual Pharmacist Visit  Patient is [facility]    Called patient to schedule 2023 yearly pharmacist appointment to discuss medications for Diabetes Management Program.    Left message to call us back at [phone] Option 3.       [address]   Department, toll free: [phone] Option #3

## 2023-01-18 NOTE — TELEPHONE ENCOUNTER
Pt returned call and made an appt on   Thursday Feb 2, 2023   Appt at 11:00 AM (30 min)       For Pharmacy Admin Tracking Only    Program: [address] in place:  No  Recommendation Provided To: Patient/Caregiver: 1 via Telephone  Intervention Detail: Scheduled Appointment  Intervention Accepted By: Patient/Caregiver: 1  Gap Closed?: Yes   Time Spent (min): 15

## 2023-01-18 NOTE — TELEPHONE ENCOUNTER
Second attempt made to contact patient to schedule 2023 annual pharmacist appointment to discuss medications for Diabetes Management Program.    Left message to call us back at [phone] Option 3. Mychart message sent.       Alka Graves, [facility] Specialist  Department, toll free: [phone] Option #3        ===================================================================    For Pharmacy Admin Tracking Only    Program: [address] place:  No  Gap Closed?: No   Time Spent (min): 10

## 2023-01-24 NOTE — PLAN OF CARE
Problem: Discharge Planning  Goal: Discharge to home or other facility with appropriate resources  9/14/2022 1336 by Aye Roland RN  Outcome: Progressing  Note: Trying to stabilize Hemoglobin. 7.7 this am. 2 units of blood given. Problem: Pain  Goal: Verbalizes/displays adequate comfort level or baseline comfort level  9/14/2022 1336 by Aye Roland RN  Outcome: Progressing  Flowsheets (Taken 9/14/2022 1336)  Verbalizes/displays adequate comfort level or baseline comfort level:   Encourage patient to monitor pain and request assistance   Assess pain using appropriate pain scale  Note: Ongoing assessment & interventions provided throughout shift. Reminded patient to report any pain, pressure, or shortness of breath to the nurse. Pain medications provided per physician's orders. Morphine available Q2 hours. Problem: Safety - Adult  Goal: Free from fall injury  9/14/2022 1336 by Aye Roland RN  Outcome: Progressing  Note: Bed locked & in low position, call light in reach, side-rails up x2, bed/chair alarm utilized, non-slip socks on when ambulating, reminded patient to use call light to call for assistance. Problem: Cardiovascular - Adult  Goal: Maintains optimal cardiac output and hemodynamic stability  Outcome: Progressing  Flowsheets (Taken 9/14/2022 1336)  Maintains optimal cardiac output and hemodynamic stability: Monitor blood pressure and heart rate  Note: Patient hypotensive. Albumin ordered. Care plan reviewed with patient. Patient verbalizes understanding of the care plan and contributed to goal setting. Elidel Counseling: Patient may experience a mild burning sensation during topical application. Elidel is not approved in children less than 2 years of age. There have been case reports of hematologic and skin malignancies in patients using topical calcineurin inhibitors although causality is questionable.

## 2023-01-30 PROCEDURE — 93297 REM INTERROG DEV EVAL ICPMS: CPT | Performed by: INTERNAL MEDICINE

## 2023-01-30 PROCEDURE — G2066 INTER DEVC REMOTE 30D: HCPCS | Performed by: INTERNAL MEDICINE

## 2023-01-31 ENCOUNTER — PROCEDURE VISIT (OUTPATIENT)
Dept: CARDIOLOGY CLINIC | Age: 48
End: 2023-01-31
Payer: COMMERCIAL

## 2023-01-31 DIAGNOSIS — I50.22 CHRONIC SYSTOLIC CONGESTIVE HEART FAILURE (HCC): Primary | ICD-10-CM

## 2023-01-31 NOTE — PROGRESS NOTES
Dr Michele Tellez pt     Medtronic carelink optivol remote   Battery   9.4 yrs remaining  Optivol off the chart / lm for pt to call this office to see if he has any s/s of chf  Pt called the office and denies any sob  or edema
Noted.
[Joint Stiffness] : joint stiffness

## 2023-02-02 ENCOUNTER — TELEPHONE (OUTPATIENT)
Dept: PHARMACY | Facility: CLINIC | Age: 48
End: 2023-02-02

## 2023-02-02 RX ORDER — POLYETHYLENE GLYCOL 3350 17 G/17G
17 POWDER, FOR SOLUTION ORAL DAILY PRN
COMMUNITY

## 2023-02-02 RX ORDER — GUAIFENESIN 400 MG/1
400 TABLET ORAL DAILY PRN
COMMUNITY

## 2023-02-02 NOTE — TELEPHONE ENCOUNTER
[facility] CLINICAL PHARMACY REVIEW - Be Well with Diabetes    El Chao is a 52 y.o. male enrolled in the [address] [place] [address],[address] Employee Diabetes Program. Patient provided Shirlene Hopkins with verbal consent to remain in the program for this year. Patient enrolled 2020    Insurance through the following employer: [address] [place] [address],[address]    Pt had gastric sleeve 9/12/22, f/w bariatrics - down 100 lbs.  Feeling much better    Medications:  Current Outpatient Medications   Medication Instructions    acetaminophen (TYLENOL) 650 mg, Oral, EVERY 8 HOURS PRN  - maybe twice weekly    ASPIRIN LOW DOSE 81 MG EC tablet TAKE 1 TABLET BY MOUTH ONE TIME A DAY    Blood Glucose Monitoring Suppl (PRODIGY AUTOCODE BLOOD GLUCOSE) MAUREEN Use to check blood sugar 4 times daily    blood glucose test strips (PRODIGY NO CODING BLOOD GLUC) strip USE TO CHECK BLOOD SUGAR FOUR TIMES A DAY    calcium carbonate 600 MG TABS tablet 2 tablets, Oral, DAILY    CPAP Machine MISC Does not apply, Ramp pressure: 4.0 cm H2O<BR>Treatment pressure: 15.0 cm H2O    Docusate Sodium 100 mg, Oral, 2 TIMES DAILY, Take as needed to prevent constipation  - helps with iron    Fexofenadine HCl (MUCINEX ALLERGY PO) 1 tablet, Oral, DAILY PRN    fluticasone (FLONASE) 50 MCG/ACT nasal spray 2 sprays, Nasal, DAILY    furosemide (LASIX) 40 MG tablet TAKE ONE TABLET BY MOUTH EVERY MORNING    gabapentin (NEURONTIN) 100 MG capsule TAKE 1 CAPSULE BY MOUTH 3 TIMES A DAY    insulin lispro, 1 Unit Dial, (HUMALOG KWIKPEN) 100 UNIT/ML SOPN ONLY SLIDING SCALE AS NEEDED--151-200 3 UNITS, 201-250 5 UNITS, 251-300 8 UNITS, 301-350 10 UNITS, 351-400 12 UNITS, OVER 400 15 UNITS AND CONTACT PHYSICIAN  - has not needed    Insulin Pen Needle (PEN NEEDLES 31GX5/16\") 31G X 8 MM MISC Use to inject insulins and Ozempic    loratadine (CLARITIN) 10 mg, Oral, DAILY    metoprolol succinate (TOPROL XL) 75 mg, Oral, DAILY    omeprazole (PRILOSEC) 40 mg, Oral, DAILY    Pediatric Multivitamins-Iron (FLINTSTONES PLUS IRON PO) 2 tablets, Oral, DAILY    Prodigy Twist Top Lancets 28G MISC USE TO TEST FOUR TIMES A DAY    Respiratory Therapy Supplies (ADULT MASK) MISC Please do mask desensitization and/or provide mask of patient's choice. rosuvastatin (CRESTOR) 10 MG tablet TAKE 1 TABLET BY MOUTH ONE TIME A DAY    vitamin B-12 (CYANOCOBALAMIN) 1,000 mcg, DAILY     Miralax once daily prn    Current Pharmacy: [facility]  Current testing supplies/frequency: Prodigy, Rx for testing up to 4 times daily - usually testing 2-3 times daily; have been drastically better; readings avg  (78 this morning) - feeling well  Pen needles/syringes: n/a - SSI insulin but not needing, confirmed has supplies    Allergies:   Allergies   Allergen Reactions    Levaquin [Levofloxacin In D5w] Itching      Vitals/Labs:  BP Readings from Last 3 Encounters:   12/28/22 114/72   12/15/22 136/80   10/25/22 118/76     Wt Readings from Last 3 Encounters:   12/28/22 (!) 317 lb (143.8 kg)   12/15/22 (!) 318 lb 3.2 oz (144.3 kg)   10/25/22 (!) 335 lb (152 kg)      BMI Readings from Last 3 Encounters:   12/28/22 42.99 kg/m²   12/15/22 40.58 kg/m²   10/25/22 42.72 kg/m²     Component      Latest Ref Rng & Units 10/22/2022 12/23/2021 3/23/2021           6:49 AM  6:09 AM  6:06 AM   Microalbumin, Random Urine      mg/dL < 1.20 < 1.20 < 1.20   Creatinine, Urine      mg/dL 141.8 222.2 206.4   Microalb/Creat Ratio      0 - 30 mg/g 8 5 6     Lab Results   Component Value Date    LABA1C 6.5 (H) 05/12/2022    LABA1C 7.0 (H) 03/05/2022    LABA1C 7.5 (H) 12/23/2021     No results found for: [id number]  Lab Results   Component Value Date    CHOL 102 05/12/2022    TRIG 187 05/12/2022    HDL 25 (L) 05/12/2022    LDLCALC 40 05/12/2022     ALT   Date Value Ref Range Status   12/10/2022 9 (L) 11 - 66 U/L Final     Comment:     Performed at [address] [place] [address], [address]     AST   Date Value Ref Range Status   12/10/2022 14 5 - 40 U/L Final     The ASCVD Risk score (Saima REBOLLEDO, et al., 2019) failed to calculate for the following reasons: The valid total cholesterol range is 130 to 320 mg/dL     Lab Results   Component Value Date    CREATININE 0.7 12/10/2022     Estimated Creatinine Clearance: 192 mL/min (based on SCr of 0.7 mg/dL). Lab Results   Component Value Date/Time    LABGLOM >60 12/10/2022 07:25 AM    LABGLOM >60 10/22/2022 06:47 AM    LABGLOM >90 09/21/2022 05:34 PM    LABGLOM >90 09/16/2022 05:30 AM       Immunizations:  Immunization History   Administered Date(s) Administered    COVID-19, MODERNA BLUE border, Primary or Immunocompromised, (age 12y+), IM, 100 mcg/0.5mL 03/06/2021, 04/03/2021    COVID-19, MODERNA Booster BLUE border, (age 18y+), IM, 50mcg/0.25mL 10/16/2021    COVID-19, PFIZER Bivalent BOOSTER, DO NOT Dilute, (age 12y+), IM, 30 mcg/0.3 mL 10/21/2022    Influenza Virus Vaccine 11/01/2019, 10/16/2021    Influenza, AFLURIA (age 1 yrs+), FLUZONE, (age 10 mo+), MDV, 0.5mL 10/17/2020    Influenza, FLUARIX, FLULAVAL, FLUZONE (age 10 mo+) AND AFLURIA, (age 1 y+), PF, 0.5mL 10/28/2016, 10/16/2021    Influenza, FLUCELVAX, (age 10 mo+), MDCK, PF, 0.5mL 09/22/2022    Pneumococcal Polysaccharide ([id number]) 08/17/2020    Tdap (Boostrix, Adacel) 08/17/2020      Social History:  Social History     Tobacco Use    Smoking status: Never    Smokeless tobacco: Never   Substance Use Topics    Alcohol use: Never     ASSESSMENT:  Initial Program Requirements (Y indicates has completed for the year, N indicates needs to be completed by 07/01/2023): No - Provider Visit for DM (1st)  No- A1c (1st)    Ongoing Program Requirements (Y indicates has completed for the year, N indicates needs to be completed by 12/31/2023):   No - Provider Visit for DM (2nd)  N/A - ACC/diabetes educator visit (if A1c over 8%)  No - A1c (2nd)  No - Lipid panel  No - Urine microalbumin  No - Pneumococcal vaccination:  may give PCV20 (or PCV15) at least 1 year after PPSV23; received PPSV23 in 2020  No - Influenza vaccination for Fall 2023  N/A - Medication adherence over 70% - not currently on antihyperglycemic medications; has Humalog SSI prn  Yes - On statin or contraindication(s)  rosuvastatin (not yet filled in 2023)  Override  - On ACEi/ARB or contraindication(s) Normal blood pressure, urinary albumin-to-creatinine ratio, and eGFR; irbesartan d/c after bariatric surgery    Formulary Medication Review:  Non-formulary or medications with cost-effective alternatives:  none identified. Current medications eligible for copay waiver, up to $600, through [address]:  - aspirin (with prescription), furosemide, Humalog, rosuvastatin  - Prodigy blood sugar testing supplies, generic insulin pen needles     Diabetes Care:   Glycemic Goal: <7.0%. Is at blood glucose goal. Type 2 DM, s/p bariatric surgery 9/12/22 and no longer on antihyperglycemic medications. Has Humalog Kwikpen SSI but has not needed  - BG: monitoring 2-3 times daily; have been under 100, feeling great  - Eye exam current (within one year):  he will call to schedule  - Foot exam current (within one year):  discussed  - Diet compliance: f/w bariatrics s/p surgery 9/2022    Other Considerations:  - Blood Pressure Goal: BP less than 130/80 mmHg due to history of DM: Is at blood pressure goal. No longer on ARB after bariatric surgery.   - Lipids:  last LDL 40 mg/dL on current statin regimen  - Smoking status: Never smoked    PLAN:  - Consideration(s) for provider:   May give [id number]  - DM program gaps identified:   Initial requirements: Provider Visit for DM (1st) and A1c (1st)   Ongoing requirements: Provider visit for DM (2nd), A1c (2nd), Lipid panel, Urine microalbumin, Pneumococcal vaccination: [id number], and Influenza vaccination for [id number]   - Education to patient: Discussed foot care, Reminded to get yearly retinal exam, Benefit/indication for pneumonia vaccine in patients with diabetes, and Reminder A1c and lipid panel can be completed for free at [facility] screenings   - Follow up: PCP for identified gaps or as scheduled below  - Upcoming appointments:   Future Appointments   Date Time Provider Link Liriano   2/2/2023 11:00 AM SCHEDULE, [facility] Rx None   3/16/2023 10:30 AM BART Falk [facility] WT [facility] [place]   4/24/2023  3:15 PM Frieda Ra, Dennie Rumps [facility] [place]   8/10/2023 11:30 AM SCHEDULE, [facility] PACER NURSE N [facility] PACER P - [place]   9/11/2023 11:45 AM Ramiro Gupta MD N [facility] - [place], PharmD, [facility]  Department, toll free: [phone], option 3     For Pharmacy Admin Tracking Only    Program: [address] in place:  No  Gap Closed?: Yes   Time Spent (min): 30

## 2023-02-22 RX ORDER — OMEPRAZOLE 40 MG/1
CAPSULE, DELAYED RELEASE ORAL
Qty: 90 CAPSULE | Refills: 0 | OUTPATIENT
Start: 2023-02-22

## 2023-03-06 ENCOUNTER — PROCEDURE VISIT (OUTPATIENT)
Dept: CARDIOLOGY CLINIC | Age: 48
End: 2023-03-06
Payer: COMMERCIAL

## 2023-03-06 DIAGNOSIS — Z95.810 AICD (AUTOMATIC CARDIOVERTER/DEFIBRILLATOR) PRESENT: Primary | ICD-10-CM

## 2023-03-06 PROCEDURE — 93295 DEV INTERROG REMOTE 1/2/MLT: CPT | Performed by: INTERNAL MEDICINE

## 2023-03-06 PROCEDURE — 93296 REM INTERROG EVL PM/IDS: CPT | Performed by: INTERNAL MEDICINE

## 2023-03-06 NOTE — PROGRESS NOTES
Call from wife, [facility], aware of elevated optivol. I think this is going to be chronic for him. No lung issues noted, but this started after gastric bypass. Meds have changed, taking less diuretic.   Will watch for further symptoms and report to office but he looks to be chronically/asymtomactically elevated

## 2023-03-06 NOTE — PROGRESS NOTES
Carelink medtronic dual ICD     . Nicolasa Arana Battery longevity:  9.3 years   Presenting rhythm  AS VS   Optivol off the chart pt states we have called every month since gastic bypass, chronically elevated. Aware to call office if he does start to have s/s.   States he feels great, has lost 110#    Atrial impedance 456  RV impedance 361  Shock 46  SVC 55    P wave sensing 1.9  R wave sensing 3.8    0 % atrial paced  0 % RV paced     Atrial threshold 1 V  at 0.4ms  RV threshold 1.25 V at 0.4ms  Mode switches   0

## 2023-03-10 RX ORDER — OMEPRAZOLE 40 MG/1
40 CAPSULE, DELAYED RELEASE ORAL DAILY
Qty: 90 CAPSULE | Refills: 1 | OUTPATIENT
Start: 2023-03-10

## 2023-03-11 ENCOUNTER — NURSE ONLY (OUTPATIENT)
Dept: LAB | Age: 48
End: 2023-03-11

## 2023-03-11 DIAGNOSIS — E61.1 LOW IRON: ICD-10-CM

## 2023-03-11 DIAGNOSIS — Z79.4 TYPE 2 DIABETES MELLITUS WITHOUT COMPLICATION, WITH LONG-TERM CURRENT USE OF INSULIN (HCC): ICD-10-CM

## 2023-03-11 DIAGNOSIS — E11.9 TYPE 2 DIABETES MELLITUS WITHOUT COMPLICATION, WITH LONG-TERM CURRENT USE OF INSULIN (HCC): ICD-10-CM

## 2023-03-11 DIAGNOSIS — K91.2 POSTSURGICAL MALABSORPTION: ICD-10-CM

## 2023-03-11 DIAGNOSIS — Z98.84 S/P GASTRIC BYPASS: ICD-10-CM

## 2023-03-11 DIAGNOSIS — Z13.21 SCREENING FOR MALNUTRITION: ICD-10-CM

## 2023-03-11 LAB
25(OH)D3 SERPL-MCNC: 29 NG/ML (ref 30–100)
ALBUMIN SERPL BCG-MCNC: 4.2 G/DL (ref 3.5–5.1)
ALP SERPL-CCNC: 90 U/L (ref 38–126)
ALT SERPL W/O P-5'-P-CCNC: 13 U/L (ref 11–66)
ANION GAP SERPL CALC-SCNC: 8 MEQ/L (ref 8–16)
AST SERPL-CCNC: 16 U/L (ref 5–40)
BASOPHILS ABSOLUTE: 0 THOU/MM3 (ref 0–0.1)
BASOPHILS NFR BLD AUTO: 0.5 %
BILIRUB SERPL-MCNC: 0.6 MG/DL (ref 0.3–1.2)
BUN SERPL-MCNC: 8 MG/DL (ref 7–22)
CALCIUM SERPL-MCNC: 9.4 MG/DL (ref 8.5–10.5)
CHLORIDE SERPL-SCNC: 105 MEQ/L (ref 98–111)
CO2 SERPL-SCNC: 28 MEQ/L (ref 23–33)
CREAT SERPL-MCNC: 0.7 MG/DL (ref 0.4–1.2)
DEPRECATED MEAN GLUCOSE BLD GHB EST-ACNC: 93 MG/DL (ref 70–126)
DEPRECATED RDW RBC AUTO: 57.2 FL (ref 35–45)
EOSINOPHIL NFR BLD AUTO: 4.9 %
EOSINOPHILS ABSOLUTE: 0.3 THOU/MM3 (ref 0–0.4)
ERYTHROCYTE [DISTWIDTH] IN BLOOD BY AUTOMATED COUNT: 18.9 % (ref 11.5–14.5)
FERRITIN SERPL IA-MCNC: 210 NG/ML (ref 22–322)
FOLATE SERPL-MCNC: 8.2 NG/ML (ref 4.8–24.2)
GFR SERPL CREATININE-BSD FRML MDRD: > 60 ML/MIN/1.73M2
GLUCOSE SERPL-MCNC: 85 MG/DL (ref 70–108)
HBA1C MFR BLD HPLC: 5.1 % (ref 4.4–6.4)
HCT VFR BLD AUTO: 45 % (ref 42–52)
HGB BLD-MCNC: 13.7 GM/DL (ref 14–18)
IMM GRANULOCYTES # BLD AUTO: 0.02 THOU/MM3 (ref 0–0.07)
IMM GRANULOCYTES NFR BLD AUTO: 0.3 %
IRON SATN MFR SERPL: 28 % (ref 20–50)
IRON SERPL-MCNC: 78 UG/DL (ref 65–195)
LYMPHOCYTES ABSOLUTE: 1.6 THOU/MM3 (ref 1–4.8)
LYMPHOCYTES NFR BLD AUTO: 24.4 %
MCH RBC QN AUTO: 25.9 PG (ref 26–33)
MCHC RBC AUTO-ENTMCNC: 30.4 GM/DL (ref 32.2–35.5)
MCV RBC AUTO: 85.2 FL (ref 80–94)
MONOCYTES ABSOLUTE: 0.4 THOU/MM3 (ref 0.4–1.3)
MONOCYTES NFR BLD AUTO: 6.1 %
NEUTROPHILS NFR BLD AUTO: 63.8 %
NRBC BLD AUTO-RTO: 0 /100 WBC
PLATELET # BLD AUTO: 177 THOU/MM3 (ref 130–400)
PMV BLD AUTO: 10.8 FL (ref 9.4–12.4)
POTASSIUM SERPL-SCNC: 3.9 MEQ/L (ref 3.5–5.2)
PREALB SERPL-MCNC: 18.5 MG/DL (ref 20–40)
PROT SERPL-MCNC: 7.2 G/DL (ref 6.1–8)
PTH-INTACT SERPL-MCNC: 36.2 PG/ML (ref 15–65)
RBC # BLD AUTO: 5.28 MILL/MM3 (ref 4.7–6.1)
SEGMENTED NEUTROPHILS ABSOLUTE COUNT: 4.1 THOU/MM3 (ref 1.8–7.7)
SODIUM SERPL-SCNC: 141 MEQ/L (ref 135–145)
TIBC SERPL-MCNC: 276 UG/DL (ref 171–450)
TSH SERPL DL<=0.005 MIU/L-ACNC: 1.21 UIU/ML (ref 0.4–4.2)
VIT B12 SERPL-MCNC: 653 PG/ML (ref 211–911)
WBC # BLD AUTO: 6.4 THOU/MM3 (ref 4.8–10.8)

## 2023-03-13 RX ORDER — OMEPRAZOLE 40 MG/1
CAPSULE, DELAYED RELEASE ORAL
Qty: 90 CAPSULE | Refills: 3 | Status: SHIPPED | OUTPATIENT
Start: 2023-03-13

## 2023-03-13 RX ORDER — OMEPRAZOLE 40 MG/1
40 CAPSULE, DELAYED RELEASE ORAL DAILY
Qty: 90 CAPSULE | Refills: 1 | OUTPATIENT
Start: 2023-03-13

## 2023-03-13 RX ORDER — OMEPRAZOLE 40 MG/1
CAPSULE, DELAYED RELEASE ORAL
Qty: 90 CAPSULE | Refills: 0 | Status: SHIPPED | OUTPATIENT
Start: 2023-03-13 | End: 2023-03-13 | Stop reason: SDUPTHER

## 2023-03-16 ENCOUNTER — OFFICE VISIT (OUTPATIENT)
Dept: BARIATRICS/WEIGHT MGMT | Age: 48
End: 2023-03-16

## 2023-03-16 VITALS
WEIGHT: 293.6 LBS | HEART RATE: 72 BPM | DIASTOLIC BLOOD PRESSURE: 84 MMHG | BODY MASS INDEX: 37.68 KG/M2 | SYSTOLIC BLOOD PRESSURE: 124 MMHG | TEMPERATURE: 97.9 F | HEIGHT: 74 IN

## 2023-03-16 DIAGNOSIS — Z99.89 OSA ON CPAP: ICD-10-CM

## 2023-03-16 DIAGNOSIS — K91.2 POSTSURGICAL MALABSORPTION: ICD-10-CM

## 2023-03-16 DIAGNOSIS — E78.00 HYPERCHOLESTEREMIA: ICD-10-CM

## 2023-03-16 DIAGNOSIS — M54.50 CHRONIC BILATERAL LOW BACK PAIN, UNSPECIFIED WHETHER SCIATICA PRESENT: ICD-10-CM

## 2023-03-16 DIAGNOSIS — Z98.84 S/P GASTRIC BYPASS: Primary | ICD-10-CM

## 2023-03-16 DIAGNOSIS — Z13.21 SCREENING FOR MALNUTRITION: ICD-10-CM

## 2023-03-16 DIAGNOSIS — Z95.810 AICD (AUTOMATIC CARDIOVERTER/DEFIBRILLATOR) PRESENT: ICD-10-CM

## 2023-03-16 DIAGNOSIS — I10 HYPERTENSION, UNSPECIFIED TYPE: ICD-10-CM

## 2023-03-16 DIAGNOSIS — I50.9 CHRONIC CONGESTIVE HEART FAILURE, UNSPECIFIED HEART FAILURE TYPE (HCC): ICD-10-CM

## 2023-03-16 DIAGNOSIS — Z79.4 TYPE 2 DIABETES MELLITUS WITHOUT COMPLICATION, WITH LONG-TERM CURRENT USE OF INSULIN (HCC): ICD-10-CM

## 2023-03-16 DIAGNOSIS — G47.33 OSA ON CPAP: ICD-10-CM

## 2023-03-16 DIAGNOSIS — K21.9 GASTROESOPHAGEAL REFLUX DISEASE, UNSPECIFIED WHETHER ESOPHAGITIS PRESENT: ICD-10-CM

## 2023-03-16 DIAGNOSIS — E11.9 TYPE 2 DIABETES MELLITUS WITHOUT COMPLICATION, WITH LONG-TERM CURRENT USE OF INSULIN (HCC): ICD-10-CM

## 2023-03-16 DIAGNOSIS — G89.29 CHRONIC BILATERAL LOW BACK PAIN, UNSPECIFIED WHETHER SCIATICA PRESENT: ICD-10-CM

## 2023-03-16 NOTE — PROGRESS NOTES
[facility] [employer] [facility] [employer]  [address], [address]  [place] Eduardo Ponce      Visit Date:  3/16/2023  Weight Management Postop Follow-up    HPI:      Daxa Acosta is a 52 y.o. male who is here today for 6 month follow up since RYGB performed by Dr. Russell Zhou  on 9/12/22. Reports that he is doing well. Feeling great. Staying on track with nutrition and dedicated physical activity. Weight today 293#. Down 25# since last visit. Down 89# since surgery. BMI 37 . Drinking 70 oz of fluid and 80-90 grams of protein daily. Drinking 2-3 protein shakes daily. Eating 2 meals daily and 1 snack. No pop. No sweets. Tolerating post-op diet. No problems with bowel movements. No nausea. No emesis. GERD well controlled with Prilosec. Denies CP/SOB. No Dizziness. No abdominal pain. Taking and tolerating all vitamins- [place] with Iron 3 daily. Taking stool softener Eddye Duck daily and having BM every other day. 6 month labs reviewed. Vit A and B1 pending. A1C 5.1- off insulin. Iron levels now within normal range. Continues following ICD/CHF with Dr. Akila Ford. Continues lasix 40mg. No swelling. No shortness of breath. Wearing CPAP adjusting pressure as needed. Physical Activity: [employer] 3 times per week. Current BMI: Body mass index is 37.44 kg/m². Current Weight:   Wt Readings from Last 3 Encounters:   03/16/23 293 lb 9.6 oz (133.2 kg)   12/28/22 (!) 317 lb (143.8 kg)   12/15/22 (!) 318 lb 3.2 oz (144.3 kg)     Initial Body weight: 400  Pre-op Body Weight:382  Target weight: 196  EBW: 186  % of EBW lost: 48%      Past Medical History:  Past Medical History:   Diagnosis Date    CHF (congestive heart failure) (HCC)     Constipation     Diabetes mellitus (HCC)     GERD (gastroesophageal reflux disease)     Hypercholesteremia     S/P ICD (internal cardiac defibrillator) procedure: 1/15/2015: Medtronic Single Chamber 1/15/2015    1/15/2015: Medtronic Single Chamber.  Dr. Laron Schumacher Sleep apnea 06/09/2017    Dr.Rovner Duron lifevest applied 8/22/14 8/29/2014    returned prior to ICD insertion 1/15       Past Surgical History:  Past Surgical History:   Procedure Laterality Date    CARDIAC CATHETERIZATION  9/2014    [facility]    CARDIAC DEFIBRILLATOR PLACEMENT  2014    CARDIAC DEFIBRILLATOR PLACEMENT      CARDIAC DEFIBRILLATOR PLACEMENT      EGD  2022    Dr Ha Brionesix     HÉCTOR-EN-Y GASTRIC BYPASS N/A 9/12/2022    Robotic Héctor-en-Y Gastric Bypass performed by Ryan Bates MD at [place] [address] 6/27/2022    EGD BIOPSY performed by Rosalina Loving MD at [facility]    VASECTOMY  2004       Past Social History:  Social History     Socioeconomic History    Marital status:      Spouse name: Not on file    Number of children: 1    Years of education: Not on file    Highest education level: Not on file   Occupational History     Employer: [employer]   Tobacco Use    Smoking status: Never    Smokeless tobacco: Never   Vaping Use    Vaping Use: Never used   Substance and Sexual Activity    Alcohol use: Never    Drug use: No    Sexual activity: Yes     Partners: Female   Other Topics Concern    Not on file   Social History Narrative    Not on file     Social Determinants of Health     Financial Resource Strain: Low Risk     Difficulty of Paying Living Expenses: Not hard at all   Food Insecurity: No Food Insecurity    Worried About Running Out of Food in the Last Year: Never true    Ran Out of Food in the Last Year: Never true   Transportation Needs: Not on file   Physical Activity: Not on file   Stress: Not on file   Social Connections: Not on file   Intimate Partner Violence: Not on file   Housing Stability: Not on file        Medications:   Current Outpatient Medications   Medication Sig Dispense Refill    omeprazole (PRILOSEC) 40 MG delayed release capsule TAKE 1 CAPSULE BY MOUTH ONE TIME A DAY 90 capsule 3    guaiFENesin 400 MG tablet Take 400 mg by mouth daily as needed (congestion/allergies)      polyethylene glycol (GLYCOLAX) 17 GM/SCOOP powder Take 17 g by mouth daily as needed (constipation)      ASPIRIN LOW DOSE 81 MG EC tablet TAKE 1 TABLET BY MOUTH ONE TIME A DAY 90 tablet 1    gabapentin (NEURONTIN) 100 MG capsule TAKE 1 CAPSULE BY MOUTH 3 TIMES A  capsule 1    metoprolol succinate (TOPROL XL) 25 MG extended release tablet Take 3 tablets by mouth daily 270 tablet 1    calcium carbonate (OS-GLENIS) 1250 (500 Ca) MG chewable tablet Take 1 chew by mouth every day      insulin lispro, 1 Unit Dial, (HUMALOG KWIKPEN) 100 UNIT/ML SOPN ONLY SLIDING SCALE AS NEEDED--151-200 3 UNITS, 201-250 5 UNITS, 251-300 8 UNITS, 301-350 10 UNITS, 351-400 12 UNITS, OVER 400 15 UNITS AND CONTACT PHYSICIAN 135 mL 3    Pediatric Multivitamins-Iron (FLINTSTONES PLUS IRON PO) Take by mouth 2 tablets in the morning and 1 tablet in the evening      furosemide (LASIX) 40 MG tablet TAKE ONE TABLET BY MOUTH EVERY MORNING 90 tablet 3    rosuvastatin (CRESTOR) 10 MG tablet TAKE 1 TABLET BY MOUTH ONE TIME A DAY 90 tablet 3    blood glucose test strips (PRODIGY NO CODING BLOOD GLUC) strip USE TO CHECK BLOOD SUGAR FOUR TIMES A  strip 3    Prodigy Twist Top Lancets 28G MISC USE TO TEST FOUR TIMES A  each 3    CPAP Machine MISC by Does not apply route Ramp pressure: 4.0 cm H2O  Treatment pressure: 15.0 cm H2O 1 each 0    Blood Glucose Monitoring Suppl (PRODIGY AUTOCODE BLOOD GLUCOSE) MAUREEN Use to check blood sugar 4 times daily 1 Device 0    Insulin Pen Needle (PEN NEEDLES 31GX5/16\") 31G X 8 MM MISC Use to inject insulins and Ozempic 400 each 3    fluticasone (FLONASE) 50 MCG/ACT nasal spray 2 sprays by Nasal route daily 1 Bottle 2    loratadine (CLARITIN) 10 MG tablet Take 10 mg by mouth daily      Respiratory Therapy Supplies (ADULT MASK) MISC Please do mask desensitization and/or provide mask of patient's choice. 1 each 0    acetaminophen (TYLENOL)  325 MG tablet Take 650 mg by mouth every 8 hours as needed for Pain      Docusate Sodium 100 MG TABS Take 100 mg by mouth 2 times daily Take as needed to prevent constipation (Patient not taking: Reported on 3/16/2023) 30 tablet 1     No current facility-administered medications for this visit. Allergies: Allergies   Allergen Reactions    Levaquin [Levofloxacin In D5w] Itching       Subjective:    Review of Systems:  Constitutional: Denies any fever, chills, fatigue. Wound: Denies any rash, skin color changes or wound problems. Resp: Denies any cough, shortness of breath. CV: Denies any chest pain, orthopnea or syncope. MS: Denies myalgias, arthralgias. GI: Denies any nausea, vomiting, diarrhea, (+) constipation, melena, hematochezia. No incisional discomfort. : Denies any hematuria, hesitancy or dysuria. NEURO: Denies seizures, headache. Objective:    /84 (Site: Right Upper Arm, Position: Sitting, Cuff Size: Large Adult)   Pulse 72   Temp 97.9 °F (36.6 °C) (Infrared)   Ht 6' 2.25\" (1.886 m)   Wt 293 lb 9.6 oz (133.2 kg)   BMI 37.44 kg/m²     Physical Examination:   Constitutional: Alert and oriented to person, place and time. Well-developed, well- nourished. Head: Normocephalic and atraumatic  Neck: Supple. Eyes: EOMI b/l. Conjunctivae normal.  No scleral icterus. Respiratory: Effort normal. No respiratory distress. Abd: Benign  Ext:  Movement x 4. No edema  Skin; Warm and dry, no visible rashes, lesions or ulcers.    Neuro: Cranial Nerves Grossly Intact; nml coordination      Labs:  CBC   Lab Results   Component Value Date/Time    WBC 6.4 03/11/2023 07:54 AM    RBC 5.28 03/11/2023 07:54 AM    HGB 13.7 03/11/2023 07:54 AM    HCT 45.0 03/11/2023 07:54 AM    MCV 85.2 03/11/2023 07:54 AM    MCH 25.9 03/11/2023 07:54 AM    MCHC 30.4 03/11/2023 07:54 AM    RDW 15.7 03/15/2017 06:30 PM     03/11/2023 07:54 AM    MPV 10.8 03/11/2023 07:54 AM    RBCMORP NORMAL 03/15/2017 06:30 PM    SEGSPCT 63.8 03/11/2023 07:54 AM    LABLYMP 24.4 03/11/2023 07:54 AM    MONOPCT 6.1 03/11/2023 07:54 AM    LABEOS 4.9 03/11/2023 07:54 AM    BASO 0.5 03/11/2023 07:54 AM    NRBC 0 03/11/2023 07:54 AM    ANISOCYTOSIS 1+ 03/15/2017 06:30 PM    SEGSABS 4.1 03/11/2023 07:54 AM    LYMPHSABS 1.6 03/11/2023 07:54 AM    MONOSABS 0.4 03/11/2023 07:54 AM    EOSABS 0.3 03/11/2023 07:54 AM    BASOSABS 0.0 03/11/2023 07:54 AM        BMP/CMP   Lab Results   Component Value Date/Time    GLUCOSE 85 03/11/2023 07:53 AM    GLUCOSE 258 12/16/2017 08:25 AM    CREATININE 0.7 03/11/2023 07:53 AM    BUN 8 03/11/2023 07:53 AM     03/11/2023 07:53 AM    K 3.9 03/11/2023 07:53 AM    K 4.6 09/13/2022 05:48 AM     03/11/2023 07:53 AM    CO2 28 03/11/2023 07:53 AM    CALCIUM 9.4 03/11/2023 07:53 AM    AST 16 03/11/2023 07:53 AM    ALKPHOS 90 03/11/2023 07:53 AM    PROT 7.2 03/11/2023 07:53 AM    LABALBU 4.2 03/11/2023 07:53 AM    BILITOT 0.6 03/11/2023 07:53 AM    ALT 13 03/11/2023 07:53 AM        PREALBUMIN   Lab Results   Component Value Date/Time    PREALBUMIN 18.5 03/11/2023 07:53 AM        VITAMIN B12   Lab Results   Component Value Date/Time    NYIDRQAF19 653 03/11/2023 07:53 AM        24 HOUR URINE CALCIUM   No results found for: URVOLMEAS, HOURSC, CALCIUMUR     VITAMIN D   Lab Results   Component Value Date/Time    VITD25 29 03/11/2023 07:53 AM        VITAMIN B1/ THIAMINE   Lab Results   Component Value Date/Time    HRLG0FTQWMA 113 12/10/2022 07:25 AM        RBC FOLATE   Lab Results   Component Value Date/Time    FOLATE 8.2 03/11/2023 07:53 AM        LIPID SCREEN (FASTING)   Lab Results   Component Value Date/Time    CHOL 102 05/12/2022 10:30 AM    TRIG 187 05/12/2022 10:30 AM    HDL 25 05/12/2022 10:30 AM    LDLCALC 40 05/12/2022 10:30 AM   ,     HGA1C (T2DM ONLY)   Lab Results   Component Value Date/Time    LABA1C 5.1 03/11/2023 07:53 AM    AVGG 93 03/11/2023 07:53 AM        TSH   Lab Results   Component Value  Date/Time    TSH 1.210 03/11/2023 07:53 AM        IRON   Lab Results   Component Value Date/Time    IRON 78 03/11/2023 07:53 AM        IONIZED CALCIUM   Lab Results   Component Value Date/Time    CAION 1.14 11/13/2020 10:08 PM         Assessment:       Diagnosis Orders   1. S/P gastric bypass  CBC with Auto Differential    Comprehensive Metabolic Panel    Prealbumin    Iron    Iron Binding Capacity    Ferritin      2. BMI 45.0-49.9, adult (HCC)  CBC with Auto Differential    Comprehensive Metabolic Panel    Prealbumin    Iron    Iron Binding Capacity    Ferritin      3. Postsurgical malabsorption  CBC with Auto Differential    Comprehensive Metabolic Panel    Prealbumin    Iron    Iron Binding Capacity    Ferritin      4. Screening for malnutrition  CBC with Auto Differential    Comprehensive Metabolic Panel    Prealbumin    Iron    Iron Binding Capacity    Ferritin      5. Chronic congestive heart failure, unspecified heart failure type ([facility] [address]        6. Chronic bilateral low back pain, unspecified whether sciatica present        7. Type 2 diabetes mellitus without complication, with long-term current use of insulin ([facility] [address]        8. AICD (automatic cardioverter/defibrillator) present        9. Hypertension, unspecified type        10. Paroxysmal atrial fibrillation (HCC)        11. MIKE on CPAP        12. Hypercholesteremia        13. Gastroesophageal reflux disease, unspecified whether esophagitis present          Plan:    Stay well hydrated. Drink a minimum of 64 oz of non-carbonated, non-caffeinated fluids daily. Nutritional education occurred during visit. Tolerating diet. Continue following with dietitian and follow their recommendations as directed. Continue  60-80 grams of protein each day. Signs and symptoms reviewed with patient that would be concerning and need her to return to office for re-evaluation. Patient will call if any questions or concerns arrise.   Importance of physical activity discussed with patient. Continue taking [place] with iron 2 in the morning and one every other evening and Calcium . Will repeat Iron levels in 3 months. Continue Omeprazole- rx through PCP  Encouraged to attend support groups  6 month labs reviewed with patient today- B1 /vit A pending  SECA scale completed and reviewed with patient today  Measurements completed and reviewed with patient today  Return in about 3 months (around 6/16/2023) for postop follow up. I spent over 20 minutes with the patient, with greater that 50% of that time spent on face counseling for nutrition and exercise.     Electronically signed by BART Miguel on 3/16/2023 at 10:54 AM

## 2023-03-16 NOTE — PATIENT INSTRUCTIONS
Stay well hydrated. Drink a minimum of 64 oz of non-carbonated, non-caffeinated fluids daily. Nutritional education occurred during visit. Tolerating diet. Continue following with dietitian and follow their recommendations as directed. Continue  60-80 grams of protein each day. Signs and symptoms reviewed with patient that would be concerning and need her to return to office for re-evaluation. Patient will call if any questions or concerns arrise. Importance of physical activity discussed with patient. Continue taking [place] with iron 2 in the morning and one every other evening and Calcium . Will repeat Iron levels in 3 months.    Continue Omeprazole- rx through PCP  Encouraged to attend support groups  6 month labs reviewed with patient today- B1 /vit A pending  SECA scale completed and reviewed with patient today  Measurements completed and reviewed with patient today

## 2023-03-28 ENCOUNTER — OFFICE VISIT (OUTPATIENT)
Dept: FAMILY MEDICINE CLINIC | Age: 48
End: 2023-03-28
Payer: COMMERCIAL

## 2023-03-28 VITALS
RESPIRATION RATE: 16 BRPM | SYSTOLIC BLOOD PRESSURE: 130 MMHG | OXYGEN SATURATION: 96 % | DIASTOLIC BLOOD PRESSURE: 80 MMHG | TEMPERATURE: 98.5 F | WEIGHT: 280 LBS | HEART RATE: 84 BPM | BODY MASS INDEX: 35.71 KG/M2

## 2023-03-28 DIAGNOSIS — R05.1 ACUTE COUGH: ICD-10-CM

## 2023-03-28 DIAGNOSIS — J01.90 ACUTE SINUSITIS, RECURRENCE NOT SPECIFIED, UNSPECIFIED LOCATION: Primary | ICD-10-CM

## 2023-03-28 DIAGNOSIS — E11.65 TYPE 2 DIABETES MELLITUS WITH HYPERGLYCEMIA, WITHOUT LONG-TERM CURRENT USE OF INSULIN (HCC): ICD-10-CM

## 2023-03-28 PROCEDURE — 3078F DIAST BP <80 MM HG: CPT | Performed by: FAMILY MEDICINE

## 2023-03-28 PROCEDURE — 3074F SYST BP LT 130 MM HG: CPT | Performed by: FAMILY MEDICINE

## 2023-03-28 PROCEDURE — 99213 OFFICE O/P EST LOW 20 MIN: CPT | Performed by: FAMILY MEDICINE

## 2023-03-28 PROCEDURE — 3044F HG A1C LEVEL LT 7.0%: CPT | Performed by: FAMILY MEDICINE

## 2023-03-28 RX ORDER — BENZONATATE 200 MG/1
200 CAPSULE ORAL 3 TIMES DAILY PRN
Qty: 30 CAPSULE | Refills: 0 | Status: SHIPPED | OUTPATIENT
Start: 2023-03-28 | End: 2023-04-04

## 2023-03-28 RX ORDER — DOXYCYCLINE HYCLATE 100 MG
100 TABLET ORAL 2 TIMES DAILY
Qty: 20 TABLET | Refills: 0 | Status: SHIPPED | OUTPATIENT
Start: 2023-03-28 | End: 2023-04-07

## 2023-03-28 SDOH — ECONOMIC STABILITY: FOOD INSECURITY: WITHIN THE PAST 12 MONTHS, YOU WORRIED THAT YOUR FOOD WOULD RUN OUT BEFORE YOU GOT MONEY TO BUY MORE.: NEVER TRUE

## 2023-03-28 SDOH — ECONOMIC STABILITY: HOUSING INSECURITY
IN THE LAST 12 MONTHS, WAS THERE A TIME WHEN YOU DID NOT HAVE A STEADY PLACE TO SLEEP OR SLEPT IN A SHELTER (INCLUDING NOW)?: NO

## 2023-03-28 SDOH — ECONOMIC STABILITY: FOOD INSECURITY: WITHIN THE PAST 12 MONTHS, THE FOOD YOU BOUGHT JUST DIDN'T LAST AND YOU DIDN'T HAVE MONEY TO GET MORE.: NEVER TRUE

## 2023-03-28 SDOH — ECONOMIC STABILITY: INCOME INSECURITY: HOW HARD IS IT FOR YOU TO PAY FOR THE VERY BASICS LIKE FOOD, HOUSING, MEDICAL CARE, AND HEATING?: NOT HARD AT ALL

## 2023-03-28 ASSESSMENT — PATIENT HEALTH QUESTIONNAIRE - PHQ9
SUM OF ALL RESPONSES TO PHQ QUESTIONS 1-9: 0
SUM OF ALL RESPONSES TO PHQ9 QUESTIONS 1 & 2: 0
2. FEELING DOWN, DEPRESSED OR HOPELESS: 0
SUM OF ALL RESPONSES TO PHQ QUESTIONS 1-9: 0
SUM OF ALL RESPONSES TO PHQ QUESTIONS 1-9: 0
1. LITTLE INTEREST OR PLEASURE IN DOING THINGS: 0
SUM OF ALL RESPONSES TO PHQ QUESTIONS 1-9: 0

## 2023-04-02 ASSESSMENT — ENCOUNTER SYMPTOMS
COUGH: 1
ABDOMINAL PAIN: 0
EYE PAIN: 0
ABDOMINAL DISTENTION: 0
SINUS PAIN: 1
NAUSEA: 0
RHINORRHEA: 0
SINUS PRESSURE: 1
DIARRHEA: 0
CONSTIPATION: 0
SORE THROAT: 0
SHORTNESS OF BREATH: 0

## 2023-04-02 NOTE — PROGRESS NOTES
[address] [place] [facility] [address]  Dept: [phone]  Dept Fax: [phone]  Loc: [phone]  PROGRESS NOTE      VisitDate: 3/28/2023    Edmar Field is a 52 y.o. male who presents today for:     Chief Complaint   Patient presents with    Sinus Problem     PND causing cough, no other sick symptoms or fever, taking mucinex and dayquil at home, tessalon has worked well in the past         Subjective:  HPI  Patient with a history of diabetes status post gastric bypass comes in because of sinus pain pressure cough and posterior nasal drainage present for weeks but not improving. No relief with over-the-counter medications    Review of Systems   Constitutional:  Negative for appetite change and fever. HENT:  Positive for congestion, postnasal drip, sinus pressure and sinus pain. Negative for ear pain, rhinorrhea and sore throat. Eyes:  Negative for pain and visual disturbance. Respiratory:  Positive for cough. Negative for shortness of breath. Cardiovascular:  Negative for chest pain. Gastrointestinal:  Negative for abdominal distention, abdominal pain, constipation, diarrhea and nausea. Genitourinary:  Negative for dysuria, frequency and urgency. Musculoskeletal:  Negative for arthralgias. Skin:  Negative for rash. Neurological:  Negative for dizziness. Past Medical History:   Diagnosis Date    CHF (congestive heart failure) ([facility])     Constipation     Diabetes mellitus ([facility] [address]     GERD (gastroesophageal reflux disease)     Hypercholesteremia     S/P ICD (internal cardiac defibrillator) procedure: 1/15/2015: Medtronic Single Chamber 1/15/2015    1/15/2015: Medtronic Single Chamber.  Dr. Nicole Espitia    Sleep apnea 06/09/2017    Aida Cuellar applied 8/22/14 8/29/2014    returned prior to ICD insertion 1/15      Past Surgical History:   Procedure Laterality Date    CARDIAC CATHETERIZATION  9/2014    [facility]

## 2023-05-04 RX ORDER — GABAPENTIN 100 MG/1
CAPSULE ORAL
Qty: 270 CAPSULE | Refills: 1 | Status: SHIPPED | OUTPATIENT
Start: 2023-05-04 | End: 2023-10-31

## 2023-05-17 RX ORDER — METOPROLOL SUCCINATE 25 MG/1
75 TABLET, EXTENDED RELEASE ORAL DAILY
Qty: 270 TABLET | Refills: 1 | Status: SHIPPED | OUTPATIENT
Start: 2023-05-17

## 2023-05-24 DIAGNOSIS — I10 PRIMARY HYPERTENSION: ICD-10-CM

## 2023-05-24 DIAGNOSIS — Z98.84 S/P BARIATRIC SURGERY: Primary | ICD-10-CM

## 2023-05-24 DIAGNOSIS — K91.2 POSTOPERATIVE INTESTINAL MALABSORPTION: ICD-10-CM

## 2023-06-08 LAB
CHOLEST SERPL-MCNC: 144 MG/DL (ref 0–199)
FASTING: YES
FASTING: YES
GLUCOSE SERPL-MCNC: 112 MG/DL (ref 74–109)
HBA1C MFR BLD HPLC: 5.6 % (ref 4.4–6.4)
HDLC SERPL-MCNC: 38 MG/DL (ref 40–90)
LDLC SERPL CALC-MCNC: 84 MG/DL
TRIGL SERPL-MCNC: 110 MG/DL (ref 0–199)

## 2023-06-19 ENCOUNTER — PROCEDURE VISIT (OUTPATIENT)
Dept: CARDIOLOGY CLINIC | Age: 48
End: 2023-06-19

## 2023-06-19 DIAGNOSIS — Z95.810 AICD (AUTOMATIC CARDIOVERTER/DEFIBRILLATOR) PRESENT: Primary | ICD-10-CM

## 2023-07-06 RX ORDER — ROSUVASTATIN CALCIUM 10 MG/1
TABLET, COATED ORAL
Qty: 90 TABLET | Refills: 2 | Status: SHIPPED | OUTPATIENT
Start: 2023-07-06

## 2023-09-05 ENCOUNTER — NURSE ONLY (OUTPATIENT)
Dept: LAB | Age: 48
End: 2023-09-05

## 2023-09-05 DIAGNOSIS — K91.2 POSTOPERATIVE INTESTINAL MALABSORPTION: ICD-10-CM

## 2023-09-05 DIAGNOSIS — Z98.84 S/P BARIATRIC SURGERY: ICD-10-CM

## 2023-09-05 DIAGNOSIS — I10 PRIMARY HYPERTENSION: ICD-10-CM

## 2023-09-05 LAB
25(OH)D3 SERPL-MCNC: 29 NG/ML (ref 30–100)
ALBUMIN SERPL BCG-MCNC: 4.4 G/DL (ref 3.5–5.1)
ALP SERPL-CCNC: 70 U/L (ref 38–126)
ALT SERPL W/O P-5'-P-CCNC: 14 U/L (ref 11–66)
ANION GAP SERPL CALC-SCNC: 11 MEQ/L (ref 8–16)
AST SERPL-CCNC: 19 U/L (ref 5–40)
BASOPHILS ABSOLUTE: 0 THOU/MM3 (ref 0–0.1)
BASOPHILS NFR BLD AUTO: 0.4 %
BILIRUB SERPL-MCNC: 0.8 MG/DL (ref 0.3–1.2)
BUN SERPL-MCNC: 8 MG/DL (ref 7–22)
CALCIUM SERPL-MCNC: 9.4 MG/DL (ref 8.5–10.5)
CHLORIDE SERPL-SCNC: 108 MEQ/L (ref 98–111)
CHOLEST SERPL-MCNC: 128 MG/DL (ref 100–199)
CO2 SERPL-SCNC: 27 MEQ/L (ref 23–33)
CREAT SERPL-MCNC: 0.9 MG/DL (ref 0.4–1.2)
DEPRECATED MEAN GLUCOSE BLD GHB EST-ACNC: 99 MG/DL (ref 70–126)
DEPRECATED RDW RBC AUTO: 47.8 FL (ref 35–45)
EOSINOPHIL NFR BLD AUTO: 3.5 %
EOSINOPHILS ABSOLUTE: 0.3 THOU/MM3 (ref 0–0.4)
ERYTHROCYTE [DISTWIDTH] IN BLOOD BY AUTOMATED COUNT: 14.1 % (ref 11.5–14.5)
FERRITIN SERPL IA-MCNC: 165 NG/ML (ref 22–322)
FOLATE SERPL-MCNC: 3.5 NG/ML (ref 4.8–24.2)
GFR SERPL CREATININE-BSD FRML MDRD: > 60 ML/MIN/1.73M2
GLUCOSE SERPL-MCNC: 90 MG/DL (ref 70–108)
HBA1C MFR BLD HPLC: 5.3 % (ref 4.4–6.4)
HCT VFR BLD AUTO: 47.3 % (ref 42–52)
HDLC SERPL-MCNC: 44 MG/DL
HGB BLD-MCNC: 15 GM/DL (ref 14–18)
IMM GRANULOCYTES # BLD AUTO: 0.04 THOU/MM3 (ref 0–0.07)
IMM GRANULOCYTES NFR BLD AUTO: 0.5 %
IRON SERPL-MCNC: 107 UG/DL (ref 65–195)
LDLC SERPL CALC-MCNC: 69 MG/DL
LYMPHOCYTES ABSOLUTE: 1.8 THOU/MM3 (ref 1–4.8)
LYMPHOCYTES NFR BLD AUTO: 22.1 %
MCH RBC QN AUTO: 29.4 PG (ref 26–33)
MCHC RBC AUTO-ENTMCNC: 31.7 GM/DL (ref 32.2–35.5)
MCV RBC AUTO: 92.7 FL (ref 80–94)
MONOCYTES ABSOLUTE: 0.5 THOU/MM3 (ref 0.4–1.3)
MONOCYTES NFR BLD AUTO: 6.4 %
NEUTROPHILS NFR BLD AUTO: 67.1 %
NRBC BLD AUTO-RTO: 0 /100 WBC
PLATELET # BLD AUTO: 147 THOU/MM3 (ref 130–400)
PMV BLD AUTO: 11 FL (ref 9.4–12.4)
POTASSIUM SERPL-SCNC: 4.4 MEQ/L (ref 3.5–5.2)
PREALB SERPL-MCNC: 24.8 MG/DL (ref 20–40)
PROT SERPL-MCNC: 6.7 G/DL (ref 6.1–8)
PTH-INTACT SERPL-MCNC: 41.8 PG/ML (ref 15–65)
RBC # BLD AUTO: 5.1 MILL/MM3 (ref 4.7–6.1)
SEGMENTED NEUTROPHILS ABSOLUTE COUNT: 5.5 THOU/MM3 (ref 1.8–7.7)
SODIUM SERPL-SCNC: 146 MEQ/L (ref 135–145)
TIBC SERPL-MCNC: 299 UG/DL (ref 171–450)
TRIGL SERPL-MCNC: 75 MG/DL (ref 0–199)
TSH SERPL DL<=0.005 MIU/L-ACNC: 1.9 UIU/ML (ref 0.4–4.2)
VIT B12 SERPL-MCNC: 488 PG/ML (ref 211–911)
WBC # BLD AUTO: 8.2 THOU/MM3 (ref 4.8–10.8)

## 2023-09-07 LAB — VIT B1 PYROPHOSHATE BLD-SCNC: 126 NMOL/L (ref 70–180)

## 2023-09-11 ENCOUNTER — NURSE ONLY (OUTPATIENT)
Dept: CARDIOLOGY CLINIC | Age: 48
End: 2023-09-11
Payer: COMMERCIAL

## 2023-09-11 ENCOUNTER — OFFICE VISIT (OUTPATIENT)
Dept: CARDIOLOGY CLINIC | Age: 48
End: 2023-09-11
Payer: COMMERCIAL

## 2023-09-11 VITALS
SYSTOLIC BLOOD PRESSURE: 151 MMHG | WEIGHT: 262 LBS | BODY MASS INDEX: 32.58 KG/M2 | HEART RATE: 62 BPM | DIASTOLIC BLOOD PRESSURE: 91 MMHG | HEIGHT: 75 IN

## 2023-09-11 DIAGNOSIS — Z95.810 ICD (IMPLANTABLE CARDIOVERTER-DEFIBRILLATOR) IN PLACE: ICD-10-CM

## 2023-09-11 DIAGNOSIS — I42.8 NONISCHEMIC CARDIOMYOPATHY (HCC): ICD-10-CM

## 2023-09-11 DIAGNOSIS — I50.22 CHRONIC SYSTOLIC CONGESTIVE HEART FAILURE (HCC): Primary | ICD-10-CM

## 2023-09-11 DIAGNOSIS — Z95.810 AICD (AUTOMATIC CARDIOVERTER/DEFIBRILLATOR) PRESENT: Primary | ICD-10-CM

## 2023-09-11 PROCEDURE — 3080F DIAST BP >= 90 MM HG: CPT | Performed by: INTERNAL MEDICINE

## 2023-09-11 PROCEDURE — 93283 PRGRMG EVAL IMPLANTABLE DFB: CPT | Performed by: INTERNAL MEDICINE

## 2023-09-11 PROCEDURE — 99214 OFFICE O/P EST MOD 30 MIN: CPT | Performed by: INTERNAL MEDICINE

## 2023-09-11 PROCEDURE — 3077F SYST BP >= 140 MM HG: CPT | Performed by: INTERNAL MEDICINE

## 2023-09-11 PROCEDURE — 93000 ELECTROCARDIOGRAM COMPLETE: CPT | Performed by: INTERNAL MEDICINE

## 2023-09-11 NOTE — PROGRESS NOTES
Medtronic dual ICD in office     . Joceline Yan Battery longevity:  8.8 years on device   Presenting rhythm  AS VS 60    Atrial impedance 456  RV impedance 361  Shock 41  SVC 54    P wave sensing 2.6  R wave sensing 5.1    <0.1 % atrial paced  <0.1 % RV paced     Atrial threshold 1 V  at 0.4ms  RV threshold 1.25 V at 0.4ms  Mode switches   0     2021 shock pathways programmed B>AX

## 2023-09-11 NOTE — PROGRESS NOTES
was normal with normal systolic function and  wall thickness. Pacer Wire visualized in right ventricle. Pericardial Effusion  The pericardium was normal in appearance with no evidence of a pericardial  effusion. Pleural Effusion  No evidence of pleural effusion. Aorta / Great Vessels  -Aortic root dimension within normal limits.  -The Pulmonary artery is within normal limits. -IVC size is within normal limits with normal respiratory phasic changes. M-Mode/2D Measurements & Calculations    LV Diastolic   LV Systolic Dimension:    AV Cusp Separation: 1.5 cmLA  Dimension: 6.7 5.6 cm                    Dimension: 4.6 cmAO Root  cm             LV Volume Diastolic:      Dimension: 2.7 cmLA Area: 25.9  LV FS:16.4 %   168.1 ml                  cm^2  LV PW          LV Volume Systolic: 642.6  Diastolic: 1.1 ml  cm             LV EDV/LV EDV Index:  Septum         168.1 ml/57 m^2LV ESV/LV  RV Diastolic Dimension: 3.3 cm  Diastolic: 0.7 ESV Index: 004.3 ml/39  cm             m^2                       LA/Aorta: 1.7  EF Calculated: 32.2 %     Ascending Aorta: 3 cm  LA volume/Index: 85.5 ml /29m^2  LV Length: 9.8 cm  LV Area  Diastolic:  32.7 cm^2  LV Area  Systolic: 05.9  cm^2    Doppler Measurements & Calculations    MV Peak E-Wave: 109 cm/s AV Peak Velocity: 125  LVOT Peak Velocity: 77.1  cm/s                   cm/s  MV Peak Gradient: 4.75   AV Peak Gradient: 6.25 LVOT Peak Gradient: 2 mmHg  mmHg                     mmHg  TV Peak E-Wave: 82.6 cm/s  MV Deceleration Time:  127 msec                                        TV Peak Gradient: 2.73  mmHg  IVRT: 63 msec          TR Velocity:266 cm/s  TR Gradient:28.3 mmHg  PV Peak Velocity: 63 cm/s  AV DVI (Vmax):0.62     PV Peak Gradient: 1.59  MR Velocity: 352 cm/s                           mmHg    [id number]      STRESS:    CATH:    Assessment/Plan

## 2023-09-12 ENCOUNTER — OFFICE VISIT (OUTPATIENT)
Dept: BARIATRICS/WEIGHT MGMT | Age: 48
End: 2023-09-12

## 2023-09-12 ENCOUNTER — OFFICE VISIT (OUTPATIENT)
Dept: BARIATRICS/WEIGHT MGMT | Age: 48
End: 2023-09-12
Payer: COMMERCIAL

## 2023-09-12 VITALS
DIASTOLIC BLOOD PRESSURE: 86 MMHG | HEART RATE: 68 BPM | WEIGHT: 259.4 LBS | BODY MASS INDEX: 33.29 KG/M2 | SYSTOLIC BLOOD PRESSURE: 126 MMHG | HEIGHT: 74 IN | TEMPERATURE: 97.7 F

## 2023-09-12 DIAGNOSIS — I50.9 CHRONIC CONGESTIVE HEART FAILURE, UNSPECIFIED HEART FAILURE TYPE (HCC): ICD-10-CM

## 2023-09-12 DIAGNOSIS — Z98.84 S/P BARIATRIC SURGERY: Primary | ICD-10-CM

## 2023-09-12 DIAGNOSIS — E53.8 FOLATE DEFICIENCY: ICD-10-CM

## 2023-09-12 DIAGNOSIS — E55.9 VITAMIN D DEFICIENCY: ICD-10-CM

## 2023-09-12 DIAGNOSIS — E78.00 HYPERCHOLESTEREMIA: ICD-10-CM

## 2023-09-12 DIAGNOSIS — Z98.84 S/P GASTRIC BYPASS: Primary | ICD-10-CM

## 2023-09-12 DIAGNOSIS — Z95.810 AICD (AUTOMATIC CARDIOVERTER/DEFIBRILLATOR) PRESENT: ICD-10-CM

## 2023-09-12 DIAGNOSIS — K21.9 GASTROESOPHAGEAL REFLUX DISEASE, UNSPECIFIED WHETHER ESOPHAGITIS PRESENT: ICD-10-CM

## 2023-09-12 PROCEDURE — 3079F DIAST BP 80-89 MM HG: CPT | Performed by: PHYSICIAN ASSISTANT

## 2023-09-12 PROCEDURE — 99213 OFFICE O/P EST LOW 20 MIN: CPT | Performed by: PHYSICIAN ASSISTANT

## 2023-09-12 PROCEDURE — 3074F SYST BP LT 130 MM HG: CPT | Performed by: PHYSICIAN ASSISTANT

## 2023-09-12 NOTE — PATIENT INSTRUCTIONS
Stay well hydrated. Drink a minimum of 64 oz of non-carbonated, non-caffeinated fluids daily. Nutritional education occurred during visit. Tolerating diet. Continue following with dietitian and follow their recommendations as directed. Continue  60-80 grams of protein each day. Signs and symptoms reviewed with patient that would be concerning and need her to return to office for re-evaluation. Patient will call if any questions or concerns arrise. Importance of physical activity discussed with patient. Continue taking [place]. Take Calcium once daily  Vit D 29- Add Vit D 5,000IU Vit D3 daily. Take with food. Folate 3.5- supplement advised. Continue Omeprazole- Rx through PCP.   Encouraged to attend support groups  Annual labs reviewed with patient today  Measurements completed and reviewed with patient today

## 2023-09-12 NOTE — PATIENT INSTRUCTIONS
Goals: 1. Protein goal is 60-80 grams protein per day. Remember to choose protein foods first at meals to meet this goal, followed by vegetables, then fruit, and starch food last if still hungry. 2.  Water goal is 64 oz per day. Separate liquids from solids. No liquids 30 minutes before meals and no liquids 30 minutes after your meals. 3.  Take 20-30 minutes to eat - this helps with mindful eating as well. Do not skip meals and stick to consistent meal times schedule as much as possible. 4.  Physical activity remains important to maintain weight loss efforts  5. Routine vitamin intake is important to avoid deficiencies. Continue the followin. Start 022 mcg Folic Acid Pill once a day  2. Add Vitamin D3 5,000IUs daily with food to absorb it  3. Continue [place] with  Iron two per day 4.   Take one 500 mg Calcium Citrate soft chew once a day   Get labs repeated mid December

## 2023-10-04 ENCOUNTER — PATIENT MESSAGE (OUTPATIENT)
Dept: PHARMACY | Facility: CLINIC | Age: 48
End: 2023-10-04

## 2023-10-09 RX ORDER — FUROSEMIDE 40 MG/1
TABLET ORAL
Qty: 90 TABLET | Refills: 3 | Status: SHIPPED | OUTPATIENT
Start: 2023-10-09

## 2023-10-16 NOTE — TELEPHONE ENCOUNTER
Margie message not read by patient. Letter mailed.     For Pharmacy Admin Tracking Only    Program: Ugo in place:  No  Gap Closed?: No   Time Spent (min): 5

## 2023-10-17 ENCOUNTER — HOSPITAL ENCOUNTER (OUTPATIENT)
Dept: NON INVASIVE DIAGNOSTICS | Age: 48
Discharge: HOME | End: 2023-10-17
Attending: INTERNAL MEDICINE
Payer: COMMERCIAL

## 2023-10-17 DIAGNOSIS — I50.22 CHRONIC SYSTOLIC CONGESTIVE HEART FAILURE (HCC): ICD-10-CM

## 2023-10-17 DIAGNOSIS — Z95.810 ICD (IMPLANTABLE CARDIOVERTER-DEFIBRILLATOR) IN PLACE: ICD-10-CM

## 2023-10-17 DIAGNOSIS — I42.8 NONISCHEMIC CARDIOMYOPATHY (HCC): ICD-10-CM

## 2023-10-17 PROCEDURE — 93306 TTE W/DOPPLER COMPLETE: CPT

## 2023-10-18 ENCOUNTER — TELEPHONE (OUTPATIENT)
Dept: CARDIOLOGY CLINIC | Age: 48
End: 2023-10-18

## 2023-10-28 ENCOUNTER — HOSPITAL ENCOUNTER (INPATIENT)
Age: 48
LOS: 5 days | Discharge: HOME | DRG: 418 | End: 2023-11-02
Attending: EMERGENCY MEDICINE | Admitting: STUDENT IN AN ORGANIZED HEALTH CARE EDUCATION/TRAINING PROGRAM
Payer: COMMERCIAL

## 2023-10-28 ENCOUNTER — APPOINTMENT (OUTPATIENT)
Dept: ULTRASOUND IMAGING | Age: 48
DRG: 418 | End: 2023-10-28
Payer: COMMERCIAL

## 2023-10-28 ENCOUNTER — APPOINTMENT (OUTPATIENT)
Dept: CT IMAGING | Age: 48
DRG: 418 | End: 2023-10-28
Payer: COMMERCIAL

## 2023-10-28 DIAGNOSIS — Z98.84 HISTORY OF ROUX-EN-Y GASTRIC BYPASS: ICD-10-CM

## 2023-10-28 DIAGNOSIS — Z90.49 S/P LAPAROSCOPIC CHOLECYSTECTOMY: ICD-10-CM

## 2023-10-28 DIAGNOSIS — R10.11 ABDOMINAL PAIN, RIGHT UPPER QUADRANT: Primary | ICD-10-CM

## 2023-10-28 DIAGNOSIS — K82.8 DILATED GALLBLADDER: ICD-10-CM

## 2023-10-28 DIAGNOSIS — K81.0 ACUTE CHOLECYSTITIS: ICD-10-CM

## 2023-10-28 LAB
ALBUMIN SERPL BCG-MCNC: 4.3 G/DL (ref 3.5–5.1)
ALP SERPL-CCNC: 180 U/L (ref 38–126)
ALT SERPL W/O P-5'-P-CCNC: 88 U/L (ref 11–66)
AMYLASE SERPL-CCNC: 47 U/L (ref 20–104)
ANION GAP SERPL CALC-SCNC: 13 MEQ/L (ref 8–16)
AST SERPL-CCNC: 99 U/L (ref 5–40)
BACTERIA URNS QL MICRO: ABNORMAL /HPF
BASOPHILS ABSOLUTE: 0 THOU/MM3 (ref 0–0.1)
BASOPHILS NFR BLD AUTO: 0.2 %
BILIRUB CONJ SERPL-MCNC: 1.9 MG/DL (ref 0–0.3)
BILIRUB SERPL-MCNC: 2.5 MG/DL (ref 0.3–1.2)
BILIRUB UR QL STRIP.AUTO: ABNORMAL
BUN SERPL-MCNC: 9 MG/DL (ref 7–22)
CALCIUM SERPL-MCNC: 9.6 MG/DL (ref 8.5–10.5)
CASTS #/AREA URNS LPF: ABNORMAL /LPF
CASTS 2: ABNORMAL /LPF
CHARACTER UR: CLEAR
CHLORIDE SERPL-SCNC: 99 MEQ/L (ref 98–111)
CO2 SERPL-SCNC: 25 MEQ/L (ref 23–33)
COLOR: ABNORMAL
CREAT SERPL-MCNC: 0.8 MG/DL (ref 0.4–1.2)
CRYSTALS URNS MICRO: ABNORMAL
DEPRECATED RDW RBC AUTO: 45.4 FL (ref 35–45)
EKG ATRIAL RATE: 77 BPM
EKG P AXIS: 29 DEGREES
EKG P-R INTERVAL: 182 MS
EKG Q-T INTERVAL: 382 MS
EKG QRS DURATION: 106 MS
EKG QTC CALCULATION (BAZETT): 432 MS
EKG R AXIS: -20 DEGREES
EKG T AXIS: 40 DEGREES
EKG VENTRICULAR RATE: 77 BPM
EOSINOPHIL NFR BLD AUTO: 0.6 %
EOSINOPHILS ABSOLUTE: 0.1 THOU/MM3 (ref 0–0.4)
EPITHELIAL CELLS, UA: ABNORMAL /HPF
ERYTHROCYTE [DISTWIDTH] IN BLOOD BY AUTOMATED COUNT: 14.2 % (ref 11.5–14.5)
FLUAV RNA RESP QL NAA+PROBE: NOT DETECTED
FLUBV RNA RESP QL NAA+PROBE: NOT DETECTED
GFR SERPL CREATININE-BSD FRML MDRD: > 60 ML/MIN/1.73M2
GLUCOSE SERPL-MCNC: 141 MG/DL (ref 70–108)
GLUCOSE UR QL STRIP.AUTO: NEGATIVE MG/DL
HCT VFR BLD AUTO: 45.4 % (ref 42–52)
HGB BLD-MCNC: 15 GM/DL (ref 14–18)
HGB UR QL STRIP.AUTO: NEGATIVE
ICTOTEST: NEGATIVE
IMM GRANULOCYTES # BLD AUTO: 0.05 THOU/MM3 (ref 0–0.07)
IMM GRANULOCYTES NFR BLD AUTO: 0.4 %
KETONES UR QL STRIP.AUTO: 40
LACTATE SERPL-SCNC: 1.4 MMOL/L (ref 0.5–2)
LIPASE SERPL-CCNC: 31.4 U/L (ref 5.6–51.3)
LYMPHOCYTES ABSOLUTE: 0.8 THOU/MM3 (ref 1–4.8)
LYMPHOCYTES NFR BLD AUTO: 6.5 %
MAGNESIUM SERPL-MCNC: 2 MG/DL (ref 1.6–2.4)
MCH RBC QN AUTO: 28.8 PG (ref 26–33)
MCHC RBC AUTO-ENTMCNC: 33 GM/DL (ref 32.2–35.5)
MCV RBC AUTO: 87.3 FL (ref 80–94)
MISCELLANEOUS 2: ABNORMAL
MONOCYTES ABSOLUTE: 0.5 THOU/MM3 (ref 0.4–1.3)
MONOCYTES NFR BLD AUTO: 4.4 %
NEUTROPHILS NFR BLD AUTO: 87.9 %
NITRITE UR QL STRIP: NEGATIVE
NRBC BLD AUTO-RTO: 0 /100 WBC
NT-PROBNP SERPL IA-MCNC: 559.6 PG/ML (ref 0–124)
OSMOLALITY SERPL CALC.SUM OF ELEC: 274.9 MOSMOL/KG (ref 275–300)
PH UR STRIP.AUTO: 7 [PH] (ref 5–9)
PLATELET # BLD AUTO: 212 THOU/MM3 (ref 130–400)
PMV BLD AUTO: 10.1 FL (ref 9.4–12.4)
POTASSIUM SERPL-SCNC: 3.7 MEQ/L (ref 3.5–5.2)
PROT SERPL-MCNC: 7.5 G/DL (ref 6.1–8)
PROT UR STRIP.AUTO-MCNC: ABNORMAL MG/DL
RBC # BLD AUTO: 5.2 MILL/MM3 (ref 4.7–6.1)
RBC URINE: ABNORMAL /HPF
RENAL EPI CELLS #/AREA URNS HPF: ABNORMAL /[HPF]
SARS-COV-2 RNA RESP QL NAA+PROBE: NOT DETECTED
SEGMENTED NEUTROPHILS ABSOLUTE COUNT: 10.9 THOU/MM3 (ref 1.8–7.7)
SODIUM SERPL-SCNC: 137 MEQ/L (ref 135–145)
SP GR UR REFRACT.AUTO: > 1.03 (ref 1–1.03)
TROPONIN, HIGH SENSITIVITY: 11 NG/L (ref 0–12)
UROBILINOGEN, URINE: >= 8 EU/DL (ref 0–1)
WBC # BLD AUTO: 12.4 THOU/MM3 (ref 4.8–10.8)
WBC #/AREA URNS HPF: ABNORMAL /HPF
WBC #/AREA URNS HPF: ABNORMAL /[HPF]
YEAST LIKE FUNGI URNS QL MICRO: ABNORMAL

## 2023-10-28 PROCEDURE — 96376 TX/PRO/DX INJ SAME DRUG ADON: CPT

## 2023-10-28 PROCEDURE — 85025 COMPLETE CBC W/AUTO DIFF WBC: CPT

## 2023-10-28 PROCEDURE — 84484 ASSAY OF TROPONIN QUANT: CPT

## 2023-10-28 PROCEDURE — 83690 ASSAY OF LIPASE: CPT

## 2023-10-28 PROCEDURE — 83735 ASSAY OF MAGNESIUM: CPT

## 2023-10-28 PROCEDURE — 76705 ECHO EXAM OF ABDOMEN: CPT

## 2023-10-28 PROCEDURE — 82150 ASSAY OF AMYLASE: CPT

## 2023-10-28 PROCEDURE — 83605 ASSAY OF LACTIC ACID: CPT

## 2023-10-28 PROCEDURE — 99285 EMERGENCY DEPT VISIT HI MDM: CPT

## 2023-10-28 PROCEDURE — 96365 THER/PROPH/DIAG IV INF INIT: CPT

## 2023-10-28 PROCEDURE — 2500000003 HC RX 250 WO HCPCS: Performed by: EMERGENCY MEDICINE

## 2023-10-28 PROCEDURE — 93005 ELECTROCARDIOGRAM TRACING: CPT | Performed by: EMERGENCY MEDICINE

## 2023-10-28 PROCEDURE — 80053 COMPREHEN METABOLIC PANEL: CPT

## 2023-10-28 PROCEDURE — 87040 BLOOD CULTURE FOR BACTERIA: CPT

## 2023-10-28 PROCEDURE — 6360000002 HC RX W HCPCS: Performed by: EMERGENCY MEDICINE

## 2023-10-28 PROCEDURE — 99223 1ST HOSP IP/OBS HIGH 75: CPT | Performed by: NURSE PRACTITIONER

## 2023-10-28 PROCEDURE — 36415 COLL VENOUS BLD VENIPUNCTURE: CPT

## 2023-10-28 PROCEDURE — 81003 URINALYSIS AUTO W/O SCOPE: CPT

## 2023-10-28 PROCEDURE — 2580000003 HC RX 258: Performed by: EMERGENCY MEDICINE

## 2023-10-28 PROCEDURE — 74177 CT ABD & PELVIS W/CONTRAST: CPT

## 2023-10-28 PROCEDURE — 96375 TX/PRO/DX INJ NEW DRUG ADDON: CPT

## 2023-10-28 PROCEDURE — 93010 ELECTROCARDIOGRAM REPORT: CPT | Performed by: INTERNAL MEDICINE

## 2023-10-28 PROCEDURE — 1200000003 HC TELEMETRY R&B

## 2023-10-28 PROCEDURE — 6360000004 HC RX CONTRAST MEDICATION: Performed by: EMERGENCY MEDICINE

## 2023-10-28 PROCEDURE — 81001 URINALYSIS AUTO W/SCOPE: CPT

## 2023-10-28 PROCEDURE — 83880 ASSAY OF NATRIURETIC PEPTIDE: CPT

## 2023-10-28 PROCEDURE — 82248 BILIRUBIN DIRECT: CPT

## 2023-10-28 PROCEDURE — 87636 SARSCOV2 & INF A&B AMP PRB: CPT

## 2023-10-28 RX ORDER — DOCUSATE SODIUM 100 MG/1
100 CAPSULE, LIQUID FILLED ORAL 2 TIMES DAILY PRN
Status: DISCONTINUED | OUTPATIENT
Start: 2023-10-28 | End: 2023-11-02 | Stop reason: HOSPADM

## 2023-10-28 RX ORDER — ROSUVASTATIN CALCIUM 10 MG/1
10 TABLET, COATED ORAL NIGHTLY
Status: DISCONTINUED | OUTPATIENT
Start: 2023-10-29 | End: 2023-11-02 | Stop reason: HOSPADM

## 2023-10-28 RX ORDER — ONDANSETRON 2 MG/ML
4 INJECTION INTRAMUSCULAR; INTRAVENOUS ONCE
Status: COMPLETED | OUTPATIENT
Start: 2023-10-28 | End: 2023-10-28

## 2023-10-28 RX ORDER — POLYETHYLENE GLYCOL 3350 17 G/17G
17 POWDER, FOR SOLUTION ORAL DAILY PRN
Status: DISCONTINUED | OUTPATIENT
Start: 2023-10-28 | End: 2023-11-02 | Stop reason: HOSPADM

## 2023-10-28 RX ORDER — CETIRIZINE HYDROCHLORIDE 10 MG/1
10 TABLET ORAL DAILY
Status: DISCONTINUED | OUTPATIENT
Start: 2023-10-29 | End: 2023-11-02 | Stop reason: HOSPADM

## 2023-10-28 RX ORDER — FUROSEMIDE 40 MG/1
40 TABLET ORAL EVERY MORNING
Status: DISCONTINUED | OUTPATIENT
Start: 2023-10-30 | End: 2023-11-02 | Stop reason: HOSPADM

## 2023-10-28 RX ORDER — SODIUM CHLORIDE 9 MG/ML
INJECTION, SOLUTION INTRAVENOUS CONTINUOUS
Status: ACTIVE | OUTPATIENT
Start: 2023-10-29 | End: 2023-10-29

## 2023-10-28 RX ORDER — SODIUM CHLORIDE 9 MG/ML
INJECTION, SOLUTION INTRAVENOUS CONTINUOUS
Status: DISCONTINUED | OUTPATIENT
Start: 2023-10-28 | End: 2023-10-28

## 2023-10-28 RX ORDER — ASPIRIN 81 MG/1
81 TABLET ORAL DAILY
Status: DISCONTINUED | OUTPATIENT
Start: 2023-10-29 | End: 2023-11-02 | Stop reason: HOSPADM

## 2023-10-28 RX ORDER — GABAPENTIN 100 MG/1
100 CAPSULE ORAL 3 TIMES DAILY
Status: DISCONTINUED | OUTPATIENT
Start: 2023-10-29 | End: 2023-11-02 | Stop reason: HOSPADM

## 2023-10-28 RX ORDER — OMEPRAZOLE 20 MG/1
40 CAPSULE, DELAYED RELEASE ORAL EVERY MORNING
Status: DISCONTINUED | OUTPATIENT
Start: 2023-10-29 | End: 2023-10-29

## 2023-10-28 RX ORDER — FLUTICASONE PROPIONATE 50 MCG
2 SPRAY, SUSPENSION (ML) NASAL DAILY
Status: DISCONTINUED | OUTPATIENT
Start: 2023-10-29 | End: 2023-11-02 | Stop reason: HOSPADM

## 2023-10-28 RX ADMIN — PIPERACILLIN AND TAZOBACTAM 4500 MG: 4; .5 INJECTION, POWDER, LYOPHILIZED, FOR SOLUTION INTRAVENOUS at 22:23

## 2023-10-28 RX ADMIN — HYDROMORPHONE HYDROCHLORIDE 0.5 MG: 1 INJECTION, SOLUTION INTRAMUSCULAR; INTRAVENOUS; SUBCUTANEOUS at 22:35

## 2023-10-28 RX ADMIN — IOPAMIDOL 80 ML: 755 INJECTION, SOLUTION INTRAVENOUS at 21:12

## 2023-10-28 RX ADMIN — SODIUM CHLORIDE: 9 INJECTION, SOLUTION INTRAVENOUS at 19:55

## 2023-10-28 RX ADMIN — HYDROMORPHONE HYDROCHLORIDE 0.5 MG: 1 INJECTION, SOLUTION INTRAMUSCULAR; INTRAVENOUS; SUBCUTANEOUS at 19:55

## 2023-10-28 RX ADMIN — ONDANSETRON 4 MG: 2 INJECTION INTRAMUSCULAR; INTRAVENOUS at 19:55

## 2023-10-28 ASSESSMENT — PAIN - FUNCTIONAL ASSESSMENT
PAIN_FUNCTIONAL_ASSESSMENT: 0-10

## 2023-10-28 ASSESSMENT — PAIN DESCRIPTION - LOCATION: LOCATION: BACK

## 2023-10-28 ASSESSMENT — PAIN SCALES - GENERAL
PAINLEVEL_OUTOF10: 8
PAINLEVEL_OUTOF10: 4
PAINLEVEL_OUTOF10: 8
PAINLEVEL_OUTOF10: 4

## 2023-10-28 NOTE — ED PROVIDER NOTES
[address] [facility] EMERGENCY DEPT      EMERGENCY MEDICINE     Room # 38/038A    Pt Name: Guy Ramirez  MRN: [id number]  [address] [facility] 1975  Date of evaluation: 10/28/2023  Provider: Anna Nobles MD    CHIEF COMPLAINT       Chief Complaint   Patient presents with    Back Pain     HISTORY OF PRESENT ILLNESS   Guy Ramirez is a pleasant 50 y.o. male who presents to the emergency department from from home, as a walk in to the ED lobby for evaluation of right upper quadrant and epigastric pain radiating to the mid since 11 AM this morning. Patient relates that he developed fever and chills 3 days ago however did not have any flu symptoms at that time, no sore throat ,no cough no cold, no abdominal pain until this morning when he started to have heartburns at 11 AM followed by shooting pain on the epigastric and right upper quadrant radiating to the mid back area. The patient took Tums as he feels nauseous. Patient pain at present is 8/10. Patient relates that he had Ronaldo-en-Y bariatric surgery done by Dr. Sherren Ferry September 12, 2022 and lost 160 pounds to present. Patient also has a pacemaker defibrillator secondary to cardiomyopathy and congestive heart failure with a reduced EF being followed by his cardiologist Dr. Keaton Shell. The patient denies any dysuria, frequency, hematuria however his urine is dark yellow. Had bowel movement this morning without any melena nor constipation or diarrhea. PASTMEDICAL HISTORY     Past Medical History:   Diagnosis Date    CHF (congestive heart failure) (HCC)     Constipation     Diabetes mellitus (HCC)     GERD (gastroesophageal reflux disease)     Hypercholesteremia     S/P ICD (internal cardiac defibrillator) procedure: 1/15/2015: Medtronic Single Chamber 1/15/2015    1/15/2015: Medtronic Single Chamber.  Dr. Tawnya Murphy    Sleep apnea 06/09/2017    Bryan Varner applied 8/22/14 8/29/2014    returned prior to ICD insertion 1/15       Patient Active Problem

## 2023-10-28 NOTE — ED TRIAGE NOTES
Pt presents to the ER with c/o back pain that started this morning. Pt denies injury. Pt also reports nausea. Pt took tylenol at home. Pt is alert and oriented, respirations even and unlabored.  VSS

## 2023-10-29 PROBLEM — R00.0 SINUS TACHYCARDIA: Status: RESOLVED | Noted: 2020-11-17 | Resolved: 2023-10-29

## 2023-10-29 PROBLEM — K80.43 CALCULUS OF BILE DUCT WITH ACUTE CHOLECYSTITIS AND OBSTRUCTION: Status: ACTIVE | Noted: 2023-10-29

## 2023-10-29 PROBLEM — Z86.79 HX OF ESSENTIAL HYPERTENSION: Status: ACTIVE | Noted: 2023-10-29

## 2023-10-29 PROBLEM — R73.02 IMPAIRED GLUCOSE TOLERANCE: Status: ACTIVE | Noted: 2023-10-29

## 2023-10-29 PROBLEM — E66.01 MORBID OBESITY (HCC): Status: RESOLVED | Noted: 2022-09-12 | Resolved: 2023-10-29

## 2023-10-29 PROBLEM — K83.1 BILIARY OBSTRUCTION: Status: ACTIVE | Noted: 2023-10-29

## 2023-10-29 PROBLEM — I50.22 CHRONIC SYSTOLIC CONGESTIVE HEART FAILURE (HCC): Status: RESOLVED | Noted: 2017-03-15 | Resolved: 2023-10-29

## 2023-10-29 PROBLEM — A41.9 SEPSIS WITH ORGAN DYSFUNCTION (HCC): Status: ACTIVE | Noted: 2023-10-29

## 2023-10-29 PROBLEM — G47.33 OSA (OBSTRUCTIVE SLEEP APNEA): Status: ACTIVE | Noted: 2023-10-29

## 2023-10-29 PROBLEM — Z87.19 HX OF GASTROESOPHAGEAL REFLUX (GERD): Status: ACTIVE | Noted: 2023-10-29

## 2023-10-29 PROBLEM — K81.0 ACUTE CHOLECYSTITIS: Status: ACTIVE | Noted: 2023-10-29

## 2023-10-29 PROBLEM — R65.20 SEPSIS WITH ORGAN DYSFUNCTION (HCC): Status: ACTIVE | Noted: 2023-10-29

## 2023-10-29 PROBLEM — R63.5 WEIGHT GAIN: Status: RESOLVED | Noted: 2017-03-15 | Resolved: 2023-10-29

## 2023-10-29 PROBLEM — I50.42 CHRONIC COMBINED SYSTOLIC AND DIASTOLIC CONGESTIVE HEART FAILURE (HCC): Status: RESOLVED | Noted: 2020-11-17 | Resolved: 2023-10-29

## 2023-10-29 PROBLEM — E11.9 DIABETES MELLITUS (HCC): Status: RESOLVED | Noted: 2022-09-05 | Resolved: 2023-10-29

## 2023-10-29 PROBLEM — K80.33 ACUTE CHOLANGITIS DUE TO CALCULUS OF BILE DUCT WITH OBSTRUCTION: Status: ACTIVE | Noted: 2023-10-29

## 2023-10-29 PROBLEM — K82.8 DILATED GALLBLADDER: Status: ACTIVE | Noted: 2023-10-29

## 2023-10-29 PROBLEM — I95.9 SYMPTOMATIC HYPOTENSION: Status: RESOLVED | Noted: 2022-09-13 | Resolved: 2023-10-29

## 2023-10-29 LAB
ALBUMIN SERPL BCG-MCNC: 3.4 G/DL (ref 3.5–5.1)
ALP SERPL-CCNC: 173 U/L (ref 38–126)
ALT SERPL W/O P-5'-P-CCNC: 144 U/L (ref 11–66)
ANION GAP SERPL CALC-SCNC: 10 MEQ/L (ref 8–16)
ANION GAP SERPL CALC-SCNC: 11 MEQ/L (ref 8–16)
AST SERPL-CCNC: 182 U/L (ref 5–40)
BASOPHILS ABSOLUTE: 0 THOU/MM3 (ref 0–0.1)
BASOPHILS NFR BLD AUTO: 0.3 %
BILIRUB SERPL-MCNC: 3.8 MG/DL (ref 0.3–1.2)
BUN SERPL-MCNC: 8 MG/DL (ref 7–22)
BUN SERPL-MCNC: 8 MG/DL (ref 7–22)
CA-I BLD ISE-SCNC: 1.17 MMOL/L (ref 1.12–1.32)
CALCIUM SERPL-MCNC: 8.8 MG/DL (ref 8.5–10.5)
CALCIUM SERPL-MCNC: 8.8 MG/DL (ref 8.5–10.5)
CHLORIDE SERPL-SCNC: 106 MEQ/L (ref 98–111)
CHLORIDE SERPL-SCNC: 108 MEQ/L (ref 98–111)
CHOLEST SERPL-MCNC: 101 MG/DL (ref 100–199)
CO2 SERPL-SCNC: 24 MEQ/L (ref 23–33)
CO2 SERPL-SCNC: 25 MEQ/L (ref 23–33)
CREAT SERPL-MCNC: 0.7 MG/DL (ref 0.4–1.2)
CREAT SERPL-MCNC: 0.7 MG/DL (ref 0.4–1.2)
CRP SERPL-MCNC: 4.31 MG/DL (ref 0–1)
DEPRECATED RDW RBC AUTO: 47.1 FL (ref 35–45)
EOSINOPHIL NFR BLD AUTO: 2.6 %
EOSINOPHILS ABSOLUTE: 0.2 THOU/MM3 (ref 0–0.4)
ERYTHROCYTE [DISTWIDTH] IN BLOOD BY AUTOMATED COUNT: 14.3 % (ref 11.5–14.5)
GFR SERPL CREATININE-BSD FRML MDRD: > 60 ML/MIN/1.73M2
GFR SERPL CREATININE-BSD FRML MDRD: > 60 ML/MIN/1.73M2
GGT SERPL-CCNC: 504 U/L (ref 8–69)
GLUCOSE SERPL-MCNC: 118 MG/DL (ref 70–108)
GLUCOSE SERPL-MCNC: 99 MG/DL (ref 70–108)
HCT VFR BLD AUTO: 41.2 % (ref 42–52)
HDLC SERPL-MCNC: 24 MG/DL
HGB BLD-MCNC: 13.2 GM/DL (ref 14–18)
IMM GRANULOCYTES # BLD AUTO: 0.04 THOU/MM3 (ref 0–0.07)
IMM GRANULOCYTES NFR BLD AUTO: 0.6 %
INR PPP: 1.17 (ref 0.85–1.13)
LDLC SERPL CALC-MCNC: 60 MG/DL
LYMPHOCYTES ABSOLUTE: 1.2 THOU/MM3 (ref 1–4.8)
LYMPHOCYTES NFR BLD AUTO: 15.9 %
MAGNESIUM SERPL-MCNC: 2 MG/DL (ref 1.6–2.4)
MCH RBC QN AUTO: 29 PG (ref 26–33)
MCHC RBC AUTO-ENTMCNC: 32 GM/DL (ref 32.2–35.5)
MCV RBC AUTO: 90.5 FL (ref 80–94)
MONOCYTES ABSOLUTE: 0.6 THOU/MM3 (ref 0.4–1.3)
MONOCYTES NFR BLD AUTO: 7.9 %
NEUTROPHILS NFR BLD AUTO: 72.7 %
NRBC BLD AUTO-RTO: 0 /100 WBC
PH BLDV: 7.34 [PH] (ref 7.31–7.41)
PH BLDV: 7.41 [PH] (ref 7.31–7.41)
PLATELET # BLD AUTO: 201 THOU/MM3 (ref 130–400)
PMV BLD AUTO: 10.3 FL (ref 9.4–12.4)
POTASSIUM SERPL-SCNC: 4 MEQ/L (ref 3.5–5.2)
POTASSIUM SERPL-SCNC: 4.1 MEQ/L (ref 3.5–5.2)
PROCALCITONIN SERPL IA-MCNC: 0.43 NG/ML (ref 0.01–0.09)
PROT SERPL-MCNC: 6 G/DL (ref 6.1–8)
RBC # BLD AUTO: 4.55 MILL/MM3 (ref 4.7–6.1)
REASON FOR REJECTION: NORMAL
REJECTED TEST: NORMAL
SEGMENTED NEUTROPHILS ABSOLUTE COUNT: 5.3 THOU/MM3 (ref 1.8–7.7)
SODIUM SERPL-SCNC: 141 MEQ/L (ref 135–145)
SODIUM SERPL-SCNC: 143 MEQ/L (ref 135–145)
TRIGL SERPL-MCNC: 86 MG/DL (ref 0–199)
WBC # BLD AUTO: 7.3 THOU/MM3 (ref 4.8–10.8)

## 2023-10-29 PROCEDURE — 82330 ASSAY OF CALCIUM: CPT

## 2023-10-29 PROCEDURE — 6360000002 HC RX W HCPCS: Performed by: NURSE PRACTITIONER

## 2023-10-29 PROCEDURE — 80053 COMPREHEN METABOLIC PANEL: CPT

## 2023-10-29 PROCEDURE — 82800 BLOOD PH: CPT

## 2023-10-29 PROCEDURE — 36415 COLL VENOUS BLD VENIPUNCTURE: CPT

## 2023-10-29 PROCEDURE — 85610 PROTHROMBIN TIME: CPT

## 2023-10-29 PROCEDURE — 99232 SBSQ HOSP IP/OBS MODERATE 35: CPT | Performed by: PHYSICIAN ASSISTANT

## 2023-10-29 PROCEDURE — 83735 ASSAY OF MAGNESIUM: CPT

## 2023-10-29 PROCEDURE — 6370000000 HC RX 637 (ALT 250 FOR IP): Performed by: NURSE PRACTITIONER

## 2023-10-29 PROCEDURE — 80061 LIPID PANEL: CPT

## 2023-10-29 PROCEDURE — 86140 C-REACTIVE PROTEIN: CPT

## 2023-10-29 PROCEDURE — 82977 ASSAY OF GGT: CPT

## 2023-10-29 PROCEDURE — 84145 PROCALCITONIN (PCT): CPT

## 2023-10-29 PROCEDURE — 99222 1ST HOSP IP/OBS MODERATE 55: CPT | Performed by: SURGERY

## 2023-10-29 PROCEDURE — 85025 COMPLETE CBC W/AUTO DIFF WBC: CPT

## 2023-10-29 PROCEDURE — 1200000003 HC TELEMETRY R&B

## 2023-10-29 PROCEDURE — 2580000003 HC RX 258: Performed by: NURSE PRACTITIONER

## 2023-10-29 RX ORDER — SODIUM CHLORIDE 0.9 % (FLUSH) 0.9 %
5-40 SYRINGE (ML) INJECTION EVERY 12 HOURS SCHEDULED
Status: DISCONTINUED | OUTPATIENT
Start: 2023-10-29 | End: 2023-11-01

## 2023-10-29 RX ORDER — ENOXAPARIN SODIUM 100 MG/ML
30 INJECTION SUBCUTANEOUS 2 TIMES DAILY
Status: DISCONTINUED | OUTPATIENT
Start: 2023-10-29 | End: 2023-11-02 | Stop reason: HOSPADM

## 2023-10-29 RX ORDER — SODIUM CHLORIDE 0.9 % (FLUSH) 0.9 %
5-40 SYRINGE (ML) INJECTION PRN
Status: DISCONTINUED | OUTPATIENT
Start: 2023-10-29 | End: 2023-11-01

## 2023-10-29 RX ORDER — PANTOPRAZOLE SODIUM 40 MG/1
40 TABLET, DELAYED RELEASE ORAL
Status: DISCONTINUED | OUTPATIENT
Start: 2023-10-29 | End: 2023-11-02 | Stop reason: HOSPADM

## 2023-10-29 RX ORDER — POLYETHYLENE GLYCOL 3350 17 G/17G
17 POWDER, FOR SOLUTION ORAL DAILY PRN
Status: DISCONTINUED | OUTPATIENT
Start: 2023-10-29 | End: 2023-10-29 | Stop reason: SDUPTHER

## 2023-10-29 RX ORDER — SODIUM CHLORIDE 9 MG/ML
INJECTION, SOLUTION INTRAVENOUS PRN
Status: DISCONTINUED | OUTPATIENT
Start: 2023-10-29 | End: 2023-11-01

## 2023-10-29 RX ORDER — ONDANSETRON 4 MG/1
4 TABLET, ORALLY DISINTEGRATING ORAL EVERY 8 HOURS PRN
Status: DISCONTINUED | OUTPATIENT
Start: 2023-10-29 | End: 2023-11-01

## 2023-10-29 RX ORDER — ACETAMINOPHEN 325 MG/1
650 TABLET ORAL EVERY 6 HOURS PRN
Status: DISCONTINUED | OUTPATIENT
Start: 2023-10-29 | End: 2023-11-01

## 2023-10-29 RX ORDER — ONDANSETRON 2 MG/ML
4 INJECTION INTRAMUSCULAR; INTRAVENOUS EVERY 6 HOURS PRN
Status: DISCONTINUED | OUTPATIENT
Start: 2023-10-29 | End: 2023-11-01

## 2023-10-29 RX ORDER — ACETAMINOPHEN 650 MG/1
650 SUPPOSITORY RECTAL EVERY 6 HOURS PRN
Status: DISCONTINUED | OUTPATIENT
Start: 2023-10-29 | End: 2023-11-01 | Stop reason: ALTCHOICE

## 2023-10-29 RX ADMIN — ROSUVASTATIN 10 MG: 10 TABLET, FILM COATED ORAL at 20:23

## 2023-10-29 RX ADMIN — SODIUM CHLORIDE: 9 INJECTION, SOLUTION INTRAVENOUS at 05:27

## 2023-10-29 RX ADMIN — HYDROMORPHONE HYDROCHLORIDE 0.5 MG: 1 INJECTION, SOLUTION INTRAMUSCULAR; INTRAVENOUS; SUBCUTANEOUS at 23:48

## 2023-10-29 RX ADMIN — HYDROMORPHONE HYDROCHLORIDE 0.5 MG: 1 INJECTION, SOLUTION INTRAMUSCULAR; INTRAVENOUS; SUBCUTANEOUS at 05:25

## 2023-10-29 RX ADMIN — CETIRIZINE HYDROCHLORIDE 10 MG: 10 TABLET, FILM COATED ORAL at 08:21

## 2023-10-29 RX ADMIN — PIPERACILLIN AND TAZOBACTAM 3375 MG: 3; .375 INJECTION, POWDER, LYOPHILIZED, FOR SOLUTION INTRAVENOUS at 13:49

## 2023-10-29 RX ADMIN — ASPIRIN 81 MG: 81 TABLET, COATED ORAL at 08:21

## 2023-10-29 RX ADMIN — HYDROMORPHONE HYDROCHLORIDE 0.5 MG: 1 INJECTION, SOLUTION INTRAMUSCULAR; INTRAVENOUS; SUBCUTANEOUS at 13:54

## 2023-10-29 RX ADMIN — PIPERACILLIN AND TAZOBACTAM 3375 MG: 3; .375 INJECTION, POWDER, LYOPHILIZED, FOR SOLUTION INTRAVENOUS at 23:02

## 2023-10-29 RX ADMIN — GABAPENTIN 100 MG: 100 CAPSULE ORAL at 13:49

## 2023-10-29 RX ADMIN — SODIUM CHLORIDE, PRESERVATIVE FREE 10 ML: 5 INJECTION INTRAVENOUS at 20:25

## 2023-10-29 RX ADMIN — SODIUM CHLORIDE: 9 INJECTION, SOLUTION INTRAVENOUS at 01:57

## 2023-10-29 RX ADMIN — GABAPENTIN 100 MG: 100 CAPSULE ORAL at 08:21

## 2023-10-29 RX ADMIN — PIPERACILLIN AND TAZOBACTAM 3375 MG: 3; .375 INJECTION, POWDER, LYOPHILIZED, FOR SOLUTION INTRAVENOUS at 05:28

## 2023-10-29 RX ADMIN — ENOXAPARIN SODIUM 30 MG: 100 INJECTION SUBCUTANEOUS at 20:23

## 2023-10-29 RX ADMIN — FLUTICASONE PROPIONATE 2 SPRAY: 50 SPRAY, METERED NASAL at 08:22

## 2023-10-29 RX ADMIN — GABAPENTIN 100 MG: 100 CAPSULE ORAL at 01:55

## 2023-10-29 RX ADMIN — ROSUVASTATIN 10 MG: 10 TABLET, FILM COATED ORAL at 01:55

## 2023-10-29 RX ADMIN — ENOXAPARIN SODIUM 30 MG: 100 INJECTION SUBCUTANEOUS at 08:56

## 2023-10-29 RX ADMIN — HYDROMORPHONE HYDROCHLORIDE 0.5 MG: 1 INJECTION, SOLUTION INTRAMUSCULAR; INTRAVENOUS; SUBCUTANEOUS at 16:58

## 2023-10-29 RX ADMIN — HYDROMORPHONE HYDROCHLORIDE 0.5 MG: 1 INJECTION, SOLUTION INTRAMUSCULAR; INTRAVENOUS; SUBCUTANEOUS at 11:00

## 2023-10-29 RX ADMIN — METOPROLOL SUCCINATE 75 MG: 25 TABLET, FILM COATED, EXTENDED RELEASE ORAL at 08:21

## 2023-10-29 RX ADMIN — GABAPENTIN 100 MG: 100 CAPSULE ORAL at 20:23

## 2023-10-29 RX ADMIN — HYDROMORPHONE HYDROCHLORIDE 0.5 MG: 1 INJECTION, SOLUTION INTRAMUSCULAR; INTRAVENOUS; SUBCUTANEOUS at 20:24

## 2023-10-29 ASSESSMENT — PAIN - FUNCTIONAL ASSESSMENT
PAIN_FUNCTIONAL_ASSESSMENT: PREVENTS OR INTERFERES SOME ACTIVE ACTIVITIES AND ADLS
PAIN_FUNCTIONAL_ASSESSMENT: ACTIVITIES ARE NOT PREVENTED
PAIN_FUNCTIONAL_ASSESSMENT: PREVENTS OR INTERFERES SOME ACTIVE ACTIVITIES AND ADLS
PAIN_FUNCTIONAL_ASSESSMENT: ACTIVITIES ARE NOT PREVENTED

## 2023-10-29 ASSESSMENT — PAIN DESCRIPTION - DESCRIPTORS
DESCRIPTORS: SHARP
DESCRIPTORS: DULL
DESCRIPTORS: DULL;SHARP
DESCRIPTORS: DULL;ACHING
DESCRIPTORS: ACHING;DISCOMFORT;PENETRATING
DESCRIPTORS: ACHING;DISCOMFORT;PENETRATING

## 2023-10-29 ASSESSMENT — PAIN DESCRIPTION - LOCATION
LOCATION: ABDOMEN

## 2023-10-29 ASSESSMENT — PAIN DESCRIPTION - ORIENTATION
ORIENTATION: MID
ORIENTATION: RIGHT;LEFT

## 2023-10-29 ASSESSMENT — PAIN SCALES - GENERAL
PAINLEVEL_OUTOF10: 8
PAINLEVEL_OUTOF10: 7
PAINLEVEL_OUTOF10: 8
PAINLEVEL_OUTOF10: 7
PAINLEVEL_OUTOF10: 8
PAINLEVEL_OUTOF10: 8
PAINLEVEL_OUTOF10: 6
PAINLEVEL_OUTOF10: 6
PAINLEVEL_OUTOF10: 8
PAINLEVEL_OUTOF10: 4

## 2023-10-29 ASSESSMENT — PAIN SCALES - WONG BAKER: WONGBAKER_NUMERICALRESPONSE: 0

## 2023-10-29 ASSESSMENT — PAIN DESCRIPTION - ONSET: ONSET: ON-GOING

## 2023-10-29 ASSESSMENT — PAIN DESCRIPTION - FREQUENCY: FREQUENCY: CONTINUOUS

## 2023-10-29 ASSESSMENT — LIFESTYLE VARIABLES
HOW MANY STANDARD DRINKS CONTAINING ALCOHOL DO YOU HAVE ON A TYPICAL DAY: PATIENT DOES NOT DRINK
HOW OFTEN DO YOU HAVE A DRINK CONTAINING ALCOHOL: NEVER

## 2023-10-29 ASSESSMENT — PAIN DESCRIPTION - PAIN TYPE: TYPE: ACUTE PAIN

## 2023-10-29 NOTE — ED NOTES
Pt and vs assessed. RR easy and unlabored. Pt resting in bed with eyes closed and appears to be sleeping.  Pt stable at this time     Jennifer Sheppard, [place]  10/28/23 [id number]

## 2023-10-29 NOTE — ED NOTES
Pt back from radiology. Pt stood on side of bed to urinate, urine specimen obtained and sent to lab. Pt reports relief of nausea and states his pain is now 4/10. No further needs expressed at this time.  VSS     Sammy Quiroga, [address] [place] [address]  10/28/23 [id number]

## 2023-10-29 NOTE — H&P
Hospitalist  History and Physical    Patient:  Mendez Pulido  MRN: [id number]    CHIEF COMPLAINT:  back pain    History Obtained From:  patient, electronic medical record  PCP: Asiya Toribio MD    HISTORY OF PRESENT ILLNESS:   Indra Montgomery is a 50year old gentlemen presented to [facility] ER 10/28/2023 with complaint of back pain . Patient has a past medical history significant for lifetime nonsmoker, MIKE-CPAP stopped 3/2023, Nonischemic cardiomyopathy-[facility] 2014 patient coronary arteries, AICD placement-2015, AICD Discharge for VTach 2020, ECHO 10/2023 LVEF 30-35%-severe global hypokinesis, Essential HPTN, RYGB- 2022-Dr. Chavez-approximately #160 pound weight loss, GERD. Patient identifies 2-3 episodes earlier in the week of fever and chills as high as 101 resolved with tylenol. Day of admission complaints of acute RUQ and epigastric pain radiating across abdomen and thru to his back. Onset approximately 11 am day of admission. Pain was described as sharp with heartburn that was associated with nausea and vomiting #8/10. The patient denies any dysuria, frequency, hematuria however his urine is dark yellow. Had bowel movement this morning without any melena nor constipation or diarrhea. Funmilayo Holt identifies relief currently with Dilaudid. Past Medical History:        Diagnosis Date    CHF (congestive heart failure) (HCC)     Constipation     Diabetes mellitus ([address])     GERD (gastroesophageal reflux disease)     Hypercholesteremia     S/P ICD (internal cardiac defibrillator) procedure: 1/15/2015: Medtronic Single Chamber 1/15/2015    1/15/2015: Medtronic Single Chamber.  Dr. Zaira Martin    Sleep apnea 06/09/2017    Trey Blair applied 8/22/14 8/29/2014    returned prior to ICD insertion 1/15       Past Surgical History:        Procedure Laterality Date    CARDIAC CATHETERIZATION  9/2014 [address]  [id number] Janae

## 2023-10-29 NOTE — PROGRESS NOTES
Hospitalist Progress Note      Patient:  Bhanu Vega    Unit/Bed:7K-24/024-A  YOB: 1975  MRN: [id number]   Acct: [de-identified]   PCP: Rock Ernie MD  Date of Admission: 10/28/2023    Assessment/Plan:    Acute Complicated Choledocholithiasis: General Surgery & GI consulted. MRCP pending PPM device. - CT A/P showed \"Distended gallbladder without associated inflammatory changes to suggest cholecystitis. \"   - General Surgery plans to do surgery Tuesday/Wednesday pending GI & MRCP results.   - NPO with Clear Liquids. Acute Cholangitis: Cultures are pending, Zosyn to continue. Biliary Obstruction: secondary to above, No Jaundice noted at time of admission. GI to evaluate, repeat LFT. Bilirubin increased to 3.8. Nonischemic cardiomyopathy: history: [facility] 2014 patent coronary arteries, AICD placement 2015. 10/17/2023 ECHO LVEF 30-35%, severe global hypokinesis. HFrEF: history, stable, Stage C, Class I, GDMT: Toprol XL/Lasix continued . MIKE: history CPAP stopped 3/2023 monitor. Essential HPTN: history, Toprol XL continued with parameters to hold. RYGB: history 2022 Dr. Raquel Queen, approximately #160 weight loss, family to bring Flinstones Plus Iron multivitamins from home. GERD: history continue PPI  Impaired glucose tolerance: history of DMT2, currently on no antidiabetic medications, monitor. Obesity: BMI 30.8    Chief Complaint: Abdominal Pain     Initial H and P:-    Initial H&P \"Betty Zavala is a 50year old gentlemen presented to [facility] ER 10/28/2023 with complaint of back pain .       Patient has a past medical history significant for lifetime nonsmoker, MIKE-CPAP stopped 3/2023, Nonischemic cardiomyopathy-[facility] 2014 patient coronary arteries, AICD placement-2015, AICD Discharge for VTach 2020, ECHO 10/2023 LVEF 30-35%-severe global hypokinesis, Essential HPTN, RYGB- 2022-Dr. Chavez-approximately #160 pound

## 2023-10-29 NOTE — ED NOTES
Pt and family updated on plan for admission. Pt medicated per MAR. Pt requesting more pain medication, provider made aware.  Mery Laughlin  10/28/23 [id number]

## 2023-10-29 NOTE — PROGRESS NOTES
Before MRI can be completed, will need MRI cardiology form filled out by pt's cardiologist.  Form faxed to [address] [place] [address] [phone]. RN notified.

## 2023-10-29 NOTE — PROGRESS NOTES
Pt admitted to  [facility] via cart/stretcher. Complaints: abdominal pain. IV at the right ACl. IV site free of s/s of infection or infiltration. Vital signs obtained. Assessment and data collection initiated. Two nurse skin assessment performed by Cheri Garcias RN and Patsy RN. Oriented to room. Policies and procedures for  explained. All questions answered with no further questions at this time. Fall prevention and safety brochure discussed with patient. Bed alarm on. Call light in reach.

## 2023-10-29 NOTE — CONSULTS
[facility]  General Surgery Consult Note  Dylan Castillo MD FACS    Pt Name: Shashank Villegas  MRN: [id number]  [facility] [place]: 1975  Date of evaluation: 10/29/2023  Primary Care Physician: Marina Ngo MD  Patient evaluated at the request of Hospitalist    Reason for evaluation: Cholecystitis  IMPRESSIONS:   Calculus cholecystitis  Back pain  Epigastric and right upper quadrant abdominal pain  Elevated liver enzymes  Possible cholangitis  History robotic Ronaldo-en-Y gastric bypass  CHF  Diabetes mellitus  History ICD insertion  does not have any pertinent problems on file. PLANS:   Bowel rest  IV fluid hydration  IV antibiotics  Pain and nausea control  GI consultation in process  MRCP if possible but may not be due to history of ICD insertion. Patient states they are looking into it to see if able to proceed. Discussed with patient the pros and cons of surgical intervention. Discussed robotic, laparoscopic and open techniques in regards to cholecystectomy. Largest concern would be has history of Ronaldo-en-Y gastric bypass and the inability to obtain ERCP the standard way. If concern for choledocholithiasis persists pending MRCP and GI recommendations then next option would be surgical intervention with ERCP intraoperatively by me placing through gastrotomy and then when completed ERCP finished with cholecystectomy. Serial abdominal examinations  A.m. labs  DVT prophylaxis  SUBJECTIVE:   Chief complaint: Abdominal pain    History of present illness: Miriam Jameson is a 71-year-old male who presented to the emergency department last night secondary to epigastric and right upper quadrant abdominal pain associated with back pain. He states they were at the [facility] earlier in the evening when the discomfort developed. It was moderate in severity. He admits having some of the discomfort but not as bad throughout the week prior. Earlier in the week he also had fevers and chills.   Wife states sensory deficits. CN II-XII are grossly intact. EXTREMITIES: no cyanosis, no clubbing, and no edema. LABS:     Recent Labs     10/28/23  1957 10/28/23  2110 10/29/23  0055   WBC 12.4*  --   --    HGB 15.0  --   --    HCT 45.4  --   --      --   --      --  141   K 3.7  --  4.1   CL 99  --  106   CO2 25  --  24   BUN 9  --  8   CREATININE 0.8  --  0.7   MG 2.0  --  2.0   CALCIUM 9.6  --  8.8   AST 99*  --   --    ALT 88*  --   --    BILITOT 2.5*  --   --    BILIDIR 1.9*  --   --    AMYLASE 47  --   --    LIPASE 31.4  --   --    LACTA 1.4  --   --    NITRU  --  NEGATIVE  --    COLORU  --  DK YELLOW*  --    BACTERIA  --  NONE SEEN  --      RADIOLOGY:   I have personally reviewed the following films:    US GALLBLADDER RUQ  Order: [id number]  Status: Final result     Visible to patient: Yes (seen)     Next appt: 03/13/2024 at 09:30 AM in [facility] [place], [place])     0 Result Notes  Details    Reading Physician Reading Date Result Priority   Mart Coreas MD 10/28/2023      Narrative & Impression  US abdomen limited     Comparison: Same day CT of the abdomen and pelvis     Findings:  No intrahepatic biliary ductal dilatation. Common duct measures up to 8   mm. Distended gallbladder with no wall thickening or pericholecystic   fluid. There is sludge layering in the gallbladder lumen as well as a few   small gallstones measuring up to 5 mm. No focal hepatic mass. Visualized portions of the pancreas are normal.     IMPRESSION:  Distended gallbladder with common bile duct enlargement. No wall   thickening or pericholecystic fluid to serve as definitive evidence of   cholecystitis. In the setting of a positive physical exam [place] sign,   cholecystitis can be diagnosed. Correlate for leukocytosis and/ or fever. Surgical consultation recommended.      This document has been electronically signed by: Mart Coreas MD on   10/28/2023 09:57 PM           Specimen Collected: 10/28/23 20:57 EDT Last

## 2023-10-29 NOTE — PLAN OF CARE
Problem: Discharge Planning  Goal: Discharge to home or other facility with appropriate resources  Outcome: Progressing  Flowsheets (Taken 10/29/2023 0052)  Discharge to home or other facility with appropriate resources:   Identify barriers to discharge with patient and caregiver   Arrange for needed discharge resources and transportation as appropriate   Identify discharge learning needs (meds, wound care, etc)     Problem: Pain  Goal: Verbalizes/displays adequate comfort level or baseline comfort level  Outcome: Progressing  Flowsheets (Taken 10/29/2023 0052)  Verbalizes/displays adequate comfort level or baseline comfort level:   Encourage patient to monitor pain and request assistance   Assess pain using appropriate pain scale   Administer analgesics based on type and severity of pain and evaluate response   Implement non-pharmacological measures as appropriate and evaluate response     Problem: Safety - Adult  Goal: Free from fall injury  Outcome: Progressing  Flowsheets (Taken 10/29/2023 0052)  Free From Fall Injury: Instruct family/caregiver on patient safety     Care plan reviewed with patient. Patient verbalized understanding of the plan of care and contribute to goal setting.

## 2023-10-29 NOTE — ED NOTES
Pt updated on POC. IV established, blood work obtained, and pt medicated per MAR. EKG completed and given to provider. Pt swabbed. No further needs expressed at this time.  VSS Theodis Gaucher, [place]  10/28/23 2002

## 2023-10-29 NOTE — CONSULTS
Patient seen evaluated consult dictated patient likely obstructive jaundice possible ascending cholangitis, with significant past history of Ronaldo-en-Y gastric bypass surgery with robotic. ERCP can be done if surgery give us access to the duodenum surgically. In the meanwhile waiting for the MRCP to evaluate for come about and see if there is really a need for ERCP with sphincterotomy and stone extraction.

## 2023-10-29 NOTE — PROGRESS NOTES
Changed Zosyn to 4500mg x 1 per [facility] protocol.   Lyla Valenzuela [facility], BCPS, BCGP  10/28/2023     10:06 PM

## 2023-10-30 ENCOUNTER — APPOINTMENT (OUTPATIENT)
Dept: MRI IMAGING | Age: 48
DRG: 418 | End: 2023-10-30
Payer: COMMERCIAL

## 2023-10-30 LAB
ALBUMIN SERPL BCG-MCNC: 3.5 G/DL (ref 3.5–5.1)
ALP SERPL-CCNC: 214 U/L (ref 38–126)
ALT SERPL W/O P-5'-P-CCNC: 157 U/L (ref 11–66)
ANION GAP SERPL CALC-SCNC: 11 MEQ/L (ref 8–16)
AST SERPL-CCNC: 123 U/L (ref 5–40)
BILIRUB CONJ SERPL-MCNC: 1 MG/DL (ref 0–0.3)
BILIRUB SERPL-MCNC: 1.9 MG/DL (ref 0.3–1.2)
BUN SERPL-MCNC: 6 MG/DL (ref 7–22)
CALCIUM SERPL-MCNC: 8.9 MG/DL (ref 8.5–10.5)
CHLORIDE SERPL-SCNC: 102 MEQ/L (ref 98–111)
CO2 SERPL-SCNC: 26 MEQ/L (ref 23–33)
CREAT SERPL-MCNC: 0.7 MG/DL (ref 0.4–1.2)
DEPRECATED RDW RBC AUTO: 47.2 FL (ref 35–45)
ERYTHROCYTE [DISTWIDTH] IN BLOOD BY AUTOMATED COUNT: 14.3 % (ref 11.5–14.5)
GFR SERPL CREATININE-BSD FRML MDRD: > 60 ML/MIN/1.73M2
GLUCOSE SERPL-MCNC: 83 MG/DL (ref 70–108)
HCT VFR BLD AUTO: 40.8 % (ref 42–52)
HGB BLD-MCNC: 13.3 GM/DL (ref 14–18)
INR PPP: 1.13 (ref 0.85–1.13)
LIPASE SERPL-CCNC: 37 U/L (ref 5.6–51.3)
MCH RBC QN AUTO: 29.4 PG (ref 26–33)
MCHC RBC AUTO-ENTMCNC: 32.6 GM/DL (ref 32.2–35.5)
MCV RBC AUTO: 90.1 FL (ref 80–94)
PLATELET # BLD AUTO: 203 THOU/MM3 (ref 130–400)
PMV BLD AUTO: 10.2 FL (ref 9.4–12.4)
POTASSIUM SERPL-SCNC: 4 MEQ/L (ref 3.5–5.2)
PROT SERPL-MCNC: 6.4 G/DL (ref 6.1–8)
RBC # BLD AUTO: 4.53 MILL/MM3 (ref 4.7–6.1)
SODIUM SERPL-SCNC: 139 MEQ/L (ref 135–145)
WBC # BLD AUTO: 5.6 THOU/MM3 (ref 4.8–10.8)

## 2023-10-30 PROCEDURE — 6360000004 HC RX CONTRAST MEDICATION: Performed by: SURGERY

## 2023-10-30 PROCEDURE — 99232 SBSQ HOSP IP/OBS MODERATE 35: CPT | Performed by: PHYSICIAN ASSISTANT

## 2023-10-30 PROCEDURE — 6360000002 HC RX W HCPCS: Performed by: NURSE PRACTITIONER

## 2023-10-30 PROCEDURE — 6370000000 HC RX 637 (ALT 250 FOR IP): Performed by: NURSE PRACTITIONER

## 2023-10-30 PROCEDURE — 80053 COMPREHEN METABOLIC PANEL: CPT

## 2023-10-30 PROCEDURE — A9579 GAD-BASE MR CONTRAST NOS,1ML: HCPCS | Performed by: SURGERY

## 2023-10-30 PROCEDURE — 36415 COLL VENOUS BLD VENIPUNCTURE: CPT

## 2023-10-30 PROCEDURE — 2580000003 HC RX 258: Performed by: NURSE PRACTITIONER

## 2023-10-30 PROCEDURE — 74183 MRI ABD W/O CNTR FLWD CNTR: CPT

## 2023-10-30 PROCEDURE — 85027 COMPLETE CBC AUTOMATED: CPT

## 2023-10-30 PROCEDURE — 82248 BILIRUBIN DIRECT: CPT

## 2023-10-30 PROCEDURE — 99232 SBSQ HOSP IP/OBS MODERATE 35: CPT | Performed by: SURGERY

## 2023-10-30 PROCEDURE — 85610 PROTHROMBIN TIME: CPT

## 2023-10-30 PROCEDURE — 83690 ASSAY OF LIPASE: CPT

## 2023-10-30 PROCEDURE — 1200000003 HC TELEMETRY R&B

## 2023-10-30 RX ADMIN — PIPERACILLIN AND TAZOBACTAM 3375 MG: 3; .375 INJECTION, POWDER, LYOPHILIZED, FOR SOLUTION INTRAVENOUS at 14:27

## 2023-10-30 RX ADMIN — PANTOPRAZOLE SODIUM 40 MG: 40 TABLET, DELAYED RELEASE ORAL at 05:45

## 2023-10-30 RX ADMIN — PIPERACILLIN AND TAZOBACTAM 3375 MG: 3; .375 INJECTION, POWDER, LYOPHILIZED, FOR SOLUTION INTRAVENOUS at 20:25

## 2023-10-30 RX ADMIN — GABAPENTIN 100 MG: 100 CAPSULE ORAL at 20:21

## 2023-10-30 RX ADMIN — FUROSEMIDE 40 MG: 40 TABLET ORAL at 10:24

## 2023-10-30 RX ADMIN — HYDROMORPHONE HYDROCHLORIDE 0.5 MG: 1 INJECTION, SOLUTION INTRAMUSCULAR; INTRAVENOUS; SUBCUTANEOUS at 05:45

## 2023-10-30 RX ADMIN — HYDROMORPHONE HYDROCHLORIDE 0.5 MG: 1 INJECTION, SOLUTION INTRAMUSCULAR; INTRAVENOUS; SUBCUTANEOUS at 15:44

## 2023-10-30 RX ADMIN — HYDROMORPHONE HYDROCHLORIDE 0.5 MG: 1 INJECTION, SOLUTION INTRAMUSCULAR; INTRAVENOUS; SUBCUTANEOUS at 09:33

## 2023-10-30 RX ADMIN — HYDROMORPHONE HYDROCHLORIDE 0.5 MG: 1 INJECTION, SOLUTION INTRAMUSCULAR; INTRAVENOUS; SUBCUTANEOUS at 18:54

## 2023-10-30 RX ADMIN — GADOTERIDOL 20 ML: 279.3 INJECTION, SOLUTION INTRAVENOUS at 11:51

## 2023-10-30 RX ADMIN — GABAPENTIN 100 MG: 100 CAPSULE ORAL at 14:29

## 2023-10-30 RX ADMIN — SODIUM CHLORIDE, PRESERVATIVE FREE 10 ML: 5 INJECTION INTRAVENOUS at 20:26

## 2023-10-30 RX ADMIN — HYDROMORPHONE HYDROCHLORIDE 0.5 MG: 1 INJECTION, SOLUTION INTRAMUSCULAR; INTRAVENOUS; SUBCUTANEOUS at 12:21

## 2023-10-30 RX ADMIN — FLUTICASONE PROPIONATE 2 SPRAY: 50 SPRAY, METERED NASAL at 08:25

## 2023-10-30 RX ADMIN — GABAPENTIN 100 MG: 100 CAPSULE ORAL at 08:23

## 2023-10-30 RX ADMIN — METOPROLOL SUCCINATE 75 MG: 25 TABLET, FILM COATED, EXTENDED RELEASE ORAL at 08:24

## 2023-10-30 RX ADMIN — ACETAMINOPHEN 650 MG: 325 TABLET ORAL at 10:30

## 2023-10-30 RX ADMIN — ACETAMINOPHEN 650 MG: 325 TABLET ORAL at 19:22

## 2023-10-30 RX ADMIN — HYDROMORPHONE HYDROCHLORIDE 0.5 MG: 1 INJECTION, SOLUTION INTRAMUSCULAR; INTRAVENOUS; SUBCUTANEOUS at 21:55

## 2023-10-30 RX ADMIN — CETIRIZINE HYDROCHLORIDE 10 MG: 10 TABLET, FILM COATED ORAL at 08:23

## 2023-10-30 RX ADMIN — ACETAMINOPHEN 650 MG: 325 TABLET ORAL at 05:45

## 2023-10-30 RX ADMIN — PIPERACILLIN AND TAZOBACTAM 3375 MG: 3; .375 INJECTION, POWDER, LYOPHILIZED, FOR SOLUTION INTRAVENOUS at 05:52

## 2023-10-30 RX ADMIN — ENOXAPARIN SODIUM 30 MG: 100 INJECTION SUBCUTANEOUS at 08:23

## 2023-10-30 RX ADMIN — ASPIRIN 81 MG: 81 TABLET, COATED ORAL at 08:23

## 2023-10-30 RX ADMIN — ROSUVASTATIN 10 MG: 10 TABLET, FILM COATED ORAL at 20:21

## 2023-10-30 ASSESSMENT — PAIN DESCRIPTION - LOCATION
LOCATION: ABDOMEN
LOCATION: ABDOMEN

## 2023-10-30 ASSESSMENT — PAIN SCALES - GENERAL
PAINLEVEL_OUTOF10: 6
PAINLEVEL_OUTOF10: 8
PAINLEVEL_OUTOF10: 7
PAINLEVEL_OUTOF10: 8
PAINLEVEL_OUTOF10: 6
PAINLEVEL_OUTOF10: 7

## 2023-10-30 ASSESSMENT — PAIN SCALES - WONG BAKER

## 2023-10-30 ASSESSMENT — PAIN DESCRIPTION - FREQUENCY
FREQUENCY: CONTINUOUS
FREQUENCY: CONTINUOUS

## 2023-10-30 ASSESSMENT — PAIN DESCRIPTION - ONSET
ONSET: ON-GOING
ONSET: ON-GOING

## 2023-10-30 NOTE — PROGRESS NOTES
[address] [facility], [facility] for Dr. Chhaya Hubbard Surgery Daily Progress Note    Pt Name: Mesha Weinberg Record Number: [id number]  Date of Birth 1975   Today's Date: 10/30/2023    Hospital day # 2     ASSESSMENT   Calculus cholecystitis  Choledocholithiasis  Epigastric and right upper quadrant abdominal pain  Elevated liver enzymes  Possible cholangitis  History robotic Ronaldo-en-Y gastric bypass  CHF  Diabetes mellitus  History ICD insertion  Obesity (BMI 30)   has a past medical history of CHF (congestive heart failure) ([address]), Constipation, Diabetes mellitus ([address]), GERD (gastroesophageal reflux disease), Hypercholesteremia, S/P ICD (internal cardiac defibrillator) procedure: 1/15/2015: Medtronic Single Chamber, Sleep apnea, and Zoll lifevest applied 8/22/14. PLAN   NPO with ice chips, sips and popsicles  MRCP today reviewed. Does demonstrate choledocholithiasis. GI following  Pain & nausea control  Continue zosyn  Up as tolerated  Lovenox for DVT prophylaxis - hold in am  IV fluids  Medical management   LFTs slightly improved. Repeat in am.   Planning robotic cholecystectomy possible open tomorrow/Wednesday. Need to talk with GI to correlate timing to complete ERCP intra-operatively through gastrotomy to remove stone within CBD. SUBJECTIVE   Chief complaint: Epigastric/back pain    Patient was stable overnight. Chart reviewed. VS stable. No fevers. Updated by nursing staff. Overall feels a little better than yesterday. Still has upper back pain. Denies chest discomfort or dyspnea. Nausea comes and goes. (+) belching, flatus. No BM. Tolerating Diet NPO Exceptions are: Sips of Clear Liquids diet. Pain controlled with analgesia. Up with assistance.    CURRENT MEDICATIONS   Scheduled Meds:   piperacillin-tazobactam  3,375 mg IntraVENous Q8H    sodium chloride flush  5-40 mL IntraVENous 2 times per day    enoxaparin  30 mg SubCUTAneous BID    pantoprazole  40 mg encounter was 47 minutes which includes :  Preparing to see the patient( reviewing tests and chart)  Obtaining and reviewing separately obtained history  Performing a medically appropriate examination and evaluation  Ordering medications, tests, or procedures  Counseling and educating the patient/family/caregiver  Care coordination  Referring and communicating with other healthcare professionals  Documenting clinical information in the EHR  Independent interpretation of results and communicating the results to patient and care team  This includes a direct physical exam as well as all the other encounter activities described above. Time may be discontiguous. Time does not include procedures. Please see our orders that were directed and approved by me if there are any new ones for the updated patient care plan. Above discussed and I agree with documentation and orders placed by Mandy Males    See any additional comments if needed below for any other updated orders and plans. -- MRCP able to be completed. Unfortunately MRCP demonstrating choledocholithiasis. N.p.o. with ice chips and popsicles only. Discussed case with Dr. Emy Tam who is in agreement and willing to proceed with ERCP. Plan on surgical intervention tomorrow late afternoon. At that time cholecystectomy along with push ERCP through gastric remnant. N.p.o. at midnight. Consent. Hold [employer]. SCDs. A.m. labs. IV fluid hydration. Pain and nausea control.     Electronically signed by Oscar Hennessy MD on 10/30/23 at 4:21 PM EDT

## 2023-10-30 NOTE — CARE COORDINATION
Case Management Assessment  Initial Evaluation    Date/Time of Evaluation: 10/30/2023 9:20 AM  Assessment Completed by: Tana Felipe RN    If patient is discharged prior to next notation, then this note serves as note for discharge by case management. Patient Name: Hall Babinski                   YOB: 1975  Diagnosis: Acute cholecystitis [K81.0]  Abdominal pain, right upper quadrant [R10.11]  Acute abdominal pain in right upper quadrant [R10.11]  Dilated gallbladder [K82.8]  History of Ronaldo-en-Y gastric bypass [Z98.84]                   Date / Time: 10/28/2023  7:11 PM  Location: [address] [place] [address]     Patient Admission Status: Inpatient   Readmission Risk Low 0-14, Mod 15-19), High > 20: Readmission Risk Score: 9.1    Current PCP: Aleks Ahmadi MD  PCP verified by CM? Yes    Chart Reviewed: Yes      History Provided by: Patient  Patient Orientation: Alert and Oriented    Patient Cognition: Alert    Hospitalization in the last 30 days (Readmission):  No    If yes, Readmission Assessment in  Navigator will be completed. Advance Directives:      Code Status: Full Code   Patient's Primary Decision Maker is: Named in [facility] [place]     Primary Decision Maker: Olga Graves - Spouse - [phone]    Secondary Decision Maker: Virgia Mohs - Parent - [phone]    Discharge Planning:    Patient lives with: Spouse/Significant Other, Children Type of Home: House  Primary Care Giver: Self  Patient Support Systems include: Spouse/Significant Other, Children, Family Members   Current Financial resources: Other (Comment) (Jhoan HARMON)  Current community resources: None  Current services prior to admission: None            Current DME:              Type of Home Care services:  None    ADLS  Prior functional level: Independent in ADLs/IADLs  Current functional level: Independent in ADLs/IADLs    Family can provide assistance at DC:  Yes  Would you like Case Management to discuss the discharge plan changes of gastric bypass without acute post operative sequela identified. 3. Distended gallbladder without associated inflammatory changes to   suggest cholecystitis. 10/30 MRCP: complete, not resulted    The Plan for Transition of Care is related to the following treatment goals of Acute cholecystitis [K81.0]  Abdominal pain, right upper quadrant [R10.11]  Acute abdominal pain in right upper quadrant [R10.11]  Dilated gallbladder [K82.8]  History of Ronaldo-en-Y gastric bypass [Z98.84]    Patient Goals/Plan/Treatment Preferences: Met with Noe. Verified PCP and insurance. He is able to afford his medications. Liya Truong is independent, works full time and actively drives. Plans to return home with his wife and son at discharge. Declines needs [facility]. Transportation/Food Security/Housekeeping Addressed: No issues identified.      Lexie Walker RN  Case Management Department

## 2023-10-30 NOTE — PROGRESS NOTES
Pt. A&Ox4. Speech celar and articulate with hearing intact. Clear S1 and S2 sounds with a regular rhythm. Breath sounds are clear, bronchovesicular and vesicular anteriorly and posteriorly, unlabored with symmetric chest expansion. Grade 5 strength in upper and lower extremities. Active bowel sounds in all 4 quadrants. Pt has tenderness. Abdomen is soft and flat with surgical scar. Pt reports pain and tenderness in right upper quadrant. ----------------- KAMILLA Forrest

## 2023-10-30 NOTE — CONSULTS
[place] [address]                                  CONSULTATION    PATIENT NAME: Herman Allen                  :        1975  MED REC NO:   [id number]                           ROOM:       [id number]  ACCOUNT NO:   [de-identified]                           ADMIT DATE: 10/28/2023  PROVIDER:     PILO Diallo DATE:  10/29/2023    HISTORY OF PRESENT ILLNESS:  The patient is known to practice, had  endoscopy done before bariatric surgery, was admitted for discomfort  which appeared to be obstructive jaundice. The patient is a 55-year-old  male, status post Ronaldo-en-Y gastric bypass surgery done by Dr. Ren Castro. Been having abdominal discomfort intermittent for the last  week. The first time was with fever and chills and diaphoresis,  resolved. Then, a few days again came back, so this is the third  episode and became severe. Epigastric area, right upper quadrant as  well as the back. Admitted to the hospital for evaluation for possible  acute cholecystitis. Being managed and workup at this time with  possible choledocholithiasis. The patient admits to nausea. Vomited. The vomit did not contain any food material as the patient was not  eating. Vomit only. Has improved. Heartburn not bad at all. Appetite  was very poor. Started a little bit better at this time. He had  not been eating. He lost no significant weight since Ronaldo-en-Y gastric  bypass surgery in the past.  His appetite is not good. He has early  satiety. Does not eat out. Bowel movement has been slightly changed in  the color, became lighter, otherwise no change. His urine had become  very dark. His eyes have not changed color. His skin has not changed. He  has no itching. He has no exposure to any patient with gastroenteritis, hepatitis,  or chronic liver disease at this time. No history of transfusion, no  Blood transfusion.  No

## 2023-10-30 NOTE — PROGRESS NOTES
Hospitalist Progress Note      Patient:  Birgit Gamez    Unit/Bed:7K-24/024-A  YOB: 1975  MRN: [id number]   Acct: [de-identified]   PCP: Deya Chaudhary MD  Date of Admission: 10/28/2023    Assessment/Plan:    Acute Complicated Choledocholithiasis: General Surgery & GI consulted. MRCP pending PPM device. - CT A/P showed \"Distended gallbladder without associated inflammatory changes to suggest cholecystitis. \"   - General Surgery plans to do surgery Tuesday/Wednesday pending GI & MRCP results. MRCP pending.    - GI states that they will need surgical access to do ERCP and will need to coordinate with General Surgery due to Ronaldo-en-Y bypass.   - NPO with Clear Liquids for now until MRCP is complete. Acute Cholangitis: Cultures are pending, Zosyn to continue. Biliary Obstruction: secondary to above, No Jaundice noted at time of admission. GI to evaluate, repeat LFT. Bilirubin down trending to 1.0. Nonischemic cardiomyopathy: history: [facility] 2014 patent coronary arteries, AICD placement 2015. 10/17/2023 ECHO LVEF 30-35%, severe global hypokinesis. HFrEF: history, stable, Stage C, Class I, GDMT: Toprol XL/Lasix continued . MIKE: history CPAP stopped 3/2023 monitor. Essential HPTN: history, Toprol XL continued with parameters to hold. RYGB: history 2022 Dr. Isaiah Santos, approximately #160 weight loss, family to bring Flinstones Plus Iron multivitamins from home. GERD: history continue PPI  Impaired glucose tolerance: history of DMT2, currently on no antidiabetic medications, monitor. Obesity: BMI 30.8    Chief Complaint: Abdominal Pain     Initial H and P:-    Initial H&P \"Betty Palmer Captangela is a 50year old gentlemen presented to [facility] ER 10/28/2023 with complaint of back pain .       Patient has a past medical history significant for lifetime nonsmoker, MIKE-CPAP stopped 3/2023, Nonischemic cardiomyopathy-[facility] 2014 patient coronary No growth 24 hours. 10/28/2023 10:20 PM       Blood culture #2:No results found for: \"BLOODCULT2\"    Organism:No results found for: \"ORG\"      Lab Results   Component Value Date/Time    [facility]  08/19/2014 08:20 PM     Quality of sputum specimen: Greater than 10 epithelial  cells/LPF and less than 25 WBC's/LPF; suggestive of  contamination with upper respiratory semaj. Urinalysis:      Lab Results   Component Value Date/Time    NITRU NEGATIVE 10/28/2023 09:10 PM    WBCUA 2-4 10/28/2023 09:10 PM    BACTERIA NONE SEEN 10/28/2023 09:10 PM    RBCUA 0-2 10/28/2023 09:10 PM    BLOODU NEGATIVE 10/28/2023 09:10 PM    SPECGRAV 1.025 09/13/2022 06:40 PM    GLUCOSEU NEGATIVE 10/28/2023 09:10 PM       Radiology:  CT ABDOMEN PELVIS W IV CONTRAST Additional Contrast? None   Final Result   1. No acute finding. 2. Postsurgical changes of gastric bypass without acute post operative    sequela identified. 3. Distended gallbladder without associated inflammatory changes to    suggest cholecystitis. This document has been electronically signed by: Trini Ramos MD on    10/28/2023 09:32 PM      All CTs at this facility use dose modulation techniques and iterative    reconstructions, and/or weight-based dosing   when appropriate to reduce radiation to a low as reasonably achievable. US GALLBLADDER RUQ   Final Result   Distended gallbladder with common bile duct enlargement. No wall    thickening or pericholecystic fluid to serve as definitive evidence of    cholecystitis. In the setting of a positive physical exam [place] sign,    cholecystitis can be diagnosed. Correlate for leukocytosis and/ or fever. Surgical consultation recommended.       This document has been electronically signed by: Trini Ramos MD on    10/28/2023 09:57 PM      MRI ABDOMEN W WO CONTRAST MRCP    (Results Pending)     Electronically signed by BART Otero on 10/30/2023 at 2:14 PM

## 2023-10-31 ENCOUNTER — ANESTHESIA (OUTPATIENT)
Dept: OPERATING ROOM | Age: 48
DRG: 418 | End: 2023-10-31
Payer: COMMERCIAL

## 2023-10-31 ENCOUNTER — PATIENT MESSAGE (OUTPATIENT)
Dept: FAMILY MEDICINE CLINIC | Age: 48
End: 2023-10-31

## 2023-10-31 ENCOUNTER — APPOINTMENT (OUTPATIENT)
Dept: GENERAL RADIOLOGY | Age: 48
DRG: 418 | End: 2023-10-31
Payer: COMMERCIAL

## 2023-10-31 ENCOUNTER — ANESTHESIA EVENT (OUTPATIENT)
Dept: OPERATING ROOM | Age: 48
DRG: 418 | End: 2023-10-31
Payer: COMMERCIAL

## 2023-10-31 PROBLEM — K80.50 CHOLEDOCHOLITHIASIS: Status: ACTIVE | Noted: 2023-10-31

## 2023-10-31 LAB
ALBUMIN SERPL BCG-MCNC: 3.7 G/DL (ref 3.5–5.1)
ALP SERPL-CCNC: 196 U/L (ref 38–126)
ALT SERPL W/O P-5'-P-CCNC: 116 U/L (ref 11–66)
ANION GAP SERPL CALC-SCNC: 10 MEQ/L (ref 8–16)
AST SERPL-CCNC: 59 U/L (ref 5–40)
BILIRUB CONJ SERPL-MCNC: 0.6 MG/DL (ref 0–0.3)
BILIRUB SERPL-MCNC: 1.3 MG/DL (ref 0.3–1.2)
BUN SERPL-MCNC: 8 MG/DL (ref 7–22)
CALCIUM SERPL-MCNC: 9.1 MG/DL (ref 8.5–10.5)
CHLORIDE SERPL-SCNC: 99 MEQ/L (ref 98–111)
CO2 SERPL-SCNC: 28 MEQ/L (ref 23–33)
CREAT SERPL-MCNC: 0.7 MG/DL (ref 0.4–1.2)
DEPRECATED RDW RBC AUTO: 45.3 FL (ref 35–45)
ERYTHROCYTE [DISTWIDTH] IN BLOOD BY AUTOMATED COUNT: 14 % (ref 11.5–14.5)
GFR SERPL CREATININE-BSD FRML MDRD: > 60 ML/MIN/1.73M2
GLUCOSE SERPL-MCNC: 69 MG/DL (ref 70–108)
HCT VFR BLD AUTO: 43.5 % (ref 42–52)
HGB BLD-MCNC: 14.1 GM/DL (ref 14–18)
MCH RBC QN AUTO: 29 PG (ref 26–33)
MCHC RBC AUTO-ENTMCNC: 32.4 GM/DL (ref 32.2–35.5)
MCV RBC AUTO: 89.3 FL (ref 80–94)
PLATELET # BLD AUTO: 224 THOU/MM3 (ref 130–400)
PMV BLD AUTO: 10 FL (ref 9.4–12.4)
POTASSIUM SERPL-SCNC: 3.9 MEQ/L (ref 3.5–5.2)
PROT SERPL-MCNC: 6.8 G/DL (ref 6.1–8)
RBC # BLD AUTO: 4.87 MILL/MM3 (ref 4.7–6.1)
SODIUM SERPL-SCNC: 137 MEQ/L (ref 135–145)
WBC # BLD AUTO: 5.6 THOU/MM3 (ref 4.8–10.8)

## 2023-10-31 PROCEDURE — 2580000003 HC RX 258: Performed by: NURSE PRACTITIONER

## 2023-10-31 PROCEDURE — 6360000002 HC RX W HCPCS: Performed by: NURSE PRACTITIONER

## 2023-10-31 PROCEDURE — 7100000000 HC PACU RECOVERY - FIRST 15 MIN: Performed by: SURGERY

## 2023-10-31 PROCEDURE — 3600000019 HC SURGERY ROBOT ADDTL 15MIN: Performed by: SURGERY

## 2023-10-31 PROCEDURE — 99232 SBSQ HOSP IP/OBS MODERATE 35: CPT | Performed by: PHYSICIAN ASSISTANT

## 2023-10-31 PROCEDURE — 1200000000 HC SEMI PRIVATE

## 2023-10-31 PROCEDURE — 6370000000 HC RX 637 (ALT 250 FOR IP): Performed by: SURGERY

## 2023-10-31 PROCEDURE — S2900 ROBOTIC SURGICAL SYSTEM: HCPCS | Performed by: SURGERY

## 2023-10-31 PROCEDURE — 2580000003 HC RX 258: Performed by: SURGERY

## 2023-10-31 PROCEDURE — 6360000002 HC RX W HCPCS: Performed by: NURSE ANESTHETIST, CERTIFIED REGISTERED

## 2023-10-31 PROCEDURE — 0DQ64ZZ REPAIR STOMACH, PERCUTANEOUS ENDOSCOPIC APPROACH: ICD-10-PCS | Performed by: SURGERY

## 2023-10-31 PROCEDURE — 2720000010 HC SURG SUPPLY STERILE: Performed by: SURGERY

## 2023-10-31 PROCEDURE — 7100000001 HC PACU RECOVERY - ADDTL 15 MIN: Performed by: SURGERY

## 2023-10-31 PROCEDURE — 0FB44ZZ EXCISION OF GALLBLADDER, PERCUTANEOUS ENDOSCOPIC APPROACH: ICD-10-PCS | Performed by: SURGERY

## 2023-10-31 PROCEDURE — 3700000001 HC ADD 15 MINUTES (ANESTHESIA): Performed by: SURGERY

## 2023-10-31 PROCEDURE — 88304 TISSUE EXAM BY PATHOLOGIST: CPT

## 2023-10-31 PROCEDURE — 2709999900 HC NON-CHARGEABLE SUPPLY: Performed by: SURGERY

## 2023-10-31 PROCEDURE — 36415 COLL VENOUS BLD VENIPUNCTURE: CPT

## 2023-10-31 PROCEDURE — 0F798ZZ DILATION OF COMMON BILE DUCT, VIA NATURAL OR ARTIFICIAL OPENING ENDOSCOPIC: ICD-10-PCS | Performed by: INTERNAL MEDICINE

## 2023-10-31 PROCEDURE — C1769 GUIDE WIRE: HCPCS | Performed by: SURGERY

## 2023-10-31 PROCEDURE — 6360000002 HC RX W HCPCS: Performed by: SURGERY

## 2023-10-31 PROCEDURE — 3600000009 HC SURGERY ROBOT BASE: Performed by: SURGERY

## 2023-10-31 PROCEDURE — BF131ZZ FLUOROSCOPY OF GALLBLADDER AND BILE DUCTS USING LOW OSMOLAR CONTRAST: ICD-10-PCS | Performed by: SURGERY

## 2023-10-31 PROCEDURE — 2500000003 HC RX 250 WO HCPCS: Performed by: SURGERY

## 2023-10-31 PROCEDURE — 2500000003 HC RX 250 WO HCPCS: Performed by: NURSE ANESTHETIST, CERTIFIED REGISTERED

## 2023-10-31 PROCEDURE — 3700000000 HC ANESTHESIA ATTENDED CARE: Performed by: SURGERY

## 2023-10-31 PROCEDURE — 6370000000 HC RX 637 (ALT 250 FOR IP): Performed by: NURSE PRACTITIONER

## 2023-10-31 PROCEDURE — 74328 X-RAY BILE DUCT ENDOSCOPY: CPT

## 2023-10-31 PROCEDURE — 2580000003 HC RX 258: Performed by: NURSE ANESTHETIST, CERTIFIED REGISTERED

## 2023-10-31 PROCEDURE — 8E0W4CZ ROBOTIC ASSISTED PROCEDURE OF TRUNK REGION, PERCUTANEOUS ENDOSCOPIC APPROACH: ICD-10-PCS | Performed by: SURGERY

## 2023-10-31 PROCEDURE — 80053 COMPREHEN METABOLIC PANEL: CPT

## 2023-10-31 PROCEDURE — 85027 COMPLETE CBC AUTOMATED: CPT

## 2023-10-31 PROCEDURE — 99233 SBSQ HOSP IP/OBS HIGH 50: CPT | Performed by: PHYSICIAN ASSISTANT

## 2023-10-31 PROCEDURE — 82248 BILIRUBIN DIRECT: CPT

## 2023-10-31 DEVICE — SEALANT TISS 10 ML FIBRIN VISTASEAL: Type: IMPLANTABLE DEVICE | Site: STOMACH | Status: FUNCTIONAL

## 2023-10-31 RX ORDER — DIPHENHYDRAMINE HYDROCHLORIDE 50 MG/ML
12.5 INJECTION INTRAMUSCULAR; INTRAVENOUS
Status: DISCONTINUED | OUTPATIENT
Start: 2023-10-31 | End: 2023-10-31 | Stop reason: HOSPADM

## 2023-10-31 RX ORDER — ACETAMINOPHEN 325 MG/1
650 TABLET ORAL EVERY 4 HOURS PRN
Status: DISCONTINUED | OUTPATIENT
Start: 2023-10-31 | End: 2023-11-01

## 2023-10-31 RX ORDER — FENTANYL CITRATE 50 UG/ML
50 INJECTION, SOLUTION INTRAMUSCULAR; INTRAVENOUS EVERY 5 MIN PRN
Status: DISCONTINUED | OUTPATIENT
Start: 2023-10-31 | End: 2023-10-31 | Stop reason: HOSPADM

## 2023-10-31 RX ORDER — DROPERIDOL 2.5 MG/ML
0.62 INJECTION, SOLUTION INTRAMUSCULAR; INTRAVENOUS
Status: DISCONTINUED | OUTPATIENT
Start: 2023-10-31 | End: 2023-10-31 | Stop reason: HOSPADM

## 2023-10-31 RX ORDER — ONDANSETRON 2 MG/ML
4 INJECTION INTRAMUSCULAR; INTRAVENOUS EVERY 6 HOURS PRN
Status: DISCONTINUED | OUTPATIENT
Start: 2023-10-31 | End: 2023-11-02 | Stop reason: HOSPADM

## 2023-10-31 RX ORDER — ONDANSETRON 2 MG/ML
INJECTION INTRAMUSCULAR; INTRAVENOUS PRN
Status: DISCONTINUED | OUTPATIENT
Start: 2023-10-31 | End: 2023-10-31 | Stop reason: SDUPTHER

## 2023-10-31 RX ORDER — HYDRALAZINE HYDROCHLORIDE 20 MG/ML
10 INJECTION INTRAMUSCULAR; INTRAVENOUS
Status: DISCONTINUED | OUTPATIENT
Start: 2023-10-31 | End: 2023-10-31 | Stop reason: HOSPADM

## 2023-10-31 RX ORDER — LABETALOL HYDROCHLORIDE 5 MG/ML
10 INJECTION INTRAVENOUS
Status: DISCONTINUED | OUTPATIENT
Start: 2023-10-31 | End: 2023-10-31 | Stop reason: HOSPADM

## 2023-10-31 RX ORDER — ONDANSETRON 4 MG/1
4 TABLET, ORALLY DISINTEGRATING ORAL EVERY 8 HOURS PRN
Status: DISCONTINUED | OUTPATIENT
Start: 2023-10-31 | End: 2023-11-02 | Stop reason: HOSPADM

## 2023-10-31 RX ORDER — MIDAZOLAM HYDROCHLORIDE 1 MG/ML
INJECTION INTRAMUSCULAR; INTRAVENOUS PRN
Status: DISCONTINUED | OUTPATIENT
Start: 2023-10-31 | End: 2023-10-31 | Stop reason: SDUPTHER

## 2023-10-31 RX ORDER — SODIUM CHLORIDE 9 MG/ML
INJECTION, SOLUTION INTRAVENOUS CONTINUOUS PRN
Status: DISCONTINUED | OUTPATIENT
Start: 2023-10-31 | End: 2023-10-31 | Stop reason: SDUPTHER

## 2023-10-31 RX ORDER — OXYCODONE HYDROCHLORIDE 5 MG/1
5 TABLET ORAL EVERY 4 HOURS PRN
Status: DISCONTINUED | OUTPATIENT
Start: 2023-10-31 | End: 2023-11-02 | Stop reason: HOSPADM

## 2023-10-31 RX ORDER — SODIUM CHLORIDE 0.9 % (FLUSH) 0.9 %
5-40 SYRINGE (ML) INJECTION PRN
Status: DISCONTINUED | OUTPATIENT
Start: 2023-10-31 | End: 2023-10-31 | Stop reason: HOSPADM

## 2023-10-31 RX ORDER — OXYCODONE HYDROCHLORIDE 5 MG/1
10 TABLET ORAL EVERY 4 HOURS PRN
Status: DISCONTINUED | OUTPATIENT
Start: 2023-10-31 | End: 2023-11-02 | Stop reason: HOSPADM

## 2023-10-31 RX ORDER — BUPIVACAINE HYDROCHLORIDE 5 MG/ML
INJECTION, SOLUTION EPIDURAL; INTRACAUDAL PRN
Status: DISCONTINUED | OUTPATIENT
Start: 2023-10-31 | End: 2023-10-31 | Stop reason: ALTCHOICE

## 2023-10-31 RX ORDER — SODIUM CHLORIDE 9 MG/ML
INJECTION, SOLUTION INTRAVENOUS CONTINUOUS
Status: DISCONTINUED | OUTPATIENT
Start: 2023-10-31 | End: 2023-11-02 | Stop reason: HOSPADM

## 2023-10-31 RX ORDER — PROPOFOL 10 MG/ML
INJECTION, EMULSION INTRAVENOUS PRN
Status: DISCONTINUED | OUTPATIENT
Start: 2023-10-31 | End: 2023-10-31 | Stop reason: SDUPTHER

## 2023-10-31 RX ORDER — INDOCYANINE GREEN AND WATER 25 MG
KIT INJECTION PRN
Status: DISCONTINUED | OUTPATIENT
Start: 2023-10-31 | End: 2023-10-31 | Stop reason: ALTCHOICE

## 2023-10-31 RX ORDER — PHENYLEPHRINE HCL IN 0.9% NACL 1 MG/10 ML
SYRINGE (ML) INTRAVENOUS PRN
Status: DISCONTINUED | OUTPATIENT
Start: 2023-10-31 | End: 2023-10-31 | Stop reason: SDUPTHER

## 2023-10-31 RX ORDER — ONDANSETRON 2 MG/ML
4 INJECTION INTRAMUSCULAR; INTRAVENOUS
Status: DISCONTINUED | OUTPATIENT
Start: 2023-10-31 | End: 2023-10-31 | Stop reason: HOSPADM

## 2023-10-31 RX ORDER — SODIUM CHLORIDE 0.9 % (FLUSH) 0.9 %
5-40 SYRINGE (ML) INJECTION EVERY 12 HOURS SCHEDULED
Status: DISCONTINUED | OUTPATIENT
Start: 2023-10-31 | End: 2023-11-02 | Stop reason: HOSPADM

## 2023-10-31 RX ORDER — MEPERIDINE HYDROCHLORIDE 25 MG/ML
12.5 INJECTION INTRAMUSCULAR; INTRAVENOUS; SUBCUTANEOUS EVERY 5 MIN PRN
Status: DISCONTINUED | OUTPATIENT
Start: 2023-10-31 | End: 2023-10-31 | Stop reason: HOSPADM

## 2023-10-31 RX ORDER — FENTANYL CITRATE 50 UG/ML
INJECTION, SOLUTION INTRAMUSCULAR; INTRAVENOUS PRN
Status: DISCONTINUED | OUTPATIENT
Start: 2023-10-31 | End: 2023-10-31 | Stop reason: SDUPTHER

## 2023-10-31 RX ORDER — FAMOTIDINE 20 MG/1
20 TABLET, FILM COATED ORAL 2 TIMES DAILY
Status: DISCONTINUED | OUTPATIENT
Start: 2023-10-31 | End: 2023-11-02 | Stop reason: HOSPADM

## 2023-10-31 RX ORDER — SODIUM CHLORIDE 0.9 % (FLUSH) 0.9 %
5-40 SYRINGE (ML) INJECTION EVERY 12 HOURS SCHEDULED
Status: DISCONTINUED | OUTPATIENT
Start: 2023-10-31 | End: 2023-10-31 | Stop reason: HOSPADM

## 2023-10-31 RX ORDER — SODIUM CHLORIDE 0.9 % (FLUSH) 0.9 %
5-40 SYRINGE (ML) INJECTION PRN
Status: DISCONTINUED | OUTPATIENT
Start: 2023-10-31 | End: 2023-11-02 | Stop reason: HOSPADM

## 2023-10-31 RX ORDER — DEXAMETHASONE SODIUM PHOSPHATE 10 MG/ML
INJECTION, EMULSION INTRAMUSCULAR; INTRAVENOUS PRN
Status: DISCONTINUED | OUTPATIENT
Start: 2023-10-31 | End: 2023-10-31 | Stop reason: SDUPTHER

## 2023-10-31 RX ORDER — MORPHINE SULFATE 2 MG/ML
2 INJECTION, SOLUTION INTRAMUSCULAR; INTRAVENOUS
Status: DISCONTINUED | OUTPATIENT
Start: 2023-10-31 | End: 2023-11-02 | Stop reason: HOSPADM

## 2023-10-31 RX ORDER — LIDOCAINE HCL/PF 100 MG/5ML
SYRINGE (ML) INJECTION PRN
Status: DISCONTINUED | OUTPATIENT
Start: 2023-10-31 | End: 2023-10-31 | Stop reason: SDUPTHER

## 2023-10-31 RX ORDER — ROCURONIUM BROMIDE 10 MG/ML
INJECTION, SOLUTION INTRAVENOUS PRN
Status: DISCONTINUED | OUTPATIENT
Start: 2023-10-31 | End: 2023-10-31 | Stop reason: SDUPTHER

## 2023-10-31 RX ORDER — MORPHINE SULFATE 2 MG/ML
2 INJECTION, SOLUTION INTRAMUSCULAR; INTRAVENOUS EVERY 5 MIN PRN
Status: DISCONTINUED | OUTPATIENT
Start: 2023-10-31 | End: 2023-10-31 | Stop reason: HOSPADM

## 2023-10-31 RX ORDER — MORPHINE SULFATE 4 MG/ML
4 INJECTION, SOLUTION INTRAMUSCULAR; INTRAVENOUS
Status: DISCONTINUED | OUTPATIENT
Start: 2023-10-31 | End: 2023-11-02 | Stop reason: HOSPADM

## 2023-10-31 RX ORDER — SODIUM CHLORIDE 9 MG/ML
INJECTION, SOLUTION INTRAVENOUS PRN
Status: DISCONTINUED | OUTPATIENT
Start: 2023-10-31 | End: 2023-11-02 | Stop reason: HOSPADM

## 2023-10-31 RX ORDER — HYDROMORPHONE HYDROCHLORIDE 2 MG/ML
INJECTION, SOLUTION INTRAMUSCULAR; INTRAVENOUS; SUBCUTANEOUS PRN
Status: DISCONTINUED | OUTPATIENT
Start: 2023-10-31 | End: 2023-10-31 | Stop reason: SDUPTHER

## 2023-10-31 RX ORDER — OMEPRAZOLE 40 MG/1
CAPSULE, DELAYED RELEASE ORAL
Qty: 90 CAPSULE | Refills: 1 | Status: SHIPPED | OUTPATIENT
Start: 2023-10-31

## 2023-10-31 RX ORDER — LORAZEPAM 2 MG/ML
0.5 INJECTION INTRAMUSCULAR
Status: DISCONTINUED | OUTPATIENT
Start: 2023-10-31 | End: 2023-10-31 | Stop reason: HOSPADM

## 2023-10-31 RX ORDER — SODIUM CHLORIDE 9 MG/ML
INJECTION, SOLUTION INTRAVENOUS PRN
Status: DISCONTINUED | OUTPATIENT
Start: 2023-10-31 | End: 2023-10-31 | Stop reason: HOSPADM

## 2023-10-31 RX ORDER — INDOCYANINE GREEN AND WATER 25 MG
2.5 KIT INJECTION ONCE
Status: CANCELLED | OUTPATIENT
Start: 2023-10-31 | End: 2023-10-31

## 2023-10-31 RX ADMIN — METOPROLOL SUCCINATE 75 MG: 25 TABLET, FILM COATED, EXTENDED RELEASE ORAL at 08:39

## 2023-10-31 RX ADMIN — FAMOTIDINE 20 MG: 20 TABLET, FILM COATED ORAL at 21:43

## 2023-10-31 RX ADMIN — SODIUM CHLORIDE, PRESERVATIVE FREE 7 ML: 5 INJECTION INTRAVENOUS at 08:40

## 2023-10-31 RX ADMIN — HYDROMORPHONE HYDROCHLORIDE 0.5 MG: 1 INJECTION, SOLUTION INTRAMUSCULAR; INTRAVENOUS; SUBCUTANEOUS at 11:30

## 2023-10-31 RX ADMIN — SODIUM CHLORIDE: 9 INJECTION, SOLUTION INTRAVENOUS at 16:04

## 2023-10-31 RX ADMIN — DEXAMETHASONE SODIUM PHOSPHATE 10 MG: 10 INJECTION, EMULSION INTRAMUSCULAR; INTRAVENOUS at 16:12

## 2023-10-31 RX ADMIN — GABAPENTIN 100 MG: 100 CAPSULE ORAL at 21:43

## 2023-10-31 RX ADMIN — ACETAMINOPHEN 650 MG: 325 TABLET ORAL at 04:46

## 2023-10-31 RX ADMIN — CETIRIZINE HYDROCHLORIDE 10 MG: 10 TABLET, FILM COATED ORAL at 08:41

## 2023-10-31 RX ADMIN — MIDAZOLAM 2 MG: 1 INJECTION INTRAMUSCULAR; INTRAVENOUS at 15:48

## 2023-10-31 RX ADMIN — PANTOPRAZOLE SODIUM 40 MG: 40 TABLET, DELAYED RELEASE ORAL at 04:53

## 2023-10-31 RX ADMIN — FENTANYL CITRATE 50 MCG: 50 INJECTION INTRAMUSCULAR; INTRAVENOUS at 16:49

## 2023-10-31 RX ADMIN — ONDANSETRON 4 MG: 2 INJECTION INTRAMUSCULAR; INTRAVENOUS at 18:49

## 2023-10-31 RX ADMIN — FUROSEMIDE 40 MG: 40 TABLET ORAL at 08:38

## 2023-10-31 RX ADMIN — PIPERACILLIN AND TAZOBACTAM 3375 MG: 3; .375 INJECTION, POWDER, LYOPHILIZED, FOR SOLUTION INTRAVENOUS at 04:53

## 2023-10-31 RX ADMIN — Medication 200 MCG: at 17:30

## 2023-10-31 RX ADMIN — Medication 30 MG: at 17:11

## 2023-10-31 RX ADMIN — HYDROMORPHONE HYDROCHLORIDE 0.5 MG: 1 INJECTION, SOLUTION INTRAMUSCULAR; INTRAVENOUS; SUBCUTANEOUS at 08:42

## 2023-10-31 RX ADMIN — FENTANYL CITRATE 50 MCG: 50 INJECTION INTRAMUSCULAR; INTRAVENOUS at 16:32

## 2023-10-31 RX ADMIN — HYDROMORPHONE HYDROCHLORIDE 0.5 MG: 1 INJECTION, SOLUTION INTRAMUSCULAR; INTRAVENOUS; SUBCUTANEOUS at 14:33

## 2023-10-31 RX ADMIN — FENTANYL CITRATE 150 MCG: 50 INJECTION INTRAMUSCULAR; INTRAVENOUS at 15:55

## 2023-10-31 RX ADMIN — SUGAMMADEX 200 MG: 100 INJECTION, SOLUTION INTRAVENOUS at 18:49

## 2023-10-31 RX ADMIN — HYDROMORPHONE HYDROCHLORIDE 0.4 MG: 2 INJECTION INTRAMUSCULAR; INTRAVENOUS; SUBCUTANEOUS at 19:22

## 2023-10-31 RX ADMIN — PROPOFOL 170 MG: 10 INJECTION, EMULSION INTRAVENOUS at 15:55

## 2023-10-31 RX ADMIN — PIPERACILLIN AND TAZOBACTAM 3375 MG: 3; .375 INJECTION, POWDER, LYOPHILIZED, FOR SOLUTION INTRAVENOUS at 16:03

## 2023-10-31 RX ADMIN — HYDROMORPHONE HYDROCHLORIDE 0.5 MG: 1 INJECTION, SOLUTION INTRAMUSCULAR; INTRAVENOUS; SUBCUTANEOUS at 01:00

## 2023-10-31 RX ADMIN — HYDROMORPHONE HYDROCHLORIDE 0.4 MG: 2 INJECTION INTRAMUSCULAR; INTRAVENOUS; SUBCUTANEOUS at 18:55

## 2023-10-31 RX ADMIN — OXYCODONE HYDROCHLORIDE 10 MG: 5 TABLET ORAL at 21:43

## 2023-10-31 RX ADMIN — SODIUM CHLORIDE: 9 INJECTION, SOLUTION INTRAVENOUS at 15:48

## 2023-10-31 RX ADMIN — FLUTICASONE PROPIONATE 2 SPRAY: 50 SPRAY, METERED NASAL at 08:37

## 2023-10-31 RX ADMIN — Medication 80 MG: at 15:55

## 2023-10-31 RX ADMIN — ROCURONIUM BROMIDE 50 MG: 10 INJECTION INTRAVENOUS at 15:55

## 2023-10-31 RX ADMIN — FENTANYL CITRATE 100 MCG: 50 INJECTION INTRAMUSCULAR; INTRAVENOUS at 17:09

## 2023-10-31 RX ADMIN — HYDROMORPHONE HYDROCHLORIDE 0.5 MG: 1 INJECTION, SOLUTION INTRAMUSCULAR; INTRAVENOUS; SUBCUTANEOUS at 04:40

## 2023-10-31 RX ADMIN — GABAPENTIN 100 MG: 100 CAPSULE ORAL at 08:38

## 2023-10-31 ASSESSMENT — PAIN DESCRIPTION - FREQUENCY
FREQUENCY: CONTINUOUS
FREQUENCY: CONTINUOUS

## 2023-10-31 ASSESSMENT — PAIN SCALES - WONG BAKER
WONGBAKER_NUMERICALRESPONSE: 0

## 2023-10-31 ASSESSMENT — PAIN SCALES - GENERAL
PAINLEVEL_OUTOF10: 8
PAINLEVEL_OUTOF10: 3
PAINLEVEL_OUTOF10: 8
PAINLEVEL_OUTOF10: 7

## 2023-10-31 ASSESSMENT — PAIN DESCRIPTION - LOCATION
LOCATION: ABDOMEN;BACK

## 2023-10-31 ASSESSMENT — PAIN DESCRIPTION - ORIENTATION
ORIENTATION: RIGHT

## 2023-10-31 ASSESSMENT — PAIN DESCRIPTION - DESCRIPTORS
DESCRIPTORS: JABBING;SHARP
DESCRIPTORS: JABBING;SHARP
DESCRIPTORS: SHARP;JABBING
DESCRIPTORS: JABBING;SHARP

## 2023-10-31 ASSESSMENT — PAIN DESCRIPTION - PAIN TYPE
TYPE: ACUTE PAIN
TYPE: ACUTE PAIN

## 2023-10-31 ASSESSMENT — PAIN DESCRIPTION - ONSET
ONSET: GRADUAL
ONSET: GRADUAL

## 2023-10-31 NOTE — PLAN OF CARE
Problem: Chronic Conditions and Co-morbidities  Goal: Patient's chronic conditions and co-morbidity symptoms are monitored and maintained or improved  Outcome: Progressing  Flowsheets (Taken 10/30/2023 2014)  Care Plan - Patient's Chronic Conditions and Co-Morbidity Symptoms are Monitored and Maintained or Improved: Monitor and assess patient's chronic conditions and comorbid symptoms for stability, deterioration, or improvement     Problem: Pain  Goal: Verbalizes/displays adequate comfort level or baseline comfort level  Outcome: Progressing  Prn pain medication administered.      Problem: Discharge Planning  Goal: Discharge to home or other facility with appropriate resources  Outcome: Progressing  Flowsheets (Taken 10/30/2023 2014)  Discharge to home or other facility with appropriate resources: Identify barriers to discharge with patient and caregiver

## 2023-10-31 NOTE — PLAN OF CARE
Problem: Discharge Planning  Goal: Discharge to home or other facility with appropriate resources  10/31/2023 1356 by Geremias Sanchez RN  Outcome: Progressing  10/31/2023 1355 by Geremias Sanchez RN  Outcome: Progressing     Problem: Pain  Goal: Verbalizes/displays adequate comfort level or baseline comfort level  10/31/2023 1356 by Geremias Sanchez RN  Outcome: Progressing  10/31/2023 1355 by Geremias Sanchez RN  Outcome: Progressing     Problem: Safety - Adult  Goal: Free from fall injury  10/31/2023 1356 by Geremias Sanchez RN  Outcome: Progressing  10/31/2023 1355 by Geremias Sanchez RN  Outcome: Progressing     Problem: Chronic Conditions and Co-morbidities  Goal: Patient's chronic conditions and co-morbidity symptoms are monitored and maintained or improved  10/31/2023 1356 by Geremias Sanchez RN  Outcome: Progressing  10/31/2023 1355 by Geremias Sanchez RN  Outcome: Progressing

## 2023-10-31 NOTE — ANESTHESIA POSTPROCEDURE EVALUATION
Department of Anesthesiology  Postprocedure Note    Patient: Dana Cote  MRN: [id number]  YOB: 1975  Date of evaluation: 10/31/2023      Procedure Summary     Date: 10/31/23 Room / Location: [address] [place] [address]    Anesthesia Start: 1548 Anesthesia Stop: 1921    Procedures:       ROBOTIC CHOLECYSTECTOMY WITH GASTROSTOMY FORMATION, ERCP, AND GASTROTOMY PRIMARY REPAIR (Abdomen)      ERCP (Abdomen) Diagnosis:       Acute cholecystitis      (Acute cholecystitis [K81.0])    Surgeons: Tamara Powell MD; Anirudh Morejon MD Responsible Provider: Sharee Butcher MD    Anesthesia Type: general ASA Status: 4          Anesthesia Type: No value filed.     Jarod Phase I: Jarod Score: 9    Jarod Phase II:        Anesthesia Post Evaluation    Complications: no

## 2023-10-31 NOTE — BRIEF OP NOTE
Brief Postoperative Note      Patient: Julianna Pace  YOB: 1975  MRN: [id number]    Date of Procedure: 10/31/2023    Pre-Op Diagnosis Codes:     * Acute cholecystitis [K81.0]    Post-Op Diagnosis:  ERCP with sphincterotomy and stone extraction . Procedure(s):  ROBOTIC CHOLECYSTECTOMY POSS ERCP INTEROPERATIVELY(DR. Art Paulino)  ERCP    Surgeon(s):  MD Lukas Melgar MD    Assistant:  * No surgical staff found *    Anesthesia: General    Estimated Blood Loss (mL): none    Complications: None    Specimens:   ID Type Source Tests Collected by Time Destination   A : Gallbladder Tissue Gallbladder SURGICAL PATHOLOGY Lukas Gupta MD 10/31/2023 1538        Implants:  * No implants in log *      Drains: * No LDAs found *    Findings:  ERCP with sphincterotomy and stone extraction .         Electronically signed by Isra Mccabe MD on 10/31/2023 at 6:21 PM

## 2023-10-31 NOTE — TELEPHONE ENCOUNTER
From: Dana Cote  To: Dr. Levon Nuñez: 10/31/2023 12:17 PM EDT  Subject: Refill request     Can you please send refill of omeprazole 40mg to [facility]?  Thank you

## 2023-10-31 NOTE — CARE COORDINATION
10/31/23, 9:00 AM EDT    DISCHARGE ON GOING [facility] day: 3  Location: -24/024-A Reason for admit: Acute cholecystitis [K81.0]  Abdominal pain, right upper quadrant [R10.11]  Acute abdominal pain in right upper quadrant [R10.11]  Dilated gallbladder [K82.8]  History of Ronaldo-en-Y gastric bypass [Z98.84]   Procedure:   10/28 US Gallbladder: Distended gallbladder with common bile duct enlargement. No wall thickening or pericholecystic fluid to serve as definitive evidence of cholecystitis. In the setting of a positive physical exam [employer] sign, cholecystitis can be diagnosed. Correlate for leukocytosis and/ or fever. Surgical consultation recommended. 10/28 CT A/P: No acute finding. 2. Postsurgical changes of gastric bypass without acute post operative sequela identified. 3. Distended gallbladder without associated inflammatory changes to   suggest cholecystitis. 10/30 MRCP: complete, not resulted  Barriers to Discharge: NPO after MN, consent for OR today after 1500. PCP: Rachana Betts MD  Readmission Risk Score: 8.1%  Patient Goals/Plan/Treatment Preferences: plans home with his wife; declined needs or [facility] services.

## 2023-10-31 NOTE — PROGRESS NOTES
[address], APRN-CNP for Dr. Theresa Griffith Surgery Daily Progress Note    Pt Name: Paola Persons Record Number: [id number]  Date of Birth 1975   Today's Date: 10/31/2023    Hospital day # 3     ASSESSMENT   Calculus cholecystitis  Choledocholithiasis  Epigastric and right upper quadrant abdominal pain  Elevated liver enzymes  Possible cholangitis  History robotic Ronaldo-en-Y gastric bypass  CHF  Diabetes mellitus  History ICD insertion  Obesity (BMI 30)   has a past medical history of CHF (congestive heart failure) ([address]), Constipation, Diabetes mellitus ([address]), GERD (gastroesophageal reflux disease), Hypercholesteremia, S/P ICD (internal cardiac defibrillator) procedure: 1/15/2015: Medtronic Single Chamber, Sleep apnea, and Zoll lifevest applied 8/22/14. PLAN   NPO   GI following. Planning ERCP with stone extraction with sphincterotomy today intraoperatively. Pain & nausea control  Continue zosyn  Informed consent   Up as tolerated  Lovenox for DVT prophylaxis - hold this morning  IV fluids  Medical management   LFTs slightly improved. Repeat in am.   Planning robotic cholecystectomy with intraoperatively ERCP with GI today. Surgical intervention discussed again with patient/wife. Questions answered. SUBJECTIVE   Chief complaint: Epigastric/back pain    Patient was stable overnight. Chart reviewed. VS stable. No fevers. Updated by nursing staff. Still has back pain. Dilaudid still working for pain. Denies chest discomfort or dyspnea. No nausea or vomiting. (+) belching, flatus. No BM. Tolerating Diet NPO Exceptions are: Sips of Clear Liquids, Ice Chips, Sips of Water with Meds, Popsicles diet. Feels hungry. Up with assistance.    CURRENT MEDICATIONS   Scheduled Meds:   piperacillin-tazobactam  3,375 mg IntraVENous 30 Min Pre-Op    piperacillin-tazobactam  3,375 mg IntraVENous Q8H    sodium chloride flush  5-40 mL IntraVENous 2 times per day    [Held by 3336-9141 24 Hour Total   INTAKE   I.V.(mL/kg/hr)  7  7   Shift Total(mL/kg)  7(0.1)  7(0.1)   OUTPUT   Shift Total(mL/kg)       Weight (kg) 112.1 112.1 112.1 112.1       LABS     Recent Labs     10/28/23  1957 10/29/23  0055 10/29/23  0646 10/30/23  0545 10/31/23  0449   WBC 12.4*  --  7.3 5.6 5.6   HGB 15.0  --  13.2* 13.3* 14.1   HCT 45.4  --  41.2* 40.8* 43.5     --  201 203 224    141 143 139 137   K 3.7 4.1 4.0 4.0 3.9   CL 99 106 108 102 99   CO2 25 24 25 26 28   BUN 9 8 8 6* 8   CREATININE 0.8 0.7 0.7 0.7 0.7   MG 2.0 2.0  --   --   --    CALCIUM 9.6 8.8 8.8 8.9 9.1        Recent Labs     10/29/23  0646 10/30/23  0545   INR 1.17* 1.13       Recent Labs     10/28/23  1957 10/29/23  0646 10/30/23  0545 10/31/23  0449   AST 99* 182* 123* 59*   ALT 88* 144* 157* 116*   BILITOT 2.5* 3.8* 1.9* 1.3*   BILIDIR 1.9*  --  1.0* 0.6*   AMYLASE 47  --   --   --    LIPASE 31.4  --  37.0  --    LACTA 1.4  --   --   --        No results for input(s): \"TROPONINT\" in the last 72 hours. RADIOLOGY   MRI ABDOMEN W WO CONTRAST MRCP  Order: [id number]  Status: Final result     Visible to patient: Yes (seen)     Next appt: 03/13/2024 at 09:30 AM in [address] [place] [address],[address] [place], [place])     0 Result Notes  Details    Reading Physician Reading Date Result Priority   Leonard Vega MD  [phone] 10/30/2023      Narrative & Impression  PROCEDURE: MRI ABDOMEN W WO CONTRAST MRCP     CLINICAL INFORMATION: Elevated liver enzymes, cholecystitis, gallbladder sludge, possible cholangitis     COMPARISON: Gallbladder ultrasound 10/28/2023, CT abdomen 10/28/2023     TECHNIQUE: Multiplanar and multisequence MRI abdomen without and with IV contrast.  Routine MRCP was also performed. The MRCP examination includes 3D reconstructions of the biliary tree and the pancreatic duct. CONTRAST: 20  mL of ProHance  intravenously. FINDINGS:      LOWER THORAX: No significant lower thoracic findings.      HEPATOBILIARY: No focal

## 2023-10-31 NOTE — PROGRESS NOTES
Physician Progress Note      PATIENT:               Yuval Saini  CSN #:                  [id number]  :                       1975  ADMIT DATE:       10/28/2023 7:11 PM  DISCH DATE:  Erika Burgos  PROVIDER #:        Emil ARRIAGA          QUERY TEXT:    Patient admitted with Acute Complicated Choledocholithiasis: with   cholecystitis and biliary obstruction. Documentation reflects 10-28 H/P: SIRS   (3/4) qSOFA (1/3) Lactic Acid 1.4 PCT 0.43 CRP 4.31 Sepsis with organ   Dysfunction. If possible, please document in the progress notes and discharge   summary if Sepsis was: The medical record reflects the following:  Risk Factors: Acute Complicated Choledocholithiasis: with cholecystitis and   biliary obstruction. Clinical Indicators: 10-28 H/P: SIRS (3/4) qSOFA (1/3) Lactic Acid 1.4 PCT   0.43 CRP 4.31 Sepsis with organ Dysfunction  Treatment: GI and GS consult, Zosyn has been started. General Surgery plans   to do surgery Tuesday/Wednesday pending GI & MRCP results. Thank you. Joanna Armas RN, Clinical Documentation Integrity, [employer], [facility]), CRCR  Options provided:  -- Sepsis confirmed after study and was present on admission  -- Sepsis treated and resolved  -- No Sepsis, localized infection only  -- Sepsis was ruled out  -- Other - I will add my own diagnosis  -- Disagree - Not applicable / Not valid  -- Disagree - Clinically unable to determine / Unknown  -- Refer to Clinical Documentation Reviewer    PROVIDER RESPONSE TEXT:    This patient has localized infection only, patient is not septic.     Query created by: Maris Romero on 10/31/2023 11:08 AM      Electronically signed by:  Yu ARRIAGA 10/31/2023 12:16 PM

## 2023-10-31 NOTE — PROGRESS NOTES
1915: pt arrives to [facility]. Pt on room air. Pt responds to verbal stimulation. VSS. Respirations unlabored. X6 sites on abdomen. CRNA gave dilaudid at bedside  1923: pt snoring in bed  1931: [facility]: pt meets discharge criteria from [facility]  1947: transport arrives. Pt transported to [address] [place] [address].  No family in waiting room

## 2023-10-31 NOTE — BRIEF OP NOTE
Brief Postoperative Note      Patient: Julianna Pace  YOB: 1975  MRN: [id number]    Date of Procedure: 10/31/2023    Preoperative diagnosis:  1. Acute calculus cholecystitis  2. Choledocholithiasis  3. History Ronaldo-en-Y gastric bypass    Post-Op Diagnosis: Same       Procedure(s):  ROBOTIC CHOLECYSTECTOMY WITH GASTROSTOMY FORMATION, ERCP, AND GASTROTOMY PRIMARY REPAIR  ERCP    Surgeon(s):  MD Lukas Melgar MD    Assistant:  * No surgical staff found *    Anesthesia: General/local    Estimated Blood Loss (mL): 40QO    Complications: None    Specimens:   ID Type Source Tests Collected by Time Destination   A : Gallbladder Tissue Gallbladder SURGICAL PATHOLOGY Lukas Gupta MD 10/31/2023 1538        Implants:  Implant Name Type Inv.  Item Serial No.  Lot No. LRB No. Used Action   SEALANT TISS 10 ML FIBRIN VISTASEAL - [id number]  SEALANT TISS 10 ML FIBRIN VISTASEAL  [facility] [id number] N/A 1 Implanted         Drains: * No LDAs found *    Findings: as above - see op note for details      Electronically signed by Lukas Gupta MD on 10/31/2023 at 6:47 PM

## 2023-11-01 LAB
ALBUMIN SERPL BCG-MCNC: 3.8 G/DL (ref 3.5–5.1)
ALP SERPL-CCNC: 174 U/L (ref 38–126)
ALT SERPL W/O P-5'-P-CCNC: 92 U/L (ref 11–66)
ANION GAP SERPL CALC-SCNC: 15 MEQ/L (ref 8–16)
AST SERPL-CCNC: 46 U/L (ref 5–40)
BILIRUB SERPL-MCNC: 1.1 MG/DL (ref 0.3–1.2)
BUN SERPL-MCNC: 14 MG/DL (ref 7–22)
CALCIUM SERPL-MCNC: 9.3 MG/DL (ref 8.5–10.5)
CHLORIDE SERPL-SCNC: 99 MEQ/L (ref 98–111)
CO2 SERPL-SCNC: 22 MEQ/L (ref 23–33)
CREAT SERPL-MCNC: 0.9 MG/DL (ref 0.4–1.2)
DEPRECATED RDW RBC AUTO: 44 FL (ref 35–45)
ERYTHROCYTE [DISTWIDTH] IN BLOOD BY AUTOMATED COUNT: 13.5 % (ref 11.5–14.5)
GFR SERPL CREATININE-BSD FRML MDRD: > 60 ML/MIN/1.73M2
GLUCOSE SERPL-MCNC: 191 MG/DL (ref 70–108)
HCT VFR BLD AUTO: 45.3 % (ref 42–52)
HGB BLD-MCNC: 15 GM/DL (ref 14–18)
LIPASE SERPL-CCNC: 130.3 U/L (ref 5.6–51.3)
MCH RBC QN AUTO: 29.1 PG (ref 26–33)
MCHC RBC AUTO-ENTMCNC: 33.1 GM/DL (ref 32.2–35.5)
MCV RBC AUTO: 88 FL (ref 80–94)
PLATELET # BLD AUTO: 275 THOU/MM3 (ref 130–400)
PMV BLD AUTO: 9.8 FL (ref 9.4–12.4)
POTASSIUM SERPL-SCNC: 4.3 MEQ/L (ref 3.5–5.2)
PROT SERPL-MCNC: 7.1 G/DL (ref 6.1–8)
RBC # BLD AUTO: 5.15 MILL/MM3 (ref 4.7–6.1)
REASON FOR REJECTION: NORMAL
REJECTED TEST: NORMAL
SODIUM SERPL-SCNC: 136 MEQ/L (ref 135–145)
WBC # BLD AUTO: 8.6 THOU/MM3 (ref 4.8–10.8)

## 2023-11-01 PROCEDURE — APPSS30 APP SPLIT SHARED TIME 16-30 MINUTES: Performed by: NURSE PRACTITIONER

## 2023-11-01 PROCEDURE — 99232 SBSQ HOSP IP/OBS MODERATE 35: CPT | Performed by: INTERNAL MEDICINE

## 2023-11-01 PROCEDURE — 85027 COMPLETE CBC AUTOMATED: CPT

## 2023-11-01 PROCEDURE — 6370000000 HC RX 637 (ALT 250 FOR IP): Performed by: SURGERY

## 2023-11-01 PROCEDURE — 83690 ASSAY OF LIPASE: CPT

## 2023-11-01 PROCEDURE — 2580000003 HC RX 258: Performed by: SURGERY

## 2023-11-01 PROCEDURE — 80053 COMPREHEN METABOLIC PANEL: CPT

## 2023-11-01 PROCEDURE — 6360000002 HC RX W HCPCS: Performed by: SURGERY

## 2023-11-01 PROCEDURE — 99024 POSTOP FOLLOW-UP VISIT: CPT | Performed by: NURSE PRACTITIONER

## 2023-11-01 PROCEDURE — 1200000000 HC SEMI PRIVATE

## 2023-11-01 PROCEDURE — 36415 COLL VENOUS BLD VENIPUNCTURE: CPT

## 2023-11-01 RX ORDER — ACETAMINOPHEN 650 MG/1
650 SUPPOSITORY RECTAL EVERY 6 HOURS PRN
Status: DISCONTINUED | OUTPATIENT
Start: 2023-11-01 | End: 2023-11-02 | Stop reason: HOSPADM

## 2023-11-01 RX ORDER — ACETAMINOPHEN 325 MG/1
650 TABLET ORAL EVERY 4 HOURS PRN
Status: DISCONTINUED | OUTPATIENT
Start: 2023-11-01 | End: 2023-11-02 | Stop reason: HOSPADM

## 2023-11-01 RX ADMIN — ROSUVASTATIN 10 MG: 10 TABLET, FILM COATED ORAL at 20:15

## 2023-11-01 RX ADMIN — OXYCODONE HYDROCHLORIDE 10 MG: 5 TABLET ORAL at 03:27

## 2023-11-01 RX ADMIN — CETIRIZINE HYDROCHLORIDE 10 MG: 10 TABLET, FILM COATED ORAL at 07:53

## 2023-11-01 RX ADMIN — MORPHINE SULFATE 4 MG: 4 INJECTION, SOLUTION INTRAMUSCULAR; INTRAVENOUS at 00:52

## 2023-11-01 RX ADMIN — FAMOTIDINE 20 MG: 20 TABLET, FILM COATED ORAL at 07:50

## 2023-11-01 RX ADMIN — GABAPENTIN 100 MG: 100 CAPSULE ORAL at 07:50

## 2023-11-01 RX ADMIN — PIPERACILLIN AND TAZOBACTAM 3375 MG: 3; .375 INJECTION, POWDER, LYOPHILIZED, FOR SOLUTION INTRAVENOUS at 00:49

## 2023-11-01 RX ADMIN — OXYCODONE HYDROCHLORIDE 10 MG: 5 TABLET ORAL at 14:42

## 2023-11-01 RX ADMIN — OXYCODONE HYDROCHLORIDE 10 MG: 5 TABLET ORAL at 07:50

## 2023-11-01 RX ADMIN — OXYCODONE HYDROCHLORIDE 10 MG: 5 TABLET ORAL at 18:51

## 2023-11-01 RX ADMIN — OXYCODONE HYDROCHLORIDE 10 MG: 5 TABLET ORAL at 23:06

## 2023-11-01 RX ADMIN — PANTOPRAZOLE SODIUM 40 MG: 40 TABLET, DELAYED RELEASE ORAL at 06:18

## 2023-11-01 RX ADMIN — FLUTICASONE PROPIONATE 2 SPRAY: 50 SPRAY, METERED NASAL at 16:43

## 2023-11-01 RX ADMIN — MORPHINE SULFATE 4 MG: 4 INJECTION, SOLUTION INTRAMUSCULAR; INTRAVENOUS at 11:17

## 2023-11-01 RX ADMIN — PIPERACILLIN AND TAZOBACTAM 3375 MG: 3; .375 INJECTION, POWDER, LYOPHILIZED, FOR SOLUTION INTRAVENOUS at 07:54

## 2023-11-01 RX ADMIN — POLYETHYLENE GLYCOL 3350 17 G: 17 POWDER, FOR SOLUTION ORAL at 11:17

## 2023-11-01 RX ADMIN — GABAPENTIN 100 MG: 100 CAPSULE ORAL at 20:16

## 2023-11-01 RX ADMIN — ASPIRIN 81 MG: 81 TABLET, COATED ORAL at 07:51

## 2023-11-01 RX ADMIN — PIPERACILLIN AND TAZOBACTAM 3375 MG: 3; .375 INJECTION, POWDER, LYOPHILIZED, FOR SOLUTION INTRAVENOUS at 16:34

## 2023-11-01 RX ADMIN — FUROSEMIDE 40 MG: 40 TABLET ORAL at 07:50

## 2023-11-01 RX ADMIN — FAMOTIDINE 20 MG: 20 TABLET, FILM COATED ORAL at 20:13

## 2023-11-01 RX ADMIN — METOPROLOL SUCCINATE 75 MG: 25 TABLET, FILM COATED, EXTENDED RELEASE ORAL at 07:50

## 2023-11-01 RX ADMIN — GABAPENTIN 100 MG: 100 CAPSULE ORAL at 13:51

## 2023-11-01 ASSESSMENT — PAIN DESCRIPTION - PAIN TYPE: TYPE: ACUTE PAIN

## 2023-11-01 ASSESSMENT — PAIN SCALES - GENERAL
PAINLEVEL_OUTOF10: 8
PAINLEVEL_OUTOF10: 7
PAINLEVEL_OUTOF10: 8
PAINLEVEL_OUTOF10: 5
PAINLEVEL_OUTOF10: 5
PAINLEVEL_OUTOF10: 8
PAINLEVEL_OUTOF10: 3
PAINLEVEL_OUTOF10: 5

## 2023-11-01 ASSESSMENT — PAIN DESCRIPTION - LOCATION
LOCATION: ABDOMEN

## 2023-11-01 ASSESSMENT — PAIN DESCRIPTION - DESCRIPTORS
DESCRIPTORS: SORE
DESCRIPTORS: ACHING
DESCRIPTORS: ACHING;DISCOMFORT
DESCRIPTORS: ACHING;DULL

## 2023-11-01 ASSESSMENT — PAIN DESCRIPTION - ORIENTATION
ORIENTATION: RIGHT;LEFT
ORIENTATION: MID
ORIENTATION: RIGHT
ORIENTATION: RIGHT
ORIENTATION: MID

## 2023-11-01 ASSESSMENT — PAIN - FUNCTIONAL ASSESSMENT
PAIN_FUNCTIONAL_ASSESSMENT: PREVENTS OR INTERFERES SOME ACTIVE ACTIVITIES AND ADLS
PAIN_FUNCTIONAL_ASSESSMENT: ACTIVITIES ARE NOT PREVENTED

## 2023-11-01 NOTE — PROGRESS NOTES
Patient complaining of pain to incision at this time. Patient rated his pain a 7 on a 1-10 scale. Medication given at this time to help relieve pain. Patient resting in bed after receiving medication in semi fowlers position. Visiting with company at this time.

## 2023-11-01 NOTE — PROGRESS NOTES
Patient complaining of pain to incision at this time. Rated pain a 7 on 1-10 scale. Medication administered at this time to reduce pain.

## 2023-11-01 NOTE — OP NOTE
[place] [address]                                OPERATIVE REPORT    PATIENT NAME: Renetta Perdomo                  :        1975  MED REC NO:   [id number]                           ROOM:       0024  ACCOUNT NO:   [de-identified]                           ADMIT DATE: 10/28/2023  PROVIDER:     Jenn Rivera M.D.    Rjarjosue Fitting OF PROCEDURE:  10/31/2023    PREOPERATIVE DIAGNOSES:  1. Acute calculous cholecystitis. 2.  Choledocholithiasis. 3.  History of Ronaldo-en-Y gastric bypass. POSTOPERATIVE DIAGNOSES:  1. Acute calculous cholecystitis. 2.  Choledocholithiasis. 3.  History of Ronaldo-en-Y gastric bypass. PROCEDURES:  Robotic laparoscopic cholecystectomy with gastrostomy  formation, ERCP, and primary repair of gastrotomy. SURGEON:  Jenn Rivera MD    ANESTHESIA:  General/local.    ESTIMATED BLOOD LOSS:  20 mL. DRAINS:  None. COMPLICATIONS:  None. DISPOSITION:  Stable to the recovery room. INDICATIONS:  The patient is a 42-year-old male, who presented to the  hospital secondary to gallbladder disease. Found to have acute  calculous cholecystitis. Also, found to have choledocholithiasis on  MRCP. Was in need of gallbladder removal and ERCP. History of  Ronaldo-en-Y gastric bypass. Both operative and nonoperative intervention  plans were discussed. Risks of surgery were further discussed. Some of  the risks included but were not limited to bleeding, infection, the need  for reoperation, severe chronic postoperative pain or numbness, major  vascular or nerve injury, cardiopulmonary complications, anesthetic  complications, seroma-hematoma formation, wound breakdown, trocar site  herniation, bile leak, bile duct injury, biloma formation, chronic pain,  and death.   After all of the questions were answered in their entirety  and the patient was completely aware of the current situation, he  elected to

## 2023-11-01 NOTE — OP NOTE
[place] [address]                                OPERATIVE REPORT    PATIENT NAME: Guy Ramirez                  :        1975  MED REC NO:   [id number]                           ROOM:       0024  ACCOUNT NO:   [de-identified]                           ADMIT DATE: 10/28/2023  PROVIDER:     Soila Tapia M.D.    DATE OF PROCEDURE:  10/31/2023    INDICATIONS:  The patient with history of cholelithiasis, with past  history of Ronaldo-en-y gastric bypass surgery done robotically. Plan  today for ERCP with sphincterotomy, stone extraction. PROCEDURE PERFORMED:  Procedure was performed in cooperation with Dr. Vielka Samuel. The patient was in the OR. Because of history of  Ronaldo-en-y gastric bypass surgery, we have no access to the duodenum, so  Dr. Sherren Ferry in the OR put a trocar on the left side of the abdomen  through the stomach and able to give me an access to the duodenum. By  the time I saw the patient, he was status post cholecystectomy. The procedure was performed in the OR. I used carbon dioxide only  For  insufflation in this procedure because the patient was post operative. Consent orally obtained previously from the patient for ERCP with  sphincterotomy, stone extraction. The risks of procedure were explained  to the patient. Informed consent was obtained. The patient on the supine position. ERCP scope was advanced with no  difficulty through the trocar provided by Dr. Sherren Ferry. Entered the  main stomach lumen. I then advanced to the duodenum. The papilla was  identified, which appeared to be smaller than average. No bile was  flowing off the patella, possibly because of the stone. Selective  cannulation achieved without difficulty. The procedure was monitored by  fluoroscopy with Radiology Service.   After the papilla was cannulated   Guidewire introduced in the biliary tree contrast injected. A stone was   seen floating free inside the CBD. Guidewire introduced into the biliary   tree. Sphincterotomy performed in the usual fashion up to 9 mm after cut   duct sphincter basically, bile was seen to flow freely. Then, the sphincter   was removed and replaced that with balloon with maximum dimension of 15 mm. Swept the common bile duct with a minimum size of the balloon basically at   12 mm multiple times. One stone seen only. Obstructive  cholangiogram done   showed normal multiple stone. Then guidewire and the balloon were withdrawn,   and the scope was withdrawn with no difficulty. IMPRESSION:  1. ERCP performed in the main OR. Access to the duodenum was done  through the trocar provided by surgical intervention with Dr. Vielka Samuel. 2.  The papilla appeared to be normal.  3.  Cannulation of the common bile duct achieved with no difficulty. The   Pancreas was not cannulated. 4.  Contrast was injected showed stone in the bile duct. Sphincterotomy  performed in the usual fashion. 5.  The common bile duct swept with balloon with maximum dimension of 15  mm, I used 12 mm size, swept the common bile duct multiple times, one  stone was seen only. PLAN:  Monitor the patient closely post ERCP. Lab can be repeated  tomorrow to evaluate. That will be addressed by the Surgery Service.         Soila Tapia M.D.    D: 10/31/2023 18:44:09       T: 11/01/2023 1:41:10     [id number]  Job#: [id number]     Doc#: [id number]    CC:  Agustina Locke M.D.

## 2023-11-01 NOTE — PLAN OF CARE
Gabapentin given with student nurse, Celester Reasons around 1119.      [facility], Student Nurse modified independent

## 2023-11-01 NOTE — CARE COORDINATION
11/1/23, 7:11 AM EDT    DISCHARGE ON GOING [facility] day: 4  Location: [facility]24/024-A Reason for admit: Acute cholecystitis [K81.0]  Abdominal pain, right upper quadrant [R10.11]  Acute abdominal pain in right upper quadrant [R10.11]  Dilated gallbladder [K82.8]  History of Ronaldo-en-Y gastric bypass [Z98.84]   Procedure:   10/31 Robotic laparoscopic cholecystectomy with gastrostomy formation, ERCP, and primary repair of gastrotomy. Barriers to Discharge: POD 1, clear liquids. IVF 75/hr. Pepcid IV. Zosyn IV every 8 hrs. PCP: Ernestine Mendoza MD  Readmission Risk Score: 8.1%  Patient Goals/Plan/Treatment Preferences: plans home with his wife; declined needs or [facility] services.

## 2023-11-01 NOTE — PROGRESS NOTES
Patient A&O x4. Speech is clear and appropriate. Patient able to follow commands. Hand grasp is strong and equal bilaterally. No numbness or tingling in upper extremities. Skin turgor and capillary refill <3 sec. No arm drift noted. IV in right AC patent with no sign of irritation. Heart rate regular. Lung sounds clear bilaterally. Chest movement is symmetrical. Dressing to incisions on abdomen are clean, dry, and intact. Bowel sounds are active in all four quadrants. Pedal push and pull strong and equal bilaterally. No edema present. Patient pleasant with no concerns voiced at this time. Call light and belongings within reach.

## 2023-11-01 NOTE — PLAN OF CARE
Problem: Discharge Planning  Goal: Discharge to home or other facility with appropriate resources  Recent Flowsheet Documentation  Taken 10/31/2023 2000 by Wesley Billy RN  Discharge to home or other facility with appropriate resources: Identify barriers to discharge with patient and caregiver     Problem: Chronic Conditions and Co-morbidities  Goal: Patient's chronic conditions and co-morbidity symptoms are monitored and maintained or improved  Recent Flowsheet Documentation  Taken 10/31/2023 2000 by Wesley Billy RN  Care Plan - Patient's Chronic Conditions and Co-Morbidity Symptoms are Monitored and Maintained or Improved: Monitor and assess patient's chronic conditions and comorbid symptoms for stability, deterioration, or improvement   Care plan reviewed with patient. Patient  verbalize understanding of the plan of care and contribute to goal setting.

## 2023-11-01 NOTE — PROGRESS NOTES
[address] [facility], APRN-CNP for Dr. Sheryl Rao Surgery Daily Progress Note    Pt Name: Noah Baca Record Number: [id number]  Date of Birth 1975   Today's Date: 11/1/2023    Hospital day # 4     ASSESSMENT   Calculus cholecystitis - POD #1 Status post robotic cholecystectomy with gastrostomy formation for ERCP and gastrotomy primary repair  Choledocholithiasis - POD #1 Status post ERCP with sphincterotomy with stone extraction  Epigastric and right upper quadrant abdominal pain - resolved   Elevated liver enzymes - improving   History robotic Ronaldo-en-Y gastric bypass  CHF  Diabetes mellitus  History ICD insertion  Obesity (BMI 30)   has a past medical history of CHF (congestive heart failure) ([address]), Constipation, Diabetes mellitus ([address]), GERD (gastroesophageal reflux disease), Hypercholesteremia, S/P ICD (internal cardiac defibrillator) procedure: 1/15/2015: Medtronic Single Chamber, Sleep apnea, and Zoll lifevest applied 8/22/14. PLAN   Increase slowly as tolerated  GI following  Pain & nausea control  Continue zosyn  Up as tolerated  Lovenox for DVT prophylaxis - will hold today secondary to ERCP  Keep IV fluids for today  Medical management   Labs reviewed. Lipase 130 which is not unexpected s/p ERCP. LFTs improving. Looks good. Increase diet slowly as tolerated and hopefully home tomorrow if still doing well. SUBJECTIVE   Chief complaint: Incisional pain     Patient was stable overnight. Chart reviewed. VS stable. No fevers. Updated by nursing staff. Back pain has improved. Complains of right shoulder gas pain now and incisional pain. Denies chest discomfort or dyspnea. No nausea or vomiting. (+) belching, but no flatus or BM. Tolerated full liquids. Up ad geovanny. Urine lighter today. Incisions all clean, dry and intact.    CURRENT MEDICATIONS   Scheduled Meds:   piperacillin-tazobactam  3,375 mg IntraVENous 30 Min Pre-Op    sodium chloride flush  5-40

## 2023-11-01 NOTE — PROGRESS NOTES
Gastroenterology  Progress Note    11/1/2023 5:00 PM  Subjective:   Admit Date: 10/28/2023    Interval History: Patient presented with cholecystitis and Rebecca choledocholithiasis, status post cholecystectomy robotically done by Dr. Norberto Libman yesterday with gastrostomy formation nonsurgically which I used for ERCP and sphincterotomy and stone extractions postprocedure the primary gastrostomy site was repaired surgically patient doing fine today actually was eating solid food at the time evaluations his liver function improve his pain has improved. We will monitor liver function test closely and follow as an outpatient to make sure that they have normalized. Diet: ADULT DIET;  Dysphagia - Soft and Bite Sized    Medications:   Scheduled Meds:    piperacillin-tazobactam  3,375 mg IntraVENous 30 Min Pre-Op    sodium chloride flush  5-40 mL IntraVENous 2 times per day    famotidine  20 mg Oral BID    piperacillin-tazobactam  3,375 mg IntraVENous Q8H    [Held by provider] enoxaparin  30 mg SubCUTAneous BID    pantoprazole  40 mg Oral QAM AC    aspirin  81 mg Oral Daily    fluticasone  2 spray Nasal Daily    furosemide  40 mg Oral QAM    gabapentin  100 mg Oral TID    cetirizine  10 mg Oral Daily    metoprolol succinate  75 mg Oral Daily    rosuvastatin  10 mg Oral Nightly     Continuous Infusions:    sodium chloride      sodium chloride 75 mL/hr at 10/31/23 2144       CBC:   Recent Labs     10/30/23  0545 10/31/23  0449 11/01/23  0544   WBC 5.6 5.6 8.6   HGB 13.3* 14.1 15.0    224 275     BMP:    Recent Labs     10/30/23  0545 10/31/23  0449 11/01/23  0830    137 136   K 4.0 3.9 4.3    99 99   CO2 26 28 22*   BUN 6* 8 14   CREATININE 0.7 0.7 0.9   GLUCOSE 83 69* 191*     Hepatic:   Recent Labs     10/30/23  0545 10/31/23  0449 11/01/23  0830   * 59* 46*   * 116* 92*   BILITOT 1.9* 1.3* 1.1   ALKPHOS 214* 196* 174*     INR:   Recent Labs     10/30/23  0545   INR 1.13 No

## 2023-11-01 NOTE — PROGRESS NOTES
Patient resting in semi velázquez position at this time. Vitals obtained. Patient states pain is continuing to decrease. Patient rated it a 5 on a 1-10  scale at this time. No complaints voiced.

## 2023-11-01 NOTE — PROGRESS NOTES
Vitals obtained and pain reassessed at this time. Patient stated pain has decreased to a 5 on a 1-10 scale. Patient resting in bed at this time. Pleasant and cooperative with no complaints voiced.

## 2023-11-01 NOTE — PROGRESS NOTES
Report from primary nurse received at this time. Patient resting in semi fowlers position. No complaints voiced at this time.

## 2023-11-01 NOTE — PROGRESS NOTES
This student nurse reassessed patients pain level at this time. Patient stated his pain lowered from a 7 to a 6 on a 1-10 scale.

## 2023-11-02 VITALS
TEMPERATURE: 97.3 F | BODY MASS INDEX: 30.73 KG/M2 | WEIGHT: 247.14 LBS | DIASTOLIC BLOOD PRESSURE: 83 MMHG | HEART RATE: 85 BPM | OXYGEN SATURATION: 99 % | RESPIRATION RATE: 16 BRPM | SYSTOLIC BLOOD PRESSURE: 133 MMHG | HEIGHT: 75 IN

## 2023-11-02 LAB
ALBUMIN SERPL BCG-MCNC: 3.8 G/DL (ref 3.5–5.1)
ALP SERPL-CCNC: 131 U/L (ref 38–126)
ALT SERPL W/O P-5'-P-CCNC: 58 U/L (ref 11–66)
ANION GAP SERPL CALC-SCNC: 12 MEQ/L (ref 8–16)
AST SERPL-CCNC: 27 U/L (ref 5–40)
BACTERIA BLD AEROBE CULT: NORMAL
BACTERIA BLD AEROBE CULT: NORMAL
BILIRUB CONJ SERPL-MCNC: 0.4 MG/DL (ref 0–0.3)
BILIRUB SERPL-MCNC: 1 MG/DL (ref 0.3–1.2)
BUN SERPL-MCNC: 8 MG/DL (ref 7–22)
CALCIUM SERPL-MCNC: 9.2 MG/DL (ref 8.5–10.5)
CHLORIDE SERPL-SCNC: 104 MEQ/L (ref 98–111)
CO2 SERPL-SCNC: 27 MEQ/L (ref 23–33)
CREAT SERPL-MCNC: 0.8 MG/DL (ref 0.4–1.2)
GFR SERPL CREATININE-BSD FRML MDRD: > 60 ML/MIN/1.73M2
GLUCOSE SERPL-MCNC: 106 MG/DL (ref 70–108)
HCT VFR BLD AUTO: 41 % (ref 42–52)
HGB BLD-MCNC: 13.6 GM/DL (ref 14–18)
LIPASE SERPL-CCNC: 39 U/L (ref 5.6–51.3)
POTASSIUM SERPL-SCNC: 4.2 MEQ/L (ref 3.5–5.2)
PROT SERPL-MCNC: 6.4 G/DL (ref 6.1–8)
SODIUM SERPL-SCNC: 143 MEQ/L (ref 135–145)

## 2023-11-02 PROCEDURE — 2580000003 HC RX 258: Performed by: SURGERY

## 2023-11-02 PROCEDURE — 80053 COMPREHEN METABOLIC PANEL: CPT

## 2023-11-02 PROCEDURE — 82248 BILIRUBIN DIRECT: CPT

## 2023-11-02 PROCEDURE — 99024 POSTOP FOLLOW-UP VISIT: CPT | Performed by: NURSE PRACTITIONER

## 2023-11-02 PROCEDURE — 83690 ASSAY OF LIPASE: CPT

## 2023-11-02 PROCEDURE — 85014 HEMATOCRIT: CPT

## 2023-11-02 PROCEDURE — 6370000000 HC RX 637 (ALT 250 FOR IP): Performed by: SURGERY

## 2023-11-02 PROCEDURE — 85018 HEMOGLOBIN: CPT

## 2023-11-02 PROCEDURE — 36415 COLL VENOUS BLD VENIPUNCTURE: CPT

## 2023-11-02 PROCEDURE — 6360000002 HC RX W HCPCS: Performed by: SURGERY

## 2023-11-02 RX ORDER — AMOXICILLIN AND CLAVULANATE POTASSIUM 875; 125 MG/1; MG/1
1 TABLET, FILM COATED ORAL 2 TIMES DAILY
Qty: 4 TABLET | Refills: 0 | Status: SHIPPED | OUTPATIENT
Start: 2023-11-02 | End: 2023-11-04

## 2023-11-02 RX ORDER — OXYCODONE HYDROCHLORIDE 5 MG/1
5 TABLET ORAL EVERY 6 HOURS PRN
Qty: 28 TABLET | Refills: 0 | Status: SHIPPED | OUTPATIENT
Start: 2023-11-02 | End: 2023-11-09

## 2023-11-02 RX ADMIN — PIPERACILLIN AND TAZOBACTAM 3375 MG: 3; .375 INJECTION, POWDER, LYOPHILIZED, FOR SOLUTION INTRAVENOUS at 01:01

## 2023-11-02 RX ADMIN — OXYCODONE HYDROCHLORIDE 10 MG: 5 TABLET ORAL at 06:36

## 2023-11-02 RX ADMIN — FUROSEMIDE 40 MG: 40 TABLET ORAL at 08:50

## 2023-11-02 RX ADMIN — PIPERACILLIN AND TAZOBACTAM 3375 MG: 3; .375 INJECTION, POWDER, LYOPHILIZED, FOR SOLUTION INTRAVENOUS at 08:58

## 2023-11-02 RX ADMIN — ASPIRIN 81 MG: 81 TABLET, COATED ORAL at 08:50

## 2023-11-02 RX ADMIN — FLUTICASONE PROPIONATE 2 SPRAY: 50 SPRAY, METERED NASAL at 08:50

## 2023-11-02 RX ADMIN — FAMOTIDINE 20 MG: 20 TABLET, FILM COATED ORAL at 08:50

## 2023-11-02 RX ADMIN — OXYCODONE HYDROCHLORIDE 10 MG: 5 TABLET ORAL at 10:58

## 2023-11-02 RX ADMIN — METOPROLOL SUCCINATE 75 MG: 25 TABLET, FILM COATED, EXTENDED RELEASE ORAL at 08:50

## 2023-11-02 RX ADMIN — CETIRIZINE HYDROCHLORIDE 10 MG: 10 TABLET, FILM COATED ORAL at 08:50

## 2023-11-02 RX ADMIN — SODIUM CHLORIDE, PRESERVATIVE FREE 10 ML: 5 INJECTION INTRAVENOUS at 08:50

## 2023-11-02 RX ADMIN — PANTOPRAZOLE SODIUM 40 MG: 40 TABLET, DELAYED RELEASE ORAL at 05:27

## 2023-11-02 RX ADMIN — GABAPENTIN 100 MG: 100 CAPSULE ORAL at 08:50

## 2023-11-02 ASSESSMENT — PAIN DESCRIPTION - ONSET: ONSET: ON-GOING

## 2023-11-02 ASSESSMENT — PAIN SCALES - GENERAL
PAINLEVEL_OUTOF10: 5
PAINLEVEL_OUTOF10: 7
PAINLEVEL_OUTOF10: 0

## 2023-11-02 ASSESSMENT — PAIN DESCRIPTION - ORIENTATION
ORIENTATION: MID
ORIENTATION: MID

## 2023-11-02 ASSESSMENT — PAIN - FUNCTIONAL ASSESSMENT: PAIN_FUNCTIONAL_ASSESSMENT: PREVENTS OR INTERFERES SOME ACTIVE ACTIVITIES AND ADLS

## 2023-11-02 ASSESSMENT — PAIN DESCRIPTION - DESCRIPTORS
DESCRIPTORS: ACHING;SHARP
DESCRIPTORS: ACHING

## 2023-11-02 ASSESSMENT — PAIN DESCRIPTION - PAIN TYPE: TYPE: ACUTE PAIN;SURGICAL PAIN

## 2023-11-02 ASSESSMENT — PAIN DESCRIPTION - FREQUENCY: FREQUENCY: CONTINUOUS

## 2023-11-02 ASSESSMENT — PAIN DESCRIPTION - LOCATION
LOCATION: ABDOMEN
LOCATION: ABDOMEN

## 2023-11-02 NOTE — PROGRESS NOTES
Discharge instructions given to patient and spouse. All questions answered. All belongings packed and ready for transport. Transport called to assist to private car via wheelchair.

## 2023-11-02 NOTE — PROGRESS NOTES
[facility], APRN-CNP for Dr. Radha Dickerson Surgery Daily Progress Note    Pt Name: Linda Young Record Number: [id number]  Date of Birth 1975   Today's Date: 11/2/2023    Hospital day # 5     ASSESSMENT   Calculus cholecystitis - POD #2 Status post robotic cholecystectomy with gastrostomy formation for ERCP and gastrotomy primary repair  Choledocholithiasis - POD #2 Status post ERCP with sphincterotomy with stone extraction  Epigastric and right upper quadrant abdominal pain - resolved   Elevated liver enzymes - improving   History robotic Ronaldo-en-Y gastric bypass  CHF  Diabetes mellitus  History ICD insertion  Obesity (BMI 30)   has a past medical history of CHF (congestive heart failure) ([address]), Constipation, Diabetes mellitus ([address]), GERD (gastroesophageal reflux disease), Hypercholesteremia, S/P ICD (internal cardiac defibrillator) procedure: 1/15/2015: Medtronic Single Chamber, Sleep apnea, and Zoll lifevest applied 8/22/14. PLAN   Tolerating soft diet. [facility] for regular diet. GI signed off. Follow up in 2 weeks with LFTs. Pain & nausea control  Ok to stop antibiotics  Up as tolerated  Lovenox for DVT prophylaxis - will hold today secondary to ERCP  IV to INT  Medical management   Labs reviewed. LFTs much better. Lipase wnl. Clinically, looks really good. Ready to go home. [facility] for discharge from our standpoint. Follow up in 2 weeks. Instructions discussed. LFTs slip given. SUBJECTIVE   Chief complaint: Incisional pain     Patient was stable overnight. Chart reviewed. VS stable. No fevers. Updated by nursing staff. Back pain has almost resolved completely. Gas pain better this morning. Incisional pain overall much improved. Denies chest discomfort or dyspnea. No nausea or vomiting. (+) belching, passing flatus. No BM. Tolerated regular diet. Up ad geovanny. Incisions all clean, dry and intact.    CURRENT MEDICATIONS   Scheduled Meds:

## 2023-11-02 NOTE — DISCHARGE SUMMARY
Hospitalist Discharge Summary        Patient: Donald Art  YOB: 1975  MRN: [id number]   Acct: [de-identified]    Primary Care Physician: Zanedr Guerra MD    Admit date  10/28/2023    Discharge date: 11/2/2023     Chief Complaint on presentation :-  abdominal pain    Discharge Assessment and Plan:-   Acute Complicated Choledocholithiasis  Acute cholangitis  Direct hyperbilirubinemia  Elevated liver chemistries  CT A/P showed \"Distended gallbladder without associated inflammatory changes to suggest cholecystitis. \"   MRCP \"Choledocholithiasis. Cholelithiasis. The common bile duct is dilated at 9 mm. \"   General Surgery & GI consulted. ERCP with Dr Nyla Corea 10/31/23 - sphincterotomy, only one stone seen  Robotic cholecystectomy with gastrostomy formation 10/31/23  Patient tolerated procedure well. Passing gas, tolerating diet. Liver chemistries downtrending. Lipase has normalized. Patient to follow up with GI and general surgery in two weeks  Repeat LFT in two weeks    Admission H and P  \"Betty Peña is a 50year old gentlemen presented to [facility] ER 10/28/2023 with complaint of back pain . Patient has a past medical history significant for lifetime nonsmoker, MIKE-CPAP stopped 3/2023, Nonischemic cardiomyopathy-[facility] 2014 patient coronary arteries, AICD placement-2015, [facility], ECHO 10/2023 LVEF 30-35%-severe global hypokinesis, Essential HPTN, RYGB- 2022-Dr. Chavez-approximately #160 pound weight loss, GERD. Patient identifies 2-3 episodes earlier in the week of fever and chills as high as 101 resolved with tylenol. Day of admission complaints of acute RUQ and epigastric pain radiating across abdomen and thru to his back. Onset approximately 11 am day of admission. Pain was described as sharp with heartburn that was associated with nausea and vomiting #8/10. The patient denies any dysuria, frequency, hematuria however his urine is dark yellow.   Had bowel [address] [facility] [address] 2-4 10/28/2023 09:10 PM    BACTERIA NONE SEEN 10/28/2023 09:10 PM    RBCUA 0-2 10/28/2023 09:10 PM    BLOODU NEGATIVE 10/28/2023 09:10 PM    SPECGRAV 1.025 09/13/2022 06:40 PM    GLUCOSEU NEGATIVE 10/28/2023 09:10 PM       Radiology:-  US GALLBLADDER RUQ    Result Date: 10/28/2023  US abdomen limited Comparison: Same day CT of the abdomen and pelvis Findings: No intrahepatic biliary ductal dilatation. Common duct measures up to 8 mm. Distended gallbladder with no wall thickening or pericholecystic fluid. There is sludge layering in the gallbladder lumen as well as a few small gallstones measuring up to 5 mm. No focal hepatic mass. Visualized portions of the pancreas are normal.     Distended gallbladder with common bile duct enlargement. No wall thickening or pericholecystic fluid to serve as definitive evidence of cholecystitis. In the setting of a positive physical exam Sulphur sign, cholecystitis can be diagnosed. Correlate for leukocytosis and/ or fever. Surgical consultation recommended. This document has been electronically signed by: Margo Byers MD on 10/28/2023 09:57 PM    CT ABDOMEN PELVIS W IV CONTRAST Additional Contrast? None    Result Date: 10/28/2023  CT abdomen and pelvis with contrast Comparison: CT/KO/SR - CT ABDOMEN PELVIS WO CONTRAST - 09/15/2022 07:33 AM EDT Findings: Included portions of the lung bases are clear. No pneumoperitoneum, pneumatosis, portal venous gas, or free fluid. No abnormally dilated or obviously thickened loop of bowel. Postsurgical changes of gastric bypass. Excluded stomach and Ronaldo limb are decompressed. Liver, spleen, pancreas, adrenal glands, and kidneys demonstrate no acute finding. Gallbladder Distended without wall thickening or adjacent inflammatory changes. Nonaneurysmal abdominal aorta. No suspicious or acute bone finding. 1. No acute finding. 2. Postsurgical changes of gastric bypass without acute post operative sequela identified.  3. Distended

## 2023-11-02 NOTE — CARE COORDINATION
11/2/23, 12:22 PM EDT    Patient goals/plan/ treatment preferences discussed by  and . Patient goals/plan/ treatment preferences reviewed with patient/ family. Patient/ family verbalize understanding of discharge plan and are in agreement with goal/plan/treatment preferences. Understanding was demonstrated using the teach back method. AVS provided by RN at time of discharge, which includes all necessary medical information pertaining to the patients current course of illness, treatment, post-discharge goals of care, and treatment preferences. Services At/After Discharge: None    Met with Noe and discussed plan for discharge today. Shannon Rudolph feels ready and is agreeable with plan. He will return home with wife and son. Declines needs. States his wife will provide transportation home.

## 2023-11-02 NOTE — DISCHARGE INSTRUCTIONS
DR. Sheron De La Cruz DISCHARGE INSTRUCTIONS    Pt Name: Julianna [facility]  Medical Record Number: [id number]  Today's Date: 11/2/2023    GENERAL ANESTHESIA OR SEDATION  1. Do not drive or operate hazardous machinery for 24 hours. 2. Do not make important business or personal decisions for 24 hours. 3. Do not drink alcoholic beverages or use tobacco for 24 hours. ACTIVITY INSTRUCTIONS:  [] Rest today. Resume light to normal activity tomorrow.   [] You may resume normal activity tomorrow. Do not engage in strenuous activity that may place stress on your incision. [x] Do not drive for 3-5 days or while taking the pain medication. Avoid heavy lifting, tugging, pullings greater than 10 lbs until seen in the office. DIET INSTRUCTIONS:  [x]Regular diet as tolerated. MEDICATIONS  [x]Prescription sent with you to be used as directed. [x]Oxycontin  Do not drink alcohol or drive while taking these medications. You may experience dizziness or drowsiness with these medications. You may also experience constipation which can be relieved with stool softners or laxatives. **Pain medication at discharge - use only as prescribed- refills may be available to you at your follow up appointments if needed and warranted. Narcotics should be used for only short term and we highly encouraged our patients to wean off appropriately and use other means for pain such as non pharmacologic measures and over the counter tylenol or ibuprofen if no restrictions apply. We do  know that surgical pain is real and will not hesitate to help eliminate some of your discomfort. However we will not be able to completely make you pain free and it is important to determine what pain level is tolerable for you **  [x]You may resume your daily prescription medication schedule unless otherwise specified. Take the Zofran exactly as prescribed to control nausea and vomiting. Use Colace and MiraLAX asneeded to prevent constipation.   Do not allow hours, you can reach the answering service via the office phone number. IF YOU NEED IMMEDIATE ATTENTION, GO TO THE EMERGENCY ROOM AND YOUR DOCTOR WILL BE CONTACTED. Prepared by Fatoumata Resendez CNP for   Arabella Mendiola MD [phone]

## 2023-11-02 NOTE — DISCHARGE INSTR - DIET
Good nutrition is important when healing from an illness, injury, or surgery. Follow any nutrition recommendations given to you during your hospital stay. If you were given an oral nutrition supplement while in the hospital, continue to take this supplement at home. You can take it with meals, in-between meals, and/or before bedtime. These supplements can be purchased at most local grocery stores, pharmacies, and chain [employer]-stores. If you have any questions about your diet or nutrition, call the hospital and ask for the dietitian.     Soft and Bite sized diet

## 2023-11-03 ENCOUNTER — CARE COORDINATION (OUTPATIENT)
Dept: OTHER | Facility: CLINIC | Age: 48
End: 2023-11-03

## 2023-11-03 NOTE — CARE COORDINATION
Care Transitions Outreach Attempt    Call within 2 business days of discharge: Yes   Attempted to reach patient for transitions of care follow up. Unable to reach patient. Patient: Delfina Solorzano Patient : 1975 MRN: [id number]    Last Discharge Facility       Date Complaint Diagnosis Description Type Department Provider    10/28/23 Back Pain Abdominal pain, right upper quadrant . .. ED to Hosp-Admission (Discharged) (ADMITTED) PABLITO AvendañoK Live Mitchell, DO; Jeffery Lovell... Was this an external facility discharge? No [facility]charge Facility [facility]    Noted following upcoming appointments from discharge chart review:   [facility] follow up appointment(s):   Future Appointments   Date Time Provider [address]   2023 11:00 AM Juliana Mason MD SRPX [facility] [place]   2023  2:30 PM JACOB Garrison - CNP N Adv Surg [facility]   2023 11:15 AM JACOB Chino - CNP AFLGASL [facility]   3/13/2024  9:30 AM BART Dixon N SRPX WT [facility] [place]   2024 11:00 AM SCHEDULE, SRPX PACER NURSE N [facility] PACER [facility]   2024 11:30 AM Ana Paula Mota MD N [facility] Heart [facility]     Non-[facility]  follow up appointment(s): unknown    ACM attempted to reach patient for introduction to Associate Care Management related to admission for right upper quadrant abdominal pain. HIPAA compliant message left requesting a return phone call at patient convenience.      Plan for follow-up call in 3-5 days

## 2023-11-06 ENCOUNTER — CARE COORDINATION (OUTPATIENT)
Dept: OTHER | Facility: CLINIC | Age: 48
End: 2023-11-06

## 2023-11-06 RX ORDER — GABAPENTIN 100 MG/1
100 CAPSULE ORAL 3 TIMES DAILY
Qty: 270 CAPSULE | Refills: 1 | Status: SHIPPED | OUTPATIENT
Start: 2023-11-06 | End: 2024-05-04

## 2023-11-06 NOTE — CARE COORDINATION
Care Transitions Outreach Attempt    Call within 2 business days of discharge: Yes   Attempted to reach patient for transitions of care follow up. Unable to reach patient. Patient: Loren Smart Patient : 1975 MRN: [id number]    Last Discharge Facility       Date Complaint Diagnosis Description Type Department Provider    10/28/23 Back Pain Abdominal pain, right upper quadrant . .. ED to Hosp-Admission (Discharged) (ADMITTED) PABLITO AvendañoK Live Mitchell, DO; Jeffery Lovell... Was this an external facility discharge? No Discharge [facility]: [facility]    Noted following upcoming appointments from discharge chart review:   [facility] follow up appointment(s):   Future Appointments   Date Time Provider [address]   2023  2:30 PM JACOB Walker CNP N Adv Surg [address]   2023 11:15 AM JACOB Porter CNP [facility]   3/13/2024  9:30 AM Allison Dubin, PA N SRPX WT [facility] [place]   2024 11:00 AM SCHEDULE, SRPX PACER NURSE N [facility] PACER [facility] [place]   2024 11:30 AM Allyson Mota MD N SRPX Heart [facility] [place]     Non-[facility]  follow up appointment(s): unknown    ACM attempted 2nd outreach to patient for introduction to Associate Care Management related to admission for right upper quadrant pain. HIPAA compliant message left requesting a return phone call at patient convenience. Unable to Reach Letter sent to patient via [employer]. Will continue to outreach.

## 2023-11-06 NOTE — CARE COORDINATION
Received [employer] message from patient declining needs. Per patient, scheduled follow up appts. Responded to patient via [employer] to notify this ACM if any care coordination needs arise.   This ACM to sign off and close program.

## 2023-11-11 ENCOUNTER — NURSE ONLY (OUTPATIENT)
Dept: LAB | Age: 48
End: 2023-11-11

## 2023-11-11 DIAGNOSIS — Z90.49 S/P LAPAROSCOPIC CHOLECYSTECTOMY: ICD-10-CM

## 2023-11-11 LAB
ALBUMIN SERPL BCG-MCNC: 3.8 G/DL (ref 3.5–5.1)
ALP SERPL-CCNC: 98 U/L (ref 38–126)
ALT SERPL W/O P-5'-P-CCNC: 18 U/L (ref 11–66)
AST SERPL-CCNC: 20 U/L (ref 5–40)
BILIRUB CONJ SERPL-MCNC: < 0.2 MG/DL (ref 0–0.3)
BILIRUB SERPL-MCNC: 0.5 MG/DL (ref 0.3–1.2)
PROT SERPL-MCNC: 7 G/DL (ref 6.1–8)

## 2023-11-13 ENCOUNTER — OFFICE VISIT (OUTPATIENT)
Dept: SURGERY | Age: 48
End: 2023-11-13

## 2023-11-13 ENCOUNTER — PROCEDURE VISIT (OUTPATIENT)
Dept: CARDIOLOGY CLINIC | Age: 48
End: 2023-11-13
Payer: COMMERCIAL

## 2023-11-13 VITALS
SYSTOLIC BLOOD PRESSURE: 124 MMHG | OXYGEN SATURATION: 98 % | DIASTOLIC BLOOD PRESSURE: 76 MMHG | HEIGHT: 75 IN | WEIGHT: 242.9 LBS | HEART RATE: 81 BPM | TEMPERATURE: 97.7 F | BODY MASS INDEX: 30.2 KG/M2

## 2023-11-13 DIAGNOSIS — Z95.810 AICD (AUTOMATIC CARDIOVERTER/DEFIBRILLATOR) PRESENT: Primary | ICD-10-CM

## 2023-11-13 DIAGNOSIS — Z90.49 S/P LAPAROSCOPIC CHOLECYSTECTOMY: ICD-10-CM

## 2023-11-13 PROCEDURE — 93296 REM INTERROG EVL PM/IDS: CPT | Performed by: INTERNAL MEDICINE

## 2023-11-13 PROCEDURE — 93295 DEV INTERROG REMOTE 1/2/MLT: CPT | Performed by: INTERNAL MEDICINE

## 2023-11-13 PROCEDURE — 99024 POSTOP FOLLOW-UP VISIT: CPT | Performed by: NURSE PRACTITIONER

## 2023-11-13 RX ORDER — OXYCODONE HYDROCHLORIDE 5 MG/1
5 TABLET ORAL EVERY 12 HOURS PRN
Qty: 14 TABLET | Refills: 0 | Status: SHIPPED | OUTPATIENT
Start: 2023-11-13 | End: 2023-11-20

## 2023-11-13 NOTE — PROGRESS NOTES
[address] [place] [address]  Dept: [phone]  Dept Fax: [phone]  Loc: [phone]    Visit Date: 11/13/2023    Betty Mueller is a 50 y.o. male who presents today for:  Chief Complaint   Patient presents with    Post-Op Check     S/p Robotic laparoscopic cholecystectomy with gastrostomy formation, ERCP, and primary repair of gastrotomy 10/31/23       HPI:     HPI    Juanjo Long is a 46-year old male patient who presents to office for follow up status post robotic laparoscopic cholecystectomy with gastrostomy formation and primary repair of gastrostomy with Dr. Devin Moreno on 10/31/23. Patient had ERCP with sphincterotomy and stone extraction per Dr. Jose De Jesus Shepard during surgery with assistance of Dr. Devin Moreno for gastrostomy. He is doing well. Has complaints of incisional pain. Ran out of oxy-IR which did help. States he could use it occasionally still. OTC medications not helping. Had LFTs checked this weekend and they are all back to normal. He is eating regular diet. No nausea or vomiting. No bloating or gerd. No diarrhea. Bowel movements have been better than his normal which is constipation. No fever or chills. No SOB or chest pain. All incisions healing well except left upper trochar site. There is a small amount of separation with erythema. No purulent drainage or induration. Minimally tenderness. Abdomen otherwise benign. Past Medical History:   Diagnosis Date    CHF (congestive heart failure) (HCC)     Constipation     Diabetes mellitus ([address])     GERD (gastroesophageal reflux disease)     Hypercholesteremia     S/P ICD (internal cardiac defibrillator) procedure: 1/15/2015: Medtronic Single Chamber 1/15/2015    1/15/2015: Medtronic Single Chamber.  Dr. Moraima Tellez    Sleep apnea 06/09/2017    Polina Suffolk applied 8/22/14 8/29/2014    returned prior to ICD insertion 1/15      Past Surgical History:

## 2023-11-13 NOTE — PROGRESS NOTES
[facility] medtronic dual ICD    . Joceline Yan Battery longevity:  8.6 years on device   Presenting rhythm  AS VS     Atrial impedance 418  RV impedance 361  Shock 43  SVC 56    P wave sensing 2.9  R wave sensing 4.6    0.1 % atrial paced  0.1 % RV paced     Atrial threshold 1 V  at 0.4ms  RV threshold 1.125 V at 0.4ms  Mode switches   0

## 2023-11-14 ASSESSMENT — ENCOUNTER SYMPTOMS
ALLERGIC/IMMUNOLOGIC NEGATIVE: 1
EYE ITCHING: 0
COLOR CHANGE: 0
COUGH: 0
CHEST TIGHTNESS: 0
ABDOMINAL DISTENTION: 0
APNEA: 0
CHOKING: 0
RHINORRHEA: 0
ANAL BLEEDING: 0
VOMITING: 0
SORE THROAT: 0
ABDOMINAL PAIN: 0
PHOTOPHOBIA: 0
DIARRHEA: 0
NAUSEA: 0
BACK PAIN: 0
EYE PAIN: 0
EYE DISCHARGE: 0
SINUS PRESSURE: 0
WHEEZING: 0
SHORTNESS OF BREATH: 0
BLOOD IN STOOL: 0
CONSTIPATION: 0

## 2023-11-15 RX ORDER — CEPHALEXIN 500 MG/1
500 CAPSULE ORAL 2 TIMES DAILY
Qty: 10 CAPSULE | Refills: 0 | Status: SHIPPED | OUTPATIENT
Start: 2023-11-15 | End: 2023-11-20

## 2023-12-13 ENCOUNTER — TELEPHONE (OUTPATIENT)
Dept: PHARMACY | Facility: CLINIC | Age: 48
End: 2023-12-13

## 2023-12-13 NOTE — TELEPHONE ENCOUNTER
Patient is currently enrolled in the Be Well with Diabetes Program.   Our records indicate that we are missing documentation of the requirement(s) listed below. If these requirement(s) are not completed by December 31, 2023, the patient may be disenrolled from the program:     Second diabetes visit with your provider in 2023  Second A1c result in 2023 2023-2024 Flu Vaccination      Patient unavailable at the time of the call. Left message on [employer] with the above information. Also left our phone number: [phone] Option #3 if patient has any questions or concerns. No further patient outreach planned at this time.       Maty Beckwith, [address] [place] [address]   Direct: [phone]  Phone: toll free [phone]       For Pharmacy Admin Tracking Only    Program: Ugo in place:  No  Gap Closed?: No   Time Spent (min): 5

## 2023-12-14 ENCOUNTER — PATIENT MESSAGE (OUTPATIENT)
Dept: FAMILY MEDICINE CLINIC | Age: 48
End: 2023-12-14

## 2023-12-14 DIAGNOSIS — E11.65 TYPE 2 DIABETES MELLITUS WITH HYPERGLYCEMIA, WITHOUT LONG-TERM CURRENT USE OF INSULIN (HCC): Primary | ICD-10-CM

## 2023-12-14 NOTE — TELEPHONE ENCOUNTER
From: Kulwant Richards  To: Dr. Kranthi Heredia: 12/14/2023 12:49 PM EST  Subject: BeWell Diabetes    Hello. Would you please send place an order for me to get a urine for microalbumin creatine ratio? I am coming in on 12/27 for my second diabetic follow up to complete the program requirements and would like to have that done before my appointment. I also will need my flu shot at that time.  Thank you

## 2023-12-14 NOTE — TELEPHONE ENCOUNTER
Thelma Pacheco called in concerning her 's missing requirements for the Be Well with Diabetes Program.    Second diabetes visit with your provider in 2023  Second A1c result in 2023- completed 9.5.23 2023-2024 Flu Vaccination    He has an appt on 12.27.23 & will complete the other 2 missing requirements. She thought he was in need of a Urine Microalbumin also. According to a patient message on 10.04.23, that was listed. She states that he has not done that yet, so she will have him get that at his appt on 12.27.23 just to be safe.      [facility], [address]   [phone] option 2     For Pharmacy Admin Tracking Only    Program: Ugo in place:  No  Gap Closed?: No   Time Spent (min): 5

## 2023-12-15 NOTE — TELEPHONE ENCOUNTER
Thanks Leticia! It was the Urine Microalbumin that needed done for 2023, not a second A1c since those were already completed.        Carlitos Larios, [address] [facility]   Phone: [phone]

## 2023-12-26 ENCOUNTER — NURSE ONLY (OUTPATIENT)
Dept: LAB | Age: 48
End: 2023-12-26

## 2023-12-26 DIAGNOSIS — E11.65 TYPE 2 DIABETES MELLITUS WITH HYPERGLYCEMIA, WITHOUT LONG-TERM CURRENT USE OF INSULIN (HCC): ICD-10-CM

## 2023-12-26 LAB
CREAT UR-MCNC: 66.2 MG/DL
MICROALBUMIN UR-MCNC: < 1.2 MG/DL
MICROALBUMIN/CREAT RATIO PNL UR: 18 MG/G (ref 0–30)

## 2023-12-27 ENCOUNTER — OFFICE VISIT (OUTPATIENT)
Dept: FAMILY MEDICINE CLINIC | Age: 48
End: 2023-12-27
Payer: COMMERCIAL

## 2023-12-27 VITALS
RESPIRATION RATE: 16 BRPM | OXYGEN SATURATION: 98 % | DIASTOLIC BLOOD PRESSURE: 74 MMHG | WEIGHT: 253.3 LBS | BODY MASS INDEX: 32.51 KG/M2 | HEART RATE: 74 BPM | SYSTOLIC BLOOD PRESSURE: 122 MMHG | HEIGHT: 74 IN

## 2023-12-27 DIAGNOSIS — Z00.00 ROUTINE GENERAL MEDICAL EXAMINATION AT A HEALTH CARE FACILITY: Primary | ICD-10-CM

## 2023-12-27 DIAGNOSIS — I42.8 NONISCHEMIC CARDIOMYOPATHY (HCC): ICD-10-CM

## 2023-12-27 DIAGNOSIS — I48.0 PAROXYSMAL ATRIAL FIBRILLATION (HCC): ICD-10-CM

## 2023-12-27 PROCEDURE — 90471 IMMUNIZATION ADMIN: CPT | Performed by: FAMILY MEDICINE

## 2023-12-27 PROCEDURE — 99396 PREV VISIT EST AGE 40-64: CPT | Performed by: FAMILY MEDICINE

## 2023-12-27 PROCEDURE — 90674 CCIIV4 VAC NO PRSV 0.5 ML IM: CPT | Performed by: FAMILY MEDICINE

## 2023-12-27 RX ORDER — ASPIRIN 81 MG/1
81 TABLET ORAL DAILY
Qty: 90 TABLET | Refills: 3 | Status: SHIPPED | OUTPATIENT
Start: 2023-12-27

## 2023-12-27 RX ORDER — METOPROLOL SUCCINATE 25 MG/1
75 TABLET, EXTENDED RELEASE ORAL DAILY
Qty: 270 TABLET | Refills: 3 | Status: SHIPPED | OUTPATIENT
Start: 2023-12-27

## 2023-12-27 RX ORDER — GABAPENTIN 300 MG/1
300 CAPSULE ORAL 3 TIMES DAILY
Qty: 270 CAPSULE | Refills: 1 | Status: SHIPPED | OUTPATIENT
Start: 2023-12-27 | End: 2024-06-24

## 2023-12-27 NOTE — PROGRESS NOTES
Immunizations Administered       Name Date Dose Route    Influenza, FLUCELVAX, (age 10 mo+), MDCK, PF, 0.5mL 12/27/2023 0.5 mL Intramuscular    Site: Deltoid- Left    Lot: [id number]    [address]: 88809-728-71

## 2024-01-18 ENCOUNTER — TELEPHONE (OUTPATIENT)
Dept: PHARMACY | Facility: CLINIC | Age: 49
End: 2024-01-18

## 2024-01-18 NOTE — TELEPHONE ENCOUNTER
Patient is [facility]  Called patient to schedule 2024 yearly pharmacist appointment to discuss medications for Diabetes Management Program.    No answer. Left VM on mobile TAD: Please call back at [phone] Option #3.    Lizy Michaels CPhT.   Population Health Clinical   [facility] Clinical Pharmacy  Toll free: [phone] Option 3

## 2024-01-25 NOTE — TELEPHONE ENCOUNTER
Patient is [facility]    Second attempt made to contact patient to schedule 2024 yearly pharmacist appointment to discuss medications for Diabetes Management Program.    No answer. Left VM on mobile TAD: Please call back at [phone] Option #3.     [address] message sent to patient.  If unread, letter will be mailed to home.       Lizy Michaels CPhT.   Population Health Clinical   [facility] Clinical Pharmacy  Toll free: [phone] Option 3      For Pharmacy Admin Tracking Only    Program: [employer]  CPA in place:  No  Gap Closed?: No   Time Spent (min): 15

## 2024-02-19 ENCOUNTER — PROCEDURE VISIT (OUTPATIENT)
Dept: CARDIOLOGY CLINIC | Age: 49
End: 2024-02-19

## 2024-02-19 DIAGNOSIS — Z95.810 AICD (AUTOMATIC CARDIOVERTER/DEFIBRILLATOR) PRESENT: Primary | ICD-10-CM

## 2024-02-19 PROCEDURE — 93295 DEV INTERROG REMOTE 1/2/MLT: CPT | Performed by: INTERNAL MEDICINE

## 2024-02-19 PROCEDURE — 93296 REM INTERROG EVL PM/IDS: CPT | Performed by: INTERNAL MEDICINE

## 2024-02-19 NOTE — PROGRESS NOTES
[facility] Medtronic Dual ICD   Pt of Nallu    Battery 8.3 years     Presenting rhythm ASVS    A Impedance 418  RV Impedance 361    RV shock 44  SVC shock 55    P wave sensing 2.1  R wave sensing 4.3    A Threshold 1.125 @ 0.40  A Amplitude 1.75 @ 0.40  RV Thresholds 1.125 @ 0.40  RV Amplitude 1.75 @ 0.40    A Paced <0.1%  V Paced <0.1%    Programmed Mode AAI <=> DDD 40    Afib Roberts 0%    Episodes:   none    Optivol -- chronically elevated

## 2024-04-01 ENCOUNTER — PATIENT MESSAGE (OUTPATIENT)
Dept: PHARMACY | Facility: CLINIC | Age: 49
End: 2024-04-01

## 2024-04-01 ENCOUNTER — CLINICAL DOCUMENTATION (OUTPATIENT)
Dept: PHARMACY | Facility: CLINIC | Age: 49
End: 2024-04-01

## 2024-04-01 NOTE — PROGRESS NOTES
1st Quarterly Reminder sent to patient for the DM Program - See Mychart message or Letter for more information.    Alicja Garcia CphT  [facility]  Clinical Pharmacy   Department, toll free: [phone] Option #3      For Pharmacy Admin Tracking Only    Program: [employer]  CPA in place:  No  Gap Closed?: Yes   Time Spent (min): 5

## 2024-04-09 NOTE — TELEPHONE ENCOUNTER
[email or web address] message not read by patient.  Letter mailed to patient with the information from the [email or web address] message.    Trisha Thorne [facility]  Clinical Pharmacy   Department, toll free: [phone] Option #3      For Pharmacy Admin Tracking Only    Program: [employer]  CPA in place:  No  Gap Closed?: Yes   Time Spent (min): 5

## 2024-05-03 ENCOUNTER — E-VISIT (OUTPATIENT)
Dept: PRIMARY CARE CLINIC | Age: 49
End: 2024-05-03
Payer: COMMERCIAL

## 2024-05-03 DIAGNOSIS — J06.9 UPPER RESPIRATORY TRACT INFECTION, UNSPECIFIED TYPE: Primary | ICD-10-CM

## 2024-05-03 PROCEDURE — 99422 OL DIG E/M SVC 11-20 MIN: CPT | Performed by: NURSE PRACTITIONER

## 2024-05-03 RX ORDER — AMOXICILLIN AND CLAVULANATE POTASSIUM 875; 125 MG/1; MG/1
1 TABLET, FILM COATED ORAL 2 TIMES DAILY
Qty: 20 TABLET | Refills: 0 | Status: SHIPPED | OUTPATIENT
Start: 2024-05-03 | End: 2024-05-13

## 2024-05-03 ASSESSMENT — LIFESTYLE VARIABLES: SMOKING_STATUS: NO, I'VE NEVER SMOKED

## 2024-05-09 ENCOUNTER — TELEPHONE (OUTPATIENT)
Dept: PHARMACY | Facility: CLINIC | Age: 49
End: 2024-05-09

## 2024-05-09 NOTE — TELEPHONE ENCOUNTER
**Patient is [facility]     Called patient to schedule 2024 yearly pharmacist appointment to discuss medications for Diabetes Management Program.     Spoke to patient and appointment scheduled for 05.13.24 @ 2:30 PM     Leticia Pack CphT   [facility]   Clinical Pharmacy    Department, toll free: [phone] Option #3      For Pharmacy Admin Tracking Only    Program: [employer]  CPA in place:  No  Recommendation Provided To: Patient/Caregiver: 1 via Telephone  Intervention Detail: Scheduled Appointment  Intervention Accepted By: Patient/Caregiver: 1  Gap Closed?: Yes   Time Spent (min): 10

## 2024-05-13 ENCOUNTER — TELEPHONE (OUTPATIENT)
Dept: PHARMACY | Facility: CLINIC | Age: 49
End: 2024-05-13

## 2024-05-13 NOTE — TELEPHONE ENCOUNTER
POPULATION HEALTH CLINICAL PHARMACY REVIEW - Be Well with Diabetes ([facility])    Erasto Daniels is a 48 y.o. male enrolled in the [facility] Employee Diabetes Program. Patient provided writer with verbal consent to remain in the program for this year. Patient enrolled 2020.    Medications:  Current Outpatient Medications   Medication Instructions    acetaminophen (TYLENOL) 650 mg, Oral, EVERY 8 HOURS PRN    amoxicillin-clavulanate (AUGMENTIN) 875-125 MG per tablet 1 tablet, Oral, 2 TIMES DAILY    aspirin (ASPIRIN LOW DOSE) 81 mg, Oral, DAILY    calcium carbonate (OS-GLENIS) 1250 (500 Ca) MG chewable tablet Take 1 chew by mouth every day    Docusate Sodium 100 mg, Oral, 2 TIMES DAILY, Take as needed to prevent constipation    fluticasone (FLONASE) 50 MCG/ACT nasal spray 2 sprays, Nasal, DAILY    furosemide (LASIX) 40 MG tablet TAKE ONE TABLET BY MOUTH EVERY MORNING    gabapentin (NEURONTIN) 300 mg, Oral, 3 TIMES DAILY    loratadine (CLARITIN) 10 mg, Oral, DAILY    metoprolol succinate (TOPROL XL) 75 mg, Oral, DAILY    omeprazole (PRILOSEC) 40 MG delayed release capsule TAKE 1 CAPSULE BY MOUTH ONE TIME A DAY    Pediatric Multivitamins-Iron (FLINTSTONES PLUS IRON PO) Take by mouth 2 tablets in the morning and 1 tablet in the evening    polyethylene glycol (GLYCOLAX) 17 g, Oral, DAILY PRN    rosuvastatin (CRESTOR) 10 MG tablet TAKE 1 TABLET BY MOUTH ONE TIME A DAY     Pharmacy and Supplies Information  Current Pharmacy: [facility]    2024 Program Benefit  [employer] (A) benefits card  Contact information: Phone: [phone]      Web address: Lenard Li   There is no longer a copay waiver in place at [facility], there will now be copays on medications/supplies  Money to be added to HRA/HSA card as an employer contribution  Money should be available around the end of January for patients enrolled 1/12/24 or prior  If pt is new enrollee, will receive money 4 to 6 weeks

## 2024-05-16 RX ORDER — GABAPENTIN 300 MG/1
300 CAPSULE ORAL 3 TIMES DAILY
Qty: 270 CAPSULE | Refills: 1 | Status: SHIPPED | OUTPATIENT
Start: 2024-05-16 | End: 2024-11-12

## 2024-05-20 ENCOUNTER — PROCEDURE VISIT (OUTPATIENT)
Dept: CARDIOLOGY CLINIC | Age: 49
End: 2024-05-20
Payer: COMMERCIAL

## 2024-05-20 DIAGNOSIS — Z95.810 AICD (AUTOMATIC CARDIOVERTER/DEFIBRILLATOR) PRESENT: Primary | ICD-10-CM

## 2024-05-20 PROCEDURE — 93295 DEV INTERROG REMOTE 1/2/MLT: CPT | Performed by: INTERNAL MEDICINE

## 2024-05-20 PROCEDURE — 93296 REM INTERROG EVL PM/IDS: CPT | Performed by: INTERNAL MEDICINE

## 2024-05-20 NOTE — PROGRESS NOTES
[employer]ope dual ICD     ..Battery longevity:  8 years on device  Presenting rhythm  AS VS   Had chronically elevated optivol since bariatric surgery, has returned to baseline     Atrial impedance 418  RV impedance 361  Shock 41  SVC 51    P wave sensing 1.5  R wave sensing 3    <0.1 % atrial paced  <0.1 % RV paced     Atrial threshold 1 V  at 0.4ms  RV threshold 1.125 V at 0.4ms  Mode switches   0

## 2024-06-06 LAB
CHOLEST SERPL-MCNC: 116 MG/DL (ref 0–199)
FASTING: YES
GLUCOSE SERPL-MCNC: 87 MG/DL (ref 74–109)
HDLC SERPL-MCNC: 45 MG/DL (ref 40–90)
LDLC SERPL CALC-MCNC: 61 MG/DL
TRIGL SERPL-MCNC: 50 MG/DL (ref 0–199)

## 2024-07-16 ENCOUNTER — PATIENT MESSAGE (OUTPATIENT)
Dept: PHARMACY | Facility: CLINIC | Age: 49
End: 2024-07-16

## 2024-07-16 ENCOUNTER — TELEPHONE (OUTPATIENT)
Dept: PHARMACY | Facility: CLINIC | Age: 49
End: 2024-07-16

## 2024-07-16 NOTE — TELEPHONE ENCOUNTER
2nd Quarterly Reminder sent to patient for the DM Program - See [employer] message or Letter for more information.    Called and spoke with patient's spouse: Gloria who is also enrolled in the DM Program - and advised her of patient's with outstanding requirements for the Be Well With Diabetes Program.  Gloria verified understanding and will advise this patient.    Gloria advised that both she and the patient went to their BWW Screening they were advised that an A1C is not part of the screening and they refused to drawn it.    I advised her to have this patient have his A1C drawn and if it is within range we will override the 1st A1C due to the above.    Gloria verified understanding and will advise the patient.    Alicja Garcia CphT  [facility]  Clinical Pharmacy   Department, toll free: [phone] Option #3    For Pharmacy Admin Tracking Only    Program: [employer]  CPA in place:  No  Gap Closed?: Yes   Time Spent (min): 5

## 2024-07-22 ENCOUNTER — PATIENT MESSAGE (OUTPATIENT)
Dept: FAMILY MEDICINE CLINIC | Age: 49
End: 2024-07-22

## 2024-07-22 DIAGNOSIS — E11.65 TYPE 2 DIABETES MELLITUS WITH HYPERGLYCEMIA, WITHOUT LONG-TERM CURRENT USE OF INSULIN (HCC): Primary | ICD-10-CM

## 2024-07-22 NOTE — TELEPHONE ENCOUNTER
From: Erasto Daniels  To: Dr. Gaurang Jiang  Sent: 7/22/2024 12:57 PM EDT  Subject: HGA1C    Hi.  Can I get an order for a HGA1C for my Be Well? It was not ran when I had my labs drawn for Be Well.    Thank you,  Noe

## 2024-07-24 NOTE — TELEPHONE ENCOUNTER
[employer] message not read by patient.  Letter mailed to patient with the information from the [employer] message.    Trisha Thorne [facility]  Clinical Pharmacy   Department, toll free: [phone] Option #3    For Pharmacy Admin Tracking Only    Program: [employer]  CPA in place:  No  Gap Closed?: Yes   Time Spent (min): 5

## 2024-08-26 ENCOUNTER — PROCEDURE VISIT (OUTPATIENT)
Dept: CARDIOLOGY CLINIC | Age: 49
End: 2024-08-26
Payer: COMMERCIAL

## 2024-08-26 DIAGNOSIS — Z95.810 AICD (AUTOMATIC CARDIOVERTER/DEFIBRILLATOR) PRESENT: Primary | ICD-10-CM

## 2024-08-26 PROCEDURE — 93296 REM INTERROG EVL PM/IDS: CPT | Performed by: INTERNAL MEDICINE

## 2024-08-26 PROCEDURE — 93295 DEV INTERROG REMOTE 1/2/MLT: CPT | Performed by: INTERNAL MEDICINE

## 2024-08-26 NOTE — PROGRESS NOTES
[facility]     ..Battery longevity:  7.7 years on device   Presenting rhythm  AS VS   Optivol WNl    Atrial impedance 418  RV impedance 361  Shock 43  SVC 53    P wave sensing 1.5  R wave sensing 2.5    0.1 % atrial paced  0.1 % RV paced     Atrial threshold 1 V  at 0.4ms  RV threshold 1 V at 0.4ms  Mode switches   <0.1

## 2024-09-10 ENCOUNTER — LAB (OUTPATIENT)
Dept: LAB | Age: 49
End: 2024-09-10

## 2024-09-10 ENCOUNTER — HOSPITAL ENCOUNTER (EMERGENCY)
Age: 49
Discharge: HOME | End: 2024-09-10
Payer: COMMERCIAL

## 2024-09-10 VITALS
TEMPERATURE: 99.1 F | WEIGHT: 260 LBS | RESPIRATION RATE: 16 BRPM | BODY MASS INDEX: 33.38 KG/M2 | SYSTOLIC BLOOD PRESSURE: 162 MMHG | DIASTOLIC BLOOD PRESSURE: 97 MMHG | HEART RATE: 79 BPM | OXYGEN SATURATION: 95 %

## 2024-09-10 DIAGNOSIS — E11.65 TYPE 2 DIABETES MELLITUS WITH HYPERGLYCEMIA, WITHOUT LONG-TERM CURRENT USE OF INSULIN (HCC): ICD-10-CM

## 2024-09-10 DIAGNOSIS — J06.9 VIRAL URI WITH COUGH: Primary | ICD-10-CM

## 2024-09-10 LAB
DEPRECATED MEAN GLUCOSE BLD GHB EST-ACNC: 99 MG/DL (ref 70–126)
HBA1C MFR BLD HPLC: 5.3 % (ref 4.4–6.4)
SARS-COV-2 RDRP RESP QL NAA+PROBE: NOT  DETECTED

## 2024-09-10 PROCEDURE — 99213 OFFICE O/P EST LOW 20 MIN: CPT

## 2024-09-10 PROCEDURE — 87635 SARS-COV-2 COVID-19 AMP PRB: CPT

## 2024-09-10 RX ORDER — ACETAMINOPHEN AND CHLORPHENIRAMINE MALEATE 325; 2 MG/1; MG/1
2 TABLET, FILM COATED ORAL EVERY 4 HOURS PRN
Qty: 80 TABLET | Refills: 0 | Status: SHIPPED | OUTPATIENT
Start: 2024-09-10 | End: 2024-09-17

## 2024-09-10 RX ORDER — BENZONATATE 200 MG/1
200 CAPSULE ORAL 3 TIMES DAILY PRN
Qty: 30 CAPSULE | Refills: 0 | Status: SHIPPED | OUTPATIENT
Start: 2024-09-10 | End: 2024-09-17

## 2024-09-10 RX ORDER — PREDNISONE 20 MG/1
20 TABLET ORAL 2 TIMES DAILY
Qty: 10 TABLET | Refills: 0 | Status: SHIPPED | OUTPATIENT
Start: 2024-09-10 | End: 2024-09-15

## 2024-09-10 RX ORDER — DEXTROMETHORPHAN HYDROBROMIDE AND PROMETHAZINE HYDROCHLORIDE 15; 6.25 MG/5ML; MG/5ML
5 SYRUP ORAL 4 TIMES DAILY PRN
Qty: 118 ML | Refills: 0 | Status: SHIPPED | OUTPATIENT
Start: 2024-09-10 | End: 2024-09-17

## 2024-09-10 ASSESSMENT — ENCOUNTER SYMPTOMS
COUGH: 1
ABDOMINAL PAIN: 1
WHEEZING: 0
SHORTNESS OF BREATH: 0

## 2024-09-12 ENCOUNTER — OFFICE VISIT (OUTPATIENT)
Dept: FAMILY MEDICINE CLINIC | Age: 49
End: 2024-09-12

## 2024-09-12 VITALS
OXYGEN SATURATION: 95 % | BODY MASS INDEX: 32.48 KG/M2 | HEART RATE: 74 BPM | DIASTOLIC BLOOD PRESSURE: 78 MMHG | TEMPERATURE: 98.8 F | WEIGHT: 253 LBS | SYSTOLIC BLOOD PRESSURE: 122 MMHG

## 2024-09-12 DIAGNOSIS — I48.0 PAROXYSMAL ATRIAL FIBRILLATION (HCC): ICD-10-CM

## 2024-09-12 DIAGNOSIS — I50.20 SYSTOLIC CONGESTIVE HEART FAILURE, UNSPECIFIED HF CHRONICITY (HCC): ICD-10-CM

## 2024-09-12 DIAGNOSIS — J20.9 ACUTE BRONCHITIS, UNSPECIFIED ORGANISM: Primary | ICD-10-CM

## 2024-09-12 DIAGNOSIS — I42.8 NONISCHEMIC CARDIOMYOPATHY (HCC): ICD-10-CM

## 2024-09-12 PROBLEM — Z79.4 TYPE 2 DIABETES MELLITUS WITH OTHER CIRCULATORY COMPLICATION, WITH LONG-TERM CURRENT USE OF INSULIN (HCC): Status: ACTIVE | Noted: 2024-09-12

## 2024-09-12 PROBLEM — E11.59 TYPE 2 DIABETES MELLITUS WITH OTHER CIRCULATORY COMPLICATION, WITH LONG-TERM CURRENT USE OF INSULIN (HCC): Status: ACTIVE | Noted: 2024-09-12

## 2024-09-12 RX ORDER — METHYLPREDNISOLONE ACETATE 80 MG/ML
160 INJECTION, SUSPENSION INTRA-ARTICULAR; INTRALESIONAL; INTRAMUSCULAR; SOFT TISSUE ONCE
Status: COMPLETED | OUTPATIENT
Start: 2024-09-12 | End: 2024-09-12

## 2024-09-12 RX ADMIN — METHYLPREDNISOLONE ACETATE 160 MG: 80 INJECTION, SUSPENSION INTRA-ARTICULAR; INTRALESIONAL; INTRAMUSCULAR; SOFT TISSUE at 15:37

## 2024-09-12 SDOH — ECONOMIC STABILITY: FOOD INSECURITY: WITHIN THE PAST 12 MONTHS, THE FOOD YOU BOUGHT JUST DIDN'T LAST AND YOU DIDN'T HAVE MONEY TO GET MORE.: NEVER TRUE

## 2024-09-12 SDOH — ECONOMIC STABILITY: FOOD INSECURITY: WITHIN THE PAST 12 MONTHS, YOU WORRIED THAT YOUR FOOD WOULD RUN OUT BEFORE YOU GOT MONEY TO BUY MORE.: NEVER TRUE

## 2024-09-12 SDOH — ECONOMIC STABILITY: INCOME INSECURITY: HOW HARD IS IT FOR YOU TO PAY FOR THE VERY BASICS LIKE FOOD, HOUSING, MEDICAL CARE, AND HEATING?: NOT HARD AT ALL

## 2024-09-12 ASSESSMENT — PATIENT HEALTH QUESTIONNAIRE - PHQ9
2. FEELING DOWN, DEPRESSED OR HOPELESS: NOT AT ALL
SUM OF ALL RESPONSES TO PHQ QUESTIONS 1-9: 0
2. FEELING DOWN, DEPRESSED OR HOPELESS: NOT AT ALL
SUM OF ALL RESPONSES TO PHQ9 QUESTIONS 1 & 2: 0
1. LITTLE INTEREST OR PLEASURE IN DOING THINGS: NOT AT ALL
SUM OF ALL RESPONSES TO PHQ QUESTIONS 1-9: 0
1. LITTLE INTEREST OR PLEASURE IN DOING THINGS: NOT AT ALL
SUM OF ALL RESPONSES TO PHQ9 QUESTIONS 1 & 2: 0

## 2024-09-12 ASSESSMENT — ENCOUNTER SYMPTOMS
ABDOMINAL DISTENTION: 0
EYE PAIN: 0
COUGH: 1
CHEST TIGHTNESS: 1
SHORTNESS OF BREATH: 0
SINUS PRESSURE: 1
ABDOMINAL PAIN: 0
NAUSEA: 0
CONSTIPATION: 0
DIARRHEA: 0
SORE THROAT: 0
RHINORRHEA: 0

## 2024-09-17 RX ORDER — ROSUVASTATIN CALCIUM 10 MG/1
TABLET, COATED ORAL
Qty: 90 TABLET | Refills: 2 | Status: SHIPPED | OUTPATIENT
Start: 2024-09-17

## 2024-09-23 ENCOUNTER — NURSE ONLY (OUTPATIENT)
Dept: CARDIOLOGY CLINIC | Age: 49
End: 2024-09-23

## 2024-09-23 ENCOUNTER — OFFICE VISIT (OUTPATIENT)
Dept: CARDIOLOGY CLINIC | Age: 49
End: 2024-09-23
Payer: COMMERCIAL

## 2024-09-23 VITALS
DIASTOLIC BLOOD PRESSURE: 93 MMHG | HEART RATE: 73 BPM | BODY MASS INDEX: 33.29 KG/M2 | HEIGHT: 74 IN | SYSTOLIC BLOOD PRESSURE: 149 MMHG | WEIGHT: 259.4 LBS

## 2024-09-23 DIAGNOSIS — Z95.810 AICD (AUTOMATIC CARDIOVERTER/DEFIBRILLATOR) PRESENT: Primary | ICD-10-CM

## 2024-09-23 DIAGNOSIS — I42.8 NICM (NONISCHEMIC CARDIOMYOPATHY) (HCC): Primary | ICD-10-CM

## 2024-09-23 PROCEDURE — 99214 OFFICE O/P EST MOD 30 MIN: CPT | Performed by: INTERNAL MEDICINE

## 2024-09-23 RX ORDER — FUROSEMIDE 40 MG
TABLET ORAL
Qty: 90 TABLET | Refills: 3 | Status: CANCELLED | OUTPATIENT
Start: 2024-09-23

## 2024-09-23 RX ORDER — FUROSEMIDE 40 MG
TABLET ORAL
Qty: 90 TABLET | Refills: 3 | Status: SHIPPED | OUTPATIENT
Start: 2024-09-23

## 2024-09-23 RX ORDER — BENZONATATE 200 MG/1
200 CAPSULE ORAL 3 TIMES DAILY PRN
Qty: 21 CAPSULE | Refills: 0 | Status: SHIPPED | OUTPATIENT
Start: 2024-09-23 | End: 2024-09-30

## 2024-09-27 ENCOUNTER — IMMUNIZATION (OUTPATIENT)
Dept: FAMILY MEDICINE CLINIC | Age: 49
End: 2024-09-27
Payer: COMMERCIAL

## 2024-09-27 DIAGNOSIS — Z23 NEEDS FLU SHOT: Primary | ICD-10-CM

## 2024-09-27 PROCEDURE — 90471 IMMUNIZATION ADMIN: CPT | Performed by: NURSE PRACTITIONER

## 2024-09-27 PROCEDURE — 90661 CCIIV3 VAC ABX FR 0.5 ML IM: CPT | Performed by: NURSE PRACTITIONER

## 2024-10-08 ENCOUNTER — TELEPHONE (OUTPATIENT)
Dept: CARDIOLOGY CLINIC | Age: 49
End: 2024-10-08

## 2024-10-08 NOTE — TELEPHONE ENCOUNTER
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

## 2024-10-08 NOTE — TELEPHONE ENCOUNTER
Erasto DESHPANDE [facility] Clinical Staff (supporting Migue Mota MD)1 minute ago (2:05 PM)       10/3   114/68 heart rate 70  10/4 missed   10/5 126/82 heart rate 72  10/6 122/74 hear rate 68  10/7 108/68 heart rate 74  10/8 133/75 hear rate 68     Please understand that I don’t want to lisinopril or another med that will cause coughing fits. If you feel I need to start something else, I would consider.  Please call my wife / Aury [phone] if making any changes or suggestions

## 2024-10-08 NOTE — TELEPHONE ENCOUNTER
Erasto DESHPANDE [facility] Clinical Staff (supporting Migue Mota MD)Just now (2:06 PM)       [phone] * is her correct number. Maricarmen

## 2024-10-24 PROCEDURE — 93297 REM INTERROG DEV EVAL ICPMS: CPT | Performed by: INTERNAL MEDICINE

## 2024-11-24 PROCEDURE — 93297 REM INTERROG DEV EVAL ICPMS: CPT | Performed by: INTERNAL MEDICINE

## 2024-12-02 RX ORDER — OMEPRAZOLE 40 MG/1
CAPSULE, DELAYED RELEASE ORAL
Qty: 90 CAPSULE | Refills: 1 | Status: SHIPPED | OUTPATIENT
Start: 2024-12-02

## 2024-12-25 PROCEDURE — 93297 REM INTERROG DEV EVAL ICPMS: CPT | Performed by: INTERNAL MEDICINE

## 2025-01-10 ENCOUNTER — TELEPHONE (OUTPATIENT)
Dept: PHARMACY | Facility: CLINIC | Age: 50
End: 2025-01-10

## 2025-01-10 NOTE — TELEPHONE ENCOUNTER
**Spouse Gloria also enrolled in the DM Program**  **Patient is [facility] **  Called patient to schedule 2025 yearly pharmacist appointment to discuss medications for Diabetes Management Program.    Spoke to patient's spouse: Gloria and appointment scheduled for 2/07/25 at 5:30 PM PM.  Spouse Gloria's appointment is scheduled for 2/07/25 at 5 PM and will be completed with his spouses appointment.     Alicja Garcia CphT  [facility]  Clinical Pharmacy   Department, toll free: [phone] Option #3    For Pharmacy Admin Tracking Only    Program: [employer]  CPA in place:  No  Recommendation Provided To: Patient/Caregiver: 1 via Telephone  Intervention Detail: Scheduled Appointment  Intervention Accepted By: Patient/Caregiver: 1  Gap Closed?: Yes   Time Spent (min): 5

## 2025-02-04 RX ORDER — GABAPENTIN 300 MG/1
300 CAPSULE ORAL 3 TIMES DAILY
Qty: 270 CAPSULE | Refills: 1 | Status: SHIPPED | OUTPATIENT
Start: 2025-02-04 | End: 2025-08-03

## 2025-02-06 RX ORDER — METOPROLOL SUCCINATE 25 MG/1
75 TABLET, EXTENDED RELEASE ORAL DAILY
Qty: 270 TABLET | Refills: 1 | Status: SHIPPED | OUTPATIENT
Start: 2025-02-06

## 2025-02-07 ENCOUNTER — TELEPHONE (OUTPATIENT)
Dept: PHARMACY | Facility: CLINIC | Age: 50
End: 2025-02-07

## 2025-02-07 NOTE — TELEPHONE ENCOUNTER
POPULATION HEALTH CLINICAL PHARMACY REVIEW - Be Well with Diabetes ([facility])    Erasto Daniels is a 49 y.o. male enrolled in the [facility] Employee Diabetes Program. Patient provided writer with verbal consent to remain in the program for this year.     Patient enrolled: 2020.    Employee/Spouse/Dependent: spouse  [facility] Selected Plan per [employer]:  Group: A01 - EPO PLUS PLAN - SHARED SERVICES - ACTIVE       Communication preferences (for >8% followup, urgent messages, etc)  Preferred methods: neglected to ask  Preferred days/time: neglected to ask        Medications and Supplies:    Current Medications:  Current Outpatient Medications   Medication Instructions    acetaminophen (TYLENOL) 650 mg, Oral, EVERY 8 HOURS PRN    aspirin (ASPIRIN LOW DOSE) 81 mg, Oral, DAILY    Docusate Sodium 100 mg, Oral, 2 TIMES DAILY, Take as needed to prevent constipation    fluticasone (FLONASE) 50 MCG/ACT nasal spray 2 sprays, Nasal, DAILY    furosemide (LASIX) 40 MG tablet TAKE ONE TABLET BY MOUTH EVERY MORNING    gabapentin (NEURONTIN) 300 mg, Oral, 3 TIMES DAILY    loratadine (CLARITIN) 10 mg, Oral, DAILY    metoprolol succinate (TOPROL XL) 75 mg, Oral, DAILY    omeprazole (PRILOSEC) 40 MG delayed release capsule TAKE 1 CAPSULE BY MOUTH ONE TIME A DAY    Pediatric Multivitamins-Iron (FLINTSTONES PLUS IRON PO) Take by mouth 2 tablets in the morning and 1 tablet in the evening    rosuvastatin (CRESTOR) 10 MG tablet TAKE 1 TABLET BY MOUTH ONE TIME A DAY     Other medications/OTC/herbals not included above:  none    Patient's hypoglycemia treatment of choice (if applicable):   States post bariatric surgery, blood sugar will drop to the 50's  at times - he uses glucose tablets (reviewed recommended dosing of 3-4 tablets).   Reviewed potential treatment options for low blood glucose: 15 gram serving of simple carbohydrates (4 oz fruit juice or regular soda, 3 to 4 glucose tablets).      Refill status:  Patient refilled a

## 2025-03-06 ENCOUNTER — TELEPHONE (OUTPATIENT)
Dept: BARIATRICS/WEIGHT MGMT | Age: 50
End: 2025-03-06

## 2025-03-06 NOTE — TELEPHONE ENCOUNTER
Called pt to see if he would like to make an appt to be seen in our office.LVM. Last appt with us was in 2023.

## 2025-04-14 ENCOUNTER — PATIENT MESSAGE (OUTPATIENT)
Dept: FAMILY MEDICINE CLINIC | Age: 50
End: 2025-04-14

## 2025-04-14 ENCOUNTER — TELEPHONE (OUTPATIENT)
Dept: FAMILY MEDICINE CLINIC | Age: 50
End: 2025-04-14

## 2025-04-14 DIAGNOSIS — Z12.11 SCREENING FOR COLON CANCER: Primary | ICD-10-CM

## 2025-04-28 PROCEDURE — 93297 REM INTERROG DEV EVAL ICPMS: CPT | Performed by: INTERNAL MEDICINE

## 2025-05-19 RX ORDER — OMEPRAZOLE 40 MG/1
CAPSULE, DELAYED RELEASE ORAL
Qty: 90 CAPSULE | Refills: 1 | Status: SHIPPED | OUTPATIENT
Start: 2025-05-19 | End: 2025-05-20 | Stop reason: SDUPTHER

## 2025-05-20 LAB — NONINV COLON CA DNA+OCC BLD SCRN STL QL: NORMAL

## 2025-05-20 RX ORDER — OMEPRAZOLE 40 MG/1
CAPSULE, DELAYED RELEASE ORAL
Qty: 90 CAPSULE | Refills: 1 | Status: SHIPPED | OUTPATIENT
Start: 2025-05-20

## 2025-05-20 NOTE — TELEPHONE ENCOUNTER
Omeprazole failed to transmit to [facility] 5/19/25.  Rx was resent today and was confirmed by pharmacy.

## 2025-05-29 PROCEDURE — 93297 REM INTERROG DEV EVAL ICPMS: CPT | Performed by: INTERNAL MEDICINE

## 2025-06-09 LAB — NONINV COLON CA DNA+OCC BLD SCRN STL QL: POSITIVE

## 2025-06-10 ENCOUNTER — RESULTS FOLLOW-UP (OUTPATIENT)
Dept: FAMILY MEDICINE CLINIC | Age: 50
End: 2025-06-10

## 2025-06-10 DIAGNOSIS — R19.5 POSITIVE COLORECTAL CANCER SCREENING USING COLOGUARD TEST: Primary | ICD-10-CM

## 2025-06-10 NOTE — TELEPHONE ENCOUNTER
Patient called back and was notified of positive cologuard test. He would like to be referred to Dr Woo. Referral is faxed and they will contact patient.

## 2025-06-29 PROCEDURE — 93297 REM INTERROG DEV EVAL ICPMS: CPT | Performed by: INTERNAL MEDICINE

## 2025-07-21 RX ORDER — GABAPENTIN 300 MG/1
300 CAPSULE ORAL 3 TIMES DAILY
Qty: 270 CAPSULE | Refills: 1 | Status: SHIPPED | OUTPATIENT
Start: 2025-07-21 | End: 2026-01-17

## 2025-07-22 ENCOUNTER — CLINICAL DOCUMENTATION (OUTPATIENT)
Dept: PHARMACY | Facility: CLINIC | Age: 50
End: 2025-07-22

## 2025-07-22 ENCOUNTER — PATIENT MESSAGE (OUTPATIENT)
Dept: PHARMACY | Facility: CLINIC | Age: 50
End: 2025-07-22

## 2025-07-22 NOTE — PROGRESS NOTES
[facility] Employee Diabetes Program - Be Well With Diabetes    Quarterly Check-in  Quarterly Reminder sent to patient for the DM Program - See Chip message or Letter for more information  Sent Chip Goodwin Kathe  Population Health Clinical   [facility] Clinical Pharmacy  Department, toll free: [phone], option 3      For Pharmacy Admin Tracking Only    Program: [employer]  CPA in place:  No  Time Spent (min): 5     =======================================================    Mychart/Letter for participant:    Thanks so much for taking the first step towards better health.    To ensure participants are taking steps to control their condition, ongoing requirements need to be completed. To receive the  Well With Diabetes Program 2026, the following actions must be taken:     Requirements to be completed by 12/31/25  Visit with your physician (First yearly visit)  Visit with your physician (Second yearly visit)  First A1C  Second A1C  Lipid panel (once yearly)  Urine Albumin/Creatinine Ratio (once yearly)  Flu vaccination (once yearly)  Taking a Statin, have a contraindication, or a Provider override  Taking an ACEi/ARB, have a contraindication, or a Provider override    Requirement due by 12/31/25 if A1C is greater than 8 percent:  Engage with a [facility] Associate Care Managers (ACM) or Clinical pharmacy specialists by phone at least 2 times, or complete some form of diabetes education through your provider, BeWell, etc.    *Documentation of any requirements completed or reviewed outside of the [facility] electronic medical record will need to be faxed or emailed (fax/email info below).*    **Note: If you complete a 2025 Be Well Within Screening you can have an A1C drawn at that time at no cost to you - Advise the Be Well Within Team at your screening that you are enrolled in the Be Well With Diabetes

## 2025-07-30 ASSESSMENT — PATIENT HEALTH QUESTIONNAIRE - PHQ9
SUM OF ALL RESPONSES TO PHQ QUESTIONS 1-9: 0
SUM OF ALL RESPONSES TO PHQ QUESTIONS 1-9: 0
2. FEELING DOWN, DEPRESSED OR HOPELESS: NOT AT ALL
1. LITTLE INTEREST OR PLEASURE IN DOING THINGS: NOT AT ALL
SUM OF ALL RESPONSES TO PHQ9 QUESTIONS 1 & 2: 0
SUM OF ALL RESPONSES TO PHQ QUESTIONS 1-9: 0
2. FEELING DOWN, DEPRESSED OR HOPELESS: NOT AT ALL
1. LITTLE INTEREST OR PLEASURE IN DOING THINGS: NOT AT ALL
SUM OF ALL RESPONSES TO PHQ QUESTIONS 1-9: 0

## 2025-07-31 ENCOUNTER — OFFICE VISIT (OUTPATIENT)
Dept: FAMILY MEDICINE CLINIC | Age: 50
End: 2025-07-31
Payer: COMMERCIAL

## 2025-07-31 VITALS
OXYGEN SATURATION: 98 % | TEMPERATURE: 98.5 F | WEIGHT: 262.6 LBS | HEART RATE: 82 BPM | SYSTOLIC BLOOD PRESSURE: 130 MMHG | DIASTOLIC BLOOD PRESSURE: 84 MMHG | HEIGHT: 74 IN | RESPIRATION RATE: 16 BRPM | BODY MASS INDEX: 33.7 KG/M2

## 2025-07-31 DIAGNOSIS — I42.8 NONISCHEMIC CARDIOMYOPATHY (HCC): ICD-10-CM

## 2025-07-31 DIAGNOSIS — J32.0 LEFT MAXILLARY SINUSITIS: ICD-10-CM

## 2025-07-31 DIAGNOSIS — Z98.84 S/P GASTRIC BYPASS: ICD-10-CM

## 2025-07-31 DIAGNOSIS — Z12.5 SCREENING PSA (PROSTATE SPECIFIC ANTIGEN): ICD-10-CM

## 2025-07-31 DIAGNOSIS — Z00.00 ROUTINE GENERAL MEDICAL EXAMINATION AT A HEALTH CARE FACILITY: Primary | ICD-10-CM

## 2025-07-31 DIAGNOSIS — I48.0 PAROXYSMAL ATRIAL FIBRILLATION (HCC): ICD-10-CM

## 2025-07-31 DIAGNOSIS — E16.2 HYPOGLYCEMIA: ICD-10-CM

## 2025-07-31 DIAGNOSIS — I50.20 SYSTOLIC CONGESTIVE HEART FAILURE, UNSPECIFIED HF CHRONICITY (HCC): ICD-10-CM

## 2025-07-31 DIAGNOSIS — E11.9 DIET-CONTROLLED TYPE 2 DIABETES MELLITUS (HCC): ICD-10-CM

## 2025-07-31 PROCEDURE — 99396 PREV VISIT EST AGE 40-64: CPT | Performed by: FAMILY MEDICINE

## 2025-07-31 PROCEDURE — 90471 IMMUNIZATION ADMIN: CPT | Performed by: FAMILY MEDICINE

## 2025-07-31 PROCEDURE — 90677 PCV20 VACCINE IM: CPT | Performed by: FAMILY MEDICINE

## 2025-07-31 RX ORDER — METOPROLOL SUCCINATE 25 MG/1
75 TABLET, EXTENDED RELEASE ORAL DAILY
Qty: 270 TABLET | Refills: 3 | Status: SHIPPED | OUTPATIENT
Start: 2025-07-31

## 2025-07-31 RX ORDER — CEFUROXIME AXETIL 250 MG/1
250 TABLET ORAL 2 TIMES DAILY
Qty: 20 TABLET | Refills: 0 | Status: SHIPPED | OUTPATIENT
Start: 2025-07-31 | End: 2025-08-10

## 2025-07-31 RX ORDER — ROSUVASTATIN CALCIUM 10 MG/1
TABLET, COATED ORAL
Qty: 90 TABLET | Refills: 3 | Status: SHIPPED | OUTPATIENT
Start: 2025-07-31

## 2025-07-31 RX ORDER — GABAPENTIN 600 MG/1
600 TABLET ORAL 3 TIMES DAILY
Qty: 270 TABLET | Refills: 1 | Status: SHIPPED | OUTPATIENT
Start: 2025-07-31 | End: 2026-01-27

## 2025-07-31 SDOH — ECONOMIC STABILITY: FOOD INSECURITY: WITHIN THE PAST 12 MONTHS, YOU WORRIED THAT YOUR FOOD WOULD RUN OUT BEFORE YOU GOT MONEY TO BUY MORE.: NEVER TRUE

## 2025-07-31 SDOH — ECONOMIC STABILITY: FOOD INSECURITY: WITHIN THE PAST 12 MONTHS, THE FOOD YOU BOUGHT JUST DIDN'T LAST AND YOU DIDN'T HAVE MONEY TO GET MORE.: NEVER TRUE

## 2025-07-31 NOTE — PROGRESS NOTES
Immunizations Administered       Name Date Dose Route    Pneumococcal, PCV20, PREVNAR 20, (age 6w+), IM, 0.5mL 7/31/2025 0.5 mL Intramuscular    Site: Deltoid- Right    Lot: [id number]    NDC: 6337-3709-26

## 2025-08-01 ASSESSMENT — ENCOUNTER SYMPTOMS
CONSTIPATION: 0
NAUSEA: 0
ABDOMINAL PAIN: 0
SINUS PAIN: 1
SORE THROAT: 0
EYE PAIN: 0
ABDOMINAL DISTENTION: 0
DIARRHEA: 0
RHINORRHEA: 0
COUGH: 0
SINUS PRESSURE: 1
SHORTNESS OF BREATH: 0

## 2025-08-01 NOTE — PROGRESS NOTES
[facility] PROFESSIONAL [facility]  [facility]  [address] [address]  Dept: [phone]  Dept Fax: [phone]  Loc: [phone]  PROGRESS NOTE      VisitDate: 7/31/2025    Erasto Daniels is a 50 y.o. male who presents today for:  Chief Complaint   Patient presents with    Annual Exam     Be well.   Neuropathy in his legs and feet.   Sinus congestion for a couple weeks.        Impression/Plan:  1. Routine general medical examination at a health care facility    2. S/P gastric bypass    3. Nonischemic cardiomyopathy (HCC)    4. Screening PSA (prostate specific antigen)    5. Hypoglycemia    6. Systolic congestive heart failure, unspecified HF chronicity (HCC)    7. Paroxysmal atrial fibrillation (HCC)    8. Diet-controlled type 2 diabetes mellitus (HCC)    9. Left maxillary sinusitis      Requested Prescriptions     Signed Prescriptions Disp Refills    rosuvastatin (CRESTOR) 10 MG tablet 90 tablet 3     Sig: TAKE 1 TABLET BY MOUTH ONE TIME A DAY    gabapentin (NEURONTIN) 600 MG tablet 270 tablet 1     Sig: Take 1 tablet by mouth 3 times daily for 180 days.    cefUROXime (CEFTIN) 250 MG tablet 20 tablet 0     Sig: Take 1 tablet by mouth 2 times daily for 10 days    metoprolol succinate (TOPROL XL) 25 MG extended release tablet 270 tablet 3     Sig: Take 3 tablets by mouth daily     Orders Placed This Encounter   Procedures    Pneumococcal, PCV20, PREVNAR 20, (age 6w+), IM, PF    Lipid Panel     Standing Status:   Future     Expected Date:   8/14/2025     Expiration Date:   7/31/2026     Is Patient Fasting?/# of Hours:   yes/12    Comprehensive Metabolic Panel     Standing Status:   Future     Expected Date:   8/14/2025     Expiration Date:   7/31/2026    Vitamin B12 & Folate     Standing Status:   Future     Expected Date:   8/14/2025     Expiration Date:   7/31/2026    Hemoglobin A1C     Standing Status:   Future     Expected Date:   8/14/2025     Expiration Date:

## 2025-08-05 RX ORDER — SODIUM CHLORIDE 9 MG/ML
INJECTION, SOLUTION INTRAVENOUS CONTINUOUS
Status: DISCONTINUED | OUTPATIENT
Start: 2025-08-05 | End: 2025-08-06 | Stop reason: HOSPADM

## 2025-08-05 RX ORDER — SODIUM CHLORIDE 0.9 % (FLUSH) 0.9 %
5-40 SYRINGE (ML) INJECTION PRN
Status: DISCONTINUED | OUTPATIENT
Start: 2025-08-05 | End: 2025-08-06 | Stop reason: HOSPADM

## 2025-08-05 RX ORDER — SODIUM CHLORIDE 0.9 % (FLUSH) 0.9 %
5-40 SYRINGE (ML) INJECTION EVERY 12 HOURS SCHEDULED
Status: DISCONTINUED | OUTPATIENT
Start: 2025-08-05 | End: 2025-08-06 | Stop reason: HOSPADM

## 2025-08-05 RX ORDER — SODIUM CHLORIDE 9 MG/ML
25 INJECTION, SOLUTION INTRAVENOUS PRN
Status: DISCONTINUED | OUTPATIENT
Start: 2025-08-05 | End: 2025-08-06 | Stop reason: HOSPADM

## 2025-08-06 ENCOUNTER — APPOINTMENT (OUTPATIENT)
Dept: ENDOSCOPY | Age: 50
End: 2025-08-06
Attending: INTERNAL MEDICINE
Payer: COMMERCIAL

## 2025-08-06 ENCOUNTER — ANESTHESIA EVENT (OUTPATIENT)
Dept: ENDOSCOPY | Age: 50
End: 2025-08-06
Payer: COMMERCIAL

## 2025-08-06 ENCOUNTER — ANESTHESIA (OUTPATIENT)
Dept: ENDOSCOPY | Age: 50
End: 2025-08-06
Payer: COMMERCIAL

## 2025-08-06 ENCOUNTER — HOSPITAL ENCOUNTER (OUTPATIENT)
Age: 50
Setting detail: OUTPATIENT SURGERY
Discharge: HOME | End: 2025-08-06
Attending: INTERNAL MEDICINE | Admitting: INTERNAL MEDICINE
Payer: COMMERCIAL

## 2025-08-06 VITALS
OXYGEN SATURATION: 99 % | DIASTOLIC BLOOD PRESSURE: 84 MMHG | TEMPERATURE: 97.4 F | RESPIRATION RATE: 18 BRPM | WEIGHT: 265 LBS | BODY MASS INDEX: 32.95 KG/M2 | HEART RATE: 70 BPM | SYSTOLIC BLOOD PRESSURE: 151 MMHG | HEIGHT: 75 IN

## 2025-08-06 PROCEDURE — 3700000000 HC ANESTHESIA ATTENDED CARE: Performed by: INTERNAL MEDICINE

## 2025-08-06 PROCEDURE — 6360000002 HC RX W HCPCS: Performed by: NURSE ANESTHETIST, CERTIFIED REGISTERED

## 2025-08-06 PROCEDURE — 2720000010 HC SURG SUPPLY STERILE: Performed by: INTERNAL MEDICINE

## 2025-08-06 PROCEDURE — 2500000003 HC RX 250 WO HCPCS: Performed by: NURSE ANESTHETIST, CERTIFIED REGISTERED

## 2025-08-06 PROCEDURE — 3700000001 HC ADD 15 MINUTES (ANESTHESIA): Performed by: INTERNAL MEDICINE

## 2025-08-06 PROCEDURE — 7100000011 HC PHASE II RECOVERY - ADDTL 15 MIN: Performed by: INTERNAL MEDICINE

## 2025-08-06 PROCEDURE — 2580000003 HC RX 258: Performed by: INTERNAL MEDICINE

## 2025-08-06 PROCEDURE — 3609010600 HC COLONOSCOPY POLYPECTOMY SNARE/COLD BIOPSY: Performed by: INTERNAL MEDICINE

## 2025-08-06 PROCEDURE — 88305 TISSUE EXAM BY PATHOLOGIST: CPT

## 2025-08-06 PROCEDURE — 7100000010 HC PHASE II RECOVERY - FIRST 15 MIN: Performed by: INTERNAL MEDICINE

## 2025-08-06 PROCEDURE — 2709999900 HC NON-CHARGEABLE SUPPLY: Performed by: INTERNAL MEDICINE

## 2025-08-06 RX ORDER — MIDAZOLAM HYDROCHLORIDE 1 MG/ML
INJECTION, SOLUTION INTRAMUSCULAR; INTRAVENOUS
Status: DISCONTINUED | OUTPATIENT
Start: 2025-08-06 | End: 2025-08-06 | Stop reason: SDUPTHER

## 2025-08-06 RX ORDER — LIDOCAINE HYDROCHLORIDE 20 MG/ML
INJECTION, SOLUTION EPIDURAL; INFILTRATION; INTRACAUDAL; PERINEURAL
Status: DISCONTINUED | OUTPATIENT
Start: 2025-08-06 | End: 2025-08-06 | Stop reason: SDUPTHER

## 2025-08-06 RX ADMIN — Medication 20 MG: at 07:40

## 2025-08-06 RX ADMIN — PROPOFOL 20 MG: 10 INJECTION, EMULSION INTRAVENOUS at 07:50

## 2025-08-06 RX ADMIN — PROPOFOL 30 MG: 10 INJECTION, EMULSION INTRAVENOUS at 07:56

## 2025-08-06 RX ADMIN — SODIUM CHLORIDE: 0.9 INJECTION, SOLUTION INTRAVENOUS at 07:13

## 2025-08-06 RX ADMIN — PROPOFOL 50 MG: 10 INJECTION, EMULSION INTRAVENOUS at 07:41

## 2025-08-06 RX ADMIN — MIDAZOLAM 2 MG: 1 INJECTION INTRAMUSCULAR; INTRAVENOUS at 07:36

## 2025-08-06 RX ADMIN — Medication 20 MG: at 07:37

## 2025-08-06 RX ADMIN — Medication 10 MG: at 07:36

## 2025-08-06 RX ADMIN — PROPOFOL 30 MG: 10 INJECTION, EMULSION INTRAVENOUS at 07:47

## 2025-08-06 RX ADMIN — LIDOCAINE HYDROCHLORIDE 100 MG: 20 INJECTION, SOLUTION EPIDURAL; INFILTRATION; INTRACAUDAL; PERINEURAL at 07:41

## 2025-08-06 ASSESSMENT — PAIN - FUNCTIONAL ASSESSMENT
PAIN_FUNCTIONAL_ASSESSMENT: NONE - DENIES PAIN

## 2025-08-16 ENCOUNTER — LAB (OUTPATIENT)
Dept: LAB | Age: 50
End: 2025-08-16

## 2025-08-16 DIAGNOSIS — E11.9 DIET-CONTROLLED TYPE 2 DIABETES MELLITUS (HCC): ICD-10-CM

## 2025-08-16 DIAGNOSIS — Z12.5 SCREENING PSA (PROSTATE SPECIFIC ANTIGEN): ICD-10-CM

## 2025-08-16 DIAGNOSIS — Z98.84 S/P GASTRIC BYPASS: ICD-10-CM

## 2025-08-16 DIAGNOSIS — Z00.00 ROUTINE GENERAL MEDICAL EXAMINATION AT A HEALTH CARE FACILITY: ICD-10-CM

## 2025-08-16 LAB
25(OH)D3 SERPL-MCNC: 26 NG/ML (ref 30–100)
ALBUMIN SERPL BCG-MCNC: 4.4 G/DL (ref 3.4–4.9)
ALP SERPL-CCNC: 89 U/L (ref 40–129)
ALT SERPL W/O P-5'-P-CCNC: 24 U/L (ref 10–50)
ANION GAP SERPL CALC-SCNC: 7 MEQ/L (ref 8–16)
AST SERPL-CCNC: 25 U/L (ref 10–50)
BASOPHILS ABSOLUTE: 0 THOU/MM3 (ref 0–0.1)
BASOPHILS NFR BLD AUTO: 0.5 %
BILIRUB SERPL-MCNC: 0.8 MG/DL (ref 0.3–1.2)
BUN SERPL-MCNC: 9 MG/DL (ref 8–23)
CALCIUM SERPL-MCNC: 10.3 MG/DL (ref 8.5–10.5)
CHLORIDE SERPL-SCNC: 104 MEQ/L (ref 98–111)
CHOLEST SERPL-MCNC: 109 MG/DL (ref 100–199)
CO2 SERPL-SCNC: 31 MEQ/L (ref 22–29)
CREAT SERPL-MCNC: 0.8 MG/DL (ref 0.7–1.2)
CREAT UR-MCNC: 159 MG/DL
DEPRECATED MEAN GLUCOSE BLD GHB EST-ACNC: 105 MG/DL (ref 70–126)
DEPRECATED RDW RBC AUTO: 45.9 FL (ref 35–45)
EOSINOPHIL NFR BLD AUTO: 4.7 %
EOSINOPHILS ABSOLUTE: 0.3 THOU/MM3 (ref 0–0.4)
ERYTHROCYTE [DISTWIDTH] IN BLOOD BY AUTOMATED COUNT: 13.7 % (ref 11.5–14.5)
FOLATE SERPL-MCNC: 6.7 NG/ML (ref 4.6–34.8)
GFR SERPL CREATININE-BSD FRML MDRD: > 90 ML/MIN/1.73M2
GLUCOSE SERPL-MCNC: 88 MG/DL (ref 74–109)
HBA1C MFR BLD HPLC: 5.5 % (ref 4–6)
HCT VFR BLD AUTO: 47.3 % (ref 42–52)
HDLC SERPL-MCNC: 47 MG/DL
HGB BLD-MCNC: 15 GM/DL (ref 14–18)
IMM GRANULOCYTES # BLD AUTO: 0.02 THOU/MM3 (ref 0–0.07)
IMM GRANULOCYTES NFR BLD AUTO: 0.3 %
LDLC SERPL CALC-MCNC: 54 MG/DL
LYMPHOCYTES ABSOLUTE: 1.9 THOU/MM3 (ref 1–4.8)
LYMPHOCYTES NFR BLD AUTO: 28.4 %
MCH RBC QN AUTO: 28.8 PG (ref 26–33)
MCHC RBC AUTO-ENTMCNC: 31.7 GM/DL (ref 32.2–35.5)
MCV RBC AUTO: 90.8 FL (ref 80–94)
MICROALBUMIN UR-MCNC: < 2 MG/DL
MICROALBUMIN/CREAT RATIO PNL UR: 13 MG/G (ref 0–30)
MONOCYTES ABSOLUTE: 0.5 THOU/MM3 (ref 0.4–1.3)
MONOCYTES NFR BLD AUTO: 6.9 %
NEUTROPHILS ABSOLUTE: 3.9 THOU/MM3 (ref 1.8–7.7)
NEUTROPHILS NFR BLD AUTO: 59.2 %
NRBC BLD AUTO-RTO: 0 /100 WBC
PLATELET # BLD AUTO: 163 THOU/MM3 (ref 130–400)
PMV BLD AUTO: 11 FL (ref 9.4–12.4)
POTASSIUM SERPL-SCNC: 4 MEQ/L (ref 3.5–5.2)
PROT SERPL-MCNC: 7 G/DL (ref 6.4–8.3)
PSA SERPL-MCNC: 1.28 NG/ML (ref 0–1)
RBC # BLD AUTO: 5.21 MILL/MM3 (ref 4.7–6.1)
SODIUM SERPL-SCNC: 142 MEQ/L (ref 135–145)
TRIGL SERPL-MCNC: 40 MG/DL (ref 0–199)
VIT B12 SERPL-MCNC: 449 PG/ML (ref 232–1245)
WBC # BLD AUTO: 6.6 THOU/MM3 (ref 4.8–10.8)

## 2025-08-23 LAB — PYRIDOXAL PHOS SERPL-SCNC: 190.2 NMOL/L (ref 20–125)

## 2025-08-30 PROCEDURE — 93297 REM INTERROG DEV EVAL ICPMS: CPT | Performed by: INTERNAL MEDICINE

## (undated) DEVICE — TROCAR: Brand: KII FIOS FIRST ENTRY

## (undated) DEVICE — PENCIL SMK EVAC ALL IN 1 DSGN ENH VISIBILITY IMPROVED AIR

## (undated) DEVICE — SUTURE V-LOC 180 SZ 3-0 L9IN ABSRB GRN L26MM V-20 1/2 CIR VLOCL0644

## (undated) DEVICE — 35 ML SYRINGE LUER-LOCK TIP: Brand: MONOJECT

## (undated) DEVICE — BIOGUARD A/W CLEANING ADAPTER

## (undated) DEVICE — TUBING INSUFFLATOR HEAT HUMIDIFIED SMK EVAC SET PNEUMOCLEAR

## (undated) DEVICE — SUTURE VCRL + SZ 4-0 L27IN ABSRB WHT FS-2 3/8 CIR REV CUT VCP422H

## (undated) DEVICE — SUTURE V-LOC 180 SZ 2-0 L12IN ABSRB VLT GS-21 L37MM 1/2 CIR VLOCM0315

## (undated) DEVICE — SPHINCTEROTOME: Brand: JAGTOME RX 39

## (undated) DEVICE — ARM DRAPE

## (undated) DEVICE — PACK PROCEDURE SURG SET UP SRMC

## (undated) DEVICE — GLOVE ORANGE PI 7   MSG9070

## (undated) DEVICE — SUTURE VCRL + SZ 0 L27IN ABSRB VLT L26MM UR-6 5/8 CIR VCP603H

## (undated) DEVICE — SUTURE VCRL + SZ 0 L18IN ABSRB TIE VCP106G

## (undated) DEVICE — RELOAD STPL 45MM THCK TISS GRN W/ GRIPPING SURF TECHNOLOGY

## (undated) DEVICE — RELOAD STPL 2.5MM L60MM 0DEG VASC TISS TAN TI 6 ROW LIN

## (undated) DEVICE — SEALANT TISS 10 CC FIBRIN VISTASEAL

## (undated) DEVICE — SUTURE VCRL + SZ 2-0 L27IN ABSRB WHT SH 1/2 CIR TAPERCUT VCP417H

## (undated) DEVICE — TIP COVER ACCESSORY

## (undated) DEVICE — VESSEL SEALER EXTEND: Brand: ENDOWRIST

## (undated) DEVICE — GENERAL LAPAROSCOPY-LF: Brand: MEDLINE INDUSTRIES, INC.

## (undated) DEVICE — APPLICATOR LAP 45CM FLX 2 VISTASEAL

## (undated) DEVICE — APPLICATOR LAP 35 CM 2 RIGID VISTASEAL

## (undated) DEVICE — COLUMN DRAPE

## (undated) DEVICE — ELECTRO LUBE IS A SINGLE PATIENT USE DEVICE THAT IS INTENDED TO BE USED ON ELECTROSURGICAL ELECTRODES TO REDUCE STICKING.: Brand: KEY SURGICAL ELECTRO LUBE

## (undated) DEVICE — BLADELESS OBTURATOR: Brand: WECK VISTA

## (undated) DEVICE — SUTURE PERMA-HAND SZ 2-0 L30IN NONABSORBABLE BLK L26MM SH K833H

## (undated) DEVICE — BASIC SINGLE BASIN BTC-LF: Brand: MEDLINE INDUSTRIES, INC.

## (undated) DEVICE — GAUZE,SPONGE,8"X4",12PLY,XRAY,STRL,LF: Brand: MEDLINE

## (undated) DEVICE — APPLICATOR MEDICATED 26 CC SOLUTION HI LT ORNG CHLORAPREP

## (undated) DEVICE — VISIGI 3D®  CALIBRATION SYSTEM  SIZE 32FR STD W/ BULB: Brand: BOEHRINGER® VISIGI 3D™ CALIBRATION SYSTEM FOR ROUX-EN-Y,  SIZE 32FR W/BULB

## (undated) DEVICE — BANDAGE COMPR W6INXL5YD WHT BGE POLY COT M E WRP WV HK AND

## (undated) DEVICE — BANDAGE ADH W1XL3IN NAT FAB WVN FLX DURABLE N ADH PD SEAL

## (undated) DEVICE — HYPODERMIC SAFETY NEEDLE: Brand: MAGELLAN

## (undated) DEVICE — SUTURE VCRL + SZ 3-0 L27IN ABSRB UD L26MM SH 1/2 CIR VCP416H

## (undated) DEVICE — SEAL

## (undated) DEVICE — STRIP,CLOSURE,WOUND,MEDI-STRIP,1/2X4: Brand: MEDLINE

## (undated) DEVICE — INSUFFLATION NEEDLE TO ESTABLISH PNEUMOPERITONEUM.: Brand: INSUFFLATION NEEDLE

## (undated) DEVICE — GOWN,SIRUS,NON REINFRCD,LARGE,SET IN SL: Brand: MEDLINE

## (undated) DEVICE — PACK,UNIVERSAL,NO GOWNS: Brand: MEDLINE

## (undated) DEVICE — BAG SPEC REM 224ML W4XL6IN DIA10MM 1 HND GYN DISP ENDOPCH

## (undated) DEVICE — 3M™ STERI-STRIP™ COMPOUND BENZOIN TINCTURE 40 BAGS/CARTON 4 CARTONS/CASE C1544: Brand: 3M™ STERI-STRIP™

## (undated) DEVICE — SHELL STPL PWR FOR SIGNIA HNDL STPL SYS

## (undated) DEVICE — PUMP SUC IRR TBNG L10FT W/ HNDPC ASSEMB STRYKEFLOW 2

## (undated) DEVICE — PACK-MAJOR

## (undated) DEVICE — SOLUTION ANTIFOG VIS SYS CLEARIFY LAPSCP

## (undated) DEVICE — GLOVE ORTHO 8   MSG9480

## (undated) DEVICE — INTENDED FOR TISSUE SEPARATION, AND OTHER PROCEDURES THAT REQUIRE A SHARP SURGICAL BLADE TO PUNCTURE OR CUT.: Brand: BARD-PARKER ® CARBON RIB-BACK BLADES

## (undated) DEVICE — DRAPE,EXTREMITY,89X128,STERILE: Brand: MEDLINE

## (undated) DEVICE — GLOVE ORANGE PI 8   MSG9080

## (undated) DEVICE — RELOAD STPL 3.5MM L60MM 0DEG UNIV TISS PUR TI 6 ROW LIN

## (undated) DEVICE — REDUCER: Brand: ENDOWRIST

## (undated) DEVICE — CANNULA SEAL

## (undated) DEVICE — TOTAL TRAY, DB, 100% SILI FOLEY, 16FR 10: Brand: MEDLINE